# Patient Record
Sex: FEMALE | Race: BLACK OR AFRICAN AMERICAN | NOT HISPANIC OR LATINO | Employment: OTHER | URBAN - METROPOLITAN AREA
[De-identification: names, ages, dates, MRNs, and addresses within clinical notes are randomized per-mention and may not be internally consistent; named-entity substitution may affect disease eponyms.]

---

## 2017-02-15 ENCOUNTER — INFUSION (OUTPATIENT)
Dept: ONCOLOGY | Facility: HOSPITAL | Age: 72
End: 2017-02-15

## 2017-02-15 ENCOUNTER — OFFICE VISIT (OUTPATIENT)
Dept: ONCOLOGY | Facility: CLINIC | Age: 72
End: 2017-02-15

## 2017-02-15 VITALS
TEMPERATURE: 98.2 F | SYSTOLIC BLOOD PRESSURE: 175 MMHG | RESPIRATION RATE: 18 BRPM | DIASTOLIC BLOOD PRESSURE: 79 MMHG | HEIGHT: 60 IN | BODY MASS INDEX: 38.87 KG/M2 | WEIGHT: 198 LBS | HEART RATE: 60 BPM

## 2017-02-15 DIAGNOSIS — C50.911 INVASIVE DUCTAL CARCINOMA OF RIGHT BREAST (HCC): ICD-10-CM

## 2017-02-15 DIAGNOSIS — C50.912 INVASIVE DUCTAL CARCINOMA OF LEFT BREAST (HCC): Primary | ICD-10-CM

## 2017-02-15 DIAGNOSIS — M85.80 OSTEOPENIA DUE TO CANCER THERAPY: ICD-10-CM

## 2017-02-15 DIAGNOSIS — M85.80 OSTEOPENIA DUE TO CANCER THERAPY: Primary | ICD-10-CM

## 2017-02-15 DIAGNOSIS — C50.912 INVASIVE DUCTAL CARCINOMA OF LEFT BREAST (HCC): ICD-10-CM

## 2017-02-15 LAB
ALBUMIN SERPL-MCNC: 4.1 G/DL (ref 3.2–4.8)
ALBUMIN/GLOB SERPL: 1.1 G/DL (ref 1.5–2.5)
ALP SERPL-CCNC: 65 U/L (ref 25–100)
ALT SERPL W P-5'-P-CCNC: 64 U/L (ref 7–40)
ANION GAP SERPL CALCULATED.3IONS-SCNC: 3 MMOL/L (ref 3–11)
AST SERPL-CCNC: 68 U/L (ref 0–33)
BILIRUB SERPL-MCNC: 0.4 MG/DL (ref 0.3–1.2)
BUN BLD-MCNC: 12 MG/DL (ref 9–23)
BUN/CREAT SERPL: 15 (ref 7–25)
CALCIUM SPEC-SCNC: 9.7 MG/DL (ref 8.7–10.4)
CHLORIDE SERPL-SCNC: 102 MMOL/L (ref 99–109)
CO2 SERPL-SCNC: 33 MMOL/L (ref 20–31)
CREAT BLD-MCNC: 0.8 MG/DL (ref 0.6–1.3)
CREAT BLDA-MCNC: 1 MG/DL (ref 0.6–1.3)
ERYTHROCYTE [DISTWIDTH] IN BLOOD BY AUTOMATED COUNT: 14.5 % (ref 11.3–14.5)
GFR SERPL CREATININE-BSD FRML MDRD: 86 ML/MIN/1.73
GLOBULIN UR ELPH-MCNC: 3.8 GM/DL
GLUCOSE BLD-MCNC: 88 MG/DL (ref 70–100)
HCT VFR BLD AUTO: 46.3 % (ref 34.5–44)
HGB BLD-MCNC: 15.7 G/DL (ref 11.5–15.5)
LYMPHOCYTES # BLD AUTO: 1.4 10*3/MM3 (ref 0.6–4.8)
LYMPHOCYTES NFR BLD AUTO: 40.4 % (ref 24–44)
MAGNESIUM SERPL-MCNC: 1.7 MG/DL (ref 1.3–2.7)
MCH RBC QN AUTO: 32.8 PG (ref 27–31)
MCHC RBC AUTO-ENTMCNC: 33.9 G/DL (ref 32–36)
MCV RBC AUTO: 96.7 FL (ref 80–99)
MONOCYTES # BLD AUTO: 0.1 10*3/MM3 (ref 0–1)
MONOCYTES NFR BLD AUTO: 1.9 % (ref 0–12)
NEUTROPHILS # BLD AUTO: 2 10*3/MM3 (ref 1.5–8.3)
NEUTROPHILS NFR BLD AUTO: 57.7 % (ref 41–71)
PHOSPHATE SERPL-MCNC: 4 MG/DL (ref 2.4–5.1)
PLATELET # BLD AUTO: 211 10*3/MM3 (ref 150–450)
PMV BLD AUTO: 7.6 FL (ref 6–12)
POTASSIUM BLD-SCNC: 4.2 MMOL/L (ref 3.5–5.5)
PROT SERPL-MCNC: 7.9 G/DL (ref 5.7–8.2)
RBC # BLD AUTO: 4.79 10*6/MM3 (ref 3.89–5.14)
SODIUM BLD-SCNC: 138 MMOL/L (ref 132–146)
WBC NRBC COR # BLD: 3.5 10*3/MM3 (ref 3.5–10.8)

## 2017-02-15 PROCEDURE — 96374 THER/PROPH/DIAG INJ IV PUSH: CPT

## 2017-02-15 PROCEDURE — 25010000002 ZOLEDRONIC ACID PER 1 MG: Performed by: INTERNAL MEDICINE

## 2017-02-15 PROCEDURE — 99213 OFFICE O/P EST LOW 20 MIN: CPT | Performed by: INTERNAL MEDICINE

## 2017-02-15 PROCEDURE — 84100 ASSAY OF PHOSPHORUS: CPT | Performed by: INTERNAL MEDICINE

## 2017-02-15 PROCEDURE — 83735 ASSAY OF MAGNESIUM: CPT | Performed by: INTERNAL MEDICINE

## 2017-02-15 PROCEDURE — 80053 COMPREHEN METABOLIC PANEL: CPT | Performed by: INTERNAL MEDICINE

## 2017-02-15 RX ORDER — SODIUM CHLORIDE 9 MG/ML
250 INJECTION, SOLUTION INTRAVENOUS ONCE
Status: CANCELLED | OUTPATIENT
Start: 2017-02-15

## 2017-02-15 RX ORDER — COLCHICINE 0.6 MG/1
0.6 CAPSULE ORAL EVERY 12 HOURS SCHEDULED
COMMUNITY
Start: 2017-01-23 | End: 2020-07-23

## 2017-02-15 RX ORDER — METFORMIN HYDROCHLORIDE 500 MG/1
TABLET, EXTENDED RELEASE ORAL
COMMUNITY
Start: 2017-01-30 | End: 2018-06-13 | Stop reason: SDUPTHER

## 2017-02-15 RX ORDER — COLESEVELAM 180 1/1
1875 TABLET ORAL 3 TIMES DAILY
COMMUNITY
End: 2018-06-13

## 2017-02-15 RX ORDER — GABAPENTIN 300 MG/1
300 CAPSULE ORAL DAILY
COMMUNITY
Start: 2017-01-31 | End: 2020-07-23

## 2017-02-15 RX ADMIN — ZOLEDRONIC ACID 4 MG: 4 INJECTION, SOLUTION, CONCENTRATE INTRAVENOUS at 12:37

## 2017-02-15 NOTE — PROGRESS NOTES
PROBLEM LIST:  1. Left-sided pT1bN0 (IA), low-grade invasive ductal carcinoma diagnosed  05/01/2014. She presented with left-sided nipple discharge and an abnormality  on mammogram in 04/2014. Biopsy showed invasive ductal cancer low-grade,  estrogen receptor and progesterone receptor positive, HER-2 negative. She  underwent a left needle localization lumpectomy on 06/27/2014. Final tumor size  is 1.4 cm:   a) Oncotype recurrence score was 0 which is associated with a 3% risk of 10  year recurrence.  b) Adjuvant Arimidex was started after the completion of radiotherapy in  08/2014.  c) Arimidex was discontinued in 11/2014 due to severe hot flashes. Arimidex  resumed in 07/2015.  2. 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 Diagnosed on biopsy on 05/22/2014. She underwent a lumpectomy on  06/27/2014 with final tumor size being 2 mm. It was ER/DE positive with HER-2  not performed.   3. Hypothyroidism.   4. Diabetes.   5. Hypertension.   6. Hyperlipidemia.   7. Autoimmune hepatitis.  8. Osteopenia, on zometa.    Subjective     HISTORY OF PRESENT ILLNESS:   Radha John returns for follow-up.   Since she was last here she has been having trouble with low back pain radiating down her legs.  She had an xray which she reports shows arthritis.  The pain has been present for about a year.  Over that time it has not worsened.  She has tried physical therapy without much benefit.  Otherwise she continues to take anastrozole.  She still has significant hot flashes but has just decided to live with it.    Past Medical History, Past Surgical History, Social History, Family History have been reviewed and are without significant changes except as mentioned.    Review of Systems   A comprehensive 14 point review of systems was performed and was negative except as mentioned.    Medications:  The current medication list was reviewed in the EMR    ALLERGIES:    Allergies   Allergen Reactions  "  • Erythromycin    • Hydrocodone-Acetaminophen        Objective      Visit Vitals   • /79   • Pulse 60   • Temp 98.2 °F (36.8 °C)   • Resp 18   • Ht 60\" (152.4 cm)   • Wt 198 lb (89.8 kg)   • BMI 38.67 kg/m2        Performance Status: 0    General: well appearing female in no acute distress  Neuro: alert and oriented  HEENT: sclera anicteric, oropharynx clear  Lymphatics: no cervical, supraclavicular, or axillary adenopathy  Breast: Bilateral breast exam is performed.  No palpable masses.  Cardiovascular: regular rate and rhythm, no murmurs  Lungs: clear to auscultation bilaterally  Abdomen: soft, nontender, nondistended.  No palpable organomegaly  Extremeties: no lower extremity edema  Skin: no rashes, lesions, bruising, or petechiae  Psych: mood and affect appropriate      CBC and CMP are unremarkable.  She has stable elevation of transaminases with ALT 64 and AST 68.    mammogram on 12/22/2016is benign.  Follow-up in 1 year recommended.    Assessment/Plan   Radha John is a 70-year-old female with stage I ER positive breast cancer who returns for follow-up.she is doing well without evidence of recurrence of breast cancer.  She continues to have hot flashes which have been unresponsive to any medical management. She will have aGraham in December 2017.    She has osteopenia and is on Zometa.  She will have a repeat bone density scan in July 2018.    I will plan to see her back again in 6 months.                  Visit time was 15 minutes, greater than 50% spent in counseling      Sarah Prasad MD  [facility] Hematology and Oncology    2/15/2017          CC:          "

## 2017-05-15 ENCOUNTER — TRANSCRIBE ORDERS (OUTPATIENT)
Dept: MAMMOGRAPHY | Facility: HOSPITAL | Age: 72
End: 2017-05-15

## 2017-05-15 DIAGNOSIS — Z98.890 STATUS POST BREAST LUMPECTOMY: ICD-10-CM

## 2017-05-15 DIAGNOSIS — Z85.3 HISTORY OF LEFT BREAST CANCER: Primary | ICD-10-CM

## 2017-05-22 RX ORDER — ANASTROZOLE 1 MG/1
TABLET ORAL
Qty: 90 TABLET | Refills: 3 | Status: SHIPPED | OUTPATIENT
Start: 2017-05-22 | End: 2018-02-14 | Stop reason: SDUPTHER

## 2017-06-01 ENCOUNTER — HOSPITAL ENCOUNTER (OUTPATIENT)
Dept: MAMMOGRAPHY | Facility: HOSPITAL | Age: 72
Discharge: HOME | End: 2017-06-01
Attending: SURGERY | Admitting: SURGERY

## 2017-06-01 DIAGNOSIS — Z85.3 HISTORY OF LEFT BREAST CANCER: ICD-10-CM

## 2017-06-01 DIAGNOSIS — Z98.890 STATUS POST BREAST LUMPECTOMY: ICD-10-CM

## 2017-06-01 PROCEDURE — G0279 TOMOSYNTHESIS, MAMMO: HCPCS

## 2017-06-01 PROCEDURE — G0204 DX MAMMO INCL CAD BI: HCPCS

## 2017-06-01 PROCEDURE — G0204 DX MAMMO INCL CAD BI: HCPCS | Performed by: RADIOLOGY

## 2017-06-01 PROCEDURE — G0279 TOMOSYNTHESIS, MAMMO: HCPCS | Performed by: RADIOLOGY

## 2017-08-16 ENCOUNTER — OFFICE VISIT (OUTPATIENT)
Dept: ONCOLOGY | Facility: CLINIC | Age: 72
End: 2017-08-16

## 2017-08-16 ENCOUNTER — INFUSION (OUTPATIENT)
Dept: ONCOLOGY | Facility: HOSPITAL | Age: 72
End: 2017-08-16

## 2017-08-16 VITALS
TEMPERATURE: 97.5 F | WEIGHT: 197 LBS | SYSTOLIC BLOOD PRESSURE: 172 MMHG | RESPIRATION RATE: 18 BRPM | HEART RATE: 59 BPM | BODY MASS INDEX: 38.68 KG/M2 | DIASTOLIC BLOOD PRESSURE: 88 MMHG | HEIGHT: 60 IN

## 2017-08-16 DIAGNOSIS — C50.911 INVASIVE DUCTAL CARCINOMA OF RIGHT BREAST (HCC): ICD-10-CM

## 2017-08-16 DIAGNOSIS — M85.80 OSTEOPENIA DUE TO CANCER THERAPY: Primary | ICD-10-CM

## 2017-08-16 DIAGNOSIS — C50.912 INVASIVE DUCTAL CARCINOMA OF LEFT BREAST (HCC): Primary | ICD-10-CM

## 2017-08-16 DIAGNOSIS — M85.80 OSTEOPENIA DUE TO CANCER THERAPY: ICD-10-CM

## 2017-08-16 DIAGNOSIS — C50.912 INVASIVE DUCTAL CARCINOMA OF LEFT BREAST (HCC): ICD-10-CM

## 2017-08-16 LAB
ALBUMIN SERPL-MCNC: 3.9 G/DL (ref 3.2–4.8)
ALBUMIN/GLOB SERPL: 1.2 G/DL (ref 1.5–2.5)
ALP SERPL-CCNC: 58 U/L (ref 25–100)
ALT SERPL W P-5'-P-CCNC: 37 U/L (ref 7–40)
ANION GAP SERPL CALCULATED.3IONS-SCNC: 4 MMOL/L (ref 3–11)
AST SERPL-CCNC: 26 U/L (ref 0–33)
BILIRUB SERPL-MCNC: 0.3 MG/DL (ref 0.3–1.2)
BUN BLD-MCNC: 17 MG/DL (ref 9–23)
BUN/CREAT SERPL: 21.3 (ref 7–25)
CALCIUM SPEC-SCNC: 8.8 MG/DL (ref 8.7–10.4)
CHLORIDE SERPL-SCNC: 105 MMOL/L (ref 99–109)
CO2 SERPL-SCNC: 31 MMOL/L (ref 20–31)
CREAT BLD-MCNC: 0.8 MG/DL (ref 0.6–1.3)
CREAT BLDA-MCNC: 1 MG/DL (ref 0.6–1.3)
ERYTHROCYTE [DISTWIDTH] IN BLOOD BY AUTOMATED COUNT: 15.3 % (ref 11.3–14.5)
GFR SERPL CREATININE-BSD FRML MDRD: 86 ML/MIN/1.73
GLOBULIN UR ELPH-MCNC: 3.3 GM/DL
GLUCOSE BLD-MCNC: 98 MG/DL (ref 70–100)
HCT VFR BLD AUTO: 47.8 % (ref 34.5–44)
HGB BLD-MCNC: 15.4 G/DL (ref 11.5–15.5)
LYMPHOCYTES # BLD AUTO: 1.4 10*3/MM3 (ref 0.6–4.8)
LYMPHOCYTES NFR BLD AUTO: 28.3 % (ref 24–44)
MCH RBC QN AUTO: 32.6 PG (ref 27–31)
MCHC RBC AUTO-ENTMCNC: 32.1 G/DL (ref 32–36)
MCV RBC AUTO: 101.5 FL (ref 80–99)
MONOCYTES # BLD AUTO: 0.2 10*3/MM3 (ref 0–1)
MONOCYTES NFR BLD AUTO: 4.6 % (ref 0–12)
NEUTROPHILS # BLD AUTO: 3.4 10*3/MM3 (ref 1.5–8.3)
NEUTROPHILS NFR BLD AUTO: 67.1 % (ref 41–71)
PLATELET # BLD AUTO: 179 10*3/MM3 (ref 150–450)
PMV BLD AUTO: 6.9 FL (ref 6–12)
POTASSIUM BLD-SCNC: 3.8 MMOL/L (ref 3.5–5.5)
PROT SERPL-MCNC: 7.2 G/DL (ref 5.7–8.2)
RBC # BLD AUTO: 4.71 10*6/MM3 (ref 3.89–5.14)
SODIUM BLD-SCNC: 140 MMOL/L (ref 132–146)
WBC NRBC COR # BLD: 5.1 10*3/MM3 (ref 3.5–10.8)

## 2017-08-16 PROCEDURE — 85025 COMPLETE CBC W/AUTO DIFF WBC: CPT

## 2017-08-16 PROCEDURE — 80053 COMPREHEN METABOLIC PANEL: CPT

## 2017-08-16 PROCEDURE — 36415 COLL VENOUS BLD VENIPUNCTURE: CPT

## 2017-08-16 PROCEDURE — 99214 OFFICE O/P EST MOD 30 MIN: CPT | Performed by: INTERNAL MEDICINE

## 2017-08-16 PROCEDURE — 96375 TX/PRO/DX INJ NEW DRUG ADDON: CPT

## 2017-08-16 PROCEDURE — 82565 ASSAY OF CREATININE: CPT

## 2017-08-16 PROCEDURE — 96365 THER/PROPH/DIAG IV INF INIT: CPT

## 2017-08-16 PROCEDURE — 25010000002 ZOLEDRONIC ACID PER 1 MG: Performed by: INTERNAL MEDICINE

## 2017-08-16 RX ORDER — SODIUM CHLORIDE 9 MG/ML
250 INJECTION, SOLUTION INTRAVENOUS ONCE
Status: CANCELLED | OUTPATIENT
Start: 2017-08-16

## 2017-08-16 RX ORDER — SODIUM CHLORIDE 9 MG/ML
250 INJECTION, SOLUTION INTRAVENOUS ONCE
Status: COMPLETED | OUTPATIENT
Start: 2017-08-16 | End: 2017-08-16

## 2017-08-16 RX ORDER — BUDESONIDE 3 MG/1
3 CAPSULE, COATED PELLETS ORAL EVERY MORNING
COMMUNITY
Start: 2017-07-22 | End: 2018-12-17 | Stop reason: DRUGHIGH

## 2017-08-16 RX ADMIN — SODIUM CHLORIDE 250 ML: 9 INJECTION, SOLUTION INTRAVENOUS at 10:23

## 2017-08-16 RX ADMIN — ZOLEDRONIC ACID 4 MG: 4 INJECTION, SOLUTION, CONCENTRATE INTRAVENOUS at 10:50

## 2017-08-16 NOTE — PROGRESS NOTES
PROBLEM LIST:  1. Left-sided pT1bN0 (IA), low-grade invasive ductal carcinoma diagnosed  05/01/2014. She presented with left-sided nipple discharge and an abnormality  on mammogram in 04/2014. Biopsy showed invasive ductal cancer low-grade,  estrogen receptor and progesterone receptor positive, HER-2 negative. She  underwent a left needle localization lumpectomy on 06/27/2014. Final tumor size  is 1.4 cm:   a) Oncotype recurrence score was 0 which is associated with a 3% risk of 10  year recurrence.  b) Adjuvant Arimidex was started after the completion of radiotherapy in  08/2014.  c) Arimidex was discontinued in 11/2014 due to severe hot flashes. Arimidex  resumed in 07/2015.  2. 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 Diagnosed on biopsy on 05/22/2014. She underwent a lumpectomy on  06/27/2014 with final tumor size being 2 mm. It was ER/TX positive with HER-2  not performed.   3. Hypothyroidism.   4. Diabetes.   5. Hypertension.   6. Hyperlipidemia.   7. Autoimmune hepatitis.  8. Osteopenia, on zometa.    Subjective     HISTORY OF PRESENT ILLNESS:   Radha John returns for follow-up.   She says she has been doing pretty well.  She had a recent flare of her autoimmune hepatitis and had to go back on some medication for this.  She says it is improving.  She continues to have severe hot flashes.  She says the sweat just runs down her face.  She is currently taking gabapentin for her arthritis pain.  We previously tried Effexor without benefit.    Past Medical History, Past Surgical History, Social History, Family History have been reviewed and are without significant changes except as mentioned.    Review of Systems   A comprehensive 14 point review of systems was performed and was negative except as mentioned.    Medications:  The current medication list was reviewed in the EMR    ALLERGIES:    Allergies   Allergen Reactions   • Erythromycin    •  "Hydrocodone-Acetaminophen        Objective      /88 Comment: LUE  Pulse 59  Temp 97.5 °F (36.4 °C) (Temporal Artery )   Resp 18  Ht 60\" (152.4 cm)  Wt 197 lb (89.4 kg)  BMI 38.47 kg/m2     Performance Status: 0    General: well appearing female in no acute distress  Neuro: alert and oriented  HEENT: sclera anicteric, oropharynx clear  Lymphatics: no cervical, supraclavicular, or axillary adenopathy  Breast: Bilateral breast exam is performed.  No palpable masses.  Cardiovascular: regular rate and rhythm, no murmurs  Lungs: clear to auscultation bilaterally  Abdomen: soft, nontender, nondistended.  No palpable organomegaly  Extremeties: no lower extremity edema  Skin: no rashes, lesions, bruising, or petechiae  Psych: mood and affect appropriate        Assessment/Plan   Radha John is a 70-year-old female with stage I ER positive breast cancer who returns for follow-up.  She continues to do well without evidence of cancer recurrence.     She continues to have significant hot flashes.  We have tried Effexor and gabapentin in the past.  We tried stopping the aromatase inhibitor and she did not have relief with this either.  We will try clonidine 0.1 mg patch.  We can increase the dose if needed.  She will call and let us know how this is going.  She will have mammogram in December 2017.    She has osteopenia and is on Zometa which she will receive today.  She will have a repeat bone density scan in July 2018.    I will plan to see her back again in 6 months.                  Visit time was 25 minutes, greater than 50% spent in counseling      Sarah Prasad MD  [facility] Hematology and Oncology    8/16/2017          CC:          "

## 2018-01-24 ENCOUNTER — TRANSCRIBE ORDERS (OUTPATIENT)
Dept: MAMMOGRAPHY | Facility: HOSPITAL | Age: 73
End: 2018-01-24

## 2018-01-24 DIAGNOSIS — C50.912 DUCTAL CARCINOMA OF BOTH BREASTS (HCC): Primary | ICD-10-CM

## 2018-01-24 DIAGNOSIS — C50.911 DUCTAL CARCINOMA OF BOTH BREASTS (HCC): Primary | ICD-10-CM

## 2018-01-25 ENCOUNTER — TELEPHONE (OUTPATIENT)
Dept: ONCOLOGY | Facility: CLINIC | Age: 73
End: 2018-01-25

## 2018-01-25 NOTE — TELEPHONE ENCOUNTER
Returned call to patient. On Tuesday she started having tenderness on her left nipple. Yesterday, after taking her bra off she noticed her left breast was red and warm to touch. She cannot feel any lumps/bumps. Patient was due for mammogram in December 2017. She has a 6 month follow up apt scheduled with Dr. Prasad on 2-14-18. I told her to call the breast center and schedule a mammogram sometime before 2/14 and call me back if the pain/redness gets worse and we will get her in to see Dr. Prasad sooner than the 14th.

## 2018-01-25 NOTE — TELEPHONE ENCOUNTER
----- Message from Yina Hurtado sent at 1/25/2018  8:43 AM EST -----  Regarding: ELIAS-LEFT BREAST RED AND SORE  Contact: [phone]  Patient called and her left breast is red and sore. Please call.

## 2018-01-26 ENCOUNTER — TELEPHONE (OUTPATIENT)
Dept: ONCOLOGY | Facility: CLINIC | Age: 73
End: 2018-01-26

## 2018-01-26 DIAGNOSIS — C50.912 INVASIVE DUCTAL CARCINOMA OF LEFT BREAST (HCC): Primary | ICD-10-CM

## 2018-01-26 DIAGNOSIS — C50.911 INVASIVE DUCTAL CARCINOMA OF RIGHT BREAST (HCC): ICD-10-CM

## 2018-01-26 NOTE — TELEPHONE ENCOUNTER
1-25-18: Yesterday, patient called me back because when she tried to schedule her mammogram the radiologist told her she could not have one until June. I told her I would check with Dr. Prasad and call her back tomorrow.     1-26-18: Called patient, no answer, left VM. Told her since she is experiencing new symptoms, we can do a diagnostic left breast mammogram. I have placed this order in epic and scheduled her for 2-6-18. She is to arrive at 8:40 for 9:00 apt. [address]

## 2018-02-06 ENCOUNTER — HOSPITAL ENCOUNTER (OUTPATIENT)
Dept: MAMMOGRAPHY | Facility: HOSPITAL | Age: 73
Discharge: HOME | End: 2018-02-06
Attending: INTERNAL MEDICINE | Admitting: INTERNAL MEDICINE

## 2018-02-06 DIAGNOSIS — C50.912 INVASIVE DUCTAL CARCINOMA OF LEFT BREAST (HCC): ICD-10-CM

## 2018-02-06 DIAGNOSIS — C50.911 INVASIVE DUCTAL CARCINOMA OF RIGHT BREAST (HCC): ICD-10-CM

## 2018-02-06 PROCEDURE — 77065 DX MAMMO INCL CAD UNI: CPT

## 2018-02-06 PROCEDURE — G0279 TOMOSYNTHESIS, MAMMO: HCPCS

## 2018-02-06 PROCEDURE — 77065 DX MAMMO INCL CAD UNI: CPT | Performed by: RADIOLOGY

## 2018-02-06 PROCEDURE — G0279 TOMOSYNTHESIS, MAMMO: HCPCS | Performed by: RADIOLOGY

## 2018-02-14 ENCOUNTER — INFUSION (OUTPATIENT)
Dept: ONCOLOGY | Facility: HOSPITAL | Age: 73
End: 2018-02-14

## 2018-02-14 ENCOUNTER — OFFICE VISIT (OUTPATIENT)
Dept: ONCOLOGY | Facility: CLINIC | Age: 73
End: 2018-02-14

## 2018-02-14 VITALS
RESPIRATION RATE: 18 BRPM | SYSTOLIC BLOOD PRESSURE: 155 MMHG | DIASTOLIC BLOOD PRESSURE: 88 MMHG | BODY MASS INDEX: 40.05 KG/M2 | HEIGHT: 60 IN | WEIGHT: 204 LBS | HEART RATE: 61 BPM | TEMPERATURE: 97.1 F

## 2018-02-14 DIAGNOSIS — C50.911 INVASIVE DUCTAL CARCINOMA OF RIGHT BREAST (HCC): Primary | ICD-10-CM

## 2018-02-14 DIAGNOSIS — M85.80 OSTEOPENIA DUE TO CANCER THERAPY: ICD-10-CM

## 2018-02-14 DIAGNOSIS — M85.80 OSTEOPENIA DUE TO CANCER THERAPY: Primary | ICD-10-CM

## 2018-02-14 DIAGNOSIS — C50.912 INVASIVE DUCTAL CARCINOMA OF LEFT BREAST (HCC): ICD-10-CM

## 2018-02-14 DIAGNOSIS — C50.911 INVASIVE DUCTAL CARCINOMA OF RIGHT BREAST (HCC): ICD-10-CM

## 2018-02-14 LAB
CREAT BLDA-MCNC: 1 MG/DL (ref 0.6–1.3)
ERYTHROCYTE [DISTWIDTH] IN BLOOD BY AUTOMATED COUNT: 15.6 % (ref 11.3–14.5)
HCT VFR BLD AUTO: 47.4 % (ref 34.5–44)
HGB BLD-MCNC: 14.8 G/DL (ref 11.5–15.5)
LYMPHOCYTES # BLD AUTO: 1.5 10*3/MM3 (ref 0.6–4.8)
LYMPHOCYTES NFR BLD AUTO: 38.3 % (ref 24–44)
MCH RBC QN AUTO: 31.4 PG (ref 27–31)
MCHC RBC AUTO-ENTMCNC: 31.2 G/DL (ref 32–36)
MCV RBC AUTO: 100.7 FL (ref 80–99)
MONOCYTES # BLD AUTO: 0.3 10*3/MM3 (ref 0–1)
MONOCYTES NFR BLD AUTO: 7.1 % (ref 0–12)
NEUTROPHILS # BLD AUTO: 2.2 10*3/MM3 (ref 1.5–8.3)
NEUTROPHILS NFR BLD AUTO: 54.6 % (ref 41–71)
PLATELET # BLD AUTO: 227 10*3/MM3 (ref 150–450)
PMV BLD AUTO: 7.4 FL (ref 6–12)
RBC # BLD AUTO: 4.71 10*6/MM3 (ref 3.89–5.14)
WBC NRBC COR # BLD: 4 10*3/MM3 (ref 3.5–10.8)

## 2018-02-14 PROCEDURE — 85025 COMPLETE CBC W/AUTO DIFF WBC: CPT

## 2018-02-14 PROCEDURE — 96374 THER/PROPH/DIAG INJ IV PUSH: CPT

## 2018-02-14 PROCEDURE — 82565 ASSAY OF CREATININE: CPT

## 2018-02-14 PROCEDURE — 36415 COLL VENOUS BLD VENIPUNCTURE: CPT

## 2018-02-14 PROCEDURE — 99214 OFFICE O/P EST MOD 30 MIN: CPT | Performed by: INTERNAL MEDICINE

## 2018-02-14 PROCEDURE — 25010000002 ZOLEDRONIC ACID PER 1 MG: Performed by: INTERNAL MEDICINE

## 2018-02-14 RX ORDER — SODIUM CHLORIDE 9 MG/ML
250 INJECTION, SOLUTION INTRAVENOUS ONCE
Status: CANCELLED | OUTPATIENT
Start: 2018-02-14

## 2018-02-14 RX ORDER — ANASTROZOLE 1 MG/1
1 TABLET ORAL DAILY
Qty: 90 TABLET | Refills: 3 | Status: SHIPPED | OUTPATIENT
Start: 2018-02-14 | End: 2019-03-02 | Stop reason: SDUPTHER

## 2018-02-14 RX ADMIN — ZOLEDRONIC ACID 4 MG: 4 INJECTION, SOLUTION, CONCENTRATE INTRAVENOUS at 10:06

## 2018-02-14 NOTE — PROGRESS NOTES
PROBLEM LIST:  1. Left-sided pT1bN0 (IA), low-grade invasive ductal carcinoma diagnosed  05/01/2014. She presented with left-sided nipple discharge and an abnormality  on mammogram in 04/2014. Biopsy showed invasive ductal cancer low-grade,  estrogen receptor and progesterone receptor positive, HER-2 negative. She  underwent a left needle localization lumpectomy on 06/27/2014. Final tumor size  is 1.4 cm:   a) Oncotype recurrence score was 0 which is associated with a 3% risk of 10  year recurrence.  b) Adjuvant Arimidex was started after the completion of radiotherapy in  08/2014.  c) Arimidex was discontinued in 11/2014 due to severe hot flashes. Arimidex  resumed in 07/2015.  2. 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 Diagnosed on biopsy on 05/22/2014. She underwent a lumpectomy on  06/27/2014 with final tumor size being 2 mm. It was ER/[place] positive with HER-2  not performed.   3. Hypothyroidism.   4. Diabetes.   5. Hypertension.   6. Hyperlipidemia.   7. Autoimmune hepatitis.  8. Osteopenia, on zometa.    Subjective     HISTORY OF PRESENT ILLNESS:   Radha John returns for follow-up.  She recently had an episode of cellulitis of the breast.  This was quite worrisome for her.  She saw Dr. Henry who placed her on antibiotics which led to a fairly quick resolution of her symptoms.  She otherwise is doing well.  She continues to take anastrozole.  She does report recently been diagnosed with a skin cancer on the scalp.  She was told this could be related to her Imuran which she takes for autoimmune hepatitis.  She is thinking of reducing her Imuran dose.    Past Medical History, Past Surgical History, Social History, Family History have been reviewed and are without significant changes except as mentioned.    Review of Systems   A comprehensive 14 point review of systems was performed and was negative except as mentioned.    Medications:  The current medication list was  "reviewed in the EMR    ALLERGIES:    Allergies   Allergen Reactions   • Erythromycin    • Hydrocodone-Acetaminophen        Objective      /88 Comment: LUE  Pulse 61  Temp 97.1 °F (36.2 °C) (Temporal Artery )   Resp 18  Ht 152.4 cm (60\")  Wt 92.5 kg (204 lb)  BMI 39.84 kg/m2     Performance Status: 0    General: well appearing female in no acute distress  Neuro: alert and oriented  HEENT: sclera anicteric, oropharynx clear  Lymphatics: no cervical, supraclavicular, or axillary adenopathy  Cardiovascular: regular rate and rhythm, no murmurs  Lungs: clear to auscultation bilaterally  Abdomen: soft, nontender, nondistended.  No palpable organomegaly  Extremeties: no lower extremity edema  Skin: no rashes, lesions, bruising, or petechiae  Psych: mood and affect appropriate        Assessment/Plan   Radha John is a 72 y.o.  female with stage I ER positive breast cancer who returns for follow-up.  She is on anastrozole and is doing well with no evidence of disease recurrence.    She is due for a mammogram in June.  She had a recent mammogram done because of the redness and swelling of the breast which did not show any concerning findings.    She has osteopenia will receive her 4th dose of zometa today.  We will plan to have this be the last dose.    I will plan to see her back again in 6 months.  I will plan to order a repeat bone density scan when she returns.                  Visit time was 25 minutes, greater than 50% spent in counseling      Sarah Prasad MD  [facility] Hematology and Oncology    2/14/2018          CC:          "

## 2018-06-04 ENCOUNTER — APPOINTMENT (OUTPATIENT)
Dept: MAMMOGRAPHY | Facility: HOSPITAL | Age: 73
End: 2018-06-04
Attending: SURGERY

## 2018-06-13 ENCOUNTER — APPOINTMENT (OUTPATIENT)
Dept: LAB | Facility: HOSPITAL | Age: 73
End: 2018-06-13

## 2018-06-13 ENCOUNTER — OFFICE VISIT (OUTPATIENT)
Dept: ONCOLOGY | Facility: CLINIC | Age: 73
End: 2018-06-13

## 2018-06-13 VITALS
TEMPERATURE: 98 F | WEIGHT: 198 LBS | HEIGHT: 60 IN | BODY MASS INDEX: 38.87 KG/M2 | DIASTOLIC BLOOD PRESSURE: 84 MMHG | HEART RATE: 58 BPM | SYSTOLIC BLOOD PRESSURE: 172 MMHG | RESPIRATION RATE: 20 BRPM

## 2018-06-13 DIAGNOSIS — C50.912 INVASIVE DUCTAL CARCINOMA OF LEFT BREAST (HCC): Primary | ICD-10-CM

## 2018-06-13 DIAGNOSIS — D75.1 ERYTHROCYTOSIS: ICD-10-CM

## 2018-06-13 DIAGNOSIS — C50.911 INVASIVE DUCTAL CARCINOMA OF RIGHT BREAST (HCC): ICD-10-CM

## 2018-06-13 LAB
ALBUMIN SERPL-MCNC: 4.01 G/DL (ref 3.2–4.8)
ALBUMIN/GLOB SERPL: 1.2 G/DL (ref 1.5–2.5)
ALP SERPL-CCNC: 54 U/L (ref 25–100)
ALT SERPL W P-5'-P-CCNC: 62 U/L (ref 7–40)
ANION GAP SERPL CALCULATED.3IONS-SCNC: 5 MMOL/L (ref 3–11)
AST SERPL-CCNC: 37 U/L (ref 0–33)
BILIRUB SERPL-MCNC: 0.5 MG/DL (ref 0.3–1.2)
BUN BLD-MCNC: 16 MG/DL (ref 9–23)
BUN/CREAT SERPL: 17.8 (ref 7–25)
CALCIUM SPEC-SCNC: 9 MG/DL (ref 8.7–10.4)
CHLORIDE SERPL-SCNC: 102 MMOL/L (ref 99–109)
CO2 SERPL-SCNC: 34 MMOL/L (ref 20–31)
CREAT BLD-MCNC: 0.9 MG/DL (ref 0.6–1.3)
ERYTHROCYTE [DISTWIDTH] IN BLOOD BY AUTOMATED COUNT: 16.1 % (ref 11.3–14.5)
GFR SERPL CREATININE-BSD FRML MDRD: 75 ML/MIN/1.73
GLOBULIN UR ELPH-MCNC: 3.4 GM/DL
GLUCOSE BLD-MCNC: 80 MG/DL (ref 70–100)
HCT VFR BLD AUTO: 50.2 % (ref 34.5–44)
HGB BLD-MCNC: 16.2 G/DL (ref 11.5–15.5)
LDH SERPL-CCNC: 289 U/L (ref 120–246)
LYMPHOCYTES # BLD AUTO: 2 10*3/MM3 (ref 0.6–4.8)
LYMPHOCYTES NFR BLD AUTO: 31.6 % (ref 24–44)
MAGNESIUM SERPL-MCNC: 2.1 MG/DL (ref 1.3–2.7)
MCH RBC QN AUTO: 32.4 PG (ref 27–31)
MCHC RBC AUTO-ENTMCNC: 32.3 G/DL (ref 32–36)
MCV RBC AUTO: 100.2 FL (ref 80–99)
MONOCYTES # BLD AUTO: 0.2 10*3/MM3 (ref 0–1)
MONOCYTES NFR BLD AUTO: 3.8 % (ref 0–12)
NEUTROPHILS # BLD AUTO: 4.1 10*3/MM3 (ref 1.5–8.3)
NEUTROPHILS NFR BLD AUTO: 64.6 % (ref 41–71)
PHOSPHATE SERPL-MCNC: 4.2 MG/DL (ref 2.4–5.1)
PLATELET # BLD AUTO: 212 10*3/MM3 (ref 150–450)
PMV BLD AUTO: 6.8 FL (ref 6–12)
POTASSIUM BLD-SCNC: 4.6 MMOL/L (ref 3.5–5.5)
PROT SERPL-MCNC: 7.4 G/DL (ref 5.7–8.2)
RBC # BLD AUTO: 5.01 10*6/MM3 (ref 3.89–5.14)
RETICS/RBC NFR AUTO: 1.63 % (ref 0.5–1.5)
SODIUM BLD-SCNC: 141 MMOL/L (ref 132–146)
WBC NRBC COR # BLD: 6.3 10*3/MM3 (ref 3.5–10.8)

## 2018-06-13 PROCEDURE — 99214 OFFICE O/P EST MOD 30 MIN: CPT | Performed by: INTERNAL MEDICINE

## 2018-06-13 PROCEDURE — 85045 AUTOMATED RETICULOCYTE COUNT: CPT | Performed by: INTERNAL MEDICINE

## 2018-06-13 PROCEDURE — 36415 COLL VENOUS BLD VENIPUNCTURE: CPT

## 2018-06-13 PROCEDURE — 82668 ASSAY OF ERYTHROPOIETIN: CPT | Performed by: INTERNAL MEDICINE

## 2018-06-13 PROCEDURE — 85025 COMPLETE CBC W/AUTO DIFF WBC: CPT | Performed by: INTERNAL MEDICINE

## 2018-06-13 PROCEDURE — 83735 ASSAY OF MAGNESIUM: CPT

## 2018-06-13 PROCEDURE — 80053 COMPREHEN METABOLIC PANEL: CPT | Performed by: INTERNAL MEDICINE

## 2018-06-13 PROCEDURE — 84100 ASSAY OF PHOSPHORUS: CPT

## 2018-06-13 PROCEDURE — 83615 LACTATE (LD) (LDH) ENZYME: CPT | Performed by: INTERNAL MEDICINE

## 2018-06-13 RX ORDER — TRAMADOL HYDROCHLORIDE 50 MG/1
50 TABLET ORAL EVERY 8 HOURS PRN
COMMUNITY
End: 2020-07-23

## 2018-06-13 NOTE — PROGRESS NOTES
PROBLEM LIST:  1. Left-sided pT1bN0 (IA), low-grade invasive ductal carcinoma diagnosed  05/01/2014. She presented with left-sided nipple discharge and an abnormality  on mammogram in 04/2014. Biopsy showed invasive ductal cancer low-grade,  estrogen receptor and progesterone receptor positive, HER-2 negative. She  underwent a left needle localization lumpectomy on 06/27/2014. Final tumor size  is 1.4 cm:   a) Oncotype recurrence score was 0 which is associated with a 3% risk of 10  year recurrence.  b) Adjuvant Arimidex was started after the completion of radiotherapy in  08/2014.  c) Arimidex was discontinued in 11/2014 due to severe hot flashes. Arimidex  resumed in 07/2015.  2. 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 Diagnosed on biopsy on 05/22/2014. She underwent a lumpectomy on  06/27/2014 with final tumor size being 2 mm. It was ER/[place] positive with HER-2  not performed.   3. Hypothyroidism.   4. Diabetes.   5. Hypertension.   6. Hyperlipidemia.   7. Autoimmune hepatitis.  8. Osteopenia, received zometa x 4 doses, completed February 2018.  9. Erythrocytosis    Subjective     HISTORY OF PRESENT ILLNESS:   Radha John returns for follow-up.  She is here to discuss some recent labs which showed a hgb of 17.3.  She says she has been feeling well and has no new complaints or concerns.  She does mention that she has recently had a mild elevation of her liver enzymes and she is concerned about this.  Her GI doctor Dr. Hansen has recommended that she check them every 2 weeks.  She is currently taking Imuran and budesonide.    Past Medical History, Past Surgical History, Social History, Family History have been reviewed and are without significant changes except as mentioned.    Review of Systems   A comprehensive 14 point review of systems was performed and was negative except as mentioned.    Medications:  The current medication list was reviewed in the  "EMR    ALLERGIES:    Allergies   Allergen Reactions   • Erythromycin    • Hydrocodone-Acetaminophen        Objective      /84   Pulse 58   Temp 98 °F (36.7 °C)   Resp 20   Ht 152.4 cm (60\")   Wt 89.8 kg (198 lb)   BMI 38.67 kg/m²      Performance Status: 0    General: well appearing female in no acute distress  Neuro: alert and oriented  HEENT: sclera anicteric, oropharynx clear  Lymphatics: no cervical, supraclavicular, or axillary adenopathy  Cardiovascular: regular rate and rhythm, no murmurs  Lungs: clear to auscultation bilaterally  Abdomen: soft, nontender, nondistended.  No palpable organomegaly  Extremeties: no lower extremity edema  Skin: no rashes, lesions, bruising, or petechiae  Psych: mood and affect appropriate    Labs:    11/28/17- wbc 4.2, hgb 15.5, hct 46, mcv 98.9, plt 147    2/14/18 - wbc 4.0, hgb 14.8, hct 47.4, mcv 100.7, plt 227    5/9/18 - wbc 5.2, hgb 16.6, hct 48.8, mcv 98, plt 181    5/30/18 - wbc 5.7, hgb 17.3, hct 51.2, mcv 99, plt 191      Assessment/Plan   Radha John is a 72 y.o.  female with stage I ER positive breast cancer who returns for evaluation of a recently elevated hemoglobin and hematocrit.  In reviewing her labs in the past few years she has occasionally had a hematocrit at the upper limit of normal.  On May 30 her hemoglobin was 17.3, hematocrit 51.2%.  She is asymptomatic from this.    We discussed potential explanations for an elevated hemoglobin and hematocrit.  The most common situation is a secondary erythrocytosis, in which the body is responding to chronic hypoxia.  This can be seen with pulmonary disease or sleep apnea.  Rarely, certain malignancies can lead to increased erythropoeitin production, namely HCC or renal cell carcinoma.   Primary erythrocytosis can be seen with myeloproliferative disorders such as polycythemia vera.     We will check some labs today including a repeat CBC, LFTs, and erythropoietin level.  I will call her regarding the " results and whether further workup is required.    Repeat bone density scan due in fall 2018.                  Visit time was 25 minutes, greater than 50% spent in counseling      Sarah Prasad MD  [facility] Hematology and Oncology    6/13/2018          CC:

## 2018-06-14 LAB — ETHNIC BACKGROUND STATED: 37.1 MIU/ML (ref 2.6–18.5)

## 2018-06-15 ENCOUNTER — TELEPHONE (OUTPATIENT)
Dept: ONCOLOGY | Facility: CLINIC | Age: 73
End: 2018-06-15

## 2018-06-15 DIAGNOSIS — R79.89 ABNORMAL LIVER FUNCTION TEST: Primary | ICD-10-CM

## 2018-06-15 NOTE — TELEPHONE ENCOUNTER
Called patient re: labs.  epo level is elevated along with hgb/hct.  Given her history of hepatitis as well as recent LFT elevation, will do AFP and CT imaging of the liver to r/o a liver tumor, which can be associated with erythrocytosis.

## 2018-06-19 ENCOUNTER — HOSPITAL ENCOUNTER (OUTPATIENT)
Dept: CT IMAGING | Facility: HOSPITAL | Age: 73
Discharge: HOME | End: 2018-06-19
Attending: INTERNAL MEDICINE | Admitting: INTERNAL MEDICINE

## 2018-06-19 DIAGNOSIS — R79.89 ABNORMAL LIVER FUNCTION TEST: ICD-10-CM

## 2018-06-19 PROCEDURE — 25010000002 IOPAMIDOL 61 % SOLUTION: Performed by: INTERNAL MEDICINE

## 2018-06-19 PROCEDURE — 74160 CT ABDOMEN W/CONTRAST: CPT

## 2018-06-19 RX ADMIN — IOPAMIDOL 80 ML: 612 INJECTION, SOLUTION INTRAVENOUS at 08:35

## 2018-06-21 ENCOUNTER — TELEPHONE (OUTPATIENT)
Dept: ONCOLOGY | Facility: CLINIC | Age: 73
End: 2018-06-21

## 2018-06-21 NOTE — TELEPHONE ENCOUNTER
Called pt to let her know CT abd showed moderate steatosis but no other abnormality.  No liver or kidney tumor that could explain erythrocytosis and elevated epo level.  For now we can monitor her labs.  If hgb continues to increase over time, she may need eval for lung disease/sleep apnea.

## 2018-06-28 ENCOUNTER — HOSPITAL ENCOUNTER (OUTPATIENT)
Dept: MAMMOGRAPHY | Facility: HOSPITAL | Age: 73
Discharge: HOME | End: 2018-06-28
Attending: SURGERY | Admitting: SURGERY

## 2018-06-28 ENCOUNTER — TRANSCRIBE ORDERS (OUTPATIENT)
Dept: MAMMOGRAPHY | Facility: HOSPITAL | Age: 73
End: 2018-06-28

## 2018-06-28 ENCOUNTER — LAB REQUISITION (OUTPATIENT)
Dept: LAB | Facility: HOSPITAL | Age: 73
End: 2018-06-28

## 2018-06-28 DIAGNOSIS — C50.912 DUCTAL CARCINOMA OF BOTH BREASTS (HCC): ICD-10-CM

## 2018-06-28 DIAGNOSIS — R92.8 ABNORMAL MAMMOGRAM: Primary | ICD-10-CM

## 2018-06-28 DIAGNOSIS — C50.911 DUCTAL CARCINOMA OF BOTH BREASTS (HCC): ICD-10-CM

## 2018-06-28 DIAGNOSIS — Z00.00 ROUTINE GENERAL MEDICAL EXAMINATION AT A HEALTH CARE FACILITY: ICD-10-CM

## 2018-06-28 PROCEDURE — G0279 TOMOSYNTHESIS, MAMMO: HCPCS

## 2018-06-28 PROCEDURE — 77066 DX MAMMO INCL CAD BI: CPT | Performed by: RADIOLOGY

## 2018-06-28 PROCEDURE — 77066 DX MAMMO INCL CAD BI: CPT

## 2018-06-28 PROCEDURE — G0279 TOMOSYNTHESIS, MAMMO: HCPCS | Performed by: RADIOLOGY

## 2018-06-28 PROCEDURE — 36415 COLL VENOUS BLD VENIPUNCTURE: CPT | Performed by: INTERNAL MEDICINE

## 2018-08-29 ENCOUNTER — APPOINTMENT (OUTPATIENT)
Dept: LAB | Facility: HOSPITAL | Age: 73
End: 2018-08-29

## 2018-08-29 ENCOUNTER — OFFICE VISIT (OUTPATIENT)
Dept: ONCOLOGY | Facility: CLINIC | Age: 73
End: 2018-08-29

## 2018-08-29 VITALS
HEART RATE: 58 BPM | TEMPERATURE: 97.6 F | SYSTOLIC BLOOD PRESSURE: 164 MMHG | BODY MASS INDEX: 40.05 KG/M2 | WEIGHT: 204 LBS | HEIGHT: 60 IN | DIASTOLIC BLOOD PRESSURE: 79 MMHG | OXYGEN SATURATION: 94 % | RESPIRATION RATE: 20 BRPM

## 2018-08-29 DIAGNOSIS — C50.912 INVASIVE DUCTAL CARCINOMA OF LEFT BREAST (HCC): Primary | ICD-10-CM

## 2018-08-29 DIAGNOSIS — M85.9 DISORDER OF BONE DENSITY AND STRUCTURE, UNSPECIFIED: ICD-10-CM

## 2018-08-29 DIAGNOSIS — D75.1 ERYTHROCYTOSIS: ICD-10-CM

## 2018-08-29 PROCEDURE — 99214 OFFICE O/P EST MOD 30 MIN: CPT | Performed by: INTERNAL MEDICINE

## 2018-08-29 NOTE — PROGRESS NOTES
PROBLEM LIST:  1. Left-sided pT1bN0 (IA), low-grade invasive ductal carcinoma diagnosed  05/01/2014. She presented with left-sided nipple discharge and an abnormality  on mammogram in 04/2014. Biopsy showed invasive ductal cancer low-grade,  estrogen receptor and progesterone receptor positive, HER-2 negative. She  underwent a left needle localization lumpectomy on 06/27/2014. Final tumor size  is 1.4 cm:   a) Oncotype recurrence score was 0 which is associated with a 3% risk of 10  year recurrence.  b) Adjuvant Arimidex was started after the completion of radiotherapy in  08/2014.  c) Arimidex was discontinued in 11/2014 due to severe hot flashes. Arimidex  resumed in 07/2015.  2. 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 Diagnosed on biopsy on 05/22/2014. She underwent a lumpectomy on  06/27/2014 with final tumor size being 2 mm. It was ER/[place] positive with HER-2  not performed.   3. Hypothyroidism.   4. Diabetes.   5. Hypertension.   6. Hyperlipidemia.   7. Autoimmune hepatitis.  8. Osteopenia, received zometa x 4 doses, completed February 2018.  9. Erythrocytosis    Subjective     HISTORY OF PRESENT ILLNESS:   Radha John returns for follow-up.  She says she's been feeling pretty well.  Her liver doctor stopped her colchicine which she was taking for gout, and this actually lead to improvement in her liver enzymes.  She says she still has hot flashes that she is just living with it and that'll bother her as much anymore.  She has some joint aches and pains, also tolerable.      Past Medical History, Past Surgical History, Social History, Family History have been reviewed and are without significant changes except as mentioned.    Review of Systems   A comprehensive 14 point review of systems was performed and was negative except as mentioned.    Medications:  The current medication list was reviewed in the EMR    ALLERGIES:    Allergies   Allergen Reactions   •  "Erythromycin    • Hydrocodone-Acetaminophen        Objective      /79 Comment: LUE  Pulse 58   Temp 97.6 °F (36.4 °C) (Temporal Artery )   Resp 20   Ht 152.4 cm (60\")   Wt 92.5 kg (204 lb)   SpO2 94% Comment: RA  BMI 39.84 kg/m²      Performance Status: 0    General: well appearing female in no acute distress  Neuro: alert and oriented  HEENT: sclera anicteric, oropharynx clear  Lymphatics: no cervical, supraclavicular, or axillary adenopathy  Cardiovascular: regular rate and rhythm, no murmurs  Lungs: clear to auscultation bilaterally  Abdomen: soft, nontender, nondistended.  No palpable organomegaly  Extremeties: no lower extremity edema  Skin: no rashes, lesions, bruising, or petechiae  Psych: mood and affect appropriate    Mammogram in June was category 3, 6 month follow-up recommended.          Assessment/Plan   Radha John is a 72 y.o.  female with stage I ER positive breast cancer who returns for follow-up on anastrozole.  She's doing well without evidence of cancer recurrence.  We will plan to continue anastrozole for another year, at which point she will be at 5 years out from her treatment.  Given her very low Oncotype score, i'm not sure that additional treatment afterwards is warranted.    I will recheck a CBC to follow her erythrocytosis.    We will do a repeat bone density scan now.                  Visit time was 25 minutes, greater than 50% spent in counseling      Sarah Prasad MD  [facility] Hematology and Oncology    8/29/2018          CC:          "

## 2018-08-30 ENCOUNTER — TELEPHONE (OUTPATIENT)
Dept: ONCOLOGY | Facility: CLINIC | Age: 73
End: 2018-08-30

## 2018-08-30 ENCOUNTER — LAB (OUTPATIENT)
Dept: LAB | Facility: HOSPITAL | Age: 73
End: 2018-08-30

## 2018-08-30 DIAGNOSIS — D75.1 ERYTHROCYTOSIS: ICD-10-CM

## 2018-08-30 DIAGNOSIS — R79.89 ABNORMAL LIVER FUNCTION TEST: ICD-10-CM

## 2018-08-30 LAB
ERYTHROCYTE [DISTWIDTH] IN BLOOD BY AUTOMATED COUNT: 14.6 % (ref 11.3–14.5)
HCT VFR BLD AUTO: 46.5 % (ref 34.5–44)
HGB BLD-MCNC: 15.4 G/DL (ref 11.5–15.5)
LYMPHOCYTES # BLD AUTO: 1.8 10*3/MM3 (ref 0.6–4.8)
LYMPHOCYTES NFR BLD AUTO: 33.8 % (ref 24–44)
MCH RBC QN AUTO: 34.2 PG (ref 27–31)
MCHC RBC AUTO-ENTMCNC: 33.1 G/DL (ref 32–36)
MCV RBC AUTO: 103.3 FL (ref 80–99)
MONOCYTES # BLD AUTO: 0.3 10*3/MM3 (ref 0–1)
MONOCYTES NFR BLD AUTO: 5.9 % (ref 0–12)
NEUTROPHILS # BLD AUTO: 3.2 10*3/MM3 (ref 1.5–8.3)
NEUTROPHILS NFR BLD AUTO: 60.3 % (ref 41–71)
PLATELET # BLD AUTO: 212 10*3/MM3 (ref 150–450)
PMV BLD AUTO: 6.6 FL (ref 6–12)
RBC # BLD AUTO: 4.51 10*6/MM3 (ref 3.89–5.14)
WBC NRBC COR # BLD: 5.3 10*3/MM3 (ref 3.5–10.8)

## 2018-08-30 PROCEDURE — 85025 COMPLETE CBC W/AUTO DIFF WBC: CPT

## 2018-08-30 PROCEDURE — 36415 COLL VENOUS BLD VENIPUNCTURE: CPT

## 2018-08-30 PROCEDURE — 82105 ALPHA-FETOPROTEIN SERUM: CPT

## 2018-08-31 LAB — AFP-TM SERPL-MCNC: 2.6 NG/ML (ref 0–8.3)

## 2018-09-13 ENCOUNTER — HOSPITAL ENCOUNTER (OUTPATIENT)
Dept: BONE DENSITY | Facility: HOSPITAL | Age: 73
Discharge: HOME | End: 2018-09-13
Attending: INTERNAL MEDICINE | Admitting: INTERNAL MEDICINE

## 2018-09-13 DIAGNOSIS — C50.912 INVASIVE DUCTAL CARCINOMA OF LEFT BREAST (HCC): ICD-10-CM

## 2018-09-13 DIAGNOSIS — M85.9 DISORDER OF BONE DENSITY AND STRUCTURE, UNSPECIFIED: ICD-10-CM

## 2018-09-13 PROCEDURE — 77080 DXA BONE DENSITY AXIAL: CPT

## 2018-12-17 DIAGNOSIS — K75.4 AUTOIMMUNE HEPATITIS (HCC): Primary | ICD-10-CM

## 2018-12-17 RX ORDER — BUDESONIDE 3 MG/1
9 CAPSULE, COATED PELLETS ORAL EVERY MORNING
Qty: 90 CAPSULE | Refills: 3 | Status: SHIPPED | OUTPATIENT
Start: 2018-12-17 | End: 2019-05-15 | Stop reason: SDUPTHER

## 2018-12-31 ENCOUNTER — HOSPITAL ENCOUNTER (OUTPATIENT)
Dept: MAMMOGRAPHY | Facility: HOSPITAL | Age: 73
Discharge: HOME | End: 2018-12-31
Attending: SURGERY | Admitting: SURGERY

## 2018-12-31 ENCOUNTER — TRANSCRIBE ORDERS (OUTPATIENT)
Dept: MAMMOGRAPHY | Facility: HOSPITAL | Age: 73
End: 2018-12-31

## 2018-12-31 DIAGNOSIS — R92.8 ABNORMAL MAMMOGRAM: Primary | ICD-10-CM

## 2018-12-31 DIAGNOSIS — R92.8 ABNORMAL MAMMOGRAM: ICD-10-CM

## 2018-12-31 PROCEDURE — 77065 DX MAMMO INCL CAD UNI: CPT | Performed by: RADIOLOGY

## 2018-12-31 PROCEDURE — 77065 DX MAMMO INCL CAD UNI: CPT

## 2019-01-04 DIAGNOSIS — R94.5 NONSPECIFIC ABNORMAL RESULTS OF LIVER FUNCTION STUDY: Primary | ICD-10-CM

## 2019-01-04 DIAGNOSIS — K75.4 HEPATITIS, AUTOIMMUNE (HCC): ICD-10-CM

## 2019-01-08 RX ORDER — AZATHIOPRINE 50 MG/1
TABLET ORAL
Qty: 60 TABLET | Refills: 1 | Status: SHIPPED | OUTPATIENT
Start: 2019-01-08 | End: 2019-09-09 | Stop reason: SDUPTHER

## 2019-01-31 ENCOUNTER — OFFICE VISIT (OUTPATIENT)
Dept: GASTROENTEROLOGY | Facility: CLINIC | Age: 74
End: 2019-01-31

## 2019-01-31 VITALS
WEIGHT: 205.8 LBS | HEART RATE: 59 BPM | HEIGHT: 60 IN | SYSTOLIC BLOOD PRESSURE: 137 MMHG | DIASTOLIC BLOOD PRESSURE: 84 MMHG | BODY MASS INDEX: 40.4 KG/M2

## 2019-01-31 DIAGNOSIS — R74.8 ELEVATED LIVER ENZYMES: ICD-10-CM

## 2019-01-31 DIAGNOSIS — K75.4 AUTOIMMUNE HEPATITIS (HCC): Primary | ICD-10-CM

## 2019-01-31 PROCEDURE — 99214 OFFICE O/P EST MOD 30 MIN: CPT | Performed by: INTERNAL MEDICINE

## 2019-01-31 NOTE — PROGRESS NOTES
GASTROENTEROLOGY OFFICE NOTE  Radha John  [id number]  1945    CARE TEAM  Patient Care Team:  Wojciech Merino MD as PCP - General (Internal Medicine)  Linh Lucas PA as PCP - Claims Attributed    No ref. provider found     Chief Complaint   Patient presents with   • Follow-up     Idiopathic autoimmune hepatitis and recently elevated liver enzymes         HISTORY OF PRESENT ILLNESS:  Patient presents for follow-up of her idiopathic immune hepatitis.    When we last saw her liver enzymes were elevated.  It wasn't clear what the cause of this elevation was but we suspected was colchicine.  It was discontinued after we last saw her and she is taking it only on a very sparing, when necessary basis.    Her most recent serum liver enzymes are from 1/9/19.  AST was normal at 17 units per liter/ALT 17 units per liter Alpine phosphatase was normal 41 and total bilirubin was 0.4.    Her current regimen it is azathioprine 50 motor grams per day and Entocort 9 mg per day.    She is feeling well without dysphagia, odynophagia, early satiety, nausea, vomiting, melanotic stools, constipation or diarrhea.  She is in her usual state of health.          PAST MEDICAL HISTORY  Past Medical History:   Diagnosis Date   • Arthritis    • Diabetes mellitus (CMS/HCC)    • Disease of thyroid gland    • Gout    • Hepatitis    • Hx of radiation therapy 06/2014   • Hyperlipidemia    • Hypertension    • Malignant neoplasm of breast (CMS/HCC) 2014        PAST SURGICAL HISTORY  Past Surgical History:   Procedure Laterality Date   • BREAST BIOPSY Bilateral    • BREAST LUMPECTOMY Bilateral 06/27/2014   • HEMORRHOIDECTOMY     • HYSTERECTOMY     • PARATHYROIDECTOMY  07/19/2016    Dr. Izabela Hermosillo @ [facility]   • THYROID SURGERY          MEDICATIONS:    Current Outpatient Medications:   •  amLODIPine (NORVASC) 10 MG tablet, Take 10 mg by mouth daily., Disp: , Rfl:   •  anastrozole (ARIMIDEX) 1 MG tablet, Take 1 tablet by mouth Daily.,  Disp: 90 tablet, Rfl: 3  •  aspirin 81 MG EC tablet, Take 81 mg by mouth daily., Disp: , Rfl:   •  azaTHIOprine (IMURAN) 50 MG tablet, Take 2 by mouth daily, Disp: 60 tablet, Rfl: 1  •  Budesonide (ENTOCORT EC) 3 MG 24 hr capsule, Take 3 capsules by mouth Every Morning for 90 days., Disp: 90 capsule, Rfl: 3  •  Cholecalciferol (VITAMIN D-3 PO), Take 1 tablet by mouth daily., Disp: , Rfl:   •  gabapentin (NEURONTIN) 300 MG capsule, Take 300 mg by mouth Daily., Disp: , Rfl:   •  levothyroxine (SYNTHROID, LEVOTHROID) 88 MCG tablet, Take 88 mcg by mouth daily., Disp: , Rfl:   •  metFORMIN (GLUCOPHAGE) 500 MG tablet, Take 500 mg by mouth 2 (two) times a day., Disp: , Rfl:   •  metoprolol tartrate (LOPRESSOR) 50 MG tablet, Take 50 mg by mouth every night at bedtime., Disp: , Rfl:   •  MITIGARE 0.6 MG capsule capsule, Take 0.6 mg by mouth Every 12 (Twelve) Hours., Disp: , Rfl:   •  montelukast (SINGULAIR) 10 MG tablet, Take 10 mg by mouth daily., Disp: , Rfl:   •  olmesartan (BENICAR) 20 MG tablet, Take 20 mg by mouth daily., Disp: , Rfl:   •  traMADol (ULTRAM) 50 MG tablet, Take 50 mg by mouth 2 (Two) Times a Day., Disp: , Rfl:     ALLERGIES  Allergies   Allergen Reactions   • Erythromycin    • Hydrocodone-Acetaminophen Other (See Comments)     Lortab       FAMILY HISTORY:  Family History   Problem Relation Age of Onset   • Hypertension Father    • Hypertension Paternal Grandmother    • Hypertension Other    • Other Other         CAD   • Breast cancer Sister 50   • Endometrial cancer Neg Hx    • Ovarian cancer Neg Hx        SOCIAL HISTORY  Social History     Socioeconomic History   • Marital status:      Spouse name: Not on file   • Number of children: Not on file   • Years of education: Not on file   • Highest education level: Not on file   Tobacco Use   • Smoking status: Former Smoker   • Smokeless tobacco: Never Used   Substance and Sexual Activity   • Alcohol use: No   • Drug use: No   • Sexual activity: Defer  "    Socioeconomic History: Nonsmoker does not abuse alcohol.  F2        REVIEW OF SYSTEMS  Review of Systems   Constitutional: Negative for unexpected weight loss.   HENT: Negative for trouble swallowing.    Eyes: Negative.    Respiratory: Negative.    Gastrointestinal: Negative for abdominal distention, abdominal pain, anal bleeding, blood in stool, constipation, diarrhea, nausea, rectal pain, vomiting, GERD and indigestion.   Endocrine: Negative.    Genitourinary: Negative.    Musculoskeletal: Negative.    Skin: Negative.    Allergic/Immunologic: Negative.    Neurological: Negative.    Hematological: Negative.    Psychiatric/Behavioral: Negative.        PHYSICAL EXAM   /84 (BP Location: Right arm, Patient Position: Sitting, Cuff Size: Adult)   Pulse 59   Ht 152.4 cm (60\")   Wt 93.4 kg (205 lb 12.8 oz)   BMI 40.19 kg/m²   General: Alert and oriented x 3. In no apparent or acute distress.  and No stigmata of chronic liver disease  HEENT: Anicteric slcera. Normal oropharynx  Neck: Supple. Without lymphadenopathy  CV: Regular rate and rhythm, S1, S2  Lungs: Clear to ausculation. Without rales, robchi and wheezing  Abdomen:  Soft,non-distended without palpable masses or hepatosplenomeagaly, areas of rebound tenderness or guarding.   Extremeties: without clubbing, cyanosis or edema  Neurologic:  Alert and oriented x 3 without focal motor or sensory deficits  Rectal exam: deferred     Results for orders placed or performed in visit on 08/30/18   CBC Auto Differential   Result Value Ref Range    WBC 5.30 3.50 - 10.80 10*3/mm3    RBC 4.51 3.89 - 5.14 10*6/mm3    Hemoglobin 15.4 11.5 - 15.5 g/dL    Hematocrit 46.5 (H) 34.5 - 44.0 %    RDW 14.6 (H) 11.3 - 14.5 %    .3 (H) 80.0 - 99.0 fL    MCH 34.2 (H) 27.0 - 31.0 pg    MCHC 33.1 32.0 - 36.0 g/dL    MPV 6.6 6.0 - 12.0 fL    Platelets 212 150 - 450 10*3/mm3    Neutrophil % 60.3 41.0 - 71.0 %    Lymphocyte % 33.8 24.0 - 44.0 %    Monocyte % 5.9 0.0 - 12.0 %    " Neutrophils, Absolute 3.20 1.50 - 8.30 10*3/mm3    Lymphocytes, Absolute 1.80 0.60 - 4.80 10*3/mm3    Monocytes, Absolute 0.30 0.00 - 1.00 10*3/mm3   AFP Tumor Marker   Result Value Ref Range    AFP Tumor Marker 2.6 0.0 - 8.3 ng/mL        Results Review:  I reviewed the patient's new clinical results.      ASSESSMENT  1.-Idiopathic autoimmune hepatitis currently and serological/biochemical remission on Imuran 50 mg/budesonide 9 mg per day.  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  3.-History of breast cancer  4.-History of adenomatous colonic polyps.  Last colonoscopy February 2017.  Surveillance colonoscopy recommended 2020.      PLAN  1.-Continue current medical regimen.  I want to check her liver enzymes monthly.  In about a month I wanted to drop down from 9 mg of budesonide per day down to 6 mg per day and continue to monitor liver enzymes.  If after a month or 2 her liver enzymes remain normal all have her drop down to 3 mg per day and keep her at that level for maintenance purposes for the time being  2.-Follow-up appointment in 6 months  3.-Surveillance colonoscopy recommended 2020        I discussed the patients findings and my recommendations with patient    Poncho Hansen MD  1/31/2019   3:23 PM    Much of this note is an electronic transcription of spoken language to printed text. Electronic transcription of spoken language may permit erroneous, nonsensical word phrases to be inadvertently transcribed.  Although I have reviewed the note for these errors, some may still be present.

## 2019-02-22 ENCOUNTER — RESULTS ENCOUNTER (OUTPATIENT)
Dept: GASTROENTEROLOGY | Facility: CLINIC | Age: 74
End: 2019-02-22

## 2019-02-22 DIAGNOSIS — K75.4 AUTOIMMUNE HEPATITIS (HCC): ICD-10-CM

## 2019-03-04 RX ORDER — ANASTROZOLE 1 MG/1
TABLET ORAL
Qty: 30 TABLET | Refills: 5 | Status: SHIPPED | OUTPATIENT
Start: 2019-03-04 | End: 2019-07-26 | Stop reason: SDUPTHER

## 2019-03-08 ENCOUNTER — OFFICE VISIT (OUTPATIENT)
Dept: ONCOLOGY | Facility: CLINIC | Age: 74
End: 2019-03-08

## 2019-03-08 VITALS
OXYGEN SATURATION: 96 % | SYSTOLIC BLOOD PRESSURE: 183 MMHG | HEART RATE: 59 BPM | TEMPERATURE: 97.7 F | BODY MASS INDEX: 39.85 KG/M2 | RESPIRATION RATE: 20 BRPM | WEIGHT: 203 LBS | DIASTOLIC BLOOD PRESSURE: 98 MMHG | HEIGHT: 60 IN

## 2019-03-08 DIAGNOSIS — C50.912 INVASIVE DUCTAL CARCINOMA OF LEFT BREAST (HCC): Primary | ICD-10-CM

## 2019-03-08 PROCEDURE — 99213 OFFICE O/P EST LOW 20 MIN: CPT | Performed by: INTERNAL MEDICINE

## 2019-03-08 RX ORDER — LISINOPRIL 20 MG/1
TABLET ORAL
COMMUNITY
End: 2020-07-23

## 2019-03-08 NOTE — PROGRESS NOTES
PROBLEM LIST:  1. Left-sided pT1bN0 (IA), low-grade invasive ductal carcinoma diagnosed  2014. She presented with left-sided nipple discharge and an abnormality  on mammogram in 2014. Biopsy showed invasive ductal cancer low-grade,  estrogen receptor and progesterone receptor positive, HER-2 negative. She  underwent a left needle localization lumpectomy on 2014. Final tumor size  is 1.4 cm:   a) Oncotype recurrence score was 0 which is associated with a 3% risk of 10  year recurrence.  b) Adjuvant Arimidex was started after the completion of radiotherapy in  2014.  c) Arimidex was discontinued in 2014 due to severe hot flashes. Arimidex  resumed in 2015.  2. 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 Diagnosed on biopsy on 2014. She underwent a lumpectomy on  2014 with final tumor size being 2 mm. It was ER/ID positive with HER-2  not performed.   3. Hypothyroidism.   4. Diabetes.   5. Hypertension.   6. Hyperlipidemia.   7. Autoimmune hepatitis.  8. Osteopenia, received zometa x 4 doses, completed 2018.  9. Erythrocytosis    Subjective      CC: breast cancer    HISTORY OF PRESENT ILLNESS:   Radha John returns for follow-up.  She says she has been doing pretty well.  She has been having more trouble with arthritis pain and has been referred to a pain doctor.  She is anticipating getting some injections in her back next week.  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    Past Medical History, Past Surgical History, Social History, Family History have been reviewed and are without significant changes except as mentioned.    Review of Systems   A comprehensive 14 point review of systems was performed and was negative except as mentioned.    Medications:  The current medication list was reviewed in the EMR    ALLERGIES:    Allergies   Allergen  "Reactions   • Erythromycin    • Hydrocodone-Acetaminophen Other (See Comments)     Lortab       Objective      BP (!) 183/98 Comment: Left Wrist  Pulse 59   Temp 97.7 °F (36.5 °C) (Temporal)   Resp 20   Ht 152.4 cm (60\")   Wt 92.1 kg (203 lb)   SpO2 96% Comment: RA  BMI 39.65 kg/m²      Performance Status: 0    General: well appearing female in no acute distress  Neuro: alert and oriented  HEENT: sclera anicteric, oropharynx clear  Lymphatics: no cervical, supraclavicular, or axillary adenopathy  Breast: Bilateral breast exam performed.  Well-healed incisions bilaterally.  No palpable masses or lesions  Cardiovascular: regular rate and rhythm, no murmurs  Lungs: clear to auscultation bilaterally  Abdomen: soft, nontender, nondistended.  No palpable organomegaly  Extremeties: no lower extremity edema  Skin: no rashes, lesions, bruising, or petechiae  Psych: mood and affect appropriate    Mammogram December 2018 was category 3.  Six-month bilateral follow-up recommended          Assessment/Plan   Radha John is a 73 y.o.  female with stage I ER positive breast cancer who returns for follow-up on anastrozole.  She continues to do well with no evidence of cancer recurrence.  I will see her back in 6 months.  At that point she will be 5 years from the start of her adjuvant hormonal therapy.  We will plan to stop hormonal therapy at that time.    We discussed her repeat bone density scan showed improvement in her bone density.                  I spent 15 minutes with the patient. I spent > 50% percent of this time counseling and discussing prognosis, diagnostic testing, evaluation, current status and management.        Sarah Prasad MD  [facility]ematology and On[facility]    3/8/2019          CC:          "

## 2019-03-22 ENCOUNTER — RESULTS ENCOUNTER (OUTPATIENT)
Dept: GASTROENTEROLOGY | Facility: CLINIC | Age: 74
End: 2019-03-22

## 2019-03-22 DIAGNOSIS — K75.4 AUTOIMMUNE HEPATITIS (HCC): ICD-10-CM

## 2019-03-29 ENCOUNTER — RESULTS ENCOUNTER (OUTPATIENT)
Dept: GASTROENTEROLOGY | Facility: CLINIC | Age: 74
End: 2019-03-29

## 2019-03-29 DIAGNOSIS — R94.5 NONSPECIFIC ABNORMAL RESULTS OF LIVER FUNCTION STUDY: ICD-10-CM

## 2019-03-29 DIAGNOSIS — K75.4 HEPATITIS, AUTOIMMUNE (HCC): ICD-10-CM

## 2019-04-19 ENCOUNTER — RESULTS ENCOUNTER (OUTPATIENT)
Dept: GASTROENTEROLOGY | Facility: CLINIC | Age: 74
End: 2019-04-19

## 2019-04-19 DIAGNOSIS — K75.4 AUTOIMMUNE HEPATITIS (HCC): ICD-10-CM

## 2019-05-15 DIAGNOSIS — K75.4 AUTOIMMUNE HEPATITIS (HCC): ICD-10-CM

## 2019-05-16 RX ORDER — BUDESONIDE 3 MG/1
9 CAPSULE, COATED PELLETS ORAL EVERY MORNING
Qty: 90 CAPSULE | Refills: 3 | Status: SHIPPED | OUTPATIENT
Start: 2019-05-16 | End: 2019-11-18 | Stop reason: SDUPTHER

## 2019-05-17 ENCOUNTER — RESULTS ENCOUNTER (OUTPATIENT)
Dept: GASTROENTEROLOGY | Facility: CLINIC | Age: 74
End: 2019-05-17

## 2019-05-17 DIAGNOSIS — K75.4 AUTOIMMUNE HEPATITIS (HCC): ICD-10-CM

## 2019-06-14 ENCOUNTER — RESULTS ENCOUNTER (OUTPATIENT)
Dept: GASTROENTEROLOGY | Facility: CLINIC | Age: 74
End: 2019-06-14

## 2019-06-14 DIAGNOSIS — K75.4 AUTOIMMUNE HEPATITIS (HCC): ICD-10-CM

## 2019-06-21 ENCOUNTER — RESULTS ENCOUNTER (OUTPATIENT)
Dept: GASTROENTEROLOGY | Facility: CLINIC | Age: 74
End: 2019-06-21

## 2019-06-21 DIAGNOSIS — R94.5 NONSPECIFIC ABNORMAL RESULTS OF LIVER FUNCTION STUDY: ICD-10-CM

## 2019-06-21 DIAGNOSIS — K75.4 HEPATITIS, AUTOIMMUNE (HCC): ICD-10-CM

## 2019-07-01 ENCOUNTER — HOSPITAL ENCOUNTER (OUTPATIENT)
Dept: MAMMOGRAPHY | Facility: HOSPITAL | Age: 74
Discharge: HOME | End: 2019-07-01
Attending: SURGERY | Admitting: SURGERY

## 2019-07-01 DIAGNOSIS — R92.8 ABNORMAL MAMMOGRAM: ICD-10-CM

## 2019-07-01 PROCEDURE — 77066 DX MAMMO INCL CAD BI: CPT | Performed by: RADIOLOGY

## 2019-07-01 PROCEDURE — G0279 TOMOSYNTHESIS, MAMMO: HCPCS

## 2019-07-01 PROCEDURE — G0279 TOMOSYNTHESIS, MAMMO: HCPCS | Performed by: RADIOLOGY

## 2019-07-01 PROCEDURE — 77066 DX MAMMO INCL CAD BI: CPT

## 2019-07-12 ENCOUNTER — RESULTS ENCOUNTER (OUTPATIENT)
Dept: GASTROENTEROLOGY | Facility: CLINIC | Age: 74
End: 2019-07-12

## 2019-07-12 DIAGNOSIS — K75.4 AUTOIMMUNE HEPATITIS (HCC): ICD-10-CM

## 2019-07-26 RX ORDER — ANASTROZOLE 1 MG/1
1 TABLET ORAL DAILY
Qty: 30 TABLET | Refills: 1 | Status: SHIPPED | OUTPATIENT
Start: 2019-07-26 | End: 2019-09-19

## 2019-08-09 ENCOUNTER — RESULTS ENCOUNTER (OUTPATIENT)
Dept: GASTROENTEROLOGY | Facility: CLINIC | Age: 74
End: 2019-08-09

## 2019-08-09 DIAGNOSIS — K75.4 AUTOIMMUNE HEPATITIS (HCC): ICD-10-CM

## 2019-09-06 ENCOUNTER — RESULTS ENCOUNTER (OUTPATIENT)
Dept: GASTROENTEROLOGY | Facility: CLINIC | Age: 74
End: 2019-09-06

## 2019-09-06 DIAGNOSIS — K75.4 AUTOIMMUNE HEPATITIS (HCC): ICD-10-CM

## 2019-09-09 RX ORDER — AZATHIOPRINE 50 MG/1
TABLET ORAL
Qty: 60 TABLET | Refills: 1 | Status: SHIPPED | OUTPATIENT
Start: 2019-09-09 | End: 2019-11-08 | Stop reason: SDUPTHER

## 2019-09-13 ENCOUNTER — RESULTS ENCOUNTER (OUTPATIENT)
Dept: GASTROENTEROLOGY | Facility: CLINIC | Age: 74
End: 2019-09-13

## 2019-09-13 DIAGNOSIS — K75.4 HEPATITIS, AUTOIMMUNE (HCC): ICD-10-CM

## 2019-09-13 DIAGNOSIS — R94.5 NONSPECIFIC ABNORMAL RESULTS OF LIVER FUNCTION STUDY: ICD-10-CM

## 2019-09-19 ENCOUNTER — OFFICE VISIT (OUTPATIENT)
Dept: ONCOLOGY | Facility: CLINIC | Age: 74
End: 2019-09-19

## 2019-09-19 VITALS
WEIGHT: 200 LBS | HEART RATE: 59 BPM | TEMPERATURE: 97.4 F | RESPIRATION RATE: 16 BRPM | SYSTOLIC BLOOD PRESSURE: 146 MMHG | BODY MASS INDEX: 39.06 KG/M2 | DIASTOLIC BLOOD PRESSURE: 90 MMHG | OXYGEN SATURATION: 97 %

## 2019-09-19 DIAGNOSIS — C50.912 INVASIVE DUCTAL CARCINOMA OF LEFT BREAST (HCC): Primary | ICD-10-CM

## 2019-09-19 DIAGNOSIS — C50.911 INVASIVE DUCTAL CARCINOMA OF RIGHT BREAST (HCC): ICD-10-CM

## 2019-09-19 PROCEDURE — 99213 OFFICE O/P EST LOW 20 MIN: CPT | Performed by: INTERNAL MEDICINE

## 2019-09-19 NOTE — PROGRESS NOTES
PROBLEM LIST:  1. Left-sided pT1bN0 (IA), low-grade invasive ductal carcinoma diagnosed 05/01/2014. She presented with left-sided nipple discharge and an abnormalityon mammogram in 04/2014. Biopsy showed invasive ductal cancer low-grade,estrogen receptor and progesterone receptor positive, HER-2 negative. Sheunderwent a left needle localization lumpectomy on 06/27/2014. Final tumor size  is 1.4 cm  a) Oncotype recurrence score was 0 which is associated with a 3% risk of 10 year recurrence.  b) Adjuvant Arimidex was started after the completion of radiotherapy in 08/2014.  c) Arimidex was discontinued in 11/2014 due to severe hot flashes. Arimidex resumed in 07/2015.  2. 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 Diagnosed on biopsy on 05/22/2014. She underwent a lumpectomy on 06/27/2014 with final tumor size being 2 mm. It was ER/IL positive with HER-2 not performed.   3. Hypothyroidism.   4. Diabetes.   5. Hypertension.   6. Hyperlipidemia.   7. Autoimmune hepatitis.  8. Osteopenia, received zometa x 4 doses, completed February 2018.  9. Erythrocytosis    Subjective      CC: breast cancer    HISTORY OF PRESENT ILLNESS:   Radha John returns for follow-up.  She says she has been feeling well.  No new complaints or concerns.  She has continued to take anastrozole daily.  She does feel a little bit anxious about stopping it.    Past Medical History, Past Surgical History, Social History, Family History have been reviewed and are without significant changes except as mentioned.    Review of Systems   A comprehensive 14 point review of systems was performed and was negative except as mentioned.    Medications:  The current medication list was reviewed in the EMR    ALLERGIES:    Allergies   Allergen Reactions   • Erythromycin    • Hydrocodone-Acetaminophen Other (See Comments)     Lortab       Objective      /90   Pulse 59   Temp 97.4 °F (36.3 °C)   Resp 16   Wt  90.7 kg (200 lb)   SpO2 97%   BMI 39.06 kg/m²      Performance Status: 0    General: well appearing female in no acute distress  Neuro: alert and oriented  HEENT: sclera anicteric, oropharynx clear  Lymphatics: no cervical, supraclavicular, or axillary adenopathy  Cardiovascular: regular rate and rhythm, no murmurs  Lungs: clear to auscultation bilaterally  Abdomen: soft, nontender, nondistended.  No palpable organomegaly  Extremeties: no lower extremity edema  Skin: no rashes, lesions, bruising, or petechiae  Psych: mood and affect appropriate    Mammogram 7/1/2019: Category 2.  One year follow-up recommended.          Assessment/Plan   Radha John is a 74 y.o.  female with stage I ER positive breast cancer who returns for follow-up on anastrozole.  She continues to do well with no evidence of cancer recurrence.  She has completed 5 years of aromatase inhibitor therapy.  She has no evidence of her cancer recurring.  In her situation she had 2 low risk breast cancers and had a low risk Oncotype score.  I think there is minimal benefit from extended endocrine therapy in her situation.  She can stop anastrozole at this time.    In terms of follow-up she can continue follow-up with her primary care provider.  I would be happy to see her in the future as needed.  She should continue with regular mammogram screening and breast exam twice yearly.                    I spent 15 minutes with the patient. I spent > 50% percent of this time counseling and discussing prognosis, diagnostic testing, evaluation, current status and management.        Sarah Prasad MD  [facility] Hematology and Oncology    9/19/2019          CC:

## 2019-09-20 NOTE — PROGRESS NOTES
I have reviewed the notes, assessments, and/or procedures performed by , I concur with her/his documentation of Radha John.  Normal serum liver enzymes noted.  Continue current therapy

## 2019-10-23 ENCOUNTER — OFFICE VISIT (OUTPATIENT)
Dept: GASTROENTEROLOGY | Facility: CLINIC | Age: 74
End: 2019-10-23

## 2019-10-23 VITALS
HEART RATE: 62 BPM | HEIGHT: 60 IN | SYSTOLIC BLOOD PRESSURE: 134 MMHG | BODY MASS INDEX: 40.13 KG/M2 | WEIGHT: 204.4 LBS | DIASTOLIC BLOOD PRESSURE: 85 MMHG

## 2019-10-23 DIAGNOSIS — K75.4 AUTOIMMUNE HEPATITIS (HCC): Primary | ICD-10-CM

## 2019-10-23 DIAGNOSIS — E66.09 CLASS 2 OBESITY DUE TO EXCESS CALORIES WITH BODY MASS INDEX (BMI) OF 39.0 TO 39.9 IN ADULT, UNSPECIFIED WHETHER SERIOUS COMORBIDITY PRESENT: ICD-10-CM

## 2019-10-23 DIAGNOSIS — D12.6 ADENOMATOUS POLYP OF COLON, UNSPECIFIED PART OF COLON: ICD-10-CM

## 2019-10-23 DIAGNOSIS — Z79.899 HIGH RISK MEDICATION USE: ICD-10-CM

## 2019-10-23 PROCEDURE — 99214 OFFICE O/P EST MOD 30 MIN: CPT | Performed by: INTERNAL MEDICINE

## 2019-10-23 RX ORDER — FLUTICASONE PROPIONATE 50 MCG
1 SPRAY, SUSPENSION (ML) NASAL AS NEEDED
COMMUNITY

## 2019-10-23 RX ORDER — IRBESARTAN 75 MG/1
TABLET ORAL
COMMUNITY
End: 2020-07-23

## 2019-10-23 RX ORDER — LORATADINE 10 MG/1
10 TABLET ORAL DAILY
COMMUNITY

## 2019-10-23 RX ORDER — BUDESONIDE 3 MG/1
CAPSULE, COATED PELLETS ORAL
Refills: 3 | COMMUNITY
Start: 2019-10-03 | End: 2020-07-23

## 2019-10-23 RX ORDER — OLMESARTAN MEDOXOMIL 40 MG/1
40 TABLET ORAL DAILY
Refills: 5 | COMMUNITY
Start: 2019-08-14 | End: 2020-07-23

## 2019-10-23 NOTE — PROGRESS NOTES
GASTROENTEROLOGY OFFICE NOTE  Radha John  [id number]  1945    CARE TEAM  Patient Care Team:  Wojciech Merino MD as PCP - General (Internal Medicine)  Linh Lucas PA as PCP - Claims Attributed    No ref. provider found     Chief Complaint   Patient presents with   • Follow-up     Autoimmune Hepatitis         HISTORY OF PRESENT ILLNESS:  Patient is here to follow-up of idiopathic autoimmune hepatitis with most recent liver enzymes within normal limits.  She remains on Imuran 50 mg/day and budesonide 9 mg/day and she has a history of lobular grade 1-2 inflammatory changes in stage II fibrosis.  She also has a history of colchicine hepatic toxicity.    She has a history of adenomatous colon polyps with last colonoscopy in February 2017 for which surveillance is recommended 2020.    She is doing very well and has no particular GI complaints today denying problems with dysphagia, odynophagia, early satiety, unexplained weight loss, melanotic stools, constipation or diarrhea.    Most recent AST and ALT were both 17 units/L.    PAST MEDICAL HISTORY  Past Medical History:   Diagnosis Date   • Arthritis    • Diabetes mellitus (CMS/HCC)    • Disease of thyroid gland    • Gout    • Hepatitis    • Hx of radiation therapy 06/2014   • Hyperlipidemia    • Hypertension    • Malignant neoplasm of breast (CMS/HCC) 2014        PAST SURGICAL HISTORY  Past Surgical History:   Procedure Laterality Date   • BREAST BIOPSY Bilateral    • BREAST LUMPECTOMY Bilateral 06/27/2014   • HEMORRHOIDECTOMY     • HYSTERECTOMY     • PARATHYROIDECTOMY  07/19/2016    Dr. Izabela Hermosillo @ [facility]   • THYROID SURGERY          MEDICATIONS:    Current Outpatient Medications:   •  amLODIPine (NORVASC) 10 MG tablet, Take 10 mg by mouth daily., Disp: , Rfl:   •  aspirin 81 MG EC tablet, Take 81 mg by mouth daily., Disp: , Rfl:   •  azaTHIOprine (IMURAN) 50 MG tablet, TAKE 2 TABLETS BY MOUTH ONCE DAILY, Disp: 60 tablet, Rfl: 1  •   Budesonide (ENTOCORT EC) 3 MG 24 hr capsule, TAKE 3 CAPSULES BY MOUTH IN THE MORNING, Disp: , Rfl: 3  •  Cholecalciferol (VITAMIN D-3 PO), Take 1 tablet by mouth daily., Disp: , Rfl:   •  fluticasone (FLONASE) 50 MCG/ACT nasal spray, fluticasone propionate 50 mcg/actuation nasal spray,suspension  Spray 1 spray every day by intranasal route for 90 days., Disp: , Rfl:   •  gabapentin (NEURONTIN) 300 MG capsule, Take 300 mg by mouth Daily., Disp: , Rfl:   •  irbesartan (AVAPRO) 75 MG tablet, irbesartan 75 mg tablet, Disp: , Rfl:   •  levothyroxine (SYNTHROID, LEVOTHROID) 88 MCG tablet, Take 88 mcg by mouth daily., Disp: , Rfl:   •  lisinopril (PRINIVIL,ZESTRIL) 20 MG tablet, lisinopril 20 mg tablet  Take 1 tablet every day by oral route., Disp: , Rfl:   •  loratadine (CLARITIN) 10 MG tablet, loratadine 10 mg tablet  Take 1 tablet every day by oral route for 90 days., Disp: , Rfl:   •  metFORMIN (GLUCOPHAGE) 500 MG tablet, Take 500 mg by mouth 2 (two) times a day., Disp: , Rfl:   •  metoprolol tartrate (LOPRESSOR) 50 MG tablet, Take 50 mg by mouth every night at bedtime., Disp: , Rfl:   •  MITIGARE 0.6 MG capsule capsule, Take 0.6 mg by mouth Every 12 (Twelve) Hours., Disp: , Rfl:   •  montelukast (SINGULAIR) 10 MG tablet, Take 10 mg by mouth daily., Disp: , Rfl:   •  olmesartan (BENICAR) 20 MG tablet, Take 20 mg by mouth daily., Disp: , Rfl:   •  olmesartan (BENICAR) 40 MG tablet, Take 40 mg by mouth Daily., Disp: , Rfl: 5  •  traMADol (ULTRAM) 50 MG tablet, Take 50 mg by mouth Every 8 (Eight) Hours As Needed., Disp: , Rfl:     ALLERGIES  Allergies   Allergen Reactions   • Erythromycin    • Hydrocodone-Acetaminophen Other (See Comments)     Lortab       FAMILY HISTORY:  Family History   Problem Relation Age of Onset   • Hypertension Father    • Hypertension Paternal Grandmother    • Hypertension Other    • Other Other         CAD   • Breast cancer Sister 50   • Endometrial cancer Neg Hx    • Ovarian cancer Neg Hx    •  Colon cancer Neg Hx    • Colon polyps Neg Hx        SOCIAL HISTORY  Social History     Socioeconomic History   • Marital status:      Spouse name: Not on file   • Number of children: Not on file   • Years of education: Not on file   • Highest education level: Not on file   Tobacco Use   • Smoking status: Former Smoker   • Smokeless tobacco: Never Used   Substance and Sexual Activity   • Alcohol use: No   • Drug use: No   • Sexual activity: Yes     Socioeconomic History:  She is now working part-time at a  home and she is really enjoying it 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  She is .  Former smoker nondrinker.       REVIEW OF SYSTEMS  Review of Systems   Constitutional: Negative for activity change, appetite change, chills, diaphoresis, fatigue, fever, unexpected weight gain and unexpected weight loss.   HENT: Negative for congestion, dental problem, drooling, ear discharge, ear pain, facial swelling, hearing loss, mouth sores, nosebleeds, postnasal drip, rhinorrhea, sinus pressure, sneezing, sore throat, swollen glands, tinnitus, trouble swallowing and voice change.    Respiratory: Negative for apnea, cough, choking, chest tightness, shortness of breath, wheezing and stridor.    Cardiovascular: Negative for chest pain, palpitations and leg swelling.   Gastrointestinal: Negative for abdominal distention, abdominal pain, anal bleeding, blood in stool, constipation, diarrhea, nausea, rectal pain, vomiting, GERD and indigestion.   Endocrine: Negative for cold intolerance, heat intolerance, polydipsia, polyphagia and polyuria.   Musculoskeletal: Positive for arthralgias. Negative for back pain, gait problem, joint swelling, myalgias, neck pain, neck stiffness and bursitis.   Allergic/Immunologic: Negative for environmental allergies, food allergies and immunocompromised state.   Neurological: Negative for  "dizziness, tremors, seizures, syncope, facial asymmetry, speech difficulty, weakness, light-headedness, numbness, headache, memory problem and confusion.   Hematological: Negative for adenopathy. Does not bruise/bleed easily.   Psychiatric/Behavioral: Negative for agitation, behavioral problems, decreased concentration, dysphoric mood, hallucinations, self-injury, sleep disturbance, suicidal ideas, negative for hyperactivity, depressed mood and stress. The patient is not nervous/anxious.      I reviewed the above noted review of systems.    PHYSICAL EXAM   /85 (BP Location: Right arm, Patient Position: Sitting, Cuff Size: Adult)   Pulse 62   Ht 152.4 cm (60\")   Wt 92.7 kg (204 lb 6.4 oz)   BMI 39.92 kg/m²   General: Alert and oriented x 3. In no apparent or acute distress.  and No stigmata of chronic liver disease  HEENT: Anicteric slcera. Normal oropharynx  Neck: Supple. Without lymphadenopathy  CV: Regular rate and rhythm, S1, S2  Lungs: Clear to ausculation. Without rales, robchi and wheezing  Abdomen:  Soft,non-distended without palpable masses or hepatosplenomeagaly, areas of rebound tenderness or guarding.   Extremeties: without clubbing, cyanosis or edema  Neurologic:  Alert and oriented x 3 without focal motor or sensory deficits  Rectal exam: deferred          Results Review:  I reviewed the patient's new clinical results.      ASSESSMENT  1.-  Idiopathic autoimmune hepatitis with normal serum liver enzymes continue current regimen  2.-   History of adenomatous colonic polyps surveillance colonoscopy due February 2020  3.-  History of colchicine hepatotoxicity  4.-  History of breast cancer    PLAN  1.-  Monitor serum liver enzymes q. 3 months  2.-  Surveillance colonoscopy in February 2020  3.-  Continue current medical regimen of colchicine/budesonide.      I discussed the patients findings and my recommendations with patient    Poncho Hansen MD  10/23/2019   1:18 PM    Much of " this note is an electronic transcription of spoken language to printed text. Electronic transcription of spoken language may permit erroneous, nonsensical word phrases to be inadvertently transcribed.  Although I have reviewed the note for these errors, some may still be present.

## 2019-11-08 RX ORDER — AZATHIOPRINE 50 MG/1
TABLET ORAL
Qty: 60 TABLET | Refills: 1 | Status: SHIPPED | OUTPATIENT
Start: 2019-11-08 | End: 2020-01-07

## 2019-11-18 DIAGNOSIS — K75.4 AUTOIMMUNE HEPATITIS (HCC): ICD-10-CM

## 2019-11-18 RX ORDER — BUDESONIDE 3 MG/1
CAPSULE, COATED PELLETS ORAL
Qty: 90 CAPSULE | Refills: 0 | Status: SHIPPED | OUTPATIENT
Start: 2019-11-18 | End: 2020-01-07

## 2020-01-03 DIAGNOSIS — K75.4 AUTOIMMUNE HEPATITIS (HCC): ICD-10-CM

## 2020-01-07 RX ORDER — AZATHIOPRINE 50 MG/1
TABLET ORAL
Qty: 60 TABLET | Refills: 0 | Status: SHIPPED | OUTPATIENT
Start: 2020-01-07 | End: 2020-02-03

## 2020-01-07 RX ORDER — BUDESONIDE 3 MG/1
CAPSULE, COATED PELLETS ORAL
Qty: 90 CAPSULE | Refills: 0 | Status: SHIPPED | OUTPATIENT
Start: 2020-01-07 | End: 2020-02-19

## 2020-02-03 RX ORDER — AZATHIOPRINE 50 MG/1
TABLET ORAL
Qty: 60 TABLET | Refills: 2 | Status: SHIPPED | OUTPATIENT
Start: 2020-02-03 | End: 2020-04-27

## 2020-02-19 DIAGNOSIS — K75.4 AUTOIMMUNE HEPATITIS (HCC): ICD-10-CM

## 2020-02-19 RX ORDER — BUDESONIDE 3 MG/1
CAPSULE, COATED PELLETS ORAL
Qty: 90 CAPSULE | Refills: 2 | Status: SHIPPED | OUTPATIENT
Start: 2020-02-19 | End: 2020-07-06

## 2020-03-19 ENCOUNTER — TELEPHONE (OUTPATIENT)
Dept: GASTROENTEROLOGY | Facility: CLINIC | Age: 75
End: 2020-03-19

## 2020-03-19 DIAGNOSIS — K75.4 AUTOIMMUNE HEPATITIS (HCC): Primary | ICD-10-CM

## 2020-03-19 DIAGNOSIS — R74.8 ELEVATED LIVER ENZYMES: ICD-10-CM

## 2020-03-19 DIAGNOSIS — Z79.899 HIGH RISK MEDICATION USE: ICD-10-CM

## 2020-03-19 NOTE — TELEPHONE ENCOUNTER
----- Message from Poncho Hansen MD sent at 3/19/2020 12:07 PM EDT -----  Telma  I would have her PCP address this.  Would not appear to be related to anything going on with her autoimmune hepatitis or her medications that I know of.  ----- Message -----  From: Telma Roblero MA  Sent: 3/19/2020   8:51 AM EDT  To: Poncho Hansen MD    Called patient back to notify her we would send the new lab order for hepatic function and CBC every 3 months.     Patient noted that her skin has become lighter and is wanting to know if the medication could be causing this?     If no, Do you know what could be causing this or should she follow up with her PCP?

## 2020-04-28 RX ORDER — AZATHIOPRINE 50 MG/1
TABLET ORAL
Qty: 180 TABLET | Refills: 1 | Status: SHIPPED | OUTPATIENT
Start: 2020-04-28 | End: 2020-11-02

## 2020-05-28 ENCOUNTER — TRANSCRIBE ORDERS (OUTPATIENT)
Dept: ADMINISTRATIVE | Facility: HOSPITAL | Age: 75
End: 2020-05-28

## 2020-05-28 DIAGNOSIS — Z12.31 VISIT FOR SCREENING MAMMOGRAM: Primary | ICD-10-CM

## 2020-06-05 ENCOUNTER — RESULTS ENCOUNTER (OUTPATIENT)
Dept: GASTROENTEROLOGY | Facility: CLINIC | Age: 75
End: 2020-06-05

## 2020-06-05 DIAGNOSIS — K75.4 AUTOIMMUNE HEPATITIS (HCC): ICD-10-CM

## 2020-06-05 DIAGNOSIS — Z79.899 HIGH RISK MEDICATION USE: ICD-10-CM

## 2020-06-29 ENCOUNTER — APPOINTMENT (OUTPATIENT)
Dept: PREADMISSION TESTING | Facility: HOSPITAL | Age: 75
End: 2020-06-29

## 2020-06-29 LAB
REF LAB TEST METHOD: NORMAL
SARS-COV-2 RNA RESP QL NAA+PROBE: NOT DETECTED

## 2020-06-29 PROCEDURE — C9803 HOPD COVID-19 SPEC COLLECT: HCPCS

## 2020-06-29 PROCEDURE — U0004 COV-19 TEST NON-CDC HGH THRU: HCPCS

## 2020-06-29 PROCEDURE — U0002 COVID-19 LAB TEST NON-CDC: HCPCS

## 2020-07-02 ENCOUNTER — TELEPHONE (OUTPATIENT)
Dept: GASTROENTEROLOGY | Facility: CLINIC | Age: 75
End: 2020-07-02

## 2020-07-02 ENCOUNTER — LAB REQUISITION (OUTPATIENT)
Dept: LAB | Facility: HOSPITAL | Age: 75
End: 2020-07-02

## 2020-07-02 ENCOUNTER — OUTSIDE FACILITY SERVICE (OUTPATIENT)
Dept: GASTROENTEROLOGY | Facility: CLINIC | Age: 75
End: 2020-07-02

## 2020-07-02 DIAGNOSIS — Z86.010 PERSONAL HISTORY OF COLONIC POLYPS: ICD-10-CM

## 2020-07-02 DIAGNOSIS — K75.4 AUTOIMMUNE HEPATITIS (HCC): ICD-10-CM

## 2020-07-02 DIAGNOSIS — Z12.11 ENCOUNTER FOR SCREENING FOR MALIGNANT NEOPLASM OF COLON: ICD-10-CM

## 2020-07-02 PROCEDURE — 45385 COLONOSCOPY W/LESION REMOVAL: CPT | Performed by: INTERNAL MEDICINE

## 2020-07-02 PROCEDURE — 88305 TISSUE EXAM BY PATHOLOGIST: CPT | Performed by: INTERNAL MEDICINE

## 2020-07-02 NOTE — TELEPHONE ENCOUNTER
Called patient back because she had left a voicemail that Post colonoscopy she has noted some rectal bleeding and blood in her urine.     Called patient and confirmed she has noticed some dark blood when she wipes and blood in her urine. Negative for abdominal pain, fever, vomiting, coughing blood, and nausea.   The patient also noted that ever since she had a hysterectomy in the 70's sometimes when she has diarrhea or when she takes the colon prep she notices that that comes out in her urine as well and the blood in the stool may be why she has blood in her urine. Placed the  patient on hold .     Notified  of the above information who recommends the patient follow up with her gynecologist for further evaluation and the potential for a rectovaginal fistula but she will also follow up with  for further evaluation as well on 08/12/2020.  noted that during the colonoscopy at least 17 polyps were removed  and that is most likely why she is noticing some rectal bleeding but to continue to monitor the bleeding and  if she starts to notice a  large amount of bright red blood with clots that is when we would recommend going to the Emergency Department.     Notified the patient of the above information  and if she has any further questions with it being the weekend the patient can call the office and it will transfer her to the on call Gastroenterologist if necessary but again if she notices a larger amount of bright red blood with clots she should go to the Emergency Department . Patient confirmed she understood and had no additional questions at this time.

## 2020-07-06 RX ORDER — BUDESONIDE 3 MG/1
CAPSULE, COATED PELLETS ORAL
Qty: 270 CAPSULE | Refills: 0 | Status: SHIPPED | OUTPATIENT
Start: 2020-07-06 | End: 2020-11-04

## 2020-07-06 NOTE — TELEPHONE ENCOUNTER
Called patient to check on her.     Patient confirmed she is doing good and has not had any additional rectal bleeding after the one time.   Negative for abdominal pain, fever, nausea, and vomiting. Confirmed patient bowel movements have been normal as well post colonoscopy .

## 2020-07-07 LAB
CYTO UR: NORMAL
LAB AP CASE REPORT: NORMAL
LAB AP CLINICAL INFORMATION: NORMAL
LAB AP DIAGNOSIS COMMENT: NORMAL
PATH REPORT.FINAL DX SPEC: NORMAL
PATH REPORT.GROSS SPEC: NORMAL

## 2020-07-23 ENCOUNTER — OFFICE VISIT (OUTPATIENT)
Dept: OBSTETRICS AND GYNECOLOGY | Facility: CLINIC | Age: 75
End: 2020-07-23

## 2020-07-23 VITALS
WEIGHT: 185.4 LBS | SYSTOLIC BLOOD PRESSURE: 152 MMHG | TEMPERATURE: 97.1 F | BODY MASS INDEX: 36.4 KG/M2 | DIASTOLIC BLOOD PRESSURE: 84 MMHG | HEIGHT: 60 IN

## 2020-07-23 DIAGNOSIS — Z78.0 MENOPAUSE: Primary | ICD-10-CM

## 2020-07-23 DIAGNOSIS — Z85.3 PERSONAL HISTORY OF BREAST CANCER: ICD-10-CM

## 2020-07-23 DIAGNOSIS — Z01.419 ENCOUNTER FOR GYNECOLOGICAL EXAMINATION WITHOUT ABNORMAL FINDING: ICD-10-CM

## 2020-07-23 PROCEDURE — G0101 CA SCREEN;PELVIC/BREAST EXAM: HCPCS | Performed by: OBSTETRICS & GYNECOLOGY

## 2020-07-23 RX ORDER — METFORMIN HYDROCHLORIDE 500 MG/1
500 TABLET, EXTENDED RELEASE ORAL DAILY
COMMUNITY
Start: 2020-05-03

## 2020-07-23 RX ORDER — OLMESARTAN MEDOXOMIL 40 MG/1
40 TABLET ORAL DAILY
COMMUNITY

## 2020-07-23 RX ORDER — METOPROLOL TARTRATE 50 MG/1
50 TABLET, FILM COATED ORAL 2 TIMES DAILY
COMMUNITY

## 2020-07-23 NOTE — PROGRESS NOTES
Chief Complaint   Patient presents with   • Gynecologic Exam     New GYN, establish care. patient feels that she may have a rectovaginal fistula.       Radha John is a 74 y.o. year old  presenting to be seen for her annual exam.  This is her first gynecologic visit with me.  This patient has previously had a vaginal hysterectomy by Dr. Roblero.  She indicates that after that she began to notice that she had some passage of flatus from the vagina and occasional brown discharge from the vagina.  She was then seen by Dr. Mayur Li.  She does not use estrogen replacement therapy.  She denies menopausal symptoms.  She denies urinary symptoms.  Dr. Bryan Reilly has done bilateral lumpectomies for DCIS.  The patient's breast imaging has been benign.  She recently had colonoscopy by Dr. Hansen with findings of tubular adenomas.    SCREENING TESTS    Year 2012 2033   Age                         PAP                         HPV high risk                         Mammogram        benign                 ADRIAN score                         Breast MRI                         Lipids                         Vitamin D                         Colonoscopy         polyps                DEXA  Frax (hip/any)       normal                  Ovarian Screen                             She exercises regularly: no.  She wears her seat belt: yes.  She has concerns about domestic violence: no.  She has noticed changes in height: no    GYN screening history:  · Last mammogram: was done on approximately 2019 and the result was: Birads I (Normal).  · Last DEXA: was done on approximately 2018 and the results were: normal.    No Additional Complaints Reported    The following portions of the patient's history were reviewed and updated as appropriate:vital signs and   She  has a past medical history of Adenomatous polyp of  colon (10/23/2019), Arthritis, Diabetes mellitus (CMS/HCC), Disease of thyroid gland, Gout, Hepatitis, radiation therapy (06/2014), Hyperlipidemia, Hypertension, Malignant neoplasm of breast (CMS/HCC) (2014), and Menopause.  She does not have any pertinent problems on file.  She  has a past surgical history that includes Hemorrhoid surgery; Parathyroidectomy (07/19/2016); Breast biopsy (Bilateral); Thyroid surgery; Breast lumpectomy (Bilateral, 06/27/2014); and Vaginal hysterectomy.  Her family history includes Breast cancer (age of onset: 50) in her sister; Hypertension in her father, paternal grandmother, and another family member; Other in an other family member.  She  reports that she has quit smoking. She has never used smokeless tobacco. She reports that she does not drink alcohol or use drugs.  Current Outpatient Medications   Medication Sig Dispense Refill   • amLODIPine (NORVASC) 10 MG tablet Take 10 mg by mouth daily.     • aspirin 81 MG EC tablet Take 81 mg by mouth daily.     • azaTHIOprine (IMURAN) 50 MG tablet Take 2 tablets by mouth once daily 180 tablet 1   • Budesonide (ENTOCORT EC) 3 MG 24 hr capsule TAKE 3 CAPSULES BY MOUTH IN THE MORNING 270 capsule 0   • Cholecalciferol (VITAMIN D-3 PO) Take 1 tablet by mouth daily.     • fluticasone (FLONASE) 50 MCG/ACT nasal spray fluticasone propionate 50 mcg/actuation nasal spray,suspension   Spray 1 spray every day by intranasal route for 90 days.     • levothyroxine (SYNTHROID, LEVOTHROID) 88 MCG tablet Take 88 mcg by mouth daily.     • loratadine (CLARITIN) 10 MG tablet loratadine 10 mg tablet   Take 1 tablet every day by oral route for 90 days.     • metFORMIN ER (GLUCOPHAGE-XR) 500 MG 24 hr tablet TAKE 2 TABLETS BY MOUTH ONCE DAILY FOR DIABETES     • metoprolol tartrate (LOPRESSOR) 50 MG tablet metoprolol tartrate 50 mg tablet   Take 1 tablet by mouth twice daily     • montelukast (SINGULAIR) 10 MG tablet Take 10 mg by mouth daily.     • olmesartan  "(BENICAR) 40 MG tablet olmesartan 40 mg tablet   TAKE 1 TABLET BY MOUTH ONCE DAILY       No current facility-administered medications for this visit.      She is allergic to erythromycin and hydrocodone-acetaminophen..    Review of Systems  A review of systems was taken.  Constitutional: negative for fever, chills, activity change, appetite change, fatigue and unexpected weight change.  Respiratory: negative  Cardiovascular: negative  Gastrointestinal: positive for flatus per vagina and occasional brown discharge  Genitourinary:negative  Musculoskeletal:positive for back pain and stiff joints  Behavioral/Psych: negative       /84   Temp 97.1 °F (36.2 °C)   Ht 152.4 cm (60\")   Wt 84.1 kg (185 lb 6.4 oz)   Breastfeeding No   BMI 36.21 kg/m²     Physical Exam    General:  alert; cooperative; well developed; well nourished  obese - Body mass index is 36.21 kg/m².   Skin:  No suspicious lesions seen   Thyroid: normal to inspection and palpation   Lungs:  clear to auscultation bilaterally   Heart:  regular rate and rhythm, S1, S2 normal, no murmur, click, rub or gallop   Breasts:  Examined in supine position  Symmetric without masses or skin dimpling  Nipples normal without inversion, lesions or discharge  There are no palpable axillary nodes  scars bilaterally with thickening in the left UOQ scar.   Abdomen: soft, non-tender; no masses  no umbilical or inguinal hernias are present  no hepato-splenomegaly   Pelvis: Clinical staff was present for exam  External genitalia:  normal appearance of the external genitalia including Bartholin's and [place]'s glands.  Vaginal:  atrophic mucosal changes are present; discharge present -  brown;  Cervix:  absent.  Uterus:  absent.  Adnexa:  non palpable bilaterally.  Rectal:  anus visually normal appearing. recto-vaginal exam unremarkable and confirms findings;     Lab Review   No data reviewed    Imaging  Mammogram results and DEXA         ASSESSMENT  Problems Addressed " this Visit        Genitourinary    Menopause - Primary      Other Visit Diagnoses     Personal history of breast cancer        Encounter for gynecological examination without abnormal finding                  Substance History:   reports that she has quit smoking. She has never used smokeless tobacco.   reports that she does not drink alcohol.   reports that she does not use drugs.    Substance use counseling is not indicated based on patient history.      PLAN    · Medications prescribed this encounter:  No orders of the defined types were placed in this encounter.  · Monthly self breast assessment and annual breast imaging.  She will see Dr. Reynolds and I on an alternating 6-month basis for breast evaluation  · I have encouraged her to follow-up with gastroenterology regarding a possible recto-vaginal fistula  · Vaginal PCR for pathogens  · Calcium, 600 mg/ Vit. D, 400 IU daily; regular weight-bearing exercise  · Follow up: 12 month(s)  *Please note that portions of this documentation may have been completed with a voice recognition program.  Efforts were made to edit this dictation, but occasional words may have been mistranscribed.       This note was electronically signed.    ANTONIA Duffy MD  July 23, 2020  08:48

## 2020-07-27 DIAGNOSIS — B96.89 BACTERIAL VAGINITIS: Primary | ICD-10-CM

## 2020-07-27 DIAGNOSIS — N76.0 BACTERIAL VAGINITIS: Primary | ICD-10-CM

## 2020-07-27 RX ORDER — SULFAMETHOXAZOLE AND TRIMETHOPRIM 800; 160 MG/1; MG/1
1 TABLET ORAL 2 TIMES DAILY
Qty: 20 TABLET | Refills: 0 | Status: SHIPPED | OUTPATIENT
Start: 2020-07-27 | End: 2020-08-06

## 2020-07-28 ENCOUNTER — TELEPHONE (OUTPATIENT)
Dept: OBSTETRICS AND GYNECOLOGY | Facility: CLINIC | Age: 75
End: 2020-07-28

## 2020-07-28 NOTE — TELEPHONE ENCOUNTER
Patient's vaginal culture grew E Coli.  She has been given a prescription for Bactrim DS and has an appointment to see Dr. Hansen 8/12/2020.  She will take antibiotic as directed.

## 2020-07-30 DIAGNOSIS — K62.5 RECTAL BLEEDING: Primary | ICD-10-CM

## 2020-07-30 DIAGNOSIS — R19.8 ABNORMAL BOWEL MOVEMENT: ICD-10-CM

## 2020-08-10 ENCOUNTER — APPOINTMENT (OUTPATIENT)
Dept: GENERAL RADIOLOGY | Facility: HOSPITAL | Age: 75
End: 2020-08-10

## 2020-08-11 ENCOUNTER — HOSPITAL ENCOUNTER (OUTPATIENT)
Dept: GENERAL RADIOLOGY | Facility: HOSPITAL | Age: 75
Discharge: HOME | End: 2020-08-11
Admitting: INTERNAL MEDICINE

## 2020-08-11 DIAGNOSIS — R19.8 ABNORMAL BOWEL MOVEMENT: ICD-10-CM

## 2020-08-11 DIAGNOSIS — K62.5 RECTAL BLEEDING: ICD-10-CM

## 2020-08-11 PROCEDURE — 74270 X-RAY XM COLON 1CNTRST STD: CPT

## 2020-08-12 ENCOUNTER — OFFICE VISIT (OUTPATIENT)
Dept: GASTROENTEROLOGY | Facility: CLINIC | Age: 75
End: 2020-08-12

## 2020-08-12 VITALS
DIASTOLIC BLOOD PRESSURE: 86 MMHG | SYSTOLIC BLOOD PRESSURE: 174 MMHG | WEIGHT: 179.6 LBS | TEMPERATURE: 97.7 F | HEART RATE: 59 BPM | BODY MASS INDEX: 35.08 KG/M2

## 2020-08-12 DIAGNOSIS — D12.6 ADENOMATOUS POLYP OF COLON, UNSPECIFIED PART OF COLON: ICD-10-CM

## 2020-08-12 DIAGNOSIS — Z79.899 HIGH RISK MEDICATION USE: ICD-10-CM

## 2020-08-12 DIAGNOSIS — K75.4 AUTOIMMUNE HEPATITIS (HCC): Primary | ICD-10-CM

## 2020-08-12 PROCEDURE — 99214 OFFICE O/P EST MOD 30 MIN: CPT | Performed by: INTERNAL MEDICINE

## 2020-08-12 RX ORDER — ESCITALOPRAM OXALATE 5 MG/1
TABLET ORAL
COMMUNITY
End: 2020-12-01

## 2020-08-12 NOTE — PROGRESS NOTES
GASTROENTEROLOGY OFFICE NOTE  Radha John  [id number]  1945    CARE TEAM  Patient Care Team:  Wojciech Merino MD as PCP - General (Internal Medicine)  Armand Duffy MD as Consulting Physician (Gynecology)    No ref. 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    She states that she notices some vaginal area and occasionally a dark brown vaginal discharge.  A recent Gastrografin barium enema failed to reveal a fistulous communication.  She denies recurrent urinary tract infections or vaginal infections and for the most part otherwise is feeling well.  She occasionally notices some vaginal area discharge.    Her idiopathic autoimmune hepatitis remains in serological remission on Imuran 50 mg/day and budesonide.  Most recent liver enzymes are within normal limits.  Specifically, on July 6 AST was 11 units/L ALT was 12 units/L alkaline phosphatase was 42 units/L and total bilirubin was 0.5 mg/dL.    She status post colonoscopy June 2, 2020 at which time multiple adenomatous colonic polyps were found and removed.  She had diverticulosis of the colon.  No obvious fistulous communication or abnormalities are noted in the rectosigmoid.  Multiple polyps were removed for which no initial 1 year surveillance colonoscopy was recommended given the size and number of polyps.    She recently saw Dr. Rendon (sp?)  At the [facility] for evaluation of asymptomatic cholelithiasis incidentally noted on ultrasound.  Per Radha, conservative management was recommended.  Cholecystectomy was not suggested.      PAST MEDICAL HISTORY  Past Medical History:   Diagnosis Date   • Adenomatous polyp of colon 10/23/2019   • Arthritis    • Diabetes mellitus (CMS/HCC)    • Disease  of thyroid gland    • Gout    • Hepatitis    • Hx of radiation therapy 06/2014   • Hyperlipidemia    • Hypertension    • Malignant neoplasm of breast (CMS/HCC) 2014   • Menopause         PAST SURGICAL HISTORY  Past Surgical History:   Procedure Laterality Date   • BREAST BIOPSY Bilateral    • BREAST LUMPECTOMY Bilateral 06/27/2014   • HEMORRHOIDECTOMY     • PARATHYROIDECTOMY  07/19/2016    Dr. Izabela Hermosillo @ [facility]   • THYROID SURGERY     • VAGINAL HYSTERECTOMY          MEDICATIONS:    Current Outpatient Medications:   •  amLODIPine (NORVASC) 10 MG tablet, Take 10 mg by mouth daily., Disp: , Rfl:   •  aspirin 81 MG EC tablet, Take 81 mg by mouth daily., Disp: , Rfl:   •  azaTHIOprine (IMURAN) 50 MG tablet, Take 2 tablets by mouth once daily, Disp: 180 tablet, Rfl: 1  •  Budesonide (ENTOCORT EC) 3 MG 24 hr capsule, TAKE 3 CAPSULES BY MOUTH IN THE MORNING, Disp: 270 capsule, Rfl: 0  •  Cholecalciferol (VITAMIN D-3 PO), Take 1 tablet by mouth daily., Disp: , Rfl:   •  escitalopram (Lexapro) 5 MG tablet, Lexapro 5 mg tablet  Take 1 tablet every day by oral route., Disp: , Rfl:   •  fluticasone (FLONASE) 50 MCG/ACT nasal spray, fluticasone propionate 50 mcg/actuation nasal spray,suspension  Spray 1 spray every day by intranasal route for 90 days., Disp: , Rfl:   •  levothyroxine (SYNTHROID, LEVOTHROID) 88 MCG tablet, Take 88 mcg by mouth daily., Disp: , Rfl:   •  loratadine (CLARITIN) 10 MG tablet, loratadine 10 mg tablet  Take 1 tablet every day by oral route for 90 days., Disp: , Rfl:   •  metFORMIN ER (GLUCOPHAGE-XR) 500 MG 24 hr tablet, TAKE 2 TABLETS BY MOUTH ONCE DAILY FOR DIABETES, Disp: , Rfl:   •  metoprolol tartrate (LOPRESSOR) 50 MG tablet, metoprolol tartrate 50 mg tablet  Take 1 tablet by mouth twice daily, Disp: , Rfl:   •  montelukast (SINGULAIR) 10 MG tablet, Take 10 mg by mouth daily., Disp: , Rfl:   •  olmesartan (BENICAR) 40 MG tablet, olmesartan 40 mg tablet  TAKE 1 TABLET BY MOUTH ONCE DAILY,  Disp: , Rfl:     ALLERGIES  Allergies   Allergen Reactions   • Erythromycin Swelling     C/O lips swelling   • Hydrocodone-Acetaminophen Nausea Only     Lortab       FAMILY HISTORY:  Family History   Problem Relation Age of Onset   • Hypertension Father    • Hypertension Paternal Grandmother    • Hypertension Other    • Other Other         CAD   • Breast cancer Sister 50   • Endometrial cancer Neg Hx    • Ovarian cancer Neg Hx    • Colon cancer Neg Hx    • Colon polyps Neg Hx        SOCIAL HISTORY  Social History     Socioeconomic History   • Marital status:      Spouse name: Not on file   • Number of children: Not on file   • Years of education: Not on file   • Highest education level: Not on file   Tobacco Use   • Smoking status: Former Smoker   • Smokeless tobacco: Never Used   Substance and Sexual Activity   • Alcohol use: No   • Drug use: No   • Sexual activity: Yes     Partners: Male     Birth control/protection: Post-menopausal, Surgical     Socioeconomic History:  Works part-time at a  home.  .  Former smoker.  Nondrinker       REVIEW OF SYSTEMS  Review of Systems   Constitutional: Positive for appetite change and unexpected weight loss. Negative for activity change, chills, diaphoresis, fatigue, fever and unexpected weight gain.   HENT: Negative for trouble swallowing and voice change.    Gastrointestinal: Negative for abdominal distention, abdominal pain, anal bleeding, blood in stool, constipation, diarrhea, nausea, rectal pain, vomiting, GERD and indigestion.     I reviewed the above-noted review of systems    PHYSICAL EXAM   There were no vitals taken for this visit.  General: Alert and oriented x 3. In no apparent or acute distress.  and No stigmata of chronic liver disease  HEENT: Anicteric sclerae. Normal oropharynx  Neck: Supple. Without lymphadenopathy  CV: Regular rate and rhythm, S1, S2  Lungs: Clear to ausculation. Without rales, rhonchi and wheezing  Abdomen:   Soft,non-distended without palpable masses or hepatosplenomeagaly, areas of rebound tenderness or guarding.   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  In the absence of recurrent urinary tract infections or vaginal infections I would not pursue this further at this time.  She agrees with conservative management  2.-  Multiple adenomatous colon polyps.  Surveillance recommended in 1 year  3.-  Idiopathic autoimmune hepatitis and serological remission on combination therapy budesonide/azathioprine.  Continue current therapy unchanged  4.-  High risk medication use  5.-  History of breast cancer  6.-  History of lobular grade 1-2 inflammatory changes and stage II fibrosis on prior liver biopsy.  7.-  Asymptomatic cholelithiasis.  Conservative management cholecystectomy not suggested at this time    PLAN  1.-  Continue to monitor serum liver enzyme  2.-  Repeat colonoscopy in 1 year  3.-  Conservative management of suspected enterovaginal fistula in the absence of urinary tract infection/vaginal infections etc.  4.-  Follow-up appointment in 6 months  5.-  Conservative management of asymptomatic cholelithiasis        Poncho Hansen MD  8/12/2020   15:36

## 2020-08-20 ENCOUNTER — TELEPHONE (OUTPATIENT)
Dept: GASTROENTEROLOGY | Facility: CLINIC | Age: 75
End: 2020-08-20

## 2020-08-20 NOTE — TELEPHONE ENCOUNTER
Called patient because insurance is requiring a peer to peer prior to the approval of her EGD for weightloss and nausea.     Patient noted she has nausea for 1 week but her Gynecologist gave her antibiotics for bacterial vaginitis and her nausea is gone. Patient also noted she had lost her appetite but at this time she is able to eat with no problems. Confirmed patient is negative for symptoms such as nausea, vomiting, GERD, vomiting, and abdominal pain. Patient also noted she weighed 181.0lb this morning and was wondering if she even needed the EGD because she is feeling so good. Confirmed with the patient that she does not think she needs the EGD at this time. Notified the patient to call us or her PCP if she starts to lose weight or if she becomes symptomatic again. Patient confirmed she understood and will continue to monitor her weight for te next couple of weeks.     Notified Eliel of the above information.

## 2020-08-21 ENCOUNTER — HOSPITAL ENCOUNTER (OUTPATIENT)
Dept: MAMMOGRAPHY | Facility: HOSPITAL | Age: 75
Discharge: HOME | End: 2020-08-21
Admitting: PHYSICIAN ASSISTANT

## 2020-08-21 DIAGNOSIS — Z12.31 VISIT FOR SCREENING MAMMOGRAM: ICD-10-CM

## 2020-08-21 PROCEDURE — 77063 BREAST TOMOSYNTHESIS BI: CPT

## 2020-08-21 PROCEDURE — 77067 SCR MAMMO BI INCL CAD: CPT | Performed by: RADIOLOGY

## 2020-08-21 PROCEDURE — 77063 BREAST TOMOSYNTHESIS BI: CPT | Performed by: RADIOLOGY

## 2020-08-21 PROCEDURE — 77067 SCR MAMMO BI INCL CAD: CPT

## 2020-08-24 ENCOUNTER — TELEPHONE (OUTPATIENT)
Dept: OBSTETRICS AND GYNECOLOGY | Facility: CLINIC | Age: 75
End: 2020-08-24

## 2020-08-24 NOTE — TELEPHONE ENCOUNTER
Dr. Duffy's recommendation to observe at this time has been discussed with the patient.  She has been advised to call the office if she has vaginal discharge or odor.

## 2020-08-24 NOTE — TELEPHONE ENCOUNTER
Patient was seen in our office in July 2020 and a vaginal specimen was obtained at that time (PCR).  The results were positive for E.coli.  The patient was then referred to Dr. Hansen to rule out fistula.  The patient had a barium enema done (results in Epic) and these results did not reveal a fistula.  The patient calls today and wonders what she needs to do.  States that she is not having any vaginal discharge at this time, but wanted Dr. Duffy to be aware of results.

## 2020-08-28 ENCOUNTER — RESULTS ENCOUNTER (OUTPATIENT)
Dept: GASTROENTEROLOGY | Facility: CLINIC | Age: 75
End: 2020-08-28

## 2020-08-28 DIAGNOSIS — Z79.899 HIGH RISK MEDICATION USE: ICD-10-CM

## 2020-08-28 DIAGNOSIS — K75.4 AUTOIMMUNE HEPATITIS (HCC): ICD-10-CM

## 2020-09-18 ENCOUNTER — TELEPHONE (OUTPATIENT)
Dept: GASTROENTEROLOGY | Facility: CLINIC | Age: 75
End: 2020-09-18

## 2020-09-18 NOTE — TELEPHONE ENCOUNTER
Patient called with some vaginal discharge when she wipe that looks like stool per the patient.     Notified  of the above information. Per  notified the patient she should contact her Gynecologist first for further recommendations with it being vaginal discharge . Patient confirmed she understood and that it happens every so often. Again notified the patient to contact  for further recommendations  but to contact our office at anytime if she has additional questions or needs anything else from our office.

## 2020-09-21 ENCOUNTER — OFFICE VISIT (OUTPATIENT)
Dept: OBSTETRICS AND GYNECOLOGY | Facility: CLINIC | Age: 75
End: 2020-09-21

## 2020-09-21 VITALS
BODY MASS INDEX: 35.5 KG/M2 | DIASTOLIC BLOOD PRESSURE: 82 MMHG | HEIGHT: 60 IN | WEIGHT: 180.8 LBS | TEMPERATURE: 97.8 F | SYSTOLIC BLOOD PRESSURE: 130 MMHG

## 2020-09-21 DIAGNOSIS — N82.3 RECTOVAGINAL FISTULA: ICD-10-CM

## 2020-09-21 DIAGNOSIS — K57.30 DIVERTICULOSIS OF LARGE INTESTINE WITHOUT HEMORRHAGE: ICD-10-CM

## 2020-09-21 DIAGNOSIS — Z78.0 MENOPAUSE: Primary | ICD-10-CM

## 2020-09-21 PROCEDURE — 99214 OFFICE O/P EST MOD 30 MIN: CPT | Performed by: OBSTETRICS & GYNECOLOGY

## 2020-09-21 RX ORDER — DOXYCYCLINE HYCLATE 100 MG/1
100 CAPSULE ORAL 2 TIMES DAILY
Qty: 14 CAPSULE | Refills: 0 | Status: SHIPPED | OUTPATIENT
Start: 2020-09-21 | End: 2020-09-28

## 2020-09-21 NOTE — PROGRESS NOTES
Chief Complaint   Patient presents with   • Follow-up     patient states that she took a stool softener and a laxative last Thursday.  states that she passed stool per vagina after taking those medications.       Radha John is a 75 y.o. year old  presenting to be seen because of recurrent complaints of stool being passed per vagina.  This patient had a vaginal hysterectomy done elsewhere in the 1970s.  She has a history of pancolonic diverticulosis.  I have seen her previously with apparent stool in the vagina.  On 2020 she underwent a barium enema with no fistula seen.  Multiple diverticuli were seen.  She has had a colonoscopy with Dr. Mobley.  She has vaginal malodor.  She denies fever, chills, and sweats.  She does not have diffuse abdominal pain.  She denies nausea and vomiting.  When she becomes constipated she has taken Colace and used prune juice and then developed liquid stool with passage per vagina.  When she has solid stools she does not have leakage into the vagina.  She denies urinary symptoms.  She is treated for hypertension, hypothyroidism, and type 2 diabetes mellitus.  She does not use estrogen replacement therapy.    Social History     Substance and Sexual Activity   Sexual Activity Yes   • Partners: Male   • Birth control/protection: Post-menopausal, Surgical     SCREENING TESTS    Year 2012 2018 2019 [id number]   Age                         PAP                         HPV high risk                         Mammogram        benign benign                ADRIAN score                         Breast MRI                         Lipids                         Vitamin D                         Colonoscopy        Divertic                 DEXA  Frax (hip/any)                         Ovarian Screen                             No Additional Complaints Reported    The following portions of the  patient's history were reviewed and updated as appropriate:vital signs and   She  has a past medical history of Adenomatous polyp of colon (10/23/2019), Arthritis, Diabetes mellitus (CMS/HCC), Disease of thyroid gland, Gout, Hepatitis, radiation therapy (06/2014), Hyperlipidemia, Hypertension, Malignant neoplasm of breast (CMS/HCC) (2014), and Menopause.  She does not have any pertinent problems on file.  She  has a past surgical history that includes Hemorrhoid surgery; Parathyroidectomy (07/19/2016); Breast biopsy (Bilateral); Thyroid surgery; Breast lumpectomy (Bilateral, 06/27/2014); and Vaginal hysterectomy.  Her family history includes Breast cancer (age of onset: 50) in her sister; Hypertension in her father, paternal grandmother, and another family member; Other in an other family member.  She  reports that she has quit smoking. She has never used smokeless tobacco. She reports that she does not drink alcohol or use drugs.  Current Outpatient Medications   Medication Sig Dispense Refill   • amLODIPine (NORVASC) 10 MG tablet Take 10 mg by mouth daily.     • aspirin 81 MG EC tablet Take 81 mg by mouth daily.     • azaTHIOprine (IMURAN) 50 MG tablet Take 2 tablets by mouth once daily 180 tablet 1   • Budesonide (ENTOCORT EC) 3 MG 24 hr capsule TAKE 3 CAPSULES BY MOUTH IN THE MORNING 270 capsule 0   • Cholecalciferol (VITAMIN D-3 PO) Take 1 tablet by mouth daily.     • escitalopram (Lexapro) 5 MG tablet Lexapro 5 mg tablet   Take 1 tablet every day by oral route.     • fluticasone (FLONASE) 50 MCG/ACT nasal spray fluticasone propionate 50 mcg/actuation nasal spray,suspension   Spray 1 spray every day by intranasal route for 90 days.     • levothyroxine (SYNTHROID, LEVOTHROID) 88 MCG tablet Take 88 mcg by mouth daily.     • loratadine (CLARITIN) 10 MG tablet loratadine 10 mg tablet   Take 1 tablet every day by oral route for 90 days.     • metFORMIN ER (GLUCOPHAGE-XR) 500 MG 24 hr tablet TAKE 2 TABLETS BY MOUTH  "ONCE DAILY FOR DIABETES     • metoprolol tartrate (LOPRESSOR) 50 MG tablet metoprolol tartrate 50 mg tablet   Take 1 tablet by mouth twice daily     • montelukast (SINGULAIR) 10 MG tablet Take 10 mg by mouth daily.     • olmesartan (BENICAR) 40 MG tablet olmesartan 40 mg tablet   TAKE 1 TABLET BY MOUTH ONCE DAILY       No current facility-administered medications for this visit.      She is allergic to erythromycin and hydrocodone-acetaminophen..        Review of Systems  A review of systems was done.  Constitutional: negative for fever, chills, activity change, appetite change, fatigue and unexpected weight change.  Respiratory: negative  Cardiovascular: negative  Gastrointestinal: positive for constipation  Genitourinary:positive for stool per vagina  Musculoskeletal:positive for back pain  Behavioral/Psych: negative          /82   Temp 97.8 °F (36.6 °C)   Ht 152.4 cm (60\")   Wt 82 kg (180 lb 12.8 oz)   Breastfeeding No   BMI 35.31 kg/m²     Physical Exam    General:  alert; cooperative; well developed; well nourished  obese - Body mass index is 35.31 kg/m².   Skin:  No suspicious lesions seen   Thyroid: normal to inspection and palpation   Lungs:  clear to auscultation bilaterally   Heart:  regular rate and rhythm, S1, S2 normal, no murmur, click, rub or gallop   Breasts:  Not performed.   Abdomen: soft, non-tender; no masses  no umbilical or inguinal hernias are present  no hepato-splenomegaly   Pelvis: Clinical staff was present for exam  External genitalia:  normal appearance of the external genitalia including Bartholin's and [address]'s glands.  Vaginal:  There is stool in the posterior fornix and a defect at the left apex of the vaginal cuff  Cervix:  absent.  Uterus:  absent.  Adnexa:  non palpable bilaterally.  Rectal:  anus visually normal appearing. recto-vaginal exam unremarkable and confirms findings;     Lab Review   CBC results    Imaging   Barium enema    Medical counseling was given to " patient for the following topics: diagnostic results, instructions for management, risk factor reductions, prognosis, impressions and risks and benefits of treatment options . Total time of the encounter was 27 minute(s) and 17 minute(s)  was spent in face to face counseling.  I have reviewed the patient's clinical findings with her.  I have counseled the patient that the recurrent passage of stool per vagina is certainly consistent with a rectovaginal fistula.  I have advised the patient that this is likely due to a perforated diverticulum since she has multiple colonic diverticuli.  I have advised her that the recurrence of this condition warrants an evaluation by Colon-Rectal surgery.  I have advised the patient that I have discussed this with Dr. Hansen' and he is in agreement.  I have advised the patient that a decision would need to be made about whether she would have surgical intervention or not due to the severity of the fistula.  I have recommended that she see Dr. Sonam Olvera for further evaluation.  I have advised the patient that I will discuss this with Dr. Olvera.  I have also recommended that she take doxycycline, 100 mg twice a day for 7 days to treat the bacterial vaginosis caused by likely gram-negative organisms.  She voiced understanding of all these recommendations.          ASSESSMENT  Problems Addressed this Visit        Genitourinary    Menopause - Primary      Other Visit Diagnoses     Rectovaginal fistula        Diverticulosis of large intestine without hemorrhage               Substance History:    reports that she has quit smoking. She has never used smokeless tobacco.    reports no history of alcohol use.    reports no history of drug use.    Substance use counseling is not indicated based on patient history.      PLAN    · Medications prescribed this encounter:  No orders of the defined types were placed in this encounter.  · PCR specimen from vaginal discharge  · Doxycycline, 100  mg twice daily for 7 days  · Referral to Colon-Rectal surgery  · Follow up: 6 month(s)  · Calcium, 600 mg/ Vit. D, 400 IU daily, regular, weight-bearing exercise 4 days a week     *Please note that portions of this documentation may have been completed with a voice recognition program.  Efforts were made to edit this dictation, but occasional words may have been mistranscribed.     This note was electronically signed.    ANTONIA Duffy MD  September 21, 2020  11:46 EDT

## 2020-09-23 ENCOUNTER — TRANSCRIBE ORDERS (OUTPATIENT)
Dept: ADMINISTRATIVE | Facility: HOSPITAL | Age: 75
End: 2020-09-23

## 2020-09-23 DIAGNOSIS — N82.4 DIGESTIVE-GENITAL TRACT FISTULA, FEMALE: Primary | ICD-10-CM

## 2020-09-29 ENCOUNTER — HOSPITAL ENCOUNTER (OUTPATIENT)
Dept: CT IMAGING | Facility: HOSPITAL | Age: 75
Discharge: HOME | End: 2020-09-29
Admitting: COLON & RECTAL SURGERY

## 2020-09-29 DIAGNOSIS — N82.4 DIGESTIVE-GENITAL TRACT FISTULA, FEMALE: ICD-10-CM

## 2020-09-29 LAB — CREAT BLDA-MCNC: 0.9 MG/DL (ref 0.6–1.3)

## 2020-09-29 PROCEDURE — 74177 CT ABD & PELVIS W/CONTRAST: CPT

## 2020-09-29 PROCEDURE — 0 DIATRIZOATE MEGLUMINE & SODIUM PER 1 ML: Performed by: COLON & RECTAL SURGERY

## 2020-09-29 PROCEDURE — 25010000002 IOPAMIDOL 61 % SOLUTION: Performed by: COLON & RECTAL SURGERY

## 2020-09-29 PROCEDURE — 82565 ASSAY OF CREATININE: CPT

## 2020-09-29 RX ADMIN — IOPAMIDOL 95 ML: 612 INJECTION, SOLUTION INTRAVENOUS at 14:24

## 2020-09-29 RX ADMIN — DIATRIZOATE MEGLUMINE AND DIATRIZOATE SODIUM 15 ML: 660; 100 LIQUID ORAL; RECTAL at 14:24

## 2020-10-02 ENCOUNTER — DOCUMENTATION (OUTPATIENT)
Dept: GASTROENTEROLOGY | Facility: CLINIC | Age: 75
End: 2020-10-02

## 2020-10-02 NOTE — PROGRESS NOTES
Patient was seen for enterovaginal fistula by Dr. Sonam Salinas.  Proctosigmoidoscopy was performed vaginoscopy was performed CT of abdomen pelvis with IV and oral contrast was ordered with subsequent recommendations be deferred pending findings of CAT scan

## 2020-10-09 ENCOUNTER — TELEPHONE (OUTPATIENT)
Dept: GASTROENTEROLOGY | Facility: CLINIC | Age: 75
End: 2020-10-09

## 2020-10-09 NOTE — TELEPHONE ENCOUNTER
Patient called in regards to blood work drawn on 10/5/20, Can you please review and let me know what to tell the patient please?

## 2020-11-02 RX ORDER — AZATHIOPRINE 50 MG/1
TABLET ORAL
Qty: 180 TABLET | Refills: 0 | Status: ON HOLD | OUTPATIENT
Start: 2020-11-02 | End: 2020-12-10 | Stop reason: SDUPTHER

## 2020-11-03 DIAGNOSIS — K75.4 AUTOIMMUNE HEPATITIS (HCC): ICD-10-CM

## 2020-11-04 RX ORDER — BUDESONIDE 3 MG/1
CAPSULE, COATED PELLETS ORAL
Qty: 270 CAPSULE | Refills: 0 | Status: SHIPPED | OUTPATIENT
Start: 2020-11-04 | End: 2021-02-08

## 2020-11-20 ENCOUNTER — RESULTS ENCOUNTER (OUTPATIENT)
Dept: GASTROENTEROLOGY | Facility: CLINIC | Age: 75
End: 2020-11-20

## 2020-11-20 DIAGNOSIS — Z79.899 HIGH RISK MEDICATION USE: ICD-10-CM

## 2020-11-20 DIAGNOSIS — K75.4 AUTOIMMUNE HEPATITIS (HCC): ICD-10-CM

## 2020-12-01 ENCOUNTER — APPOINTMENT (OUTPATIENT)
Dept: PREADMISSION TESTING | Facility: HOSPITAL | Age: 75
End: 2020-12-01

## 2020-12-01 ENCOUNTER — HOSPITAL ENCOUNTER (OUTPATIENT)
Dept: GENERAL RADIOLOGY | Facility: HOSPITAL | Age: 75
Discharge: HOME | End: 2020-12-01

## 2020-12-01 VITALS — HEIGHT: 60 IN | WEIGHT: 178 LBS | BODY MASS INDEX: 34.95 KG/M2

## 2020-12-01 LAB
ABO GROUP BLD: NORMAL
ALBUMIN SERPL-MCNC: 3.9 G/DL (ref 3.5–5.2)
ALBUMIN/GLOB SERPL: 1.2 G/DL
ALP SERPL-CCNC: 55 U/L (ref 39–117)
ALT SERPL W P-5'-P-CCNC: 13 U/L (ref 1–33)
ANION GAP SERPL CALCULATED.3IONS-SCNC: 8 MMOL/L (ref 5–15)
AST SERPL-CCNC: 14 U/L (ref 1–32)
BILIRUB SERPL-MCNC: 0.4 MG/DL (ref 0–1.2)
BUN SERPL-MCNC: 19 MG/DL (ref 8–23)
BUN/CREAT SERPL: 17.1 (ref 7–25)
CALCIUM SPEC-SCNC: 9.2 MG/DL (ref 8.6–10.5)
CHLORIDE SERPL-SCNC: 103 MMOL/L (ref 98–107)
CO2 SERPL-SCNC: 30 MMOL/L (ref 22–29)
CREAT SERPL-MCNC: 1.11 MG/DL (ref 0.57–1)
DEPRECATED RDW RBC AUTO: 48.1 FL (ref 37–54)
ERYTHROCYTE [DISTWIDTH] IN BLOOD BY AUTOMATED COUNT: 12.9 % (ref 12.3–15.4)
GFR SERPL CREATININE-BSD FRML MDRD: 58 ML/MIN/1.73
GLOBULIN UR ELPH-MCNC: 3.2 GM/DL
GLUCOSE SERPL-MCNC: 99 MG/DL (ref 65–99)
HBA1C MFR BLD: 6.2 % (ref 4.8–5.6)
HCT VFR BLD AUTO: 50.8 % (ref 34–46.6)
HGB BLD-MCNC: 16.7 G/DL (ref 12–15.9)
MCH RBC QN AUTO: 33.3 PG (ref 26.6–33)
MCHC RBC AUTO-ENTMCNC: 32.9 G/DL (ref 31.5–35.7)
MCV RBC AUTO: 101.4 FL (ref 79–97)
PLATELET # BLD AUTO: 198 10*3/MM3 (ref 140–450)
PMV BLD AUTO: 9.3 FL (ref 6–12)
POTASSIUM SERPL-SCNC: 4.2 MMOL/L (ref 3.5–5.2)
PROT SERPL-MCNC: 7.1 G/DL (ref 6–8.5)
QT INTERVAL: 464 MS
QTC INTERVAL: 427 MS
RBC # BLD AUTO: 5.01 10*6/MM3 (ref 3.77–5.28)
RH BLD: POSITIVE
SODIUM SERPL-SCNC: 141 MMOL/L (ref 136–145)
WBC # BLD AUTO: 4.62 10*3/MM3 (ref 3.4–10.8)

## 2020-12-01 PROCEDURE — 71046 X-RAY EXAM CHEST 2 VIEWS: CPT

## 2020-12-01 PROCEDURE — 83036 HEMOGLOBIN GLYCOSYLATED A1C: CPT

## 2020-12-01 PROCEDURE — 36415 COLL VENOUS BLD VENIPUNCTURE: CPT

## 2020-12-01 PROCEDURE — 93010 ELECTROCARDIOGRAM REPORT: CPT | Performed by: INTERNAL MEDICINE

## 2020-12-01 PROCEDURE — 80053 COMPREHEN METABOLIC PANEL: CPT

## 2020-12-01 PROCEDURE — 93005 ELECTROCARDIOGRAM TRACING: CPT

## 2020-12-01 PROCEDURE — 86900 BLOOD TYPING SEROLOGIC ABO: CPT

## 2020-12-01 PROCEDURE — 85027 COMPLETE CBC AUTOMATED: CPT

## 2020-12-01 PROCEDURE — 86901 BLOOD TYPING SEROLOGIC RH(D): CPT

## 2020-12-01 NOTE — PAT
Patient to apply Chlorhexadine wipes  to surgical area (as instructed) the night before procedure and the AM of procedure. Wipes provided.    Patient instructed to drink 20 ounces (or until full) of Gatorade and it needs to be completed 1 hour before given arrival time for procedure (NO RED Gatorade)    Patient verbalized understanding.    Per Anesthesia Request, patient instructed not to take their ACE/ARB medications on the AM of surgery.    Too early to draw type and screen in PAT.  Please obtain specimen in pre-op on the day of surgery.    Called the stoma nurse and left a message.    Called the GI nurse navigator and left a message.

## 2020-12-02 DIAGNOSIS — Z79.899 HIGH RISK MEDICATION USE: ICD-10-CM

## 2020-12-02 DIAGNOSIS — K75.4 AUTOIMMUNE HEPATITIS (HCC): Primary | ICD-10-CM

## 2020-12-06 ENCOUNTER — APPOINTMENT (OUTPATIENT)
Dept: PREADMISSION TESTING | Facility: HOSPITAL | Age: 75
End: 2020-12-06

## 2020-12-06 LAB — SARS-COV-2 RNA RESP QL NAA+PROBE: NOT DETECTED

## 2020-12-06 PROCEDURE — U0004 COV-19 TEST NON-CDC HGH THRU: HCPCS

## 2020-12-06 PROCEDURE — C9803 HOPD COVID-19 SPEC COLLECT: HCPCS

## 2020-12-08 ENCOUNTER — HOSPITAL ENCOUNTER (INPATIENT)
Facility: HOSPITAL | Age: 75
LOS: 2 days | Discharge: HOME | End: 2020-12-10
Attending: COLON & RECTAL SURGERY | Admitting: COLON & RECTAL SURGERY

## 2020-12-08 ENCOUNTER — ANESTHESIA (OUTPATIENT)
Dept: PERIOP | Facility: HOSPITAL | Age: 75
End: 2020-12-08

## 2020-12-08 ENCOUNTER — ANESTHESIA EVENT (OUTPATIENT)
Dept: PERIOP | Facility: HOSPITAL | Age: 75
End: 2020-12-08

## 2020-12-08 DIAGNOSIS — N82.4 COLOVAGINAL FISTULA: ICD-10-CM

## 2020-12-08 PROBLEM — Z98.890 S/P CYSTOSCOPY: Status: ACTIVE | Noted: 2020-12-08

## 2020-12-08 PROBLEM — Z90.49 STATUS POST COLECTOMY: Status: ACTIVE | Noted: 2020-12-08

## 2020-12-08 PROBLEM — K57.90 DIVERTICULOSIS: Status: ACTIVE | Noted: 2020-12-08

## 2020-12-08 LAB
ABO GROUP BLD: NORMAL
BLD GP AB SCN SERPL QL: NEGATIVE
GLUCOSE BLDC GLUCOMTR-MCNC: 150 MG/DL (ref 70–130)
GLUCOSE BLDC GLUCOMTR-MCNC: 228 MG/DL (ref 70–130)
GLUCOSE BLDC GLUCOMTR-MCNC: 292 MG/DL (ref 70–130)
GLUCOSE BLDC GLUCOMTR-MCNC: 83 MG/DL (ref 70–130)
HCT VFR BLD AUTO: 45.6 % (ref 34–46.6)
HGB BLD-MCNC: 14.9 G/DL (ref 12–15.9)
RH BLD: POSITIVE
T&S EXPIRATION DATE: NORMAL

## 2020-12-08 PROCEDURE — 8E0W4CZ ROBOTIC ASSISTED PROCEDURE OF TRUNK REGION, PERCUTANEOUS ENDOSCOPIC APPROACH: ICD-10-PCS | Performed by: COLON & RECTAL SURGERY

## 2020-12-08 PROCEDURE — 44207 L COLECTOMY/COLOPROCTOSTOMY: CPT | Performed by: PHYSICIAN ASSISTANT

## 2020-12-08 PROCEDURE — 86900 BLOOD TYPING SEROLOGIC ABO: CPT | Performed by: OBSTETRICS & GYNECOLOGY

## 2020-12-08 PROCEDURE — 63710000001 INSULIN LISPRO (HUMAN) PER 5 UNITS: Performed by: COLON & RECTAL SURGERY

## 2020-12-08 PROCEDURE — 0TN78ZZ RELEASE LEFT URETER, VIA NATURAL OR ARTIFICIAL OPENING ENDOSCOPIC: ICD-10-PCS | Performed by: COLON & RECTAL SURGERY

## 2020-12-08 PROCEDURE — 86850 RBC ANTIBODY SCREEN: CPT | Performed by: OBSTETRICS & GYNECOLOGY

## 2020-12-08 PROCEDURE — C1769 GUIDE WIRE: HCPCS | Performed by: COLON & RECTAL SURGERY

## 2020-12-08 PROCEDURE — 52005 CYSTO W/URTRL CATHJ: CPT | Performed by: OBSTETRICS & GYNECOLOGY

## 2020-12-08 PROCEDURE — 25010000002 ONDANSETRON PER 1 MG: Performed by: NURSE ANESTHETIST, CERTIFIED REGISTERED

## 2020-12-08 PROCEDURE — 25010000002 MORPHINE PER 10 MG: Performed by: COLON & RECTAL SURGERY

## 2020-12-08 PROCEDURE — 25010000002 PROPOFOL 10 MG/ML EMULSION: Performed by: NURSE ANESTHETIST, CERTIFIED REGISTERED

## 2020-12-08 PROCEDURE — 25010000002 HEPARIN (PORCINE) PER 1000 UNITS: Performed by: OBSTETRICS & GYNECOLOGY

## 2020-12-08 PROCEDURE — 86901 BLOOD TYPING SEROLOGIC RH(D): CPT | Performed by: OBSTETRICS & GYNECOLOGY

## 2020-12-08 PROCEDURE — 85014 HEMATOCRIT: CPT | Performed by: COLON & RECTAL SURGERY

## 2020-12-08 PROCEDURE — C1758 CATHETER, URETERAL: HCPCS | Performed by: COLON & RECTAL SURGERY

## 2020-12-08 PROCEDURE — 85018 HEMOGLOBIN: CPT | Performed by: COLON & RECTAL SURGERY

## 2020-12-08 PROCEDURE — 0T788DZ DILATION OF BILATERAL URETERS WITH INTRALUMINAL DEVICE, VIA NATURAL OR ARTIFICIAL OPENING ENDOSCOPIC: ICD-10-PCS | Performed by: COLON & RECTAL SURGERY

## 2020-12-08 PROCEDURE — 25010000002 DEXAMETHASONE SODIUM PHOSPHATE 10 MG/ML SOLUTION: Performed by: ANESTHESIOLOGY

## 2020-12-08 PROCEDURE — 88307 TISSUE EXAM BY PATHOLOGIST: CPT | Performed by: COLON & RECTAL SURGERY

## 2020-12-08 PROCEDURE — 25010000002 ERTAPENEM 1 GM/100ML SOLUTION: Performed by: COLON & RECTAL SURGERY

## 2020-12-08 PROCEDURE — 0DTP4ZZ RESECTION OF RECTUM, PERCUTANEOUS ENDOSCOPIC APPROACH: ICD-10-PCS | Performed by: COLON & RECTAL SURGERY

## 2020-12-08 PROCEDURE — 0D1B4Z4 BYPASS ILEUM TO CUTANEOUS, PERCUTANEOUS ENDOSCOPIC APPROACH: ICD-10-PCS | Performed by: COLON & RECTAL SURGERY

## 2020-12-08 PROCEDURE — 82962 GLUCOSE BLOOD TEST: CPT

## 2020-12-08 PROCEDURE — 25010000002 NEOSTIGMINE 10 MG/10ML SOLUTION: Performed by: NURSE ANESTHETIST, CERTIFIED REGISTERED

## 2020-12-08 DEVICE — ABSORBABLE WOUND CLOSURE DEVICE
Type: IMPLANTABLE DEVICE | Site: ABDOMEN | Status: FUNCTIONAL
Brand: V-LOC 180

## 2020-12-08 DEVICE — CELLULAR STAPLER WITH TRI-STAPLE TECHNOLOGY
Type: IMPLANTABLE DEVICE | Site: ABDOMEN | Status: FUNCTIONAL
Brand: EEA

## 2020-12-08 RX ORDER — ROCURONIUM BROMIDE 10 MG/ML
INJECTION, SOLUTION INTRAVENOUS AS NEEDED
Status: DISCONTINUED | OUTPATIENT
Start: 2020-12-08 | End: 2020-12-08 | Stop reason: SURG

## 2020-12-08 RX ORDER — PREGABALIN 75 MG/1
75 CAPSULE ORAL ONCE
Status: COMPLETED | OUTPATIENT
Start: 2020-12-08 | End: 2020-12-08

## 2020-12-08 RX ORDER — CALCIUM POLYCARBOPHIL 625 MG
625 TABLET ORAL DAILY
COMMUNITY

## 2020-12-08 RX ORDER — DEXTROSE, SODIUM CHLORIDE, AND POTASSIUM CHLORIDE 5; .45; .15 G/100ML; G/100ML; G/100ML
50 INJECTION INTRAVENOUS CONTINUOUS
Status: DISCONTINUED | OUTPATIENT
Start: 2020-12-08 | End: 2020-12-10 | Stop reason: HOSPADM

## 2020-12-08 RX ORDER — SODIUM CHLORIDE, SODIUM LACTATE, POTASSIUM CHLORIDE, CALCIUM CHLORIDE 600; 310; 30; 20 MG/100ML; MG/100ML; MG/100ML; MG/100ML
9 INJECTION, SOLUTION INTRAVENOUS CONTINUOUS PRN
Status: DISCONTINUED | OUTPATIENT
Start: 2020-12-08 | End: 2020-12-08 | Stop reason: HOSPADM

## 2020-12-08 RX ORDER — PROMETHAZINE HYDROCHLORIDE 25 MG/1
25 TABLET ORAL ONCE AS NEEDED
Status: DISCONTINUED | OUTPATIENT
Start: 2020-12-08 | End: 2020-12-08 | Stop reason: HOSPADM

## 2020-12-08 RX ORDER — KETAMINE HCL IN NACL, ISO-OSM 100MG/10ML
SYRINGE (ML) INJECTION AS NEEDED
Status: DISCONTINUED | OUTPATIENT
Start: 2020-12-08 | End: 2020-12-08 | Stop reason: SURG

## 2020-12-08 RX ORDER — LABETALOL HYDROCHLORIDE 5 MG/ML
10 INJECTION, SOLUTION INTRAVENOUS EVERY 4 HOURS PRN
Status: DISCONTINUED | OUTPATIENT
Start: 2020-12-08 | End: 2020-12-10 | Stop reason: HOSPADM

## 2020-12-08 RX ORDER — ONDANSETRON 2 MG/ML
4 INJECTION INTRAMUSCULAR; INTRAVENOUS EVERY 6 HOURS PRN
Status: DISCONTINUED | OUTPATIENT
Start: 2020-12-08 | End: 2020-12-08 | Stop reason: SDUPTHER

## 2020-12-08 RX ORDER — FAMOTIDINE 20 MG/1
20 TABLET, FILM COATED ORAL 2 TIMES DAILY
Status: DISCONTINUED | OUTPATIENT
Start: 2020-12-08 | End: 2020-12-10 | Stop reason: HOSPADM

## 2020-12-08 RX ORDER — ONDANSETRON 2 MG/ML
INJECTION INTRAMUSCULAR; INTRAVENOUS AS NEEDED
Status: DISCONTINUED | OUTPATIENT
Start: 2020-12-08 | End: 2020-12-08 | Stop reason: SURG

## 2020-12-08 RX ORDER — NEOSTIGMINE METHYLSULFATE 1 MG/ML
INJECTION, SOLUTION INTRAVENOUS AS NEEDED
Status: DISCONTINUED | OUTPATIENT
Start: 2020-12-08 | End: 2020-12-08 | Stop reason: SURG

## 2020-12-08 RX ORDER — GLYCOPYRROLATE 0.2 MG/ML
INJECTION INTRAMUSCULAR; INTRAVENOUS AS NEEDED
Status: DISCONTINUED | OUTPATIENT
Start: 2020-12-08 | End: 2020-12-08 | Stop reason: SURG

## 2020-12-08 RX ORDER — LIDOCAINE HYDROCHLORIDE 10 MG/ML
0.5 INJECTION, SOLUTION EPIDURAL; INFILTRATION; INTRACAUDAL; PERINEURAL ONCE AS NEEDED
Status: COMPLETED | OUTPATIENT
Start: 2020-12-08 | End: 2020-12-08

## 2020-12-08 RX ORDER — MONTELUKAST SODIUM 10 MG/1
10 TABLET ORAL NIGHTLY
Status: DISCONTINUED | OUTPATIENT
Start: 2020-12-08 | End: 2020-12-10 | Stop reason: HOSPADM

## 2020-12-08 RX ORDER — SODIUM CHLORIDE 0.9 % (FLUSH) 0.9 %
3 SYRINGE (ML) INJECTION EVERY 12 HOURS SCHEDULED
Status: DISCONTINUED | OUTPATIENT
Start: 2020-12-08 | End: 2020-12-08 | Stop reason: HOSPADM

## 2020-12-08 RX ORDER — ESMOLOL HYDROCHLORIDE 10 MG/ML
INJECTION INTRAVENOUS AS NEEDED
Status: DISCONTINUED | OUTPATIENT
Start: 2020-12-08 | End: 2020-12-08 | Stop reason: SURG

## 2020-12-08 RX ORDER — DIAZEPAM 5 MG/1
5 TABLET ORAL EVERY 6 HOURS PRN
Status: DISCONTINUED | OUTPATIENT
Start: 2020-12-08 | End: 2020-12-10 | Stop reason: HOSPADM

## 2020-12-08 RX ORDER — OXYBUTYNIN CHLORIDE 5 MG/1
5 TABLET ORAL 3 TIMES DAILY
Status: DISCONTINUED | OUTPATIENT
Start: 2020-12-08 | End: 2020-12-10 | Stop reason: HOSPADM

## 2020-12-08 RX ORDER — ULTRASOUND COUPLING MEDIUM
GEL (GRAM) TOPICAL AS NEEDED
Status: DISCONTINUED | OUTPATIENT
Start: 2020-12-08 | End: 2020-12-08 | Stop reason: HOSPADM

## 2020-12-08 RX ORDER — MORPHINE SULFATE 2 MG/ML
2 INJECTION, SOLUTION INTRAMUSCULAR; INTRAVENOUS EVERY 4 HOURS PRN
Status: DISCONTINUED | OUTPATIENT
Start: 2020-12-08 | End: 2020-12-10 | Stop reason: HOSPADM

## 2020-12-08 RX ORDER — ONDANSETRON 2 MG/ML
4 INJECTION INTRAMUSCULAR; INTRAVENOUS ONCE AS NEEDED
Status: DISCONTINUED | OUTPATIENT
Start: 2020-12-08 | End: 2020-12-08 | Stop reason: HOSPADM

## 2020-12-08 RX ORDER — AMLODIPINE BESYLATE 10 MG/1
10 TABLET ORAL NIGHTLY
Status: DISCONTINUED | OUTPATIENT
Start: 2020-12-09 | End: 2020-12-08

## 2020-12-08 RX ORDER — MELOXICAM 15 MG/1
15 TABLET ORAL ONCE
Status: COMPLETED | OUTPATIENT
Start: 2020-12-08 | End: 2020-12-08

## 2020-12-08 RX ORDER — ACETAMINOPHEN 500 MG
1000 TABLET ORAL EVERY 6 HOURS
Status: DISCONTINUED | OUTPATIENT
Start: 2020-12-08 | End: 2020-12-09

## 2020-12-08 RX ORDER — FAMOTIDINE 20 MG/1
20 TABLET, FILM COATED ORAL
Status: COMPLETED | OUTPATIENT
Start: 2020-12-08 | End: 2020-12-08

## 2020-12-08 RX ORDER — LIDOCAINE HYDROCHLORIDE 10 MG/ML
INJECTION, SOLUTION EPIDURAL; INFILTRATION; INTRACAUDAL; PERINEURAL AS NEEDED
Status: DISCONTINUED | OUTPATIENT
Start: 2020-12-08 | End: 2020-12-08 | Stop reason: SURG

## 2020-12-08 RX ORDER — MIDAZOLAM HYDROCHLORIDE 1 MG/ML
1 INJECTION INTRAMUSCULAR; INTRAVENOUS
Status: DISCONTINUED | OUTPATIENT
Start: 2020-12-08 | End: 2020-12-08 | Stop reason: HOSPADM

## 2020-12-08 RX ORDER — DROPERIDOL 2.5 MG/ML
0.62 INJECTION, SOLUTION INTRAMUSCULAR; INTRAVENOUS AS NEEDED
Status: DISCONTINUED | OUTPATIENT
Start: 2020-12-08 | End: 2020-12-08 | Stop reason: HOSPADM

## 2020-12-08 RX ORDER — HYDROCODONE BITARTRATE AND ACETAMINOPHEN 5; 325 MG/1; MG/1
1 TABLET ORAL ONCE AS NEEDED
Status: DISCONTINUED | OUTPATIENT
Start: 2020-12-08 | End: 2020-12-08 | Stop reason: HOSPADM

## 2020-12-08 RX ORDER — DEXMEDETOMIDINE HYDROCHLORIDE 100 UG/ML
INJECTION, SOLUTION INTRAVENOUS AS NEEDED
Status: DISCONTINUED | OUTPATIENT
Start: 2020-12-08 | End: 2020-12-08 | Stop reason: SURG

## 2020-12-08 RX ORDER — CETIRIZINE HYDROCHLORIDE 10 MG/1
5 TABLET ORAL DAILY
Status: DISCONTINUED | OUTPATIENT
Start: 2020-12-09 | End: 2020-12-10 | Stop reason: HOSPADM

## 2020-12-08 RX ORDER — PROMETHAZINE HYDROCHLORIDE 25 MG/1
25 SUPPOSITORY RECTAL ONCE AS NEEDED
Status: DISCONTINUED | OUTPATIENT
Start: 2020-12-08 | End: 2020-12-08 | Stop reason: HOSPADM

## 2020-12-08 RX ORDER — SODIUM CHLORIDE 0.9 % (FLUSH) 0.9 %
3-10 SYRINGE (ML) INJECTION AS NEEDED
Status: DISCONTINUED | OUTPATIENT
Start: 2020-12-08 | End: 2020-12-08 | Stop reason: HOSPADM

## 2020-12-08 RX ORDER — AMLODIPINE BESYLATE 10 MG/1
10 TABLET ORAL DAILY
Status: DISCONTINUED | OUTPATIENT
Start: 2020-12-09 | End: 2020-12-10 | Stop reason: HOSPADM

## 2020-12-08 RX ORDER — MAGNESIUM HYDROXIDE 1200 MG/15ML
LIQUID ORAL AS NEEDED
Status: DISCONTINUED | OUTPATIENT
Start: 2020-12-08 | End: 2020-12-08 | Stop reason: HOSPADM

## 2020-12-08 RX ORDER — ALVIMOPAN 12 MG/1
12 CAPSULE ORAL ONCE
Status: COMPLETED | OUTPATIENT
Start: 2020-12-08 | End: 2020-12-08

## 2020-12-08 RX ORDER — HYDROMORPHONE HYDROCHLORIDE 1 MG/ML
0.5 INJECTION, SOLUTION INTRAMUSCULAR; INTRAVENOUS; SUBCUTANEOUS
Status: DISCONTINUED | OUTPATIENT
Start: 2020-12-08 | End: 2020-12-08 | Stop reason: HOSPADM

## 2020-12-08 RX ORDER — ONDANSETRON 4 MG/1
4 TABLET, FILM COATED ORAL EVERY 6 HOURS PRN
Status: DISCONTINUED | OUTPATIENT
Start: 2020-12-08 | End: 2020-12-10 | Stop reason: HOSPADM

## 2020-12-08 RX ORDER — ACETAMINOPHEN 500 MG
1000 TABLET ORAL ONCE
Status: COMPLETED | OUTPATIENT
Start: 2020-12-08 | End: 2020-12-08

## 2020-12-08 RX ORDER — INDOCYANINE GREEN AND WATER 25 MG
KIT INJECTION AS NEEDED
Status: DISCONTINUED | OUTPATIENT
Start: 2020-12-08 | End: 2020-12-08 | Stop reason: HOSPADM

## 2020-12-08 RX ORDER — NALOXONE HCL 0.4 MG/ML
0.4 VIAL (ML) INJECTION AS NEEDED
Status: DISCONTINUED | OUTPATIENT
Start: 2020-12-08 | End: 2020-12-08 | Stop reason: HOSPADM

## 2020-12-08 RX ORDER — DROPERIDOL 2.5 MG/ML
0.62 INJECTION, SOLUTION INTRAMUSCULAR; INTRAVENOUS ONCE AS NEEDED
Status: DISCONTINUED | OUTPATIENT
Start: 2020-12-08 | End: 2020-12-08 | Stop reason: HOSPADM

## 2020-12-08 RX ORDER — ONDANSETRON 2 MG/ML
4 INJECTION INTRAMUSCULAR; INTRAVENOUS EVERY 6 HOURS PRN
Status: DISCONTINUED | OUTPATIENT
Start: 2020-12-08 | End: 2020-12-10 | Stop reason: HOSPADM

## 2020-12-08 RX ORDER — AMLODIPINE BESYLATE 10 MG/1
10 TABLET ORAL DAILY
Status: DISCONTINUED | OUTPATIENT
Start: 2020-12-08 | End: 2020-12-08

## 2020-12-08 RX ORDER — SODIUM CHLORIDE 9 MG/ML
INJECTION, SOLUTION INTRAVENOUS AS NEEDED
Status: DISCONTINUED | OUTPATIENT
Start: 2020-12-08 | End: 2020-12-08 | Stop reason: HOSPADM

## 2020-12-08 RX ORDER — DEXAMETHASONE SODIUM PHOSPHATE 10 MG/ML
INJECTION, SOLUTION INTRAMUSCULAR; INTRAVENOUS
Status: COMPLETED | OUTPATIENT
Start: 2020-12-08 | End: 2020-12-08

## 2020-12-08 RX ORDER — METOPROLOL TARTRATE 50 MG/1
50 TABLET, FILM COATED ORAL 2 TIMES DAILY
Status: DISCONTINUED | OUTPATIENT
Start: 2020-12-08 | End: 2020-12-10 | Stop reason: HOSPADM

## 2020-12-08 RX ORDER — BUPIVACAINE HYDROCHLORIDE 2.5 MG/ML
INJECTION, SOLUTION EPIDURAL; INFILTRATION; INTRACAUDAL
Status: COMPLETED | OUTPATIENT
Start: 2020-12-08 | End: 2020-12-08

## 2020-12-08 RX ORDER — FENTANYL CITRATE 50 UG/ML
50 INJECTION, SOLUTION INTRAMUSCULAR; INTRAVENOUS
Status: DISCONTINUED | OUTPATIENT
Start: 2020-12-08 | End: 2020-12-08 | Stop reason: HOSPADM

## 2020-12-08 RX ORDER — LEVOTHYROXINE SODIUM 88 UG/1
88 TABLET ORAL
Status: DISCONTINUED | OUTPATIENT
Start: 2020-12-09 | End: 2020-12-10 | Stop reason: HOSPADM

## 2020-12-08 RX ORDER — SODIUM CHLORIDE 0.9 % (FLUSH) 0.9 %
10 SYRINGE (ML) INJECTION EVERY 12 HOURS SCHEDULED
Status: DISCONTINUED | OUTPATIENT
Start: 2020-12-08 | End: 2020-12-08 | Stop reason: HOSPADM

## 2020-12-08 RX ORDER — SODIUM CHLORIDE 0.9 % (FLUSH) 0.9 %
10 SYRINGE (ML) INJECTION AS NEEDED
Status: DISCONTINUED | OUTPATIENT
Start: 2020-12-08 | End: 2020-12-08 | Stop reason: HOSPADM

## 2020-12-08 RX ORDER — ALVIMOPAN 12 MG/1
12 CAPSULE ORAL 2 TIMES DAILY
Status: DISCONTINUED | OUTPATIENT
Start: 2020-12-09 | End: 2020-12-09

## 2020-12-08 RX ORDER — NALOXONE HCL 0.4 MG/ML
0.4 VIAL (ML) INJECTION
Status: DISCONTINUED | OUTPATIENT
Start: 2020-12-08 | End: 2020-12-10 | Stop reason: HOSPADM

## 2020-12-08 RX ORDER — IPRATROPIUM BROMIDE AND ALBUTEROL SULFATE 2.5; .5 MG/3ML; MG/3ML
3 SOLUTION RESPIRATORY (INHALATION) ONCE AS NEEDED
Status: DISCONTINUED | OUTPATIENT
Start: 2020-12-08 | End: 2020-12-08 | Stop reason: HOSPADM

## 2020-12-08 RX ORDER — SODIUM CHLORIDE, SODIUM LACTATE, POTASSIUM CHLORIDE, CALCIUM CHLORIDE 600; 310; 30; 20 MG/100ML; MG/100ML; MG/100ML; MG/100ML
INJECTION, SOLUTION INTRAVENOUS CONTINUOUS PRN
Status: DISCONTINUED | OUTPATIENT
Start: 2020-12-08 | End: 2020-12-08 | Stop reason: SURG

## 2020-12-08 RX ORDER — HEPARIN SODIUM 5000 [USP'U]/ML
5000 INJECTION, SOLUTION INTRAVENOUS; SUBCUTANEOUS ONCE
Status: COMPLETED | OUTPATIENT
Start: 2020-12-08 | End: 2020-12-08

## 2020-12-08 RX ORDER — PROPOFOL 10 MG/ML
VIAL (ML) INTRAVENOUS AS NEEDED
Status: DISCONTINUED | OUTPATIENT
Start: 2020-12-08 | End: 2020-12-08 | Stop reason: SURG

## 2020-12-08 RX ADMIN — NEOSTIGMINE 4 MG: 1 INJECTION INTRAVENOUS at 11:08

## 2020-12-08 RX ADMIN — ACETAMINOPHEN 1000 MG: 500 TABLET ORAL at 06:52

## 2020-12-08 RX ADMIN — AMLODIPINE BESYLATE 10 MG: 10 TABLET ORAL at 14:17

## 2020-12-08 RX ADMIN — EPHEDRINE SULFATE 5 MG: 50 INJECTION INTRAMUSCULAR; INTRAVENOUS; SUBCUTANEOUS at 10:21

## 2020-12-08 RX ADMIN — DEXAMETHASONE SODIUM PHOSPHATE 4 MG: 10 INJECTION INTRAMUSCULAR; INTRAVENOUS at 07:44

## 2020-12-08 RX ADMIN — LIDOCAINE HYDROCHLORIDE 0.2 ML: 10 INJECTION, SOLUTION EPIDURAL; INFILTRATION; INTRACAUDAL; PERINEURAL at 06:48

## 2020-12-08 RX ADMIN — PREGABALIN 75 MG: 75 CAPSULE ORAL at 06:52

## 2020-12-08 RX ADMIN — EPHEDRINE SULFATE 10 MG: 50 INJECTION INTRAMUSCULAR; INTRAVENOUS; SUBCUTANEOUS at 10:47

## 2020-12-08 RX ADMIN — LIDOCAINE HYDROCHLORIDE 50 MG: 10 INJECTION, SOLUTION EPIDURAL; INFILTRATION; INTRACAUDAL; PERINEURAL at 07:38

## 2020-12-08 RX ADMIN — ROCURONIUM BROMIDE 20 MG: 10 INJECTION INTRAVENOUS at 09:36

## 2020-12-08 RX ADMIN — DEXMEDETOMIDINE HYDROCHLORIDE 12 MCG: 100 INJECTION, SOLUTION INTRAVENOUS at 11:07

## 2020-12-08 RX ADMIN — MELOXICAM 15 MG: 15 TABLET ORAL at 06:52

## 2020-12-08 RX ADMIN — METOPROLOL TARTRATE 50 MG: 50 TABLET, FILM COATED ORAL at 20:47

## 2020-12-08 RX ADMIN — EPHEDRINE SULFATE 5 MG: 50 INJECTION INTRAMUSCULAR; INTRAVENOUS; SUBCUTANEOUS at 08:06

## 2020-12-08 RX ADMIN — HEPARIN SODIUM 5000 UNITS: 5000 INJECTION, SOLUTION INTRAVENOUS; SUBCUTANEOUS at 06:53

## 2020-12-08 RX ADMIN — Medication 30 MG: at 09:36

## 2020-12-08 RX ADMIN — SODIUM CHLORIDE, POTASSIUM CHLORIDE, SODIUM LACTATE AND CALCIUM CHLORIDE 9 ML/HR: 600; 310; 30; 20 INJECTION, SOLUTION INTRAVENOUS at 06:48

## 2020-12-08 RX ADMIN — ROCURONIUM BROMIDE 30 MG: 10 INJECTION INTRAVENOUS at 10:01

## 2020-12-08 RX ADMIN — SODIUM CHLORIDE, POTASSIUM CHLORIDE, SODIUM LACTATE AND CALCIUM CHLORIDE: 600; 310; 30; 20 INJECTION, SOLUTION INTRAVENOUS at 10:22

## 2020-12-08 RX ADMIN — ROCURONIUM BROMIDE 50 MG: 10 INJECTION INTRAVENOUS at 07:38

## 2020-12-08 RX ADMIN — INSULIN LISPRO 3 UNITS: 100 INJECTION, SOLUTION INTRAVENOUS; SUBCUTANEOUS at 16:57

## 2020-12-08 RX ADMIN — ACETAMINOPHEN 1000 MG: 500 TABLET ORAL at 20:46

## 2020-12-08 RX ADMIN — FAMOTIDINE 20 MG: 20 TABLET, FILM COATED ORAL at 06:52

## 2020-12-08 RX ADMIN — FAMOTIDINE 20 MG: 20 TABLET, FILM COATED ORAL at 20:46

## 2020-12-08 RX ADMIN — EPHEDRINE SULFATE 5 MG: 50 INJECTION INTRAMUSCULAR; INTRAVENOUS; SUBCUTANEOUS at 09:25

## 2020-12-08 RX ADMIN — ONDANSETRON 4 MG: 2 INJECTION INTRAMUSCULAR; INTRAVENOUS at 10:47

## 2020-12-08 RX ADMIN — INDOCYANINE GREEN AND WATER 12.5 MG: KIT at 09:53

## 2020-12-08 RX ADMIN — INDOCYANINE GREEN AND WATER 12.5 MG: KIT at 10:32

## 2020-12-08 RX ADMIN — POTASSIUM CHLORIDE, DEXTROSE MONOHYDRATE AND SODIUM CHLORIDE 100 ML/HR: 150; 5; 450 INJECTION, SOLUTION INTRAVENOUS at 14:17

## 2020-12-08 RX ADMIN — SODIUM CHLORIDE, POTASSIUM CHLORIDE, SODIUM LACTATE AND CALCIUM CHLORIDE: 600; 310; 30; 20 INJECTION, SOLUTION INTRAVENOUS at 07:30

## 2020-12-08 RX ADMIN — BUPIVACAINE HYDROCHLORIDE 60 ML: 2.5 INJECTION, SOLUTION EPIDURAL; INFILTRATION; INTRACAUDAL; PERINEURAL at 07:44

## 2020-12-08 RX ADMIN — EPHEDRINE SULFATE 10 MG: 50 INJECTION INTRAMUSCULAR; INTRAVENOUS; SUBCUTANEOUS at 08:01

## 2020-12-08 RX ADMIN — MORPHINE SULFATE 2 MG: 2 INJECTION, SOLUTION INTRAMUSCULAR; INTRAVENOUS at 17:03

## 2020-12-08 RX ADMIN — GLYCOPYRROLATE 0.6 MG: 0.2 INJECTION INTRAMUSCULAR; INTRAVENOUS at 11:08

## 2020-12-08 RX ADMIN — DEXMEDETOMIDINE HYDROCHLORIDE 12 MCG: 100 INJECTION, SOLUTION INTRAVENOUS at 11:10

## 2020-12-08 RX ADMIN — ERTAPENEM SODIUM 1 G: 1 INJECTION, POWDER, LYOPHILIZED, FOR SOLUTION INTRAMUSCULAR; INTRAVENOUS at 07:46

## 2020-12-08 RX ADMIN — PROPOFOL 100 MG: 10 INJECTION, EMULSION INTRAVENOUS at 07:38

## 2020-12-08 RX ADMIN — OXYBUTYNIN CHLORIDE 5 MG: 5 TABLET ORAL at 20:47

## 2020-12-08 RX ADMIN — ACETAMINOPHEN 1000 MG: 500 TABLET ORAL at 14:17

## 2020-12-08 RX ADMIN — OXYBUTYNIN CHLORIDE 5 MG: 5 TABLET ORAL at 14:17

## 2020-12-08 RX ADMIN — ALVIMOPAN 12 MG: 12 CAPSULE ORAL at 06:52

## 2020-12-08 RX ADMIN — ESMOLOL HYDROCHLORIDE 30 MG: 10 INJECTION, SOLUTION INTRAVENOUS at 07:38

## 2020-12-08 RX ADMIN — INSULIN LISPRO 4 UNITS: 100 INJECTION, SOLUTION INTRAVENOUS; SUBCUTANEOUS at 20:46

## 2020-12-08 RX ADMIN — EPHEDRINE SULFATE 5 MG: 50 INJECTION INTRAMUSCULAR; INTRAVENOUS; SUBCUTANEOUS at 08:25

## 2020-12-08 RX ADMIN — MONTELUKAST SODIUM 10 MG: 10 TABLET, COATED ORAL at 20:46

## 2020-12-08 NOTE — NURSING NOTE
Stoma marking completed:     Marked Mrs. John in her RUQ.   This was the only location that she could visualize.   Avoided waist band.   She was able to point to selected site.      In the event an ostomy is need, WOC will follow daily for stomal support and teaching.     Thanks

## 2020-12-08 NOTE — H&P
Patient Care Team:      Chief complaint stool and air in vagina     Subjective:  Patient is a 75 y.o.female who presents with stool and air through her vagina. Patient states that she believes her symptoms began years ago, but they have progressively worsened over the past year. She states she recently had a colonoscopy and the prep also leaked out her vagina. She denies any abdominal pain, melena, or hematochezia. Patient denies any other recent changes to her health.      Review of Systems:  General ROS: negative  Cardiovascular ROS: no chest pain or dyspnea on exertion  Respiratory ROS: no cough, shortness of breath, or wheezing      Allergies:   Allergies   Allergen Reactions   • Erythromycin Swelling     C/O lips swelling   • Hydrocodone-Acetaminophen Nausea Only     Lortab          Latex: negative  Contrast Dye: negative    Home Meds    Medications Prior to Admission   Medication Sig Dispense Refill Last Dose   • amLODIPine (NORVASC) 10 MG tablet Take 10 mg by mouth daily.   12/7/2020 at 0800   • aspirin 81 MG EC tablet Take 81 mg by mouth daily.   12/7/2020 at 2000   • azaTHIOprine (IMURAN) 50 MG tablet Take 2 tablets by mouth once daily (Patient taking differently: Take 50 mg by mouth 2 (two) times a day. TAKE 2 TABLETS BY MOUTH ONCE DAILY) 180 tablet 0 12/7/2020 at 0800   • Budesonide (ENTOCORT EC) 3 MG 24 hr capsule TAKE 3 CAPSULES BY MOUTH IN THE MORNING (Patient taking differently: Take 6 mg by mouth Every Morning.) 270 capsule 0 12/7/2020 at 0800   • Cholecalciferol (VITAMIN D-3 PO) Take 1 tablet by mouth daily.   12/7/2020 at 2000   • levothyroxine (SYNTHROID, LEVOTHROID) 88 MCG tablet Take 88 mcg by mouth daily.   12/7/2020 at 0800   • loratadine (CLARITIN) 10 MG tablet Take 10 mg by mouth Daily.   12/7/2020 at 0800   • metFORMIN ER (GLUCOPHAGE-XR) 500 MG 24 hr tablet Take 500 mg by mouth Daily.   12/7/2020 at 2000   • metoprolol tartrate (LOPRESSOR) 50 MG tablet Take 50 mg by mouth 2 (Two) Times a  Day.   12/7/2020 at 2000   • montelukast (SINGULAIR) 10 MG tablet Take 10 mg by mouth daily.   12/7/2020 at 2000   • olmesartan (BENICAR) 40 MG tablet Take 40 mg by mouth Daily.   12/7/2020 at 2000   • polycarbophil (calcium polycarbophil) 625 MG tablet tablet Take 625 mg by mouth Daily. Takes two daily   12/6/2020 at Unknown time   • fluticasone (FLONASE) 50 MCG/ACT nasal spray 1 spray into the nostril(s) as directed by provider As Needed.   More than a month at Unknown time     PMH:   Past Medical History:   Diagnosis Date   • Adenomatous polyp of colon 10/23/2019   • Arthritis    • Diabetes mellitus (CMS/HCC)    • Disease of thyroid gland    • Diverticulosis    • Gout    • Hepatitis    • History of kidney stones    • Hx of radiation therapy 06/2014   • Hyperlipidemia    • Hypertension    • Malignant neoplasm of breast (CMS/HCC) 2014   • Menopause      PSH:    Past Surgical History:   Procedure Laterality Date   • BREAST BIOPSY Bilateral    • BREAST LUMPECTOMY Bilateral 06/27/2014   • COLONOSCOPY     • HEMORRHOIDECTOMY     • PARATHYROIDECTOMY  07/19/2016    [facility] Izabela Hermosillo [facility]   • THYROID SURGERY     • VAGINAL HYSTERECTOMY       Immunization History: pneumo up to date   Flu up to date  Tetanus up to date  Social History:   Tobacco negative   Alcohol negative       Physical Exam:/81 (BP Location: Right arm, Patient Position: Lying)   Pulse 64   Temp 97.3 °F (36.3 °C) (Temporal)   Resp 18   SpO2 98%       General Appearance:    Alert, cooperative, no distress, appears stated age   Head:    Normocephalic, without obvious abnormality, atraumatic   Lungs:     Clear to auscultation bilaterally, respirations unlabored    Heart:   Regular rate and rhythm, S1 and S2 normal, no murmur, rub    or gallop    Abdomen:    Soft without tenderness   Breast Exam:    deferred   Genitalia:    deferred   Extremities:   Extremities normal, atraumatic, no cyanosis or edema   Skin:   Skin color, texture, turgor  normal, no rashes or lesions   Neurologic:   Grossly intact     Results Review:   LABS:  Lab Results   Component Value Date    WBC 4.62 12/01/2020    HGB 16.7 (H) 12/01/2020    HCT 50.8 (H) 12/01/2020    .4 (H) 12/01/2020     12/01/2020    NEUTROABS 3.20 08/30/2018    GLUCOSE 99 12/01/2020    BUN 19 12/01/2020    CREATININE 1.11 (H) 12/01/2020    EGFRIFAFRI 58 (L) 12/01/2020     12/01/2020    K 4.2 12/01/2020     12/01/2020    CO2 30.0 (H) 12/01/2020    MG 2.1 06/13/2018    PHOS 4.2 06/13/2018    CALCIUM 9.2 12/01/2020    ALBUMIN 3.90 12/01/2020    AST 14 12/01/2020    ALT 13 12/01/2020    BILITOT 0.4 12/01/2020       RADIOLOGY:  Imaging Results (Last 72 Hours)     ** No results found for the last 72 hours. **        Impression: colovaginal fistula    Plan: robotic low anterior resection, possible diverting loop ileostomy, cystoscopy with bilateral ureteral catheters  Isha Valladares PA-C 12/8/2020 06:55 EST

## 2020-12-08 NOTE — ANESTHESIA PREPROCEDURE EVALUATION
Anesthesia Evaluation     Patient summary reviewed and Nursing notes reviewed   no history of anesthetic complications:  NPO Solid Status: > 8 hours  NPO Liquid Status: > 2 hours           Airway   Mallampati: I  TM distance: >3 FB  Neck ROM: full  No difficulty expected  Dental - normal exam     Pulmonary    (+) decreased breath sounds,   Cardiovascular   Exercise tolerance: good (4-7 METS)    Rhythm: regular  Rate: normal    (+) hypertension well controlled 2 medications or greater, hyperlipidemia,       Neuro/Psych  GI/Hepatic/Renal/Endo    (+) obesity,   hepatitis, liver disease, diabetes mellitus type 2 well controlled, thyroid problem hypothyroidism    Musculoskeletal     Abdominal   (+) obese,     Abdomen: soft.   Substance History      OB/GYN          Other   arthritis,    history of cancer remission                    Anesthesia Plan    ASA 3     general with block     intravenous induction     Anesthetic plan, all risks, benefits, and alternatives have been provided, discussed and informed consent has been obtained with: patient.    Plan discussed with CRNA.

## 2020-12-08 NOTE — OP NOTE
Subjective     Date of Service:  12/08/20  Time of Service:  08:42 EST    Surgical Staff: Surgeon(s) and Role:  Panel 1:     * Sonam Olvera MD - Primary  Panel 2:     * Rosie Gottlieb MD - Primary   Additional Staff:  Diverticular disease, colovaginal fistula, need for clarification of pelvic anatomy   Pre-operative diagnosis(es):      Post-operative diagnosis(es):  Same   Procedure(s): Procedure(s):  ROBOTIC LOW ANTERIOR RESECTION, POSSIBLE DIVERTING LOOP ILEOSTOMY (May)  CYSTOSCOPY WITH BILATERAL URETERAL CATHETERS, ADMINISTRATION OF FLUORESCEIN DYE VIA BILATERAL URETERAL CATHETERS (Chery)       Antibiotics:  Invanz ordered on call to OR     Anesthesia: Type: General with Block  ASA:  III     Objective      Operative findings:  Attenuated bilateral ureteral orifices, no mucosal lesions which were significant   Specimens removed: None (for my portion)   Fluid Intake and Output: No intake/output data recorded.   Blood products used: No   Drains: NG/OG Tube Orogastric 16 Fr Center mouth (Active)      Implant Information: Nothing was implanted during the procedure   Complications:  No immediate   Intraoperative consult(s):    Condition: stable   Disposition: to PACU and then admit per Dr. Olvera       Indications:  Patient is a 75-year-old woman had diverticular disease with vaginal fistula and was undergoing surgical intervention.   requested identification of ureters in order to facilitate surgical approach.  Consent was signed and on chart.    Procedure: Patient was taken to the operating room and underwent general endotracheal anesthesia after patient site verification.  Feet were placed in Brooks stirrups.  Abdomen, perineum, and vagina prepped and draped in the usual sterile fashion.  Cystoscopy was performed with the above findings.  Bilateral 5 French catheters could not be placed.  Glidewire was called for.  Catheters were then placed without difficulty under direct visualization.  As these  were placed, fluorescein dye was injected via the ureteral catheters.  Raymond catheter was anchored and the ureteral catheters were attached to a sterile glove.  At the completion of the surgery, the intact temporary bilateral ureteral catheters were removed.    Rosie Gottlieb MD  12/08/20  08:38 EST

## 2020-12-08 NOTE — OP NOTE
[facility]  OPERATIVE REPORT      Radha John  12/8/2020    Pre-op Diagnosis:   Sigmoid diverticulosis  Colovaginal fistula  History of hysterectomy  Fecal incontinence      Post-op Diagnosis:    Sigmoid diverticulosis  Colovaginal fistula  History of hysterectomy  Fecal incontinence    Procedure(s):  ROBOTIC LOW ANTERIOR RESECTION  Takedown of colovaginal fistula  CYSTOSCOPY WITH BILATERAL URETERAL CATHETERS  Left ureterolysis    Surgeon(s):  Sonam Olvera MD Cottrill, Hope M., MD    Anesthesia: General with Block    Staff:   Circulator: Sunshine Tamayo RN; Diana Boland RN; Caroline John, ALIZE; Bethany Felix RN  Scrub Person: Marika Islas; Maryam Zaragoza  Nursing Assistant: Lewis Vaughn  Assistant: Liseth Ha PA    Assistant: Liseth Ha PA was responsible for performing the following activities: Retraction, Suction, Irrigation, Suturing, Closing and Held/Positioned Camera and their skilled assistance was necessary for the success of this case.     Estimated Blood Loss: 50mL    Urine Voided: * No values recorded between 12/8/2020  7:29 AM and 12/8/2020 11:22 AM *    Specimens:                Specimens     ID Source Type Tests Collected By Collected At Frozen?      A Large Intestine, Sigmoid Colon Tissue · TISSUE PATHOLOGY EXAM   Sonam Olvera MD 12/8/20 1046 No     Description: sigmoid colon, upper rectum, and anastamotic ring                Drains:   Urethral Catheter Silicone 16 Fr. (Active)   Daily Indications Selected surgeries ( tract, abdomen) 12/08/20 1140   Site Assessment Clean;Skin intact 12/08/20 1125   Collection Container Standard drainage bag 12/08/20 1140   Securement Method Securing device 12/08/20 1140       [REMOVED] NG/OG Tube Orogastric 16 Fr Center mouth (Removed)       Findings: Dense scar tissue on the left pelvic sidewall.  Colovaginal fistula at the top of the vaginal cuff.  Appropriate for age appearing ovaries.   Negative air leak test.    Complications: None.    Procedure in Detail:   Radha was brought to the operating room placed in the supine position.  After successful induction of general tracheal anesthesia, she was placed in the low lithotomy position in yellowfin stirrups.  The operative site was prepped and draped in the usual sterile fashion.  Dr. Gottlieb performed cystoscopy with bilateral ureteral catheters which will be dictated separately.     Veress needle technique was used to enter the abdomen at Vargas's point in the left upper quadrant.  Saline drop test was reassuring.  Initial pressures were low.  Optiview trocar was then used to gain entrance into the right midabdomen.     Four additional robotic trochars were placed, 8 mm, all under direct visualization.    She was placed in 25 degrees of Trendelenburg and 8 degrees right side tilt.  The small bowel was placed in the upper abdomen.  The sigmoid colon was retracted to expose the pelvis.        Pathology noted in the distal sigmoid colon with adhesions in the pelvis.  The peritoneal reflection was essentially socked in with adhesions.  Tedious dissection was performed to take the colon off of the bladder and the vaginal cuff.  There were obvious adhesions of the rectosigmoid to the top of the vaginal cuff in the left ovary was also involved in this chronic appearing inflammatory process.    The left ureter was identified and kept out of the plane of dissection at all times.     I repaired the vaginal cuff with 2 layers of running 3-0 Vicryl V lock suture including the peritoneum over the repair.    The line of Toldt was then incised and left ureterolysis was performed to identify the left ureteral catheter with the aid of firefly.      A healthy location on the descending colon was chosen for a point of transection. This was performed using the vessel sealer device.  The mesentery of the specimen was then transected using the vessel seal device.  This  was done using the vessel sealer.  Isocyanate green was used to confirm good fluorescence at the descending colon conduit.        3-0 VLock suture was then used to secure pursestring on the descending colon and placed the anvil and a 3-O V lock was also used on the rectum for a pursestring around stapler.  A 28 mm [employer] circular stapler with tristaple technology was used to perform the anastomosis.  The stapler was fired and removed from the rectum.  I placed 4 interrupted figure-of-eight Vicryl sutures circumferentially around the anastomosis.      The anastomosis was then placed under saline irrigation and proximally occluded.  Rigid proctoscopy was performed and the anastomosis was found to be hemostatic airtight and circumferentially intact.  It was located approximately 10 cm from the anal verge.    The anastomosis was inspected and found to be tension-free.    I then pexied a couple of long fatty epiploic appendages in between the colorectal anastomosis and the vaginal cuff to help prevent recurrence of her fistula.    Abdomen was inspected for other pathology. None noted.     Ureteral catheters were removed intact.    All counts were announced as correct at the end of the case. Patient tolerated procedure well, extubated in the operating room and transferred to recovery room in stable condition.     The abdomen was inspected for hemostasis which was achieved.    The abdomen was desufflated.  The ports were closed with Monocryl.  Dermabond pernio dressing was applied.    All counts were announced as correct at the end of the case.    She tolerated the procedure well was extubated in the operating room and transferred to recovery in stable condition.            Sonam Olvera MD     Date: 12/8/2020  Time: 11:45 EST

## 2020-12-08 NOTE — ANESTHESIA PROCEDURE NOTES
Peripheral Block      Patient reassessed immediately prior to procedure    Patient location during procedure: OR  Start time: 12/8/2020 7:39 AM  Stop time: 12/8/2020 7:45 AM  Reason for block: at surgeon's request and post-op pain management  Performed by  Anesthesiologist: Antonio Atkinson MD  CRNA: Jamie Nelson CRNA  Preanesthetic Checklist  Completed: patient identified, site marked, surgical consent, pre-op evaluation, timeout performed, IV checked, risks and benefits discussed and monitors and equipment checked  Prep:  Pt Position: supine  Sterile barriers:washed/disinfected hands, gloves, mask and cap  Prep: ChloraPrep  Patient monitoring: blood pressure monitoring, continuous pulse oximetry and EKG  Procedure  Sedation:yes  Performed under: general  Guidance:ultrasound guided  ULTRASOUND INTERPRETATION. Using ultrasound guidance a 20 G gauge needle was placed in close proximity to the nerve, at which point, under ultrasound guidance anesthetic was injected in the area of the nerve and spread of the anesthesia was seen on ultrasound in close proximity thereto.  There were no abnormalities seen on ultrasound; a digital image was taken; and the patient tolerated the procedure with no complications. Images:still images not obtained    Laterality:Bilateral  Block Type:TAP  Injection Technique:single-shot  Needle Type:echogenic  Needle Gauge:20 G  Resistance on Injection: none    Medications Used: bupivacaine PF (MARCAINE) 0.25 % injection, 60 mL  dexamethasone sodium phosphate injection, 4 mg  Med admintered at 12/8/2020 7:44 AM      Medications  Comment:30 mL .25% Bupivicaine and 2 mg Decadron administered per side

## 2020-12-08 NOTE — H&P
Admission HP     Patient Name: Radha John  MRN: [id number]  : 1945  DOS: 2020    Attending: Sonam Olvera MD    Primary Care Provider: Wojciech Merino MD      Patient Care Team:  Wojciech Merino MD as PCP - General (Internal Medicine)  Armand Duffy MD as Consulting Physician (Gynecology)    Chief complaint: Severe diverticulosis with colovaginal fistula and fecal incontinence.    Subjective   Patient is a pleasant 75 y.o. female presented for scheduled surgery by Dr. Sonam Olvera..     Patient has been seen by Dr. Olvera for sigmoid diverticulosis, colovaginal fistula, and fecal incontinence.  He opted to proceed with surgery.    Patient with history of autoimmune hepatitis for which she is under the care of Dr. Hansen.  She is on Imuran.    Today she underwent robotic low anterior resection with takedown of colovaginal fistula, by Dr. Olvera.  She also underwent cystoscopy with bilateral ureteral catheters placement by Dr. Gottlieb.  He tolerated surgery well and was admitted for the    Seen in her room postoperatively, doing fairly well, good pain control, no complains of nausea, vomiting, or shortness of breath, tolerating clear liquids.    Allergies:  Allergies   Allergen Reactions   • Erythromycin Swelling     C/O lips swelling   • Hydrocodone-Acetaminophen Nausea Only     Lortab       Meds:  Medications Prior to Admission   Medication Sig Dispense Refill Last Dose   • amLODIPine (NORVASC) 10 MG tablet Take 10 mg by mouth daily.   2020 at 0800   • aspirin 81 MG EC tablet Take 81 mg by mouth daily.   2020 at 2000   • azaTHIOprine (IMURAN) 50 MG tablet Take 2 tablets by mouth once daily (Patient taking differently: Take 50 mg by mouth 2 (two) times a day. TAKE 2 TABLETS BY MOUTH ONCE DAILY) 180 tablet 0 2020 at 0800   • Budesonide (ENTOCORT EC) 3 MG 24 hr capsule TAKE 3 CAPSULES BY MOUTH IN THE MORNING (Patient taking differently: Take 6 mg by mouth Every Morning.)  270 capsule 0 12/7/2020 at 0800   • Cholecalciferol (VITAMIN D-3 PO) Take 1 tablet by mouth daily.   12/7/2020 at 2000   • levothyroxine (SYNTHROID, LEVOTHROID) 88 MCG tablet Take 88 mcg by mouth daily.   12/7/2020 at 0800   • loratadine (CLARITIN) 10 MG tablet Take 10 mg by mouth Daily.   12/7/2020 at 0800   • metFORMIN ER (GLUCOPHAGE-XR) 500 MG 24 hr tablet Take 500 mg by mouth Daily.   12/7/2020 at 2000   • metoprolol tartrate (LOPRESSOR) 50 MG tablet Take 50 mg by mouth 2 (Two) Times a Day.   12/7/2020 at 2000   • montelukast (SINGULAIR) 10 MG tablet Take 10 mg by mouth daily.   12/7/2020 at 2000   • olmesartan (BENICAR) 40 MG tablet Take 40 mg by mouth Daily.   12/7/2020 at 2000   • polycarbophil (calcium polycarbophil) 625 MG tablet tablet Take 625 mg by mouth Daily. Takes two daily   12/6/2020 at Unknown time   • fluticasone (FLONASE) 50 MCG/ACT nasal spray 1 spray into the nostril(s) as directed by provider As Needed.   More than a month at Unknown time       Past Medical History:   Diagnosis Date   • Adenomatous polyp of colon 10/23/2019   • Arthritis    • Diabetes mellitus (CMS/HCC)    • Disease of thyroid gland    • Diverticulosis    • Gout    • Hepatitis    • History of kidney stones    • Hx of radiation therapy 06/2014   • Hyperlipidemia    • Hypertension    • Malignant neoplasm of breast (CMS/HCC) 2014   • Menopause      Past Surgical History:   Procedure Laterality Date   • BREAST BIOPSY Bilateral    • BREAST LUMPECTOMY Bilateral 06/27/2014   • COLONOSCOPY     • HEMORRHOIDECTOMY     • PARATHYROIDECTOMY  07/19/2016    Dr. Izabela Hemrosillo @ [facility]   • THYROID SURGERY     • VAGINAL HYSTERECTOMY       Family History   Problem Relation Age of Onset   • Hypertension Father    • Hypertension Paternal Grandmother    • Hypertension Other    • Other Other         CAD   • Breast cancer Sister 50   • Endometrial cancer Neg Hx    • Ovarian cancer Neg Hx    • Colon cancer Neg Hx    • Colon polyps Neg Hx       Social History     Tobacco Use   • Smoking status: Former Smoker   • Smokeless tobacco: Never Used   • Tobacco comment: quit 40 years ago   Substance Use Topics   • Alcohol use: No   • Drug use: No   Patient is .  Used to be an  for inpatient drug rehab facility.    Review of Systems  Pertinent items are noted in HPI, all other systems reviewed and negative    Vital Signs  /76   Pulse 84   Temp 98.5 °F (36.9 °C) (Temporal)   Resp 16   SpO2 95%     Physical Exam:    General Appearance:    Alert, cooperative, in no acute distress   Head:    Normocephalic, without obvious abnormality, atraumatic   Eyes:            Lids and lashes normal, conjunctivae and sclerae normal, no   icterus, no pallor, corneas clear   Ears:    Ears appear intact with no abnormalities noted   Throat:   No oral lesions, no thrush, oral mucosa moist   Neck:   No adenopathy, supple, trachea midline, no thyromegaly         Lungs:     Clear to auscultation,respirations regular, even and       unlabored. No wheezes or rales.    Heart:    Regular rhythm and normal rate, normal S1 and S2, no murmur    Abdomen:      Soft and benign with clean incisions.  Obese.   Genitalia:    Deferred  Raymond catheter with clear urine.   Extremities:   Moves all extremities well, no edema, no cyanosis, no              redness   Pulses:   Pulses palpable and equal bilaterally   Skin:   No bleeding, bruising or rash   Neurologic:   Cranial nerves 2 - 12 grossly intact, no gross deficit.     Results from last 7 days   Lab Units 12/08/20  1318   HEMOGLOBIN g/dL 14.9   HEMATOCRIT % 45.6           Invalid input(s): LABALBU, PROT  Lab Results   Component Value Date    HGBA1C 6.20 (H) 12/01/2020     Results for BHAVANI STEFANYMAURO MORALES (MRN [id number]) as of 12/8/2020 17:32   Ref. Range 12/1/2020 10:38   Glucose Latest Ref Range: 65 - 99 mg/dL 99   Sodium Latest Ref Range: 136 - 145 mmol/L 141   Potassium Latest Ref Range: 3.5 - 5.2 mmol/L 4.2   CO2  Latest Ref Range: 22.0 - 29.0 mmol/L 30.0 (H)   Chloride Latest Ref Range: 98 - 107 mmol/L 103   Anion Gap Latest Ref Range: 5.0 - 15.0 mmol/L 8.0   Creatinine Latest Ref Range: 0.57 - 1.00 mg/dL 1.11 (H)   BUN Latest Ref Range: 8 - 23 mg/dL 19   BUN/Creatinine Ratio Latest Ref Range: 7.0 - 25.0  17.1   Calcium Latest Ref Range: 8.6 - 10.5 mg/dL 9.2   eGFR African Am Latest Ref Range: >60 mL/min/1.73 58 (L)   Alkaline Phosphatase Latest Ref Range: 39 - 117 U/L 55   Total Protein Latest Ref Range: 6.0 - 8.5 g/dL 7.1   ALT (SGPT) Latest Ref Range: 1 - 33 U/L 13   AST (SGOT) Latest Ref Range: 1 - 32 U/L 14   Total Bilirubin Latest Ref Range: 0.0 - 1.2 mg/dL 0.4   Albumin Latest Ref Range: 3.50 - 5.20 g/dL 3.90   Globulin Latest Units: gm/dL 3.2   A/G Ratio Latest Units: g/dL 1.2     Assessment and Plan:       Status post colectomy, LAR with takedown of colovaginal fistula    Diverticulosis, severe with colovaginal fistula    S/P cystoscopy with ureteral catheter placement    Hypothyroidism    Hypertension    Hyperlipidemia    Disease of thyroid gland    Diabetes mellitus (CMS/[facility])      Plan  1. Ambulation, several times daily  2. Pain control-prns   3. IS-encourage  4. DVT proph- Mechanical, subcutaneous Lovenox  5. Bowel regimen, alvimopan  6. Resume home medications as appropriate  7. Monitor post-op labs  8. DC planning   9. Diet, Clears, advance diet as tolerated.  IVF initially, monitor volume status.    - Hypertension:  Resume home medications as appropriate, formulary substitution when indicated.  Holding parameters.  Prn medications for elevated blood pressure.    -DM type 2  Hgb A1C 6.2  Hold OHA as appropriate  FSBG AC/HS, ( q 6 when NPO), along with correction humalog.  Long acting insulin if needed.    -Hypothyroidism: Resume replacement.    Discussed with patient postop management plan, she expressed understanding and agreement.    Dragon disclaimer:  Part of this encounter note is an electronic  transcription/translation of spoken language to printed text. The electronic translation of spoken language may permit erroneous, or at times, nonsensical words or phrases to be inadvertently transcribed; Although I have reviewed the note for such errors, some may still exist.    Christy Lennon MD  12/08/20  17:32 EST

## 2020-12-09 LAB
ANION GAP SERPL CALCULATED.3IONS-SCNC: 9 MMOL/L (ref 5–15)
BASOPHILS # BLD AUTO: 0.04 10*3/MM3 (ref 0–0.2)
BASOPHILS NFR BLD AUTO: 0.3 % (ref 0–1.5)
BUN SERPL-MCNC: 10 MG/DL (ref 8–23)
BUN/CREAT SERPL: 9 (ref 7–25)
CALCIUM SPEC-SCNC: 8.7 MG/DL (ref 8.6–10.5)
CHLORIDE SERPL-SCNC: 105 MMOL/L (ref 98–107)
CO2 SERPL-SCNC: 25 MMOL/L (ref 22–29)
CREAT SERPL-MCNC: 1.11 MG/DL (ref 0.57–1)
CYTO UR: NORMAL
DEPRECATED RDW RBC AUTO: 47.8 FL (ref 37–54)
EOSINOPHIL # BLD AUTO: 0.04 10*3/MM3 (ref 0–0.4)
EOSINOPHIL NFR BLD AUTO: 0.3 % (ref 0.3–6.2)
ERYTHROCYTE [DISTWIDTH] IN BLOOD BY AUTOMATED COUNT: 12.8 % (ref 12.3–15.4)
GFR SERPL CREATININE-BSD FRML MDRD: 58 ML/MIN/1.73
GLUCOSE BLDC GLUCOMTR-MCNC: 108 MG/DL (ref 70–130)
GLUCOSE BLDC GLUCOMTR-MCNC: 111 MG/DL (ref 70–130)
GLUCOSE BLDC GLUCOMTR-MCNC: 89 MG/DL (ref 70–130)
GLUCOSE BLDC GLUCOMTR-MCNC: 96 MG/DL (ref 70–130)
GLUCOSE SERPL-MCNC: 115 MG/DL (ref 65–99)
HCT VFR BLD AUTO: 44 % (ref 34–46.6)
HGB BLD-MCNC: 14.6 G/DL (ref 12–15.9)
IMM GRANULOCYTES # BLD AUTO: 0.04 10*3/MM3 (ref 0–0.05)
IMM GRANULOCYTES NFR BLD AUTO: 0.3 % (ref 0–0.5)
LAB AP CASE REPORT: NORMAL
LAB AP CLINICAL INFORMATION: NORMAL
LYMPHOCYTES # BLD AUTO: 1.11 10*3/MM3 (ref 0.7–3.1)
LYMPHOCYTES NFR BLD AUTO: 8.4 % (ref 19.6–45.3)
MAGNESIUM SERPL-MCNC: 1.9 MG/DL (ref 1.6–2.4)
MCH RBC QN AUTO: 33.6 PG (ref 26.6–33)
MCHC RBC AUTO-ENTMCNC: 33.2 G/DL (ref 31.5–35.7)
MCV RBC AUTO: 101.4 FL (ref 79–97)
MONOCYTES # BLD AUTO: 0.85 10*3/MM3 (ref 0.1–0.9)
MONOCYTES NFR BLD AUTO: 6.5 % (ref 5–12)
NEUTROPHILS NFR BLD AUTO: 11.07 10*3/MM3 (ref 1.7–7)
NEUTROPHILS NFR BLD AUTO: 84.2 % (ref 42.7–76)
NRBC BLD AUTO-RTO: 0 /100 WBC (ref 0–0.2)
PATH REPORT.FINAL DX SPEC: NORMAL
PATH REPORT.GROSS SPEC: NORMAL
PLAT MORPH BLD: NORMAL
PLATELET # BLD AUTO: 206 10*3/MM3 (ref 140–450)
PMV BLD AUTO: 9.7 FL (ref 6–12)
POTASSIUM SERPL-SCNC: 4.7 MMOL/L (ref 3.5–5.2)
RBC # BLD AUTO: 4.34 10*6/MM3 (ref 3.77–5.28)
RBC MORPH BLD: NORMAL
SODIUM SERPL-SCNC: 139 MMOL/L (ref 136–145)
WBC # BLD AUTO: 13.15 10*3/MM3 (ref 3.4–10.8)
WBC MORPH BLD: NORMAL

## 2020-12-09 PROCEDURE — 97161 PT EVAL LOW COMPLEX 20 MIN: CPT | Performed by: PHYSICAL THERAPIST

## 2020-12-09 PROCEDURE — 82962 GLUCOSE BLOOD TEST: CPT

## 2020-12-09 PROCEDURE — 97165 OT EVAL LOW COMPLEX 30 MIN: CPT

## 2020-12-09 PROCEDURE — 85007 BL SMEAR W/DIFF WBC COUNT: CPT | Performed by: COLON & RECTAL SURGERY

## 2020-12-09 PROCEDURE — 80048 BASIC METABOLIC PNL TOTAL CA: CPT | Performed by: COLON & RECTAL SURGERY

## 2020-12-09 PROCEDURE — 83735 ASSAY OF MAGNESIUM: CPT | Performed by: COLON & RECTAL SURGERY

## 2020-12-09 PROCEDURE — 85025 COMPLETE CBC W/AUTO DIFF WBC: CPT | Performed by: COLON & RECTAL SURGERY

## 2020-12-09 PROCEDURE — 25010000002 ENOXAPARIN PER 10 MG: Performed by: COLON & RECTAL SURGERY

## 2020-12-09 PROCEDURE — 94799 UNLISTED PULMONARY SVC/PX: CPT

## 2020-12-09 RX ORDER — IBUPROFEN 400 MG/1
400 TABLET ORAL EVERY 6 HOURS PRN
Status: DISCONTINUED | OUTPATIENT
Start: 2020-12-09 | End: 2020-12-10 | Stop reason: HOSPADM

## 2020-12-09 RX ADMIN — METOPROLOL TARTRATE 50 MG: 50 TABLET, FILM COATED ORAL at 09:09

## 2020-12-09 RX ADMIN — ALVIMOPAN 12 MG: 12 CAPSULE ORAL at 09:09

## 2020-12-09 RX ADMIN — ENOXAPARIN SODIUM 30 MG: 30 INJECTION SUBCUTANEOUS at 09:09

## 2020-12-09 RX ADMIN — CETIRIZINE HYDROCHLORIDE 5 MG: 10 TABLET, FILM COATED ORAL at 09:09

## 2020-12-09 RX ADMIN — IBUPROFEN 400 MG: 400 TABLET ORAL at 17:33

## 2020-12-09 RX ADMIN — MONTELUKAST SODIUM 10 MG: 10 TABLET, COATED ORAL at 20:28

## 2020-12-09 RX ADMIN — FAMOTIDINE 20 MG: 20 TABLET, FILM COATED ORAL at 09:09

## 2020-12-09 RX ADMIN — IBUPROFEN 400 MG: 400 TABLET ORAL at 09:09

## 2020-12-09 RX ADMIN — AMLODIPINE BESYLATE 10 MG: 10 TABLET ORAL at 09:10

## 2020-12-09 RX ADMIN — POTASSIUM CHLORIDE, DEXTROSE MONOHYDRATE AND SODIUM CHLORIDE 100 ML/HR: 150; 5; 450 INJECTION, SOLUTION INTRAVENOUS at 03:21

## 2020-12-09 RX ADMIN — OXYBUTYNIN CHLORIDE 5 MG: 5 TABLET ORAL at 17:31

## 2020-12-09 RX ADMIN — LEVOTHYROXINE SODIUM 88 MCG: 88 TABLET ORAL at 06:17

## 2020-12-09 RX ADMIN — OXYBUTYNIN CHLORIDE 5 MG: 5 TABLET ORAL at 09:09

## 2020-12-09 RX ADMIN — OXYBUTYNIN CHLORIDE 5 MG: 5 TABLET ORAL at 20:28

## 2020-12-09 RX ADMIN — FAMOTIDINE 20 MG: 20 TABLET, FILM COATED ORAL at 20:28

## 2020-12-09 RX ADMIN — ACETAMINOPHEN 1000 MG: 500 TABLET ORAL at 03:27

## 2020-12-09 NOTE — THERAPY DISCHARGE NOTE
Patient Name: Radha John  : 1945    MRN: [id number]                              Today's Date: 2020       Admit Date: 2020    Visit Dx:     ICD-10-CM ICD-9-CM   1. 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   • Invasive ductal carcinoma of right breast (CMS/HCC)   • Hypothyroidism   • Diabetes (CMS/HCC)   • Hypertension   • Hyperlipidemia   • Autoimmune hepatitis (CMS/HCC)   • Erythrocytosis   • High risk medication use   • Adenomatous polyp of colon   • Malignant neoplasm of breast (CMS/HCC)   • Hepatitis   • Gout   • Disease of thyroid gland   • Diabetes mellitus (CMS/HCC)   • Arthritis   • Menopause   • Colovaginal fistula   • Diverticulosis, severe with colovaginal fistula   • Status post colectomy, LAR with takedown of colovaginal fistula   • S/P cystoscopy with ureteral catheter placement     Past Medical History:   Diagnosis Date   • Adenomatous polyp of colon 10/23/2019   • Arthritis    • Diabetes mellitus (CMS/HCC)    • Disease of thyroid gland    • Diverticulosis    • Gout    • Hepatitis    • History of kidney stones    • Hx of radiation therapy 2014   • Hyperlipidemia    • Hypertension    • Malignant neoplasm of breast (CMS/HCC)    • Menopause      Past Surgical History:   Procedure Laterality Date   • BREAST BIOPSY Bilateral    • BREAST LUMPECTOMY Bilateral 2014   • COLON RESECTION N/A 2020    Procedure: ROBOTIC LOW ANTERIOR RESECTION, TAKEDOWN OF COLOVAGINAL FISTULA, URETEROLYSIS LEFT;  Surgeon: Sonam Olvera MD;  Location:  [facility];  Service: [facility];  Laterality: N/A;   • COLONOSCOPY     • CYSTOSCOPY N/A 2020    Procedure: CYSTOSCOPY, TEMPORY BILATERAL URETERAL STENTS;  Surgeon: Rosie Gottlieb MD;  Location:  [facility] OR;  Service: Gynecology;  Laterality: N/A;   • HEMORRHOIDECTOMY     • PARATHYROIDECTOMY  2016    Dr. Izabela Hermosillo @ [facility]   •  THYROID SURGERY     • VAGINAL HYSTERECTOMY       General Information     Row Name 12/09/20 1104          Physical Therapy Time and Intention    Document Type  evaluation;discharge evaluation/summary  -LM     Mode of Treatment  individual therapy;physical therapy  -LM     Row Name 12/09/20 1104          General Information    Patient Profile Reviewed  yes  -LM     Prior Level of Function  independent:;all household mobility;gait;ADL's  -LM     Existing Precautions/Restrictions  other (see comments) Abd Incision  -LM     Barriers to Rehab  none identified  -LM     Row Name 12/09/20 1104          Living Environment    Lives With  spouse  -LM     Row Name 12/09/20 1104          Home Main Entrance    Number of Stairs, Main Entrance  five  -LM     Stair Railings, Main Entrance  railing on right side (ascending)  -LM     Row Name 12/09/20 1104          Stairs Within Home, Primary    Number of Stairs, Within Home, Primary  seven  -LM     Stair Railings, Within Home, Primary  railing on right side (ascending)  -LM     Row Name 12/09/20 1104          Cognition    Orientation Status (Cognition)  oriented x 4  -LM       User Key  (r) = Recorded By, (t) = Taken By, (c) = Cosigned By    Initials Name Provider Type    LM Macey Shelley, JOSE Physical Therapist        Mobility     Row Name 12/09/20 1104          Bed Mobility    Bed Mobility  sit-supine  -LM     Sit-Supine [place] (Bed Mobility)  independent  -LM     Comment (Bed Mobility)  HOB flat; No BR  -LM     Row Name 12/09/20 1104          Sit-Stand Transfer    Sit-Stand [place] (Transfers)  independent  -LM     Assistive Device (Sit-Stand Transfers)  -- No AD  -LM     Row Name 12/09/20 1104          Gait/Stairs (Locomotion)    [place] Level (Gait)  independent  -LM     Assistive Device (Gait)  -- No AD  -LM     Distance in Feet (Gait)  300 feet  -LM     [place] Level (Stairs)  stand by assist  -LM     Handrail Location (Stairs)  right side (ascending)  -LM      Number of Steps (Stairs)  5  -LM     Ascending Technique (Stairs)  step-over-step  -LM     Descending Technique (Stairs)  step-to-step  -LM     Comment (Gait/Stairs)  No unsteadiness or gait abnormalities noted.  Pt reports she has been ambulating in the halls and feels comfortable doing so independently.  Pt has no concerns re: mobility/safety.  -LM       User Key  (r) = Recorded By, (t) = Taken By, (c) = Cosigned By    Initials Name Provider Type    LM Macey Shelley PT Physical Therapist        Obj/Interventions     Row Name 12/09/20 1104          Range of Motion Comprehensive    General Range of Motion  bilateral lower extremity ROM WFL  -LM     Row Name 12/09/20 1104          Strength Comprehensive (MMT)    General Manual Muscle Testing (MMT) Assessment  no strength deficits identified BLEs  -LM     Row Name 12/09/20 1104          Balance    Balance Assessment  sitting static balance;standing static balance;standing dynamic balance  -LM     Static Sitting Balance  WFL;sitting in chair  -LM     Static Standing Balance  WFL;unsupported  -LM     Dynamic Standing Balance  WFL;unsupported  -LM     Row Name 12/09/20 1104          Sensory Assessment (Somatosensory)    Sensory Assessment (Somatosensory)  LE sensation intact  -LM       User Key  (r) = Recorded By, (t) = Taken By, (c) = Cosigned By    Initials Name Provider Type    Macey Mccabe, JOSE Physical Therapist        Goals/Plan    No documentation.       Clinical Impression     Row Name 12/09/20 1104          Pain    Additional Documentation  Pain Scale: Numbers Pre/Post-Treatment (Group)  -LM     [facility] Name 12/09/20 1104          Pain Scale: Numbers Pre/Post-Treatment    Pretreatment Pain Rating  5/10  -LM     Posttreatment Pain Rating  5/10  -LM     Pain Location - Orientation  incisional  -LM     Pain Location  abdomen  -LM     Pain Intervention(s)  Repositioned;Ambulation/increased activity  -LM     Row Name 12/09/20 1104          Plan of Care Review     Plan of Care Reviewed With  patient  -LM     Outcome Summary  PT evaluation completed.  Pt demonstrated independence with bed mobility, transfers, and 300' + 20' feet of gait without AD.  Pt also ambulated up/down 5 stairs using R HR with SBAx1.  Pt has no concerns re: mobility or safety upon d/c.  Pt verbalizes understanding to continue ambulating in the halls multiple times a day.  Recommend home with assist.  PT will sign off.  -LM     Row Name 12/09/20 1104          Therapy Assessment/Plan (PT)    Criteria for Skilled Interventions Met (PT)  no;no problems identified which require skilled intervention  -LM     Row Name 12/09/20 1104          Vital Signs    Pre Systolic BP Rehab  133  -LM     Pre Treatment Diastolic BP  83  -LM     Pretreatment Heart Rate (beats/min)  60  -LM     Posttreatment Heart Rate (beats/min)  64  -LM     Pre Patient Position  Sitting  -LM     Post Patient Position  Supine  -LM     Row Name 12/09/20 1104          Positioning and Restraints    Pre-Treatment Position  sitting in chair/recliner  -LM     Post Treatment Position  bed  -LM     In Bed  supine;call light within reach;encouraged to call for assist;with family/caregiver;notified nsg  -LM       User Key  (r) = Recorded By, (t) = Taken By, (c) = Cosigned By    Initials Name Provider Type    LM Macey Shelley, PT Physical Therapist        Outcome Measures     Row Name 12/09/20 1104          How much help from another person do you currently need...    Turning from your back to your side while in flat bed without using bedrails?  4  -LM     Moving from lying on back to sitting on the side of a flat bed without bedrails?  4  -LM     Moving to and from a bed to a chair (including a wheelchair)?  4  -LM     Standing up from a chair using your arms (e.g., wheelchair, bedside chair)?  4  -LM     Climbing 3-5 steps with a railing?  3  -LM     To walk in hospital room?  4  -LM     AM-PAC 6 Clicks Score (PT)  23  -LM     Row Name 12/09/20 1106           Functional Assessment    Outcome Measure Options  AM-PAC 6 Clicks Basic Mobility (PT)  -       User Key  (r) = Recorded By, (t) = Taken By, (c) = Cosigned By    Initials Name Provider Type    Macey Mccabe PT Physical Therapist        Physical Therapy Education                 Title: PT OT SLP Therapies (In Progress)     Topic: Physical Therapy (In Progress)     Point: Mobility training (Done)     Learning Progress Summary           Patient Acceptance, E, VU,DU by  at 12/9/2020 1141                   Point: Home exercise program (Not Started)     Learner Progress:  Not documented in this visit.          Point: Body mechanics (Not Started)     Learner Progress:  Not documented in this visit.          Point: Precautions (Done)     Learning Progress Summary           Patient Acceptance, E, VU,DU by  at 12/9/2020 1141                               User Key     Initials Effective Dates Name Provider Type [facility] 07/24/19 -  Macey Shelley PT Physical Therapist PT              PT Recommendation and Plan     Plan of Care Reviewed With: patient  Outcome Summary: PT evaluation completed.  Pt demonstrated independence with bed mobility, transfers, and 300' + 20' feet of gait without AD.  Pt also ambulated up/down 5 stairs using R HR with SBAx1.  Pt has no concerns re: mobility or safety upon d/c.  Pt verbalizes understanding to continue ambulating in the halls multiple times a day.  Recommend home with assist.  PT will sign off.     Time Calculation:   PT Charges     Row Name 12/09/20 1104             Time Calculation    Start Time  1104  -LM      PT Received On  12/09/20  -        User Key  (r) = Recorded By, (t) = Taken By, (c) = Cosigned By    Initials Name Provider Type     Macey Shelley PT Physical Therapist        Therapy Charges for Today     Code Description Service Date Service Provider Modifiers Qty    [id number]  PT EVAL LOW COMPLEXITY 4 12/9/2020 Macey Shelley PT GP 1           PT G-Codes  Outcome Measure Options: AM-PAC 6 Clicks Basic Mobility (PT)  AM-PAC 6 Clicks Score (PT): 23  AM-PAC 6 Clicks Score (OT): 20    PT Discharge Summary  Anticipated Discharge Disposition (PT): home with assist    Macey Joshi, PT  12/9/2020

## 2020-12-09 NOTE — PROGRESS NOTES
Discharge Planning Assessment  [facility]     Patient Name: Radha John  MRN: [id number]  Today's Date: 12/9/2020    Admit Date: 12/8/2020    Discharge Needs Assessment     Row Name 12/09/20 1332       Living Environment    Lives With  spouse    Name(s) of Who Lives With Patient  Stas (spouse)    Current Living Arrangements  home/apartment/condo    Primary Care Provided by  self    Provides Primary Care For  no one    Family Caregiver if Needed  spouse    Quality of Family Relationships  helpful;involved;supportive    Able to Return to Prior Arrangements  yes    Living Arrangement Comments  Spoke with pt and pt's spouse, in room, with permission regarding discharge plan. Pt resides in [facility] with spouse.       Resource/Environmental Concerns    Resource/Environmental Concerns  none       Transition Planning    Patient/Family Anticipates Transition to  home with family    Patient/Family Anticipated Services at Transition      Transportation Anticipated  car, drives self;family or friend will provide       Discharge Needs Assessment    Readmission Within the Last 30 Days  no previous admission in last 30 days    Equipment Currently Used at Home  glucometer    Concerns to be Addressed  discharge planning    Anticipated Changes Related to Illness  other (see comments) S/P surgery, recovery time    Equipment Needed After Discharge  glucometer    Discharge Coordination/Progress  Pt reports she has [employer] Medicare Replacement insurance with no recent changes in insurance. Pt has prescription coverage and uses [facility] on [facility]. Plan is home with spouse at discharge. Pt currently denies discharge needs. CM will cont to follow.        Discharge Plan     Row Name 12/09/20 1338       Plan    Plan  discharge plan    Plan Comments  Plan is home with spouse at discharge. Pt currently denies discharge needs. CM will cont to follow.    Final Discharge Disposition Code  01 - home or self-care         Continued Care and Services - Admitted Since 12/8/2020    Coordination has not been started for this encounter.       Expected Discharge Date and Time     Expected Discharge Date Expected Discharge Time    Dec 10, 2020         Demographic Summary     Row Name 12/09/20 1327       General Information    General Information Comments  Pt's PCP is Wojciech Merino       Contact Information    Permission Granted to Share Info With      Contact Information Obtained for      Contact Information Comments  Jaspreet John(spouse):[phone] or [phone]        Functional Status     Row Name 12/09/20 1331       Functional Status    Functional Status Comments  Pt is independent of ADLs        Psychosocial    No documentation.       Abuse/Neglect    No documentation.       Legal    No documentation.       Substance Abuse    No documentation.       Patient Forms    No documentation.           Avis Zarate RN

## 2020-12-09 NOTE — PROGRESS NOTES
IM progress note      Radha John  [id number]  1945     LOS: 1 day     Attending: Sonam Olvera MD    Primary Care Provider: Wojciech Merino MD      Chief Complaint/Reason for visit:  No chief complaint on file.      Subjective   Doing well, having a good day.  Good pain control.  Ambulated, voided after Raymond catheter removal.  Had 2 liquid bowel movements.  On soft diet.    Objective     Visit Vitals  /59 (BP Location: Left arm, Patient Position: Lying)   Pulse 66   Temp 98.2 °F (36.8 °C) (Oral)   Resp 18   SpO2 95%     Temp (24hrs), Av.3 °F (36.8 °C), Min:98.2 °F (36.8 °C), Max:98.7 °F (37.1 °C)      Intake/Output:    Intake/Output Summary (Last 24 hours) at 2020 1537  Last data filed at 2020 1000  Gross per 24 hour   Intake 1940 ml   Output 3275 ml   Net -1335 ml          Physical Exam:     General Appearance:    Alert, cooperative, in no acute distress   Head:    Normocephalic, without obvious abnormality, atraumatic    Lungs:     Normal effort, symmetric chest rise,  clear to  auscultation bilaterally                 Heart:    Regular rhythm and normal rate, normal S1 and S2   Abdomen:    Soft, obese, benign.  Clean incisions.   Extremities:   No clubbing, cyanosis or edema.  No deformities.    Pulses:   Pulses palpable and equal bilaterally   Skin:   No bleeding, bruising or rash          Results Review:     I reviewed the patient's new clinical results.   Results from last 7 days   Lab Units 20  0645 20  1318   WBC 10*3/mm3 13.15*  --    HEMOGLOBIN g/dL 14.6 14.9   HEMATOCRIT % 44.0 45.6   PLATELETS 10*3/mm3 206  --      Results from last 7 days   Lab Units 20  0645   SODIUM mmol/L 139   POTASSIUM mmol/L 4.7   CHLORIDE mmol/L 105   CO2 mmol/L 25.0   BUN mg/dL 10   CREATININE mg/dL 1.11*   CALCIUM mg/dL 8.7   GLUCOSE mg/dL 115*     I reviewed the patient's new imaging including images and reports.  Results for RADHA JOHN (MRN [id number]) as of  12/9/2020 17:55   Ref. Range 12/8/2020 20:06 12/9/2020 06:45 12/9/2020 07:41 12/9/2020 11:46   Glucose Latest Ref Range: 70 - 130 mg/dL 292 (H) 115 (H) 111 108     All medications reviewed.   alvimopan, 12 mg, Oral, BID  amLODIPine, 10 mg, Oral, Daily  cetirizine, 5 mg, Oral, Daily  enoxaparin, 30 mg, Subcutaneous, Daily  famotidine, 20 mg, Oral, BID  insulin lispro, 0-7 Units, Subcutaneous, 4x Daily AC & at Bedtime  levothyroxine, 88 mcg, Oral, Q AM  metoprolol tartrate, 50 mg, Oral, BID  montelukast, 10 mg, Oral, Nightly  oxybutynin, 5 mg, Oral, TID          Assessment/Plan       Status post colectomy, LAR with takedown of colovaginal fistula    Diverticulosis, severe with colovaginal fistula    S/P cystoscopy with ureteral catheter placement    Hypothyroidism    Hypertension    Hyperlipidemia    Disease of thyroid gland    Diabetes mellitus (CMS/[facility])      Plan  1. Ambulation, several times daily.  2. Pain control-prns   3. IS-encouraged  4. DVT proph-mechanicals and Lovenox.  5. Bowel regimen  6. Diet soft diet as tolerated.  7. Monitor post-op labs  8. DC planning.    - Hypertension:  Resume home medications as appropriate, formulary substitution when indicated.  Holding parameters.  Prn medications for elevated blood pressure.     -DM type 2  Hgb A1C 6.2  Hold OHA as appropriate  FSBG AC/HS, ( q 6 when NPO), along with correction humalog.  Long acting insulin if needed.     -Hypothyroidism: Resume replacement.      Dragon disclaimer:  Part of this encounter note is an electronic transcription/translation of spoken language to printed text. The electronic translation of spoken language may permit erroneous, or at times, nonsensical words or phrases to be inadvertently transcribed; Although I have reviewed the note for such errors, some may still exist.    Christy Lennon MD  12/09/20  15:37 EST

## 2020-12-09 NOTE — PROGRESS NOTES
Seen and examined.   VS, Is&Os, labs reviewed.     Denies nausea. No vomitting. Elisa clears.   Walking.   Passing gas.     Abdominal exam as expected.     Progress as expected.    D/c Raymond if Cr ok. Am labs pending, but robust clear UOP and I anticipate no significant issues.   Will stop tylenol given hx of autoimmune hepatitis. Pt uses ibuprofen prn at home. Will order. Hold imuran for now until discharge. Preop LFTs all normal.   ADAT  Decrease IVFs  Ambulate  IS  PO pain meds  Home tomorrow if all goes well

## 2020-12-09 NOTE — PLAN OF CARE
Goal Outcome Evaluation:  Plan of Care Reviewed With: patient        VSS.  Pt had adequate urine output into mckeon bag during shift.  Pt slept most of the night and did not do any ambulation.  Pt did chew gum and drink fluids.  Will continue to monitor.    Problem: Adult Inpatient Plan of Care  Goal: Plan of Care Review  Outcome: Ongoing, Progressing  Goal: Patient-Specific Goal (Individualized)  Outcome: Ongoing, Progressing  Goal: Absence of Hospital-Acquired Illness or Injury  Outcome: Ongoing, Progressing  Intervention: Identify and Manage Fall Risk  Recent Flowsheet Documentation  Taken 12/9/2020 0400 by Jacklyn Leon RN  Safety Promotion/Fall Prevention:   activity supervised   fall prevention program maintained   nonskid shoes/slippers when out of bed   safety round/check completed  Taken 12/9/2020 0200 by Jacklyn Leon RN  Safety Promotion/Fall Prevention:   activity supervised   fall prevention program maintained   nonskid shoes/slippers when out of bed   safety round/check completed  Taken 12/9/2020 0000 by Jacklyn Leon RN  Safety Promotion/Fall Prevention:   activity supervised   fall prevention program maintained   nonskid shoes/slippers when out of bed   safety round/check completed  Taken 12/8/2020 2200 by Jacklyn Leon RN  Safety Promotion/Fall Prevention:   activity supervised   fall prevention program maintained   nonskid shoes/slippers when out of bed   safety round/check completed  Taken 12/8/2020 2000 by Jacklyn Leon RN  Safety Promotion/Fall Prevention:   activity supervised   fall prevention program maintained   nonskid shoes/slippers when out of bed   safety round/check completed  Intervention: Prevent Skin Injury  Recent Flowsheet Documentation  Taken 12/9/2020 0400 by Jacklyn Leon RN  Body Position: position changed independently  Taken 12/9/2020 0200 by Jacklyn Leon RN  Body Position: position changed independently  Taken 12/9/2020 0000 by Jacklyn Leon RN  Body Position: position  changed independently  Taken 12/8/2020 2200 by Jacklyn Leon RN  Body Position: position changed independently  Taken 12/8/2020 2000 by Jacklyn Leon RN  Body Position: position changed independently  Intervention: Prevent and Manage VTE (venous thromboembolism) Risk  Recent Flowsheet Documentation  Taken 12/8/2020 2000 by Jacklyn Leon RN  VTE Prevention/Management:   bilateral   sequential compression devices on  Intervention: Prevent Infection  Recent Flowsheet Documentation  Taken 12/9/2020 0400 by Jacklyn Leon RN  Infection Prevention: rest/sleep promoted  Taken 12/9/2020 0200 by Jacklyn Leon RN  Infection Prevention: rest/sleep promoted  Taken 12/9/2020 0000 by Jacklyn Leon RN  Infection Prevention: rest/sleep promoted  Taken 12/8/2020 2200 by Jacklyn Leon RN  Infection Prevention: rest/sleep promoted  Taken 12/8/2020 2000 by Jacklyn Leon RN  Infection Prevention: single patient room provided  Goal: Optimal Comfort and Wellbeing  Outcome: Ongoing, Progressing  Intervention: Provide Person-Centered Care  Recent Flowsheet Documentation  Taken 12/8/2020 2000 by Jacklyn Leon RN  Trust Relationship/Rapport:   care explained   thoughts/feelings acknowledged   questions answered  Goal: Readiness for Transition of Care  Outcome: Ongoing, Progressing

## 2020-12-09 NOTE — THERAPY EVALUATION
Patient Name: Radha John  : 1945    MRN: [id number]                              Today's Date: 2020       Admit Date: 2020    Visit Dx:     ICD-10-CM ICD-9-CM   1. 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   • Invasive ductal carcinoma of right breast (CMS/HCC)   • Hypothyroidism   • Diabetes (CMS/HCC)   • Hypertension   • Hyperlipidemia   • Autoimmune hepatitis (CMS/HCC)   • Erythrocytosis   • High risk medication use   • Adenomatous polyp of colon   • Malignant neoplasm of breast (CMS/HCC)   • Hepatitis   • Gout   • Disease of thyroid gland   • Diabetes mellitus (CMS/HCC)   • Arthritis   • Menopause   • Colovaginal fistula   • Diverticulosis, severe with colovaginal fistula   • Status post colectomy, LAR with takedown of colovaginal fistula   • S/P cystoscopy with ureteral catheter placement     Past Medical History:   Diagnosis Date   • Adenomatous polyp of colon 10/23/2019   • Arthritis    • Diabetes mellitus (CMS/HCC)    • Disease of thyroid gland    • Diverticulosis    • Gout    • Hepatitis    • History of kidney stones    • Hx of radiation therapy 2014   • Hyperlipidemia    • Hypertension    • Malignant neoplasm of breast (CMS/HCC)    • Menopause      Past Surgical History:   Procedure Laterality Date   • BREAST BIOPSY Bilateral    • BREAST LUMPECTOMY Bilateral 2014   • COLONOSCOPY     • HEMORRHOIDECTOMY     • PARATHYROIDECTOMY  2016    Dr. Izabela Hermosillo @ [facility]   • THYROID SURGERY     • VAGINAL HYSTERECTOMY       General Information     Row Name 20 0841          OT Time and Intention    Document Type  evaluation  -KAJAL     Mode of Treatment  occupational therapy  -KAJAL     Row Name 20 0841          General Information    Patient Profile Reviewed  yes  -KAJAL     Prior Level of Function  independent:;ADL's;home management;all household mobility;community mobility   -KAJAL     Existing Precautions/Restrictions  fall  -KAJAL     Barriers to Rehab  none identified  -KAJAL     Row Name 12/09/20 0841          Living Environment    Lives With  spouse  -KAJAL     Row Name 12/09/20 0841          Home Main Entrance    Number of Stairs, Main Entrance  five  -KAJAL     Stair Railings, Main Entrance  railing on right side (ascending)  -KAJAL     Row Name 12/09/20 0841          Stairs Within Home, Primary    Stairs, Within Home, Primary  Pt has split-level home with 5 SASHA through garage to main floor, 7 steps to master bedroom.  -KAJAL     Number of Stairs, Within Home, Primary  seven  -KAJAL     Stair Railings, Within Home, Primary  railing on right side (ascending)  -KAJAL     Row Name 12/09/20 0841          Cognition    Orientation Status (Cognition)  oriented x 4  -KAJAL     Row Name 12/09/20 0841          Safety Issues, Functional Mobility    Safety Issues Affecting Function (Mobility)  insight into deficits/self-awareness;judgment;problem-solving  -KAJAL     Impairments Affecting Function (Mobility)  balance;endurance/activity tolerance;strength  -KAJAL     Comment, Safety Issues/Impairments (Mobility)  vc's for line management for mckeon and IV  -KAJAL       User Key  (r) = Recorded By, (t) = Taken By, (c) = Cosigned By    Initials Name Provider Type    KAJAL Darcie Fregoso OT Occupational Therapist        Mobility/ADL's     Row Name 12/09/20 0849          Bed Mobility    Bed Mobility  supine-sit  -KAJAL     Supine-Sit Saunders (Bed Mobility)  standby assist  -KAJAL     Assistive Device (Bed Mobility)  bed rails;head of bed elevated  -KAJAL     Comment (Bed Mobility)  Extra time and effort required, HOB elevated, using bed rail  -KAJAL     Row Name 12/09/20 0849          Transfers    Transfers  sit-stand transfer;toilet transfer  -KAJAL     Sit-Stand Saunders (Transfers)  contact guard  -KAJAL     Row Name 12/09/20 0849          Functional Mobility    Functional Mobility- Ind. Level  contact guard assist  -KAJAL     Functional Mobility-  Device  other (see comments) HH assist  -KAJAL     Functional Mobility-Distance (Feet)  10  -KAJAL     Functional Mobility- Safety Issues  balance decreased during turns;step length decreased  -KAJAL     Functional Mobility- Comment  Pt ambulated across room from sink side to recliner managing IV pole with HHA and assist for mckeon.  -     Row Name 12/09/20 0849          Activities of Daily Living    BADL Assessment/Intervention  lower body dressing;grooming;toileting  -     Row Name 12/09/20 0849          Lower Body Dressing Assessment/Training    [place] Level (Lower Body Dressing)  don;pants/bottoms;socks;supervision  -KAJAL     Position (Lower Body Dressing)  edge of bed sitting;unsupported sitting  -KAJAL     Comment (Lower Body Dressing)  Pt donned underwear and socks seated at EOB, assist to manage mckeon through leghole in underwear.  -     Row Name 12/09/20 0849          Grooming Assessment/Training    [place] Level (Grooming)  oral care regimen;wash face, hands;supervision  -KAJAL     Position (Grooming)  sink side;supported standing  -KAJAL     Comment (Grooming)  One hand on sink countertop for support.  -     Row Name 12/09/20 0849          Toileting Assessment/Training    [place] Level (Toileting)  adjust/manage clothing;perform perineal hygiene;moderate assist (50% patient effort);change pad/brief;set up  -KAJAL     Position (Toileting)  edge of bed sitting;supported standing  -KAJAL     Comment (Toileting)  Assist for hygiene with bowel accident; pt completed hygiene for periarea.  -       User Key  (r) = Recorded By, (t) = Taken By, (c) = Cosigned By    Initials Name Provider Type    Darcie Lagos OT Occupational Therapist        Obj/Interventions     Row Name 12/09/20 0858          Sensory Assessment (Somatosensory)    Sensory Assessment (Somatosensory)  UE sensation intact  -     Row Name 12/09/20 0828          Vision Assessment/Intervention    Visual Impairment/Limitations  WFL;corrective  lenses full-time  -     Row Name 12/09/20 0858          Range of Motion Comprehensive    General Range of Motion  bilateral upper extremity ROM WFL  -     Row Name 12/09/20 0858          Strength Comprehensive (MMT)    Comment, General Manual Muscle Testing (MMT) Assessment  BUE's grossly 4/5  -     Row Name 12/09/20 0858          Balance    Balance Assessment  sitting static balance;sitting dynamic balance;standing dynamic balance  -     Static Sitting Balance  WFL;unsupported;sitting, edge of bed  -KAJAL     Dynamic Sitting Balance  WFL;unsupported;sitting, edge of bed  -KAJAL     Dynamic Standing Balance  mild impairment;unsupported;standing  -KAJAL     Balance Interventions  standing;occupation based/functional task  -     Comment, Balance  Assist for pericare in standing.  -KAJAL       User Key  (r) = Recorded By, (t) = Taken By, (c) = Cosigned By    Initials Name Provider Type    Darcie Lagos OT Occupational Therapist        Goals/Plan     Row Name 12/09/20 0907          Transfer Goal 1 (OT)    Activity/Assistive Device (Transfer Goal 1, OT)  sit-to-stand/stand-to-sit;bed-to-chair/chair-to-bed;toilet  -KAJAL     [place] Level/Cues Needed (Transfer Goal 1, OT)  supervision required  -KAJAL     Time Frame (Transfer Goal 1, OT)  long term goal (LTG);by discharge  -KAJAL     Progress/Outcome (Transfer Goal 1, OT)  goal ongoing  -     Row Name 12/09/20 0907          Toileting Goal 1 (OT)    Activity/Device (Toileting Goal 1, OT)  adjust/manage clothing;perform perineal hygiene;commode;grab bar/safety frame  -KAJAL     [place] Level/Cues Needed (Toileting Goal 1, OT)  supervision required  -KAJAL     Time Frame (Toileting Goal 1, OT)  long term goal (LTG);by discharge  -KAJAL     Progress/Outcome (Toileting Goal 1, OT)  goal ongoing  -     Row Name 12/09/20 0907          Therapy Assessment/Plan (OT)    Planned Therapy Interventions (OT)  activity tolerance training;BADL retraining;functional balance  retraining;ROM/therapeutic exercise;strengthening exercise;transfer/mobility retraining  -       User Key  (r) = Recorded By, (t) = Taken By, (c) = Cosigned By    Initials Name Provider Type    Darcie Lagos, FINESSE Occupational Therapist        Clinical Impression     Row Name 12/09/20 0900          Pain Assessment    Additional Documentation  Pain Scale: Numbers Pre/Post-Treatment (Group)  -KAJAL     Row Name 12/09/20 0900          Pain Scale: Numbers Pre/Post-Treatment    Pre/Posttreatment Pain Comment  Pt c/o mild sore throat.  -KAJAL     Pain Intervention(s)  Ambulation/increased activity;Repositioned  -KAJAL     Row Name 12/09/20 0900          Pain Scale: FACES Pre/Post-Treatment    Pain: FACES Scale, Pretreatment  2-->hurts little bit  -KAJAL     Posttreatment Pain Rating  2-->hurts little bit  -KAJAL     Pain Location  throat  -KAJAL     Row Name 12/09/20 0900          Plan of Care Review    Plan of Care Reviewed With  patient  -KAJAL     Outcome Summary  OT evaluation completed. Pt presents with decreased activity tolerance and dynamic standing balance deficits, s/p colectomy. Pt completed bed mobility with SBA, extra time and effort, STS with CGA to stand at sink to perform grooming with rest break needed at midpoint. Pt donned undergarment seated EOB with SBA, ambulated across room with HH assist to sit in recliner. OT to follow to progress self-care. Recommend home with assist and HH OT at discharge to promote return to PLOF.  -KAJAL     Row Name 12/09/20 0900          Therapy Assessment/Plan (OT)    Patient/Family Therapy Goal Statement (OT)  To go home and take care of myself.  -     Rehab Potential (OT)  good, to achieve stated therapy goals  -     Criteria for Skilled Therapeutic Interventions Met (OT)  yes;meets criteria;skilled treatment is necessary  -     Therapy Frequency (OT)  daily  -KAJAL     Row Name 12/09/20 0900          Therapy Plan Review/Discharge Plan (OT)    Anticipated Discharge Disposition (OT)  home  with assist;home with home health  -KAJAL     Row Name 12/09/20 0900          Vital Signs    Pre Systolic BP Rehab  129  -KAJAL     Pre Treatment Diastolic BP  68  -KAJAL     Post Systolic BP Rehab  133  -KAJAL     Post Treatment Diastolic BP  83  -KAJAL     Pretreatment Heart Rate (beats/min)  74  -KAJAL     Posttreatment Heart Rate (beats/min)  67  -KAJAL     Pre SpO2 (%)  93  -KAJAL     O2 Delivery Pre Treatment  room air  -KAJAL     Intra SpO2 (%)  94  -KAJAL     O2 Delivery Intra Treatment  room air  -KAJAL     Post SpO2 (%)  93  -KAJAL     O2 Delivery Post Treatment  room air  -KAJAL     Pre Patient Position  Supine  -KAJAL     Intra Patient Position  Standing  -KAJAL     Post Patient Position  Sitting  -KAJAL     Row Name 12/09/20 0900          Positioning and Restraints    Pre-Treatment Position  in bed  -KAJAL     Post Treatment Position  chair  -KAJAL     In Chair  notified nsg;reclined;call light within reach;encouraged to call for assist;exit alarm on;waffle cushion;legs elevated;heels elevated  -KAJAL       User Key  (r) = Recorded By, (t) = Taken By, (c) = Cosigned By    Initials Name Provider Type    Darcie Lagos OT Occupational Therapist        Outcome Measures     Row Name 12/09/20 0909          How much help from another is currently needed...    Putting on and taking off regular lower body clothing?  3  -KAJAL     Bathing (including washing, rinsing, and drying)  3  -KAJAL     Toileting (which includes using toilet bed pan or urinal)  3  -KAJAL     Putting on and taking off regular upper body clothing  4  -KAJAL     Taking care of personal grooming (such as brushing teeth)  3  -KAJAL     Eating meals  4  -KAJAL     AM-PAC 6 Clicks Score (OT)  20  -KAJAL     Row Name 12/09/20 0909          Functional Assessment    Outcome Measure Options  AM-PAC 6 Clicks Daily Activity (OT)  -KAJAL       User Key  (r) = Recorded By, (t) = Taken By, (c) = Cosigned By    Initials Name Provider Type    Darcie Lagos OT Occupational Therapist        Occupational Therapy Education                  Title: PT OT SLP Therapies (In Progress)     Topic: Occupational Therapy (In Progress)     Point: ADL training (Done)     Description:   Instruct learner(s) on proper safety adaptation and remediation techniques during self care or transfers.   Instruct in proper use of assistive devices.              Learning Progress Summary           Patient Acceptance, E, VU by KAJAL at 12/9/2020 0910    Comment: Role of OT; fall prevention                   Point: Home exercise program (Not Started)     Description:   Instruct learner(s) on appropriate technique for monitoring, assisting and/or progressing therapeutic exercises/activities.              Learner Progress:  Not documented in this visit.          Point: Precautions (Done)     Description:   Instruct learner(s) on prescribed precautions during self-care and functional transfers.              Learning Progress Summary           Patient Acceptance, E, VU by KAJAL at 12/9/2020 0910    Comment: Role of OT; fall prevention                   Point: Body mechanics (Done)     Description:   Instruct learner(s) on proper positioning and spine alignment during self-care, functional mobility activities and/or exercises.              Learning Progress Summary           Patient Acceptance, E, VU by KAJAL at 12/9/2020 0910    Comment: Role of OT; fall prevention                               User Key     Initials Effective Dates Name Provider Type Discipline    KAJAL 02/14/20 -  Darcie Fregoso OT Occupational Therapist OT              OT Recommendation and Plan  Planned Therapy Interventions (OT): activity tolerance training, BADL retraining, functional balance retraining, ROM/therapeutic exercise, strengthening exercise, transfer/mobility retraining  Therapy Frequency (OT): daily  Plan of Care Review  Plan of Care Reviewed With: patient  Outcome Summary: OT evaluation completed. Pt presents with decreased activity tolerance and dynamic standing balance deficits, s/p colectomy. Pt  completed bed mobility with SBA, extra time and effort, STS with CGA to stand at sink to perform grooming with rest break needed at midpoint. Pt donned undergarment seated EOB with SBA, ambulated across room with HH assist to sit in recliner. OT to follow to progress self-care. Recommend home with assist and HH OT at discharge to promote return to PLOF.     Time Calculation:   Time Calculation- OT     Row Name 12/09/20 0810             Time Calculation- OT    OT Start Time  0810  -KAJAL      OT Received On  12/09/20  -KAJAL      OT Goal Re-Cert Due Date  12/19/20  -KAJAL        User Key  (r) = Recorded By, (t) = Taken By, (c) = Cosigned By    Initials Name Provider Type    Darcie Lagos OT Occupational Therapist        Therapy Charges for Today     Code Description Service Date Service Provider Modifiers Qty    86331404347 HC OT EVAL LOW COMPLEXITY 4 12/9/2020 Darcie Fregoso OT GO 1               Darcie Fregoso OT  12/9/2020

## 2020-12-09 NOTE — ANESTHESIA POSTPROCEDURE EVALUATION
Patient: Radha John    Procedure Summary     Date: 12/08/20 Room / [facility]:  [facility]  [facility]    Anesthesia Start: 0730 Anesthesia Stop: 1125    Procedures:       ROBOTIC LOW ANTERIOR RESECTION, POSSIBLE DIVERTING LOOP ILEOSTOMY, CYSTOSCOPY WITH BILATERAL URETERAL CATHETERS (N/A Abdomen)      CYSTOSCOPY, TEMPORY BILATERAL URETERAL STENTS (N/A Urethra) Diagnosis:     Surgeon: Sonam Olvera MD; Rosie Gottlieb MD Provider: Antonio Atkinson MD    Anesthesia Type: general with block ASA Status: 3          Anesthesia Type: general with block    Vitals  Vitals Value Taken Time   /76 12/08/20 1225   Temp 98.5 °F (36.9 °C) 12/08/20 1230   Pulse 74 12/08/20 1230   Resp 16 12/08/20 1215   SpO2 95 % 12/08/20 1230           Post Anesthesia Care and Evaluation    Patient location during evaluation: PACU  Patient participation: complete - patient participated  Level of consciousness: awake and alert  Pain management: adequate  Airway patency: patent  Anesthetic complications: No anesthetic complications  PONV Status: none  Cardiovascular status: hemodynamically stable and acceptable  Respiratory status: nonlabored ventilation, acceptable and nasal cannula  Hydration status: acceptable

## 2020-12-09 NOTE — PLAN OF CARE
Goal Outcome Evaluation:  Plan of Care Reviewed With: patient     Outcome Summary: OT evaluation completed. Pt presents with decreased activity tolerance and dynamic standing balance deficits, s/p colectomy. Pt completed bed mobility with SBA, extra time and effort, STS with CGA to stand at sink to perform grooming with rest break needed at midpoint. Pt donned undergarment seated EOB with SBA, ambulated across room with HH assist to sit in recliner. OT to follow to progress self-care. Recommend home with assist and HH OT at discharge to promote return to PLOF.

## 2020-12-09 NOTE — PLAN OF CARE
Goal Outcome Evaluation:  Plan of Care Reviewed With: patient     Outcome Summary: PT evaluation completed.  Pt demonstrated independence with bed mobility, transfers, and 300' + 20' feet of gait without AD.  Pt also ambulated up/down 5 stairs using R HR with SBAx1.  Pt has no concerns re: mobility or safety upon d/c.  Pt verbalizes understanding to continue ambulating in the halls multiple times a day.  Recommend home with assist.  PT will sign off.

## 2020-12-10 VITALS
DIASTOLIC BLOOD PRESSURE: 75 MMHG | OXYGEN SATURATION: 99 % | HEART RATE: 73 BPM | SYSTOLIC BLOOD PRESSURE: 113 MMHG | TEMPERATURE: 98.3 F | RESPIRATION RATE: 18 BRPM

## 2020-12-10 LAB
GLUCOSE BLDC GLUCOMTR-MCNC: 83 MG/DL (ref 70–130)
GLUCOSE BLDC GLUCOMTR-MCNC: 98 MG/DL (ref 70–130)

## 2020-12-10 PROCEDURE — 82962 GLUCOSE BLOOD TEST: CPT

## 2020-12-10 PROCEDURE — 25010000002 ENOXAPARIN PER 10 MG: Performed by: COLON & RECTAL SURGERY

## 2020-12-10 RX ORDER — AZATHIOPRINE 50 MG/1
50 TABLET ORAL 2 TIMES DAILY
Start: 2020-12-12 | End: 2021-01-29

## 2020-12-10 RX ORDER — IBUPROFEN 400 MG/1
400 TABLET ORAL EVERY 6 HOURS PRN
Qty: 50 TABLET | Refills: 0 | Status: SHIPPED | OUTPATIENT
Start: 2020-12-10

## 2020-12-10 RX ADMIN — LEVOTHYROXINE SODIUM 88 MCG: 88 TABLET ORAL at 06:03

## 2020-12-10 RX ADMIN — AMLODIPINE BESYLATE 10 MG: 10 TABLET ORAL at 09:00

## 2020-12-10 RX ADMIN — OXYBUTYNIN CHLORIDE 5 MG: 5 TABLET ORAL at 09:00

## 2020-12-10 RX ADMIN — ENOXAPARIN SODIUM 30 MG: 30 INJECTION SUBCUTANEOUS at 08:59

## 2020-12-10 RX ADMIN — IBUPROFEN 400 MG: 400 TABLET ORAL at 08:59

## 2020-12-10 RX ADMIN — IBUPROFEN 400 MG: 400 TABLET ORAL at 01:03

## 2020-12-10 RX ADMIN — METOPROLOL TARTRATE 50 MG: 50 TABLET, FILM COATED ORAL at 09:00

## 2020-12-10 RX ADMIN — FAMOTIDINE 20 MG: 20 TABLET, FILM COATED ORAL at 08:59

## 2020-12-10 RX ADMIN — CETIRIZINE HYDROCHLORIDE 5 MG: 10 TABLET, FILM COATED ORAL at 08:59

## 2020-12-10 NOTE — PROGRESS NOTES
Case Management Discharge Note      Final Note: Plan is home with spouse, denies discharge needs.         Selected Continued Care - Admitted Since 12/8/2020     Destination    No services have been selected for the patient.              Durable Medical Equipment    No services have been selected for the patient.              Dialysis/Infusion    No services have been selected for the patient.              Home Medical Care    No services have been selected for the patient.              Therapy    No services have been selected for the patient.              Community Resources    No services have been selected for the patient.                       Final Discharge Disposition Code: 01 - home or self-care

## 2020-12-10 NOTE — DISCHARGE SUMMARY
Patient Name: Radha Jonh  MRN: [id number]  : 1945  DOS: 12/10/2020    Attending: Sonam Olvera MD    Primary Care Provider: Wojciech Merino MD    Date of Admission:.2020  5:57 AM    Date of Discharge:  12/10/2020    Discharge Diagnosis:     Status post colectomy, LAR with takedown of colovaginal fistula    Diverticulosis, severe with colovaginal fistula    S/P cystoscopy with ureteral catheter placement    Hypothyroidism    Hypertension    Hyperlipidemia    Disease of thyroid gland    Diabetes mellitus (CMS/HCC)      Hospital Course  At admit  Patient is a pleasant 75 y.o. female presented for scheduled surgery by Dr. Sonam Olvera..     Patient has been seen by Dr. Olvera for sigmoid diverticulosis, colovaginal fistula, and fecal incontinence.  He opted to proceed with surgery.     Patient with history of autoimmune hepatitis for which she is under the care of Dr. Hansen.  She is on Imuran.     Today she underwent robotic low anterior resection with takedown of colovaginal fistula, by Dr. Olvera.  She also underwent cystoscopy with bilateral ureteral catheters placement by Dr. Gottlieb.  He tolerated surgery well and was admitted for the     Seen in her room postoperatively, doing fairly well, good pain control, no complains of nausea, vomiting, or shortness of breath, tolerating clear liquids.       After admit    Patient was provided pain medication as needed for pain control.  She received DVT prophylaxis with subcutaneous Lovenox as well as mechanicals      Patient was started on clear liquid diet, this was advanced when she showed evidence of bowel function return.      Tolerated diet without difficulty.    Patient used an IS for atelectasis prophylaxis.    Home medications were resumed as appropriate, and labs were monitored and remained fairly stable.      With the progress she has made, pt is ready for DC home today.      Discussed with patient regarding plan and she shows  understanding and agreement.       Procedures Performed  Procedure(s):  ROBOTIC LOW ANTERIOR RESECTION, TAKEDOWN OF COLOVAGINAL FISTULA, URETEROLYSIS LEFT  CYSTOSCOPY, TEMPORY BILATERAL URETERAL STENTS       Pertinent Test Results:    I reviewed the patient's new clinical results.   Results from last 7 days   Lab Units 20  0645 20  1318   WBC 10*3/mm3 13.15*  --    HEMOGLOBIN g/dL 14.6 14.9   HEMATOCRIT % 44.0 45.6   PLATELETS 10*3/mm3 206  --      Results from last 7 days   Lab Units 20  0645   SODIUM mmol/L 139   POTASSIUM mmol/L 4.7   CHLORIDE mmol/L 105   CO2 mmol/L 25.0   BUN mg/dL 10   CREATININE mg/dL 1.11*   CALCIUM mg/dL 8.7   GLUCOSE mg/dL 115*     I reviewed the patient's new imaging including images and reports.          Physical therapy  Outcome Summary: PT evaluation completed.  Pt demonstrated independence with bed mobility, transfers, and 300' + 20' feet of gait without AD.  Pt also ambulated up/down 5 stairs using R HR with SBAx1.  Pt has no concerns re: mobility or safety upon d/c.  Pt verbalizes understanding to continue ambulating in the halls multiple times a day.  Recommend home with assist.  PT will sign off.  Discharge Assessment:       Visit Vitals  /75 (BP Location: Left arm, Patient Position: Lying)   Pulse 73   Temp 98.3 °F (36.8 °C) (Oral)   Resp 18   SpO2 96%     Temp (24hrs), Av.3 °F (36.8 °C), Min:98.1 °F (36.7 °C), Max:98.6 °F (37 °C)      General Appearance:    Alert, cooperative, in no acute distress   Lungs:     Clear to auscultation,respirations regular, even and                   unlabored    Heart:    Regular rhythm and normal rate, normal S1 and S2    Abdomen:   Soft and benign with clean incisions   Extremities:   Moves all extremities well, no edema, no cyanosis, no              redness   Pulses:   Pulses palpable and equal bilaterally   Skin:   No bleeding, bruising or rash            Discharge Disposition: Home          Discharge Medications       New Medications      Instructions Start Date   ibuprofen 400 MG tablet  Commonly known as: ADVIL,MOTRIN   400 mg, Oral, Every 6 Hours PRN         Changes to Medications      Instructions Start Date   azaTHIOprine 50 MG tablet  Commonly known as: IMURAN  What changed:   · how much to take  · when to take this  · additional instructions  · These instructions start on December 12, 2020. If you are unsure what to do until then, ask your doctor or other care provider.   50 mg, Oral, 2 times daily, TAKE 2 TABLETS BY MOUTH ONCE DAILY   Start Date: December 12, 2020     Budesonide 3 MG 24 hr capsule  Commonly known as: ENTOCORT EC  What changed: See the new instructions.   TAKE 3 CAPSULES BY MOUTH IN THE MORNING         Continue These Medications      Instructions Start Date   amLODIPine 10 MG tablet  Commonly known as: NORVASC   10 mg, Oral, Daily      aspirin 81 MG EC tablet   81 mg, Oral, Daily      fluticasone 50 MCG/ACT nasal spray  Commonly known as: FLONASE   1 spray, Nasal, As Needed      levothyroxine 88 MCG tablet  Commonly known as: SYNTHROID, LEVOTHROID   88 mcg, Oral, Daily      loratadine 10 MG tablet  Commonly known as: CLARITIN   10 mg, Oral, Daily      metFORMIN  MG 24 hr tablet  Commonly known as: GLUCOPHAGE-XR   500 mg, Oral, Daily      metoprolol tartrate 50 MG tablet  Commonly known as: LOPRESSOR   50 mg, Oral, 2 Times Daily      montelukast 10 MG tablet  Commonly known as: SINGULAIR   10 mg, Oral, Daily      olmesartan 40 MG tablet  Commonly known as: BENICAR   40 mg, Oral, Daily      polycarbophil 625 MG tablet tablet   625 mg, Oral, Daily, Takes two daily       VITAMIN D-3 PO   1 tablet, Oral, Daily             Discharge Diet: Regular    Activity at Discharge: Ambulate.  Restrictions per Dr. Olvera.    Follow-up Appointments  Sonam Olvera MD per her orders    I discussed with patient, her , and with Dr. Olvera.    Discharge took over 30 min    Dragon disclaimer:  Part of this encounter note  is an electronic transcription/translation of spoken language to printed text. The electronic translation of spoken language may permit erroneous, or at times, nonsensical words or phrases to be inadvertently transcribed; Although I have reviewed the note for such errors, some may still exist.       Christy Lennon MD  12/10/20  11:49 EST

## 2020-12-10 NOTE — PLAN OF CARE
Goal Outcome Evaluation:  Plan of Care Reviewed With: patient        Problem: Adult Inpatient Plan of Care  Goal: Plan of Care Review  Outcome: Ongoing, Progressing  Goal: Patient-Specific Goal (Individualized)  Outcome: Ongoing, Progressing  Goal: Absence of Hospital-Acquired Illness or Injury  Outcome: Ongoing, Progressing  Intervention: Identify and Manage Fall Risk  Recent Flowsheet Documentation  Taken 12/10/2020 0400 by Jacklyn Leon RN  Safety Promotion/Fall Prevention:   activity supervised   fall prevention program maintained   nonskid shoes/slippers when out of bed   safety round/check completed  Taken 12/10/2020 0200 by Jacklyn Leon RN  Safety Promotion/Fall Prevention:   activity supervised   fall prevention program maintained   nonskid shoes/slippers when out of bed   safety round/check completed  Taken 12/10/2020 0000 by Jacklyn Leon RN  Safety Promotion/Fall Prevention:   activity supervised   fall prevention program maintained   nonskid shoes/slippers when out of bed   safety round/check completed  Taken 12/9/2020 2200 by Jacklyn Leon RN  Safety Promotion/Fall Prevention:   activity supervised   fall prevention program maintained   nonskid shoes/slippers when out of bed   safety round/check completed  Taken 12/9/2020 2000 by Jacklyn Leon RN  Safety Promotion/Fall Prevention:   activity supervised   fall prevention program maintained   nonskid shoes/slippers when out of bed   safety round/check completed  Intervention: Prevent Skin Injury  Recent Flowsheet Documentation  Taken 12/10/2020 0400 by Jacklyn Leon RN  Body Position: position changed independently  Taken 12/10/2020 0200 by Jacklyn Leon RN  Body Position: position changed independently  Taken 12/10/2020 0000 by Jacklyn Leon RN  Body Position: position changed independently  Taken 12/9/2020 2200 by Jacklyn Leon RN  Body Position: position changed independently  Taken 12/9/2020 2000 by Jacklyn Leon RN  Body Position: position changed  independently  Intervention: Prevent and Manage VTE (venous thromboembolism) Risk  Recent Flowsheet Documentation  Taken 12/9/2020 2000 by Jacklyn Leon RN  VTE Prevention/Management: (lovenox)   bilateral   sequential compression devices on   bleeding risk factor(s) identified  Intervention: Prevent Infection  Recent Flowsheet Documentation  Taken 12/10/2020 0400 by Jacklyn Leon RN  Infection Prevention: rest/sleep promoted  Taken 12/10/2020 0200 by Jacklyn Leon RN  Infection Prevention: rest/sleep promoted  Taken 12/10/2020 0000 by Jacklyn Leon RN  Infection Prevention: rest/sleep promoted  Taken 12/9/2020 2200 by Jacklyn Leon RN  Infection Prevention: rest/sleep promoted  Taken 12/9/2020 2000 by Jacklyn Leon RN  Infection Prevention: rest/sleep promoted  Goal: Optimal Comfort and Wellbeing  Outcome: Ongoing, Progressing  Intervention: Provide Person-Centered Care  Recent Flowsheet Documentation  Taken 12/9/2020 2000 by Jacklyn Leon RN  Trust Relationship/Rapport:   care explained   thoughts/feelings acknowledged  Goal: Readiness for Transition of Care  Outcome: Ongoing, Progressing   VSS.  Pt did well on night shift.  No c/o's pain.  She ambulated to the bedside commode multiple times and did very well.  Pt may go home today.  Will continue to monitor.

## 2020-12-11 ENCOUNTER — READMISSION MANAGEMENT (OUTPATIENT)
Dept: CALL CENTER | Facility: HOSPITAL | Age: 75
End: 2020-12-11

## 2020-12-11 NOTE — OUTREACH NOTE
Prep Survey      Responses   [facility] patient discharged from?  [place]   Is LACE score < 7 ?  No   Eligibility  Readm Mgmt   Discharge diagnosis  colovaginal fistula [Status post colectomy, LAR with takedown of colovaginal fistula]   Does the patient have one of the following disease processes/diagnoses(primary or secondary)?  General Surgery   Does the patient have Home health ordered?  No   Is there a DME ordered?  No   Comments regarding appointments  Per AVS   Medication alerts for this patient  continue aspirin   Prep survey completed?  Yes          Madeleine Ledesma RN

## 2020-12-16 ENCOUNTER — READMISSION MANAGEMENT (OUTPATIENT)
Dept: CALL CENTER | Facility: HOSPITAL | Age: 75
End: 2020-12-16

## 2020-12-16 NOTE — OUTREACH NOTE
General Surgery Week 1 Survey      Responses   [facility] patient discharged from?  [place]   Does the patient have one of the following disease processes/diagnoses(primary or secondary)?  General Surgery   Week 1 attempt successful?  Yes   Call start time  1251   Call end time  1253   Discharge diagnosis  colovaginal fistula   Meds reviewed with patient/caregiver?  Yes   Is the patient having any side effects they believe may be caused by any medication additions or changes?  No   Does the patient have all medications related to this admission filled (includes all antibiotics, pain medications, etc.)  Yes   Is the patient taking all medications as directed (includes completed medication regime)?  Yes   Does the patient have a follow up appointment scheduled with their surgeon?  No   What is preventing the patient from scheduling follow up appointments?  Waiting on return call   Nursing Interventions  Advised patient to make appointment   Has the patient kept scheduled appointments due by today?  N/A   Psychosocial issues?  No   Did the patient receive a copy of their discharge instructions?  Yes   Nursing interventions  Reviewed instructions with patient   What is the patient's perception of their health status since discharge?  Improving   Nursing interventions  Nurse provided patient education   Is the patient /caregiver able to teach back basic post-op care?  Continue use of incentive spirometry at least 1 week post discharge, Practice 'cough and deep breath', Drive as instructed by MD in discharge instructions, Take showers only when approved by MD-sponge bathe until then, No tub bath, swimming, or hot tub until instructed by MD, Keep incision areas clean,dry and protected, Do not remove steri-strips, Lifting as instructed by MD in discharge instructions   Is the patient/caregiver able to teach back signs and symptoms of incisional infection?  Fever   Is the patient/caregiver able to teach back the  hierarchy of who to call/visit for symptoms/problems? PCP, Specialist, Home health nurse, Urgent Care, ED, 911  Yes   Week 1 call completed?  Yes   Wrap up additional comments  States she is doing well, no questions or concerns at this time.          Padmini Downs RN

## 2020-12-23 ENCOUNTER — READMISSION MANAGEMENT (OUTPATIENT)
Dept: CALL CENTER | Facility: HOSPITAL | Age: 75
End: 2020-12-23

## 2020-12-23 NOTE — OUTREACH NOTE
General Surgery Week 2 Survey      Responses   [facility] patient discharged from?  [place]   Does the patient have one of the following disease processes/diagnoses(primary or secondary)?  General Surgery   Week 2 attempt successful?  Yes   Call start time  1430   Unsuccessful attempts  Attempt 1   Call end time  1443   Discharge diagnosis  colovaginal fistula   Meds reviewed with patient/caregiver?  Yes   Is the patient having any side effects they believe may be caused by any medication additions or changes?  No   Does the patient have all medications related to this admission filled (includes all antibiotics, pain medications, etc.)  Yes   Is the patient taking all medications as directed (includes completed medication regime)?  Yes   Does the patient have a follow up appointment scheduled with their surgeon?  Yes   Has the patient kept scheduled appointments due by today?  Yes   Psychosocial issues?  No   Comments  states is free of pain, steri strips removed in office   Did the patient receive a copy of their discharge instructions?  Yes   Nursing interventions  Reviewed instructions with patient   What is the patient's perception of their health status since discharge?  Improving   Nursing interventions  Nurse provided patient education   Is the patient /caregiver able to teach back basic post-op care?  Drive as instructed by MD in discharge instructions, Take showers only when approved by MD-sponge bathe until then, No tub bath, swimming, or hot tub until instructed by MD, Keep incision areas clean,dry and protected, Do not remove steri-strips, Lifting as instructed by MD in discharge instructions   Is the patient/caregiver able to teach back signs and symptoms of incisional infection?  Increased redness, swelling or pain at the incisonal site, Incisional warmth, Fever, Increased drainage or bleeding, Pus or odor from incision   Is the patient/caregiver able to teach back steps to recovery at home?   Set small, achievable goals for return to baseline health, Rest and rebuild strength, gradually increase activity, Eat a well-balance diet   If the patient is a current smoker, are they able to teach back resources for cessation?  Not a smoker   Is the patient/caregiver able to teach back the hierarchy of who to call/visit for symptoms/problems? PCP, Specialist, Home health nurse, Urgent Care, ED, 911  Yes   Additional teach back comments  states having normal urination and BM's, appetite good, free of infection, pt states is so pleased with surgical outcome, states has been watching closely for hematuria and blood in stool   Week 2 call completed?  Yes          Loraine Anderson, RN

## 2020-12-23 NOTE — OUTREACH NOTE
General Surgery Week 2 Survey      Responses   [facility] patient discharged from?  [place]   Does the patient have one of the following disease processes/diagnoses(primary or secondary)?  General Surgery   Week 2 attempt successful?  No   Unsuccessful attempts  Attempt 1          Loraine Anderson RN

## 2020-12-30 ENCOUNTER — READMISSION MANAGEMENT (OUTPATIENT)
Dept: CALL CENTER | Facility: HOSPITAL | Age: 75
End: 2020-12-30

## 2020-12-30 NOTE — OUTREACH NOTE
General Surgery Week 3 Survey      Responses   [facility] patient discharged from?  [place]   Does the patient have one of the following disease processes/diagnoses(primary or secondary)?  General Surgery   Week 3 attempt successful?  Yes   Call start time  1200   Call end time  1203   Discharge diagnosis  colovaginal fistula   Meds reviewed with patient/caregiver?  Yes   Is the patient taking all medications as directed (includes completed medication regime)?  Yes   Has the patient kept scheduled appointments due by today?  Yes   What is the patient's perception of their health status since discharge?  Improving   Week 3 call completed?  Yes   Revoked  No further contact(revokes)-requires comment   Is the patient interested in additional calls from an ambulatory ?  NOTE:  applies to high risk patients requiring additional follow-up.  No   Graduated/Revoked comments  Goals met released by surgeon. No further calls necessary.          Polyl Phelps RN

## 2021-01-29 RX ORDER — AZATHIOPRINE 50 MG/1
TABLET ORAL
Qty: 180 TABLET | Refills: 1 | Status: SHIPPED | OUTPATIENT
Start: 2021-01-29 | End: 2021-07-20

## 2021-02-06 DIAGNOSIS — K75.4 AUTOIMMUNE HEPATITIS (HCC): ICD-10-CM

## 2021-02-08 RX ORDER — BUDESONIDE 3 MG/1
9 CAPSULE, COATED PELLETS ORAL EVERY MORNING
Qty: 90 CAPSULE | Refills: 0 | Status: SHIPPED | OUTPATIENT
Start: 2021-02-08 | End: 2021-04-13

## 2021-02-14 ENCOUNTER — APPOINTMENT (OUTPATIENT)
Dept: PREADMISSION TESTING | Facility: HOSPITAL | Age: 76
End: 2021-02-14

## 2021-02-14 LAB — SARS-COV-2 RNA RESP QL NAA+PROBE: NOT DETECTED

## 2021-02-14 PROCEDURE — C9803 HOPD COVID-19 SPEC COLLECT: HCPCS

## 2021-02-14 PROCEDURE — U0004 COV-19 TEST NON-CDC HGH THRU: HCPCS

## 2021-02-18 ENCOUNTER — IMMUNIZATION (OUTPATIENT)
Dept: VACCINE CLINIC | Facility: HOSPITAL | Age: 76
End: 2021-02-18

## 2021-02-18 PROCEDURE — 91301 HC SARSCO02 VAC 100MCG/0.5ML IM: CPT | Performed by: INTERNAL MEDICINE

## 2021-02-18 PROCEDURE — 0011A: CPT | Performed by: INTERNAL MEDICINE

## 2021-03-18 ENCOUNTER — IMMUNIZATION (OUTPATIENT)
Dept: VACCINE CLINIC | Facility: HOSPITAL | Age: 76
End: 2021-03-18

## 2021-03-18 PROCEDURE — 91301 HC SARSCO02 VAC 100MCG/0.5ML IM: CPT | Performed by: INTERNAL MEDICINE

## 2021-03-18 PROCEDURE — 0012A: CPT | Performed by: INTERNAL MEDICINE

## 2021-03-21 ENCOUNTER — APPOINTMENT (OUTPATIENT)
Dept: PREADMISSION TESTING | Facility: HOSPITAL | Age: 76
End: 2021-03-21

## 2021-03-24 LAB
ALBUMIN SERPL-MCNC: 3.7 G/DL (ref 3.7–4.7)
ALP SERPL-CCNC: 67 IU/L (ref 39–117)
ALT SERPL-CCNC: 10 IU/L (ref 0–32)
AST SERPL-CCNC: 10 IU/L (ref 0–40)
BASOPHILS # BLD AUTO: 0.1 X10E3/UL (ref 0–0.2)
BASOPHILS NFR BLD AUTO: 1 %
BILIRUB DIRECT SERPL-MCNC: 0.12 MG/DL (ref 0–0.4)
BILIRUB SERPL-MCNC: 0.4 MG/DL (ref 0–1.2)
EOSINOPHIL # BLD AUTO: 0.1 X10E3/UL (ref 0–0.4)
EOSINOPHIL NFR BLD AUTO: 1 %
ERYTHROCYTE [DISTWIDTH] IN BLOOD BY AUTOMATED COUNT: 14.3 % (ref 11.7–15.4)
HCT VFR BLD AUTO: 49.1 % (ref 34–46.6)
HGB BLD-MCNC: 16.4 G/DL (ref 11.1–15.9)
IMM GRANULOCYTES # BLD AUTO: 0 X10E3/UL (ref 0–0.1)
IMM GRANULOCYTES NFR BLD AUTO: 0 %
LYMPHOCYTES # BLD AUTO: 2.1 X10E3/UL (ref 0.7–3.1)
LYMPHOCYTES NFR BLD AUTO: 33 %
MCH RBC QN AUTO: 32.7 PG (ref 26.6–33)
MCHC RBC AUTO-ENTMCNC: 33.4 G/DL (ref 31.5–35.7)
MCV RBC AUTO: 98 FL (ref 79–97)
MONOCYTES # BLD AUTO: 0.6 X10E3/UL (ref 0.1–0.9)
MONOCYTES NFR BLD AUTO: 8 %
NEUTROPHILS # BLD AUTO: 3.7 X10E3/UL (ref 1.4–7)
NEUTROPHILS NFR BLD AUTO: 57 %
PLATELET # BLD AUTO: 233 X10E3/UL (ref 150–450)
PROT SERPL-MCNC: 6.8 G/DL (ref 6–8.5)
RBC # BLD AUTO: 5.01 X10E6/UL (ref 3.77–5.28)
WBC # BLD AUTO: 6.5 X10E3/UL (ref 3.4–10.8)

## 2021-03-25 NOTE — PROGRESS NOTES
Patient with idiopathic autoimmune hepatitis.  Standard monitoring labs indicate remission of her idiopathic autoimmune hepatitis with normalization of her liver enzymes AST and ALT are down to 10 units/L from 37/62 units/L 2 years ago and actually down from 3 months ago when they were 14 and 13 respectively.

## 2021-03-26 ENCOUNTER — OFFICE VISIT (OUTPATIENT)
Dept: GASTROENTEROLOGY | Facility: CLINIC | Age: 76
End: 2021-03-26

## 2021-03-26 DIAGNOSIS — K62.5 RECTAL BLEEDING: ICD-10-CM

## 2021-03-26 DIAGNOSIS — K75.4 AUTOIMMUNE HEPATITIS (HCC): Primary | ICD-10-CM

## 2021-03-26 DIAGNOSIS — Z79.899 HIGH RISK MEDICATION USE: ICD-10-CM

## 2021-03-26 DIAGNOSIS — R74.8 ELEVATED LIVER ENZYMES: ICD-10-CM

## 2021-03-26 DIAGNOSIS — D12.6 ADENOMATOUS POLYP OF COLON, UNSPECIFIED PART OF COLON: ICD-10-CM

## 2021-03-26 PROCEDURE — 99214 OFFICE O/P EST MOD 30 MIN: CPT | Performed by: INTERNAL MEDICINE

## 2021-03-26 RX ORDER — LEVOTHYROXINE SODIUM 0.07 MG/1
TABLET ORAL
COMMUNITY
Start: 2021-01-29

## 2021-03-26 NOTE — PROGRESS NOTES
EGD GASTROENTEROLOGY OFFICE NOTE  Radha John  [id number]  1945        Chief Complaint   Patient presents with   • Follow-up   • Autoimmune Hepatitis        HISTORY OF PRESENT ILLNESS:  Patient presents for follow-up of idiopathic autoimmune hepatitis on Imuran 100 mg a day budesonide 9 mg daily with most recent liver enzymes in March 22 indicating her autoimmune hepatitis is in remission.  AST and ALT are 10 units/L each and total bilirubin 0.4 mg/dL with a alkaline phosphatase of 67 units/L.  She is feeling well without any excessive fatigue and certainly no evidence of jaundice, dark urine or acholic stools.    She is status post robotic low anterior resection with colovaginal fistula repair with colovaginal fistula that had resulted from previous episode of diverticulitis presumably.    She recovered extremely well from the surgery and states that she sailed through without any real problems.  He is currently taking fiber supplements and with this she is having normal well formed bowel movements without any straining or blood in her stool.    Overall from a GI standpoint she is doing well without dysphagia, odynophagia, early satiety, nausea, vomiting, melena or bright red blood per rectum.    She has completed COVID-19 vaccination.    She did have a question regarding her skin coloration.  She was wondering whether any of the meds that she is on might cause whitening of her skin as she feels that she has had a diffuse generalized lightening of her baseline skin color.    PAST MEDICAL HISTORY  Past Medical History:   Diagnosis Date   • Adenomatous polyp of colon 10/23/2019   • Arthritis    • Diabetes mellitus (CMS/HCC)    • Disease of thyroid gland    • Diverticulosis    • Gout    • Hepatitis    • History of kidney stones    • Hx of radiation therapy 06/2014   • Hyperlipidemia    • Hypertension    • Malignant neoplasm of breast (CMS/HCC) 2014   • Menopause         PAST SURGICAL HISTORY  Past Surgical  History:   Procedure Laterality Date   • BREAST BIOPSY Bilateral    • BREAST LUMPECTOMY Bilateral 06/27/2014   • CATARACT EXTRACTION Right    • COLON RESECTION N/A 12/8/2020    Procedure: ROBOTIC LOW ANTERIOR RESECTION, TAKEDOWN OF COLOVAGINAL FISTULA, URETEROLYSIS LEFT;  Surgeon: Sonam Olvera MD;  Location:  [facility];  Service: DaVinci;  Laterality: N/A;   • COLONOSCOPY     • CYSTOSCOPY N/A 12/8/2020    Procedure: CYSTOSCOPY, TEMPORY BILATERAL URETERAL STENTS;  Surgeon: Rosie Gottlieb MD;  Location:  [facility];  Service: Gynecology;  Laterality: N/A;   • HEMORRHOIDECTOMY     • PARATHYROIDECTOMY  07/19/2016    Dr[facility] Izabela Hermosillo [facility]   • THYROID SURGERY     • VAGINAL HYSTERECTOMY          MEDICATIONS:    Current Outpatient Medications:   •  amLODIPine (NORVASC) 10 MG tablet, Take 10 mg by mouth daily., Disp: , Rfl:   •  aspirin 81 MG EC tablet, Take 81 mg by mouth daily., Disp: , Rfl:   •  azaTHIOprine (IMURAN) 50 MG tablet, Take 2 tablets by mouth once daily, Disp: 180 tablet, Rfl: 1  •  Budesonide (ENTOCORT EC) 3 MG 24 hr capsule, Take 3 capsules by mouth Every Morning. Patient needs a appointment prior to next refill., Disp: 90 capsule, Rfl: 0  •  Cholecalciferol (VITAMIN D-3 PO), Take 1 tablet by mouth daily., Disp: , Rfl:   •  fluticasone (FLONASE) 50 MCG/ACT nasal spray, 1 spray into the nostril(s) as directed by provider As Needed., Disp: , Rfl:   •  ibuprofen (ADVIL,MOTRIN) 400 MG tablet, Take 1 tablet by mouth Every 6 (Six) Hours As Needed for Mild Pain , Moderate Pain , Fever or Headache., Disp: 50 tablet, Rfl: 0  •  levothyroxine (SYNTHROID, LEVOTHROID) 75 MCG tablet, , Disp: , Rfl:   •  levothyroxine (SYNTHROID, LEVOTHROID) 88 MCG tablet, Take 88 mcg by mouth daily., Disp: , Rfl:   •  loratadine (CLARITIN) 10 MG tablet, Take 10 mg by mouth Daily., Disp: , Rfl:   •  metFORMIN ER (GLUCOPHAGE-XR) 500 MG 24 hr tablet, Take 500 mg by mouth Daily., Disp: , Rfl:   •  metoprolol tartrate  (LOPRESSOR) 50 MG tablet, Take 50 mg by mouth 2 (Two) Times a Day., Disp: , Rfl:   •  montelukast (SINGULAIR) 10 MG tablet, Take 10 mg by mouth daily., Disp: , Rfl:   •  olmesartan (BENICAR) 40 MG tablet, Take 40 mg by mouth Daily., Disp: , Rfl:   •  polycarbophil (calcium polycarbophil) 625 MG tablet tablet, Take 625 mg by mouth Daily. Takes two daily, Disp: , Rfl:     ALLERGIES  Allergies   Allergen Reactions   • Erythromycin Swelling     C/O lips swelling   • Hydrocodone-Acetaminophen Nausea Only     Lortab       FAMILY HISTORY:  Family History   Problem Relation Age of Onset   • Hypertension Father    • Hypertension Paternal Grandmother    • Hypertension Other    • Other Other         CAD   • Breast cancer Sister 50   • Endometrial cancer Neg Hx    • Ovarian cancer Neg Hx    • Colon cancer Neg Hx    • Colon polyps Neg Hx        SOCIAL HISTORY  Social History     Socioeconomic History   • Marital status:      Spouse name: Not on file   • Number of children: Not on file   • Years of education: Not on file   • Highest education level: Not on file   Tobacco Use   • Smoking status: Former Smoker   • Smokeless tobacco: Never Used   • Tobacco comment: quit 40 years ago   Vaping Use   • Vaping Use: Never used   Substance and Sexual Activity   • Alcohol use: No   • Drug use: No   • Sexual activity: Yes     Partners: Male     Birth control/protection: Post-menopausal, Surgical     Socioeconomic History:  She is  and lives at home with her .  He is due for bypass surgery in the very near future.  She is a former smoker for over 40 years and is a nondrinker.  She is very anxious to see the great-grandchild that she is not met yet because of the pandemic.       REVIEW OF SYSTEMS  Review of Systems   Constitutional: Negative for activity change, appetite change, chills, diaphoresis, fatigue, fever, unexpected weight gain and unexpected weight loss.   HENT: Negative for trouble swallowing and voice change.     Gastrointestinal: Negative for abdominal distention, abdominal pain, anal bleeding, blood in stool, constipation, diarrhea, nausea, rectal pain, vomiting, GERD and indigestion.     I reviewed the above-noted review of systems.    PHYSICAL EXAM   There were no vitals taken for this visit.  General: Alert and oriented x 3. In no apparent or acute distress.  and No stigmata of chronic liver disease  HEENT: Anicteric sclerae. Normal oropharynx  Neck: Supple. Without lymphadenopathy  CV: Regular rate and rhythm, S1, S2  Lungs: Clear to ausculation. Without rales, rhonchi and wheezing  Abdomen:  Soft,non-distended without palpable masses or hepatosplenomeagaly, areas of rebound tenderness or guarding.   Extremeties: without clubbing, cyanosis or edema  Neurologic:  Alert and oriented x 3 without focal motor or sensory deficits  Rectal exam: deferred     Results for orders placed or performed in visit on 03/22/21   Hepatic Function Panel   Result Value Ref Range    Total Protein 6.8 6.0 - 8.5 g/dL    Albumin 3.7 3.7 - 4.7 g/dL    Total Bilirubin 0.4 0.0 - 1.2 mg/dL    Bilirubin, Direct 0.12 0.00 - 0.40 mg/dL    Alkaline Phosphatase 67 39 - 117 IU/L    AST (SGOT) 10 0 - 40 IU/L    ALT (SGPT) 10 0 - 32 IU/L   CBC & Differential   Result Value Ref Range    WBC 6.5 3.4 - 10.8 x10E3/uL    RBC 5.01 3.77 - 5.28 x10E6/uL    Hemoglobin 16.4 (H) 11.1 - 15.9 g/dL    Hematocrit 49.1 (H) 34.0 - 46.6 %    MCV 98 (H) 79 - 97 fL    MCH 32.7 26.6 - 33.0 pg    MCHC 33.4 31.5 - 35.7 g/dL    RDW 14.3 11.7 - 15.4 %    Platelets 233 150 - 450 x10E3/uL    Neutrophil Rel % 57 Not Estab. %    Lymphocyte Rel % 33 Not Estab. %    Monocyte Rel % 8 Not Estab. %    Eosinophil Rel % 1 Not Estab. %    Basophil Rel % 1 Not Estab. %    Neutrophils Absolute 3.7 1.4 - 7.0 x10E3/uL    Lymphocytes Absolute 2.1 0.7 - 3.1 x10E3/uL    Monocytes Absolute 0.6 0.1 - 0.9 x10E3/uL    Eosinophils Absolute 0.1 0.0 - 0.4 x10E3/uL    Basophils Absolute 0.1 0.0 - 0.2  x10E3/uL    Immature Granulocyte Rel % 0 Not Estab. %    Immature Grans Absolute 0.0 0.0 - 0.1 x10E3/uL        Results Review:  I reviewed the patient's new clinical results.      ASSESSMENT  1.-Idiopathic autoimmune hepatitis in remission on Imuran 100 mg daily and budesonide 9 mg/day.  Continue.  Continue to monitor liver enzymes and CBC every 3 to 4 months  2.-Mild erythrocytosis.  Hemoglobin 16.4.  Monitor  3.-Status post repair of colovaginal fistula  4.-History of breast cancer  5.-History of stage II fibrosis on liver biopsy years ago  6.-Asymptomatic cholelithiasis.  7.-High risk medication use without indication of untoward effects    PLAN  1.-Continue budesonide 9 mg/day/Imuran 100 mg/day  2.-Lab recheck every 3 to 4 months  3.-Follow-up appointment in 6 to 12 months        Poncho Hansen MD  3/26/2021   16:38 EDT

## 2021-04-10 DIAGNOSIS — K75.4 AUTOIMMUNE HEPATITIS (HCC): ICD-10-CM

## 2021-04-13 RX ORDER — BUDESONIDE 3 MG/1
9 CAPSULE, COATED PELLETS ORAL EVERY MORNING
Qty: 90 CAPSULE | Refills: 5 | Status: SHIPPED | OUTPATIENT
Start: 2021-04-13 | End: 2021-09-28 | Stop reason: SDUPTHER

## 2021-04-14 ENCOUNTER — TELEPHONE (OUTPATIENT)
Dept: GASTROENTEROLOGY | Facility: CLINIC | Age: 76
End: 2021-04-14

## 2021-04-14 NOTE — TELEPHONE ENCOUNTER
MS. BECKHAM CALLED IN RE;TO HER MEDICINE STATES HAS LEFT YOU A FEW MESSAGE WITH ON CALL BACK. I TRANSFERRED HER TO YOUR VOICE MAIL. SHE ALSO ASK THAT I GIVE YOU A MESSAGE TO CALL HER [phone]

## 2021-04-21 ENCOUNTER — TELEPHONE (OUTPATIENT)
Dept: GASTROENTEROLOGY | Facility: CLINIC | Age: 76
End: 2021-04-21

## 2021-05-24 RX ORDER — SOD SULF/POT CHLORIDE/MAG SULF 1.479 G
1 TABLET ORAL ONCE
Qty: 24 TABLET | Refills: 0 | Status: SHIPPED | OUTPATIENT
Start: 2021-05-24 | End: 2021-05-24

## 2021-06-18 ENCOUNTER — LAB (OUTPATIENT)
Dept: LAB | Facility: HOSPITAL | Age: 76
End: 2021-06-18

## 2021-06-18 PROCEDURE — 80076 HEPATIC FUNCTION PANEL: CPT | Performed by: INTERNAL MEDICINE

## 2021-06-18 PROCEDURE — 85025 COMPLETE CBC W/AUTO DIFF WBC: CPT | Performed by: INTERNAL MEDICINE

## 2021-06-30 ENCOUNTER — TRANSCRIBE ORDERS (OUTPATIENT)
Dept: MAMMOGRAPHY | Facility: HOSPITAL | Age: 76
End: 2021-06-30

## 2021-06-30 DIAGNOSIS — Z12.31 VISIT FOR SCREENING MAMMOGRAM: Primary | ICD-10-CM

## 2021-07-13 ENCOUNTER — OUTSIDE FACILITY SERVICE (OUTPATIENT)
Dept: GASTROENTEROLOGY | Facility: CLINIC | Age: 76
End: 2021-07-13

## 2021-07-13 PROCEDURE — 45385 COLONOSCOPY W/LESION REMOVAL: CPT | Performed by: INTERNAL MEDICINE

## 2021-07-13 PROCEDURE — 88305 TISSUE EXAM BY PATHOLOGIST: CPT | Performed by: INTERNAL MEDICINE

## 2021-07-14 ENCOUNTER — LAB REQUISITION (OUTPATIENT)
Dept: LAB | Facility: HOSPITAL | Age: 76
End: 2021-07-14

## 2021-07-14 DIAGNOSIS — Z12.11 ENCOUNTER FOR SCREENING FOR MALIGNANT NEOPLASM OF COLON: ICD-10-CM

## 2021-07-15 LAB
CYTO UR: NORMAL
LAB AP CASE REPORT: NORMAL
LAB AP CLINICAL INFORMATION: NORMAL
PATH REPORT.FINAL DX SPEC: NORMAL
PATH REPORT.GROSS SPEC: NORMAL

## 2021-07-20 RX ORDER — AZATHIOPRINE 50 MG/1
TABLET ORAL
Qty: 180 TABLET | Refills: 1 | Status: SHIPPED | OUTPATIENT
Start: 2021-07-20 | End: 2022-01-25

## 2021-07-22 ENCOUNTER — TRANSCRIBE ORDERS (OUTPATIENT)
Dept: MAMMOGRAPHY | Facility: HOSPITAL | Age: 76
End: 2021-07-22

## 2021-07-22 DIAGNOSIS — R92.8 ABNORMAL MAMMOGRAM: ICD-10-CM

## 2021-07-22 DIAGNOSIS — N63.15 UNSPECIFIED LUMP IN THE RIGHT BREAST, OVERLAPPING QUADRANTS: Primary | ICD-10-CM

## 2021-07-23 ENCOUNTER — HOSPITAL ENCOUNTER (OUTPATIENT)
Dept: MAMMOGRAPHY | Facility: HOSPITAL | Age: 76
Discharge: HOME | End: 2021-07-23

## 2021-07-23 ENCOUNTER — HOSPITAL ENCOUNTER (OUTPATIENT)
Dept: ULTRASOUND IMAGING | Facility: HOSPITAL | Age: 76
Discharge: HOME | End: 2021-07-23

## 2021-07-23 DIAGNOSIS — N63.15 UNSPECIFIED LUMP IN THE RIGHT BREAST, OVERLAPPING QUADRANTS: ICD-10-CM

## 2021-07-23 PROCEDURE — 76642 ULTRASOUND BREAST LIMITED: CPT | Performed by: RADIOLOGY

## 2021-07-23 PROCEDURE — 77066 DX MAMMO INCL CAD BI: CPT | Performed by: RADIOLOGY

## 2021-07-23 PROCEDURE — 77066 DX MAMMO INCL CAD BI: CPT

## 2021-07-23 PROCEDURE — 76642 ULTRASOUND BREAST LIMITED: CPT

## 2021-07-23 PROCEDURE — G0279 TOMOSYNTHESIS, MAMMO: HCPCS | Performed by: RADIOLOGY

## 2021-07-23 PROCEDURE — G0279 TOMOSYNTHESIS, MAMMO: HCPCS

## 2021-07-26 ENCOUNTER — TELEPHONE (OUTPATIENT)
Dept: MAMMOGRAPHY | Facility: HOSPITAL | Age: 76
End: 2021-07-26

## 2021-07-26 ENCOUNTER — OFFICE VISIT (OUTPATIENT)
Dept: OBSTETRICS AND GYNECOLOGY | Facility: CLINIC | Age: 76
End: 2021-07-26

## 2021-07-26 VITALS
WEIGHT: 169 LBS | DIASTOLIC BLOOD PRESSURE: 88 MMHG | BODY MASS INDEX: 33.18 KG/M2 | SYSTOLIC BLOOD PRESSURE: 148 MMHG | HEIGHT: 60 IN

## 2021-07-26 DIAGNOSIS — Z01.419 ENCOUNTER FOR GYNECOLOGICAL EXAMINATION WITHOUT ABNORMAL FINDING: Primary | ICD-10-CM

## 2021-07-26 DIAGNOSIS — Z85.3 PERSONAL HISTORY OF BREAST CANCER: ICD-10-CM

## 2021-07-26 PROCEDURE — G0101 CA SCREEN;PELVIC/BREAST EXAM: HCPCS | Performed by: OBSTETRICS & GYNECOLOGY

## 2021-07-26 RX ORDER — ALBUTEROL SULFATE 90 UG/1
AEROSOL, METERED RESPIRATORY (INHALATION)
COMMUNITY
Start: 2021-06-23

## 2021-07-26 NOTE — PROGRESS NOTES
Chief Complaint   Patient presents with   • Gynecologic Exam       Radha John is a 75 y.o. year old  presenting to be seen for her annual exam.  Patient has previously had a vaginal hysterectomy.  She is menopausal and does not use estrogen replacement therapy.  She denies menopausal symptoms.  She has previously had bilateral lumpectomies of the breast for in situ carcinoma.  Recent breast imaging revealed an abnormality of the lower left breast.  She is scheduled for a biopsy.  She denies bowel or urinary symptoms.  She is treated for type 2 diabetes mellitus and hypertension.  She has had Covid-19 immunization.    SCREENING TESTS    Year 2012   Age                         PAP                         HPV high risk                         Mammogram          IV               ADRIAN score                         Breast MRI                         Lipids                         Vitamin D                         Colonoscopy                         DEXA  Frax (hip/any)       normal                  Ovarian Screen                             She exercises regularly: no.  She wears her seat belt: yes.  She has concerns about domestic violence: no.  She has noticed changes in height: no    GYN screening history:  · Last mammogram: was done on approximately 2021 and the result was: Birads IV (Suspicous abnormality).  · Last DEXA: was done on approximately 2018 and the results were: normal.    No Additional Complaints Reported    The following portions of the patient's history were reviewed and updated as appropriate:vital signs and   She  has a past medical history of Adenomatous polyp of colon (10/23/2019), Arthritis, Diabetes mellitus (CMS/HCC), Disease of thyroid gland, Diverticulosis, Gout, Hepatitis, History of kidney stones, radiation therapy (2014), Hyperlipidemia, Hypertension,  Malignant neoplasm of breast (CMS/HCC) (2014), and Menopause.  She does not have any pertinent problems on file.  She  has a past surgical history that includes Hemorrhoid surgery; Parathyroidectomy (07/19/2016); Breast biopsy (Bilateral); Thyroid surgery; Breast lumpectomy (Bilateral, 06/27/2014); Vaginal hysterectomy; Colonoscopy; Colectomy (N/A, 12/8/2020); Cystoscopy (N/A, 12/8/2020); and Cataract extraction (Right).  Her family history includes Breast cancer (age of onset: 50) in her sister; Hypertension in her father, paternal grandmother, and another family member; Other in an other family member.  She  reports that she has quit smoking. She has never used smokeless tobacco. She reports that she does not drink alcohol and does not use drugs.  Current Outpatient Medications   Medication Sig Dispense Refill   • albuterol sulfate  (90 Base) MCG/ACT inhaler INHALE 2 PUFFS BY MOUTH EVERY 4 TO 6 HOURS     • amLODIPine (NORVASC) 10 MG tablet Take 10 mg by mouth daily.     • aspirin 81 MG EC tablet Take 81 mg by mouth daily.     • azaTHIOprine (IMURAN) 50 MG tablet Take 2 tablets by mouth once daily 180 tablet 1   • Budesonide (ENTOCORT EC) 3 MG 24 hr capsule Take 3 capsules by mouth Every Morning. 90 capsule 5   • Cholecalciferol (VITAMIN D-3 PO) Take 1 tablet by mouth daily.     • fluticasone (FLONASE) 50 MCG/ACT nasal spray 1 spray into the nostril(s) as directed by provider As Needed.     • glucose blood test strip True Metrix Glucose Test Strip   Take 1 strip every day by miscell. route.     • ibuprofen (ADVIL,MOTRIN) 400 MG tablet Take 1 tablet by mouth Every 6 (Six) Hours As Needed for Mild Pain , Moderate Pain , Fever or Headache. 50 tablet 0   • levothyroxine (SYNTHROID, LEVOTHROID) 75 MCG tablet      • loratadine (CLARITIN) 10 MG tablet Take 10 mg by mouth Daily.     • metFORMIN ER (GLUCOPHAGE-XR) 500 MG 24 hr tablet Take 500 mg by mouth Daily.     • metoprolol tartrate (LOPRESSOR) 50 MG tablet Take  "50 mg by mouth 2 (Two) Times a Day.     • montelukast (SINGULAIR) 10 MG tablet Take 10 mg by mouth daily.     • olmesartan (BENICAR) 40 MG tablet Take 40 mg by mouth Daily.     • polycarbophil (calcium polycarbophil) 625 MG tablet tablet Take 625 mg by mouth Daily. Takes two daily       No current facility-administered medications for this visit.     She is allergic to erythromycin, hydrocodone-acetaminophen, and amoxicillin..    Review of Systems  A review of systems was taken.  Constitutional: negative for fever, chills, activity change, appetite change, fatigue and unexpected weight change.  Respiratory: negative  Cardiovascular: negative  Gastrointestinal: negative  Genitourinary:negative  Musculoskeletal:positive for back pain and stiff joints  Behavioral/Psych: negative     Counseling/Anticipatory Guidance Discussed: nutrition, physical activity, healthy weight, injury prevention, immunizations, screenings and self-breast exam      /88   Ht 152.4 cm (60\")   Wt 76.7 kg (169 lb)   Breastfeeding No   BMI 33.01 kg/m²     BMI reviewed     MEDICALLY INDICATED   Physical Exam    Neuro: alert and oriented to person, place and time    General:  alert; cooperative; well developed; well nourished  obese - Body mass index is 33.01 kg/m².   Skin:  No suspicious lesions seen   Thyroid: normal to inspection and palpation   Lungs:  breathing is unlabored  clear to auscultation bilaterally   Heart:  regular rate and rhythm, S1, S2 normal, no murmur, click, rub or gallop  normal apical impulse   Breasts:  Examined in supine position  Symmetric without masses or skin dimpling  Nipples normal without inversion, lesions or discharge  There are no palpable axillary nodes  Pendulous   Abdomen: soft, non-tender; no masses  no umbilical or inguinal hernias are present  no hepato-splenomegaly   Pelvis: Clinical staff was present for exam  External genitalia:  normal appearance of the external genitalia including Bartholin's " and Nada's glands.  Vaginal:  normal pink mucosa without prolapse or lesions.  Cervix:  absent.  Uterus:  absent.  Adnexa:  non palpable bilaterally.  Rectal:  anus visually normal appearing. recto-vaginal exam unremarkable and confirms findings;     Lab Review   No data reviewed    Imaging  Mammogram results and DEXA              ASSESSMENT  Problems Addressed this Visit     None      Visit Diagnoses     Encounter for gynecological examination without abnormal finding    -  Primary    Personal history of breast cancer          Diagnoses       Codes Comments    Encounter for gynecological examination without abnormal finding    -  Primary ICD-10-CM: Z01.419  ICD-9-CM: V72.31     Personal history of breast cancer     ICD-10-CM: Z85.3  ICD-9-CM: V10.3               Substance History:   reports that she has quit smoking. She has never used smokeless tobacco.   reports no history of alcohol use.   reports no history of drug use.    Substance use counseling is not indicated based on patient history.      PLAN    · Medications prescribed this encounter:  No orders of the defined types were placed in this encounter.  · Monthly self breast assessment breast follow-up for biopsy  · Calcium, 600 mg/ Vit. D, 400 IU daily; regular weight-bearing exercise  · Follow up: 12 month(s)  *Please note that portions of this documentation may have been completed with a voice recognition program.  Efforts were made to edit this dictation, but occasional words may have been mistranscribed.       This note was electronically signed.    ANTONIA Duffy MD  July 26, 2021  09:32 EDT

## 2021-08-16 ENCOUNTER — HOSPITAL ENCOUNTER (OUTPATIENT)
Dept: MAMMOGRAPHY | Facility: HOSPITAL | Age: 76
Discharge: HOME | End: 2021-08-16

## 2021-08-16 DIAGNOSIS — R92.8 ABNORMAL MAMMOGRAM: ICD-10-CM

## 2021-08-16 DIAGNOSIS — N63.15 UNSPECIFIED LUMP IN THE RIGHT BREAST, OVERLAPPING QUADRANTS: ICD-10-CM

## 2021-08-16 PROCEDURE — 19081 BX BREAST 1ST LESION STRTCTC: CPT | Performed by: RADIOLOGY

## 2021-08-16 PROCEDURE — 88305 TISSUE EXAM BY PATHOLOGIST: CPT | Performed by: SURGERY

## 2021-08-16 PROCEDURE — 77065 DX MAMMO INCL CAD UNI: CPT | Performed by: RADIOLOGY

## 2021-08-16 PROCEDURE — 88360 TUMOR IMMUNOHISTOCHEM/MANUAL: CPT | Performed by: SURGERY

## 2021-08-16 PROCEDURE — 25010000003 LIDOCAINE 1 % SOLUTION: Performed by: SURGERY

## 2021-08-16 PROCEDURE — A4648 IMPLANTABLE TISSUE MARKER: HCPCS

## 2021-08-16 RX ORDER — LIDOCAINE HYDROCHLORIDE 10 MG/ML
5 INJECTION, SOLUTION INFILTRATION; PERINEURAL ONCE
Status: COMPLETED | OUTPATIENT
Start: 2021-08-16 | End: 2021-08-16

## 2021-08-16 RX ORDER — LIDOCAINE HYDROCHLORIDE AND EPINEPHRINE 10; 10 MG/ML; UG/ML
10 INJECTION, SOLUTION INFILTRATION; PERINEURAL ONCE
Status: COMPLETED | OUTPATIENT
Start: 2021-08-16 | End: 2021-08-16

## 2021-08-16 RX ADMIN — LIDOCAINE HYDROCHLORIDE,EPINEPHRINE BITARTRATE 2 ML: 10; .01 INJECTION, SOLUTION INFILTRATION; PERINEURAL at 11:04

## 2021-08-16 RX ADMIN — LIDOCAINE HYDROCHLORIDE 1.5 ML: 10 INJECTION, SOLUTION INFILTRATION; PERINEURAL at 11:03

## 2021-08-16 NOTE — PROGRESS NOTES
Alert and orientated. Denies discomfort., No active bleeding., Steri-strips intact, gauze dressing applied. Ice packs given. Verbalizes and demonstrates understanding of post-care instructions, written copy given.

## 2021-08-18 LAB
CYTO UR: NORMAL
LAB AP CASE REPORT: NORMAL
LAB AP CLINICAL INFORMATION: NORMAL
LAB AP DIAGNOSIS COMMENT: NORMAL
LAB AP SPECIAL STAINS: NORMAL
PATH REPORT.FINAL DX SPEC: NORMAL
PATH REPORT.GROSS SPEC: NORMAL

## 2021-08-20 ENCOUNTER — TELEPHONE (OUTPATIENT)
Dept: MAMMOGRAPHY | Facility: HOSPITAL | Age: 76
End: 2021-08-20

## 2021-08-20 ENCOUNTER — NURSE NAVIGATOR (OUTPATIENT)
Dept: OTHER | Facility: HOSPITAL | Age: 76
End: 2021-08-20

## 2021-08-20 NOTE — TELEPHONE ENCOUNTER
Patient was notified of results on 8/19/21 by Dr. MIKE Reynolds.    Attempted to contact patient to notify of surgical consult appointment.  Left message for patient to return my call.

## 2021-08-20 NOTE — TELEPHONE ENCOUNTER
Patient returned call and stated she spoke with Dr. MIKE Reynolds yesterday and was notified of results and recommendation.      Pt notified of surgical consult appointment with Dr. MIKE Reynolds on 8/24/21 @ [id number] . Pt given office contact & location information. Told to bring photo ID, list of prescription & OTC medications, insurance information, must wear a mask; she may be accompanied by one person who must also wear a mask. Reviewed what would be discussed at surgical consult visit, including detailed explanation of pathology report & imaging reports; treatment options & pros/cons, availability of nurse navigator. Patient encouraged to call back or contact ERIC Ortiz RN, OCN, Breast Navigator, with any questions or concerns. Pt information sent to ERIC Ortiz RN, OCN, Breast Navigator for evaluation. Breast cancer information packet offered and accepted. Patient verbalized understanding.

## 2021-08-23 ENCOUNTER — NURSE NAVIGATOR (OUTPATIENT)
Dept: OTHER | Facility: HOSPITAL | Age: 76
End: 2021-08-23

## 2021-08-23 NOTE — PROGRESS NOTES
Spoke with patient about genetic testing and counseling. She is interested and will discuss it further with surgeon at her appointment. She will see MD on 8/24/2021.

## 2021-08-24 ENCOUNTER — TRANSCRIBE ORDERS (OUTPATIENT)
Dept: MAMMOGRAPHY | Facility: HOSPITAL | Age: 76
End: 2021-08-24

## 2021-08-24 ENCOUNTER — NURSE NAVIGATOR (OUTPATIENT)
Dept: ONCOLOGY | Facility: CLINIC | Age: 76
End: 2021-08-24

## 2021-08-24 DIAGNOSIS — C50.212 MALIGNANT NEOPLASM OF UPPER-INNER QUADRANT OF LEFT FEMALE BREAST, UNSPECIFIED ESTROGEN RECEPTOR STATUS (HCC): Primary | ICD-10-CM

## 2021-08-24 DIAGNOSIS — C50.912 MALIGNANT NEOPLASM OF LEFT BREAST IN FEMALE, ESTROGEN RECEPTOR POSITIVE, UNSPECIFIED SITE OF BREAST (HCC): Primary | ICD-10-CM

## 2021-08-24 DIAGNOSIS — Z17.0 MALIGNANT NEOPLASM OF LEFT BREAST IN FEMALE, ESTROGEN RECEPTOR POSITIVE, UNSPECIFIED SITE OF BREAST (HCC): Primary | ICD-10-CM

## 2021-08-24 NOTE — PROGRESS NOTES
I saw patient with Dr Reynolds and patients . The patient has history of bilateral breast cancer and has not had genetic testing - she asked for referral but this will not be for her surgical decision- Dr Reynolds reviewed the pathology report with the patient - she has decided on left lumpectomy and hormone blockade- Dr Reynolds will get this scheduled - Referral given to genetics. Educational and Supportive materials were reviewed and given.

## 2021-08-30 ENCOUNTER — PRE-ADMISSION TESTING (OUTPATIENT)
Dept: PREADMISSION TESTING | Facility: HOSPITAL | Age: 76
End: 2021-08-30

## 2021-08-30 LAB
ALBUMIN SERPL-MCNC: 3.5 G/DL (ref 3.5–5.2)
ALBUMIN/GLOB SERPL: 1.2 G/DL
ALP SERPL-CCNC: 234 U/L (ref 39–117)
ALT SERPL W P-5'-P-CCNC: 10 U/L (ref 1–33)
ANION GAP SERPL CALCULATED.3IONS-SCNC: 14 MMOL/L (ref 5–15)
AST SERPL-CCNC: 15 U/L (ref 1–32)
BILIRUB SERPL-MCNC: 0.3 MG/DL (ref 0–1.2)
BUN SERPL-MCNC: 16 MG/DL (ref 8–23)
BUN/CREAT SERPL: 19 (ref 7–25)
CALCIUM SPEC-SCNC: 9.1 MG/DL (ref 8.6–10.5)
CHLORIDE SERPL-SCNC: 105 MMOL/L (ref 98–107)
CO2 SERPL-SCNC: 25 MMOL/L (ref 22–29)
CREAT SERPL-MCNC: 0.84 MG/DL (ref 0.57–1)
DEPRECATED RDW RBC AUTO: 49 FL (ref 37–54)
ERYTHROCYTE [DISTWIDTH] IN BLOOD BY AUTOMATED COUNT: 13.4 % (ref 12.3–15.4)
GFR SERPL CREATININE-BSD FRML MDRD: 80 ML/MIN/1.73
GLOBULIN UR ELPH-MCNC: 3 GM/DL
GLUCOSE SERPL-MCNC: 123 MG/DL (ref 65–99)
HCT VFR BLD AUTO: 41.1 % (ref 34–46.6)
HGB BLD-MCNC: 13.6 G/DL (ref 12–15.9)
MCH RBC QN AUTO: 32.9 PG (ref 26.6–33)
MCHC RBC AUTO-ENTMCNC: 33.1 G/DL (ref 31.5–35.7)
MCV RBC AUTO: 99.3 FL (ref 79–97)
PLATELET # BLD AUTO: 284 10*3/MM3 (ref 140–450)
PMV BLD AUTO: 8.7 FL (ref 6–12)
POTASSIUM SERPL-SCNC: 4.2 MMOL/L (ref 3.5–5.2)
PROT SERPL-MCNC: 6.5 G/DL (ref 6–8.5)
QT INTERVAL: 442 MS
QTC INTERVAL: 459 MS
RBC # BLD AUTO: 4.14 10*6/MM3 (ref 3.77–5.28)
SARS-COV-2 RNA PNL SPEC NAA+PROBE: NOT DETECTED
SODIUM SERPL-SCNC: 144 MMOL/L (ref 136–145)
WBC # BLD AUTO: 7.08 10*3/MM3 (ref 3.4–10.8)

## 2021-08-30 PROCEDURE — 85027 COMPLETE CBC AUTOMATED: CPT

## 2021-08-30 PROCEDURE — 93010 ELECTROCARDIOGRAM REPORT: CPT | Performed by: INTERNAL MEDICINE

## 2021-08-30 PROCEDURE — 80053 COMPREHEN METABOLIC PANEL: CPT

## 2021-08-30 PROCEDURE — U0004 COV-19 TEST NON-CDC HGH THRU: HCPCS

## 2021-08-30 PROCEDURE — C9803 HOPD COVID-19 SPEC COLLECT: HCPCS

## 2021-08-30 PROCEDURE — 93005 ELECTROCARDIOGRAM TRACING: CPT

## 2021-08-30 PROCEDURE — 36415 COLL VENOUS BLD VENIPUNCTURE: CPT

## 2021-09-01 ENCOUNTER — HOSPITAL ENCOUNTER (OUTPATIENT)
Dept: MAMMOGRAPHY | Facility: HOSPITAL | Age: 76
Discharge: HOME | End: 2021-09-01

## 2021-09-01 ENCOUNTER — LAB REQUISITION (OUTPATIENT)
Dept: LAB | Facility: HOSPITAL | Age: 76
End: 2021-09-01

## 2021-09-01 DIAGNOSIS — C50.212 MALIGNANT NEOPLASM OF UPPER-INNER QUADRANT OF LEFT FEMALE BREAST, UNSPECIFIED ESTROGEN RECEPTOR STATUS (HCC): ICD-10-CM

## 2021-09-01 DIAGNOSIS — C50.912 MALIGNANT NEOPLASM OF UNSPECIFIED SITE OF LEFT FEMALE BREAST (HCC): ICD-10-CM

## 2021-09-01 PROCEDURE — 77065 DX MAMMO INCL CAD UNI: CPT | Performed by: RADIOLOGY

## 2021-09-01 PROCEDURE — 76098 X-RAY EXAM SURGICAL SPECIMEN: CPT

## 2021-09-01 PROCEDURE — 25010000003 LIDOCAINE 1 % SOLUTION: Performed by: RADIOLOGY

## 2021-09-01 PROCEDURE — C1819 TISSUE LOCALIZATION-EXCISION: HCPCS

## 2021-09-01 PROCEDURE — 88341 IMHCHEM/IMCYTCHM EA ADD ANTB: CPT | Performed by: SURGERY

## 2021-09-01 PROCEDURE — 19283 PERQ DEV BREAST 1ST STRTCTC: CPT | Performed by: RADIOLOGY

## 2021-09-01 PROCEDURE — 76098 X-RAY EXAM SURGICAL SPECIMEN: CPT | Performed by: RADIOLOGY

## 2021-09-01 PROCEDURE — 88342 IMHCHEM/IMCYTCHM 1ST ANTB: CPT | Performed by: SURGERY

## 2021-09-01 PROCEDURE — 88307 TISSUE EXAM BY PATHOLOGIST: CPT | Performed by: SURGERY

## 2021-09-01 RX ORDER — LIDOCAINE HYDROCHLORIDE 10 MG/ML
5 INJECTION, SOLUTION INFILTRATION; PERINEURAL ONCE
Status: COMPLETED | OUTPATIENT
Start: 2021-09-01 | End: 2021-09-01

## 2021-09-01 RX ADMIN — LIDOCAINE HYDROCHLORIDE 3 ML: 10 INJECTION, SOLUTION INFILTRATION; PERINEURAL at 08:37

## 2021-09-08 ENCOUNTER — NURSE NAVIGATOR (OUTPATIENT)
Dept: OTHER | Facility: HOSPITAL | Age: 76
End: 2021-09-08

## 2021-09-08 DIAGNOSIS — R74.8 ELEVATED LIVER ENZYMES: ICD-10-CM

## 2021-09-08 DIAGNOSIS — Z79.899 HIGH RISK MEDICATION USE: ICD-10-CM

## 2021-09-08 DIAGNOSIS — K75.4 AUTOIMMUNE HEPATITIS (HCC): Primary | ICD-10-CM

## 2021-09-28 DIAGNOSIS — K75.4 AUTOIMMUNE HEPATITIS (HCC): ICD-10-CM

## 2021-09-28 RX ORDER — BUDESONIDE 3 MG/1
9 CAPSULE, COATED PELLETS ORAL EVERY MORNING
Qty: 90 CAPSULE | Refills: 5 | Status: SHIPPED | OUTPATIENT
Start: 2021-09-28 | End: 2022-04-08 | Stop reason: SDUPTHER

## 2021-09-28 NOTE — PROGRESS NOTES
Subjective     PROBLEM LIST:  1. Left-sided pT1bN0 (IA), low-grade invasive ductal carcinoma diagnosed 05/01/2014. She presented with left-sided nipple discharge and an abnormality on mammogram in 04/2014. Biopsy showed invasive ductal cancer low-grade,estrogen receptor and progesterone receptor positive, HER-2 negative. Sheunderwent a left needle localization lumpectomy on 06/27/2014. Final tumor size  is 1.4 cm  a) Oncotype recurrence score was 0 which is associated with a 3% risk of 10 year recurrence.  b) Adjuvant Arimidex was started after the completion of radiotherapy in 08/2014.  c) Arimidex was discontinued in 11/2014 due to severe hot flashes. Arimidex resumed in 07/2015.  Completed 5 years of treatment.  D) recurrent left breast IDC.  Left breast lumpectomy on 9/1/21 showed 5 mm tumor, completely removed by biopsy needle, grade 2.  ER+ WI+ Her2 negative (pT1bNx)  2. 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 Diagnosed on biopsy on 05/22/2014. She underwent a lumpectomy on 06/27/2014 with final tumor size being 2 mm. It was ER/WI positive with HER-2 not performed.   3. Hypothyroidism.   4. Diabetes.   5. Hypertension.   6. Hyperlipidemia.   7. Autoimmune hepatitis.  8. Osteopenia, received zometa x 4 doses, completed February 2018.  9. Erythrocytosis    CHIEF COMPLAINT: breast cancer      HISTORY OF PRESENT ILLNESS:  The patient is a 76 y.o. female, referred for recurrent IDC in the left breast.    It has been about a year since she stopped taking anastrozole.  She says that she has thought about it and she is willing to go back on this.  She does not want to have any further surgery or radiation treatment.    REVIEW OF SYSTEMS:  A 14 point review of systems was performed and is negative except as noted above.    Past Medical History:   Diagnosis Date   • Adenomatous polyp of colon 10/23/2019   • Arthritis    • Diabetes mellitus (CMS/HCC)    • Disease of thyroid gland   "  • Diverticulosis    • Gout    • Hepatitis    • History of kidney stones    • Hx of radiation therapy 06/2014   • Hyperlipidemia    • Hypertension    • Malignant neoplasm of breast (CMS/HCC) 2014   • Menopause            Objective     /80   Pulse 55   Temp 97.5 °F (36.4 °C)   Ht 152.4 cm (60\")   Wt 74.2 kg (163 lb 8 oz)   SpO2 97%   BMI 31.93 kg/m²   Performance Status:0              General: well appearing female in no acute distress  Neuro: alert and oriented  HEENT: sclerae anicteric, oropharynx clear  Lymphatics: no cervical, supraclavicular, or axillary adenopathy  Cardiovascular: regular rate and rhythm, no murmurs  Lungs: clear to auscultation bilaterally  Abdomen: soft, nontender, nondistended.  No palpable organomegaly  Extremities: no lower extremity edema  Skin: no rashes, lesions, bruising, or petechiae  Psych: mood and affect appropriate    Lab Results   Component Value Date    WBC 7.08 08/30/2021    HGB 13.6 08/30/2021    HCT 41.1 08/30/2021    MCV 99.3 (H) 08/30/2021     08/30/2021     Lab Results   Component Value Date    GLUCOSE 123 (H) 08/30/2021    BUN 16 08/30/2021    CREATININE 0.84 08/30/2021    EGFRIFAFRI 80 08/30/2021    BCR 19.0 08/30/2021    K 4.2 08/30/2021    CO2 25.0 08/30/2021    CALCIUM 9.1 08/30/2021    PROTENTOTREF 6.8 03/22/2021    ALBUMIN 3.50 08/30/2021    AST 15 08/30/2021    ALT 10 08/30/2021             Assessment/Plan     Radha John is a 76 y.o. female with a history of bilateral stage I ER+ Her2 negative breast cancer, now with a recurrent 5 mm ER+ IDC in the left breast, s/p repeat lumpectomy.    We discussed resuming aromatase inhibitor therapy for another 5 years.  She tolerated anastrozole reasonably well previously and we will restart this medication.    Osteopenia: she previously received zometa x 4 with improvement in bone density to normal range.  Last dexa was 9/2018.  I will order repeat bone density scan.    Follow-up in 1 year.       A " total greater than 45 mins minutes was spent in face to face patient time, examination, counseling, charting, reviewing test results, and reviewing outside records.    Sarah Prasad MD    9/29/2021

## 2021-09-28 NOTE — TELEPHONE ENCOUNTER
Rx Refill Note  Requested Prescriptions      No prescriptions requested or ordered in this encounter      Last office visit with prescribing clinician: 3/26/2021      Next office visit with prescribing clinician: Visit date not found            Eric Roblero MA  09/28/21, 09:38 EDT

## 2021-09-29 ENCOUNTER — CONSULT (OUTPATIENT)
Dept: ONCOLOGY | Facility: CLINIC | Age: 76
End: 2021-09-29

## 2021-09-29 ENCOUNTER — CLINICAL SUPPORT (OUTPATIENT)
Dept: GENETICS | Facility: HOSPITAL | Age: 76
End: 2021-09-29

## 2021-09-29 VITALS
HEART RATE: 55 BPM | SYSTOLIC BLOOD PRESSURE: 158 MMHG | HEIGHT: 60 IN | TEMPERATURE: 97.5 F | DIASTOLIC BLOOD PRESSURE: 80 MMHG | OXYGEN SATURATION: 97 % | BODY MASS INDEX: 32.1 KG/M2 | WEIGHT: 163.5 LBS

## 2021-09-29 DIAGNOSIS — Z85.3 PERSONAL HISTORY OF BREAST CANCER: Primary | ICD-10-CM

## 2021-09-29 DIAGNOSIS — Z80.41 FH: OVARIAN CANCER: ICD-10-CM

## 2021-09-29 DIAGNOSIS — C50.912 INVASIVE DUCTAL CARCINOMA OF LEFT BREAST (HCC): ICD-10-CM

## 2021-09-29 DIAGNOSIS — Z80.3 FAMILY HISTORY OF BREAST CANCER: ICD-10-CM

## 2021-09-29 DIAGNOSIS — M81.0 AGE-RELATED OSTEOPOROSIS WITHOUT CURRENT PATHOLOGICAL FRACTURE: ICD-10-CM

## 2021-09-29 DIAGNOSIS — C50.911 INVASIVE DUCTAL CARCINOMA OF RIGHT BREAST (HCC): Primary | ICD-10-CM

## 2021-09-29 PROCEDURE — 99215 OFFICE O/P EST HI 40 MIN: CPT | Performed by: INTERNAL MEDICINE

## 2021-09-29 PROCEDURE — 96040: CPT | Performed by: GENETIC COUNSELOR, MS

## 2021-09-29 RX ORDER — TRIPROLIDINE/PSEUDOEPHEDRINE 2.5MG-60MG
TABLET ORAL
COMMUNITY
Start: 2021-09-20

## 2021-09-29 RX ORDER — ANASTROZOLE 1 MG/1
1 TABLET ORAL DAILY
Qty: 30 TABLET | Refills: 11 | Status: SHIPPED | OUTPATIENT
Start: 2021-09-29 | End: 2022-08-12 | Stop reason: SDUPTHER

## 2021-10-01 NOTE — PROGRESS NOTES
Radha John is a 76-year old female who was seen for genetic counseling due to a personal and family history of breast cancer.  Ms. John was diagnosed with breast cancer at age 69, and underwent a lumpectomy.  She was recently diagnosed with an ER/FL+ breast cancer at age 76, followed by lumpectomy.  She is postmenopausal, has had a hysterectomy, but retains her ovaries.  She reports that she has had several colon polyps in the past and has undergone screening colonoscopy every three years.  Ms. John has opted to take time to consider the information discussed today before proceeding with genetic testing.  Testing remains available to her and could be coordinated at any point in the future.      PERTINENT FAMILY HISTORY: (See pedigree)   Mat. Half-sister: Breast cancer  Niece:   Ovarian cancer, 30s    We do not have medical records regarding Ms. John’s family members’ diagnoses.     RISK ASSESSMENT:  Ms. John’s personal history of breast cancer raises the question of a hereditary cancer syndrome.   NCCN guidelines recommend offering testing to individuals with a personal history of two breast cancers at any age with one close relative diagnosed with breast cancer, therefore, Ms. John meets BRCA1/2 testing criteria.  We also discussed the availability of multigene panels which can evaluate for the BRCA genes and a number of other breast cancer susceptibility genes simultaneously.   We discussed that if she were to pursue testing and it was negative, breast cancer risk for other women in the family should be estimated based on family history risk modeling.  This risk assessment is based on the family history information provided at the time of the appointment.      GENETIC COUNSELING (30 minutes): We reviewed the family history information in detail.  Cases of breast cancer follow three general patterns: sporadic, familial, and hereditary.  While most (75-80%) breast cancer is sporadic, some cases appear to occur  in family clusters.  These cases are said to be familial and account for 10-20% of breast cancer cases.  Familial cases may be due to a combination of shared genes and environmental factors among family members.  In even fewer cases (5-10%), the risk to develop cancer is inherited, and the genes responsible for the cancer are known.      Family histories typical of hereditary cancer syndromes usually include multiple first- and second-degree relatives diagnosed with cancer types that define a syndrome.  These cases tend to be diagnosed at younger-than-expected ages and can be bilateral or multifocal.  The cancer in these families follows an autosomal dominant inheritance pattern, which indicates the likely presence of a mutation in a cancer susceptibility gene.  Children and siblings of an individual known to carry this mutation have a 50% chance of inheriting that mutation, thereby inheriting the increased risk to develop cancer.  These mutations can be passed down from the maternal or the paternal lineage.    Hereditary breast cancer accounts for 5-10% of all cases of breast cancer.  A significant proportion (up to 50%) of hereditary breast cancer can be attributed to mutations in the BRCA1 and BRCA2 genes.  Mutations in these genes confer an increased risk for breast cancer, ovarian cancer, male breast cancer, prostate cancer and pancreatic cancer.  Women with a BRCA1 or BRCA2 mutation who have already been diagnosed with breast cancer have a 40-60% lifetime risk of a second breast cancer.  In addition to BRCA1/2, there are a number of moderate risk breast cancer genes, and we discussed the availability of multigene panels that can evaluate multiple genes simultaneously.  We discussed that the degree of risk varies based on the gene identified to have a mutation, and that management guidelines also vary based on gene.       Genetic Testing:  The risks, benefits and limitations of genetic testing and implications  for clinical management following testing were reviewed.  DNA test results can influence decisions regarding screening, prevention and surgical management.  Genetic testing can have significant psychological implications for both individuals and families.  We also discussed insurance coverage for genetic testing.  In most cases, health insurance plans follow NCCN guidelines in determining coverage for genetic testing, and Ms. John would meet those criteria.      We discussed panel testing, which would involve testing for BRCA1/2 as well as multiple additional cancer susceptibility genes. The benefits and limitations of genetic testing were discussed. The implications of a positive or negative test result were discussed. We discussed the possibility that, in some cases, genetic test results may be ambiguous due to the identification of a genetic variant of uncertain significance (VUS).  VUSs are frequently reported through multigene panel testing, given the number of genes being evaluated and the presence of genetic variation in the population.  The majority of VUSs (>90%) are ultimately reclassified as negative.  Given her personal and family history, a negative test result does not eliminate all breast cancer risk to her relatives, although the risk would not be as high as it would with positive genetic testing.      Cancer Prevention:  At this time, Ms. John’s female relatives may be considered to have a somewhat increased lifetime risk for breast cancer based on family history.  Relatives could have a personalized risk assessment performed using a family history based risk model, such as the Tyrer-Cuzick (EMI) model, to quantify their breast cancer risk.    Surveillance for individuals with a high lifetime risk of breast cancer (>20% vs the general population risk of 12%), based on NCCN guidelines, would consist of semi-annual clinical breast exams and monthly self-breast exams starting by age 18 and annual  mammography starting 10 years younger than the earliest diagnosis in the family, or by age 40.  According to an American Cancer Society expert panel and NCCN guidelines, annual breast MRI should be offered to women whose lifetime risk of breast cancer is 20-25 percent or more.  If Ms. John were to pursue genetic testing and have an identified mutation, relatives could opt to have genetic testing to determine if they did or did not inherit the mutation.    PLAN:  Ms. John plans to take time to consider the information discussed today before proceeding with genetic testing.  She was encouraged to contact us with any questions or concerns.  If she were to decide she wanted to pursue testing in the future, it could be coordinated at any time.  She would contact our office at [phone] to coordinate.  Genetic counseling remains available to Ms. John in the future.       Bing Craig MS, [facility], [facility]       Licensed Certified Genetic Counselor          Cc: Radha Reynolds MD

## 2021-10-06 ENCOUNTER — TELEPHONE (OUTPATIENT)
Dept: ONCOLOGY | Facility: CLINIC | Age: 76
End: 2021-10-06

## 2021-10-06 NOTE — TELEPHONE ENCOUNTER
Caller: ROLANDO    Relationship:     Best call back number: [phone] REF [phone]    What is the best time to reach you: ASAP    Who are you requesting to speak with (clinical staff, provider,  specific staff member): VIIVANE    Do you know the name of the person who called:     What was the call regarding: CALLER HAS QUESTIONS ABOUT BIOPSY ON 08/16    Do you require a callback: YES

## 2021-10-07 NOTE — TELEPHONE ENCOUNTER
Attempted to call the number listed but it was a medicare voicemail and never a person.  Unable to complete the call.

## 2021-10-13 ENCOUNTER — TELEPHONE (OUTPATIENT)
Dept: ONCOLOGY | Facility: CLINIC | Age: 76
End: 2021-10-13

## 2021-10-13 NOTE — TELEPHONE ENCOUNTER
Call from estelita sun, who has done a bone scan on patient for complaints of joint pain, findings concerning for metastatic disease - involving axial skeleton, bilateral femurs.    We will get report and schedule patient for an appt in near future to discuss.

## 2021-10-13 NOTE — PROGRESS NOTES
PROBLEM LIST:  1. Left-sided pT1bN0 (IA), low-grade invasive ductal carcinoma diagnosed 05/01/2014. She presented with left-sided nipple discharge and an abnormality on mammogram in 04/2014. Biopsy showed invasive ductal cancer low-grade,estrogen receptor and progesterone receptor positive, HER-2 negative. Sheunderwent a left needle localization lumpectomy on 06/27/2014. Final tumor size  is 1.4 cm  a) Oncotype recurrence score was 0 which is associated with a 3% risk of 10 year recurrence.  b) Adjuvant Arimidex was started after the completion of radiotherapy in 08/2014.  c) Arimidex was discontinued in 11/2014 due to severe hot flashes. Arimidex resumed in 07/2015.  Completed 5 years of treatment.  D) recurrent left breast IDC.  Left breast lumpectomy on 9/1/21 showed 5 mm tumor, completely removed by biopsy needle, intermediate grade.  ER+ MN+ Her2 negative (pT1bNx).  Bone scan showed widespread metastatic bone disease.  2. 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 Diagnosed on biopsy on 05/22/2014. She underwent a lumpectomy on 06/27/2014 with final tumor size being 2 mm. It was ER/MN positive with HER-2 not performed.   3. Hypothyroidism.   4. Diabetes.   5. Hypertension.   6. Hyperlipidemia.   7. Autoimmune hepatitis.  8. Osteopenia, received zometa x 4 doses, completed February 2018.  9. Erythrocytosis    Subjective     CHIEF COMPLAINT: breast cancer    HISTORY OF PRESENT ILLNESS:   Radha John returns for follow-up.   She had a bone scan done for back and hip pain by her rheumatologist, and this showed evidence of metastatic disease.  She presents today to discuss this as well as treatment.    She says her pain is not severe.  Currently she does not have any and she can walk without difficulty.  She has noticed especially after longer periods of activity that she is aching in her low back, hips, and legs.  She is taking a fair amount of ibuprofen.      Objective   "    /79   Pulse 60   Temp 97.8 °F (36.6 °C) (Temporal)   Resp 18   Ht 152.4 cm (60\")   Wt 73 kg (161 lb)   SpO2 96%   BMI 31.44 kg/m²      Performance Status:                General: well appearing female in no acute distress  Neuro: alert and oriented  HEENT: sclera anicteric, oropharynx clear  Extremeties: no lower extremity edema  Skin: no rashes, lesions, bruising, or petechiae  Psych: mood and affect appropriate        RECENT LABS:  Lab Results   Component Value Date    WBC 7.08 08/30/2021    HGB 13.6 08/30/2021    HCT 41.1 08/30/2021    MCV 99.3 (H) 08/30/2021     08/30/2021       Lab Results   Component Value Date    GLUCOSE 123 (H) 08/30/2021    BUN 16 08/30/2021    CREATININE 0.84 08/30/2021    EGFRIFAFRI 80 08/30/2021    BCR 19.0 08/30/2021    K 4.2 08/30/2021    CO2 25.0 08/30/2021    CALCIUM 9.1 08/30/2021    PROTENTOTREF 6.8 03/22/2021    ALBUMIN 3.50 08/30/2021    AST 15 08/30/2021    ALT 10 08/30/2021                 Assessment/Plan   Radha John is a 76 y.o. female with a history of bilateral stage I ER+ Her2 negative breast cancer, with a more recent recurrent 5 mm ER+ IDC in the left breast, s/p repeat lumpectomy.  Bone scan shows evidence of metastatic disease involving the bone.    We discussed adding ibrance to her treatment, as well as zometa every three months.  Side effects of anastrozole and ibrance were discussed including  hot flashes, vaginal dryness, joint pain, decrease in bone density, mood or sleep disturbance, nausea, and neutropenia.    I also recommend a cT c/a/p to complete her staging.    We will plan to start zometa next week and ibrance as soon as it is available.    Tentatively plan to repeat imaging in about 3 months to assess response.    Cancer related pain - hopefully will improve with treatment.  Currently manageable with OTC medications.    F/u with me in 3 weeks.    Total time of patient care on day of service including time prior to, face to " face with patient, and following visit spent in reviewing records, lab results, imaging studies, discussion with patient, and documentation/charting was > 40 minutes.                          Sarah Prasad MD  [facility]    10/14/2021          CC:

## 2021-10-13 NOTE — TELEPHONE ENCOUNTER
Caller: Radha John    Relationship: Self    Best call back number: [phone]    What is the best time to reach you: ANYTIME    Who are you requesting to speak with (clinical staff, provider,  specific staff member): NURSE    What was the call regarding: PATIENT CALLED BACK (SHE HAS SPOKEN TO NURSE EARLIER) AS SHE IS FEELING VERY NERVOUS ABOUT WHY SHE IS BEING SCHEDULED TO SEE US.  SHE WOULD LIKE TO KNOW IF SHE CAN BE SEEN ANY TIME TODAY INSTEAD OF TOMORROW AT 8:00 AM.  IT WOULD TAKE PATIENT ABOUT 15 - 20 MINUTES TO ARRIVE.      Do you require a callback: PLEASE CALL TO ADVISE.

## 2021-10-13 NOTE — TELEPHONE ENCOUNTER
I called patient and asked her to come in tomorrow morning to see Dr. Prasad and discuss the bone scan that CYDNEY Ferguson ordered at [facility].  Patient stated she would be here.

## 2021-10-13 NOTE — TELEPHONE ENCOUNTER
Called and spoke to chloe briefly re: her bone scan.  Will see her in the morning to discuss treatment optoins.

## 2021-10-13 NOTE — TELEPHONE ENCOUNTER
I called patient and told her that our scxhedule was very fill today but Dr Prasad would try to call her on the phone.

## 2021-10-14 ENCOUNTER — OFFICE VISIT (OUTPATIENT)
Dept: ONCOLOGY | Facility: CLINIC | Age: 76
End: 2021-10-14

## 2021-10-14 VITALS
TEMPERATURE: 97.8 F | RESPIRATION RATE: 18 BRPM | DIASTOLIC BLOOD PRESSURE: 79 MMHG | HEIGHT: 60 IN | HEART RATE: 60 BPM | SYSTOLIC BLOOD PRESSURE: 163 MMHG | BODY MASS INDEX: 31.61 KG/M2 | OXYGEN SATURATION: 96 % | WEIGHT: 161 LBS

## 2021-10-14 DIAGNOSIS — C50.911 INVASIVE DUCTAL CARCINOMA OF RIGHT BREAST (HCC): ICD-10-CM

## 2021-10-14 DIAGNOSIS — C79.51 BONE METASTASES: Primary | ICD-10-CM

## 2021-10-14 PROCEDURE — 99215 OFFICE O/P EST HI 40 MIN: CPT | Performed by: INTERNAL MEDICINE

## 2021-10-14 RX ORDER — METHOCARBAMOL 500 MG/1
TABLET, FILM COATED ORAL
COMMUNITY
Start: 2021-06-23

## 2021-10-15 ENCOUNTER — SPECIALTY PHARMACY (OUTPATIENT)
Dept: ONCOLOGY | Facility: HOSPITAL | Age: 76
End: 2021-10-15

## 2021-10-15 DIAGNOSIS — C79.51 BONE METASTASES: Primary | ICD-10-CM

## 2021-10-15 DIAGNOSIS — C50.911 INVASIVE DUCTAL CARCINOMA OF RIGHT BREAST (HCC): ICD-10-CM

## 2021-10-15 NOTE — PROGRESS NOTES
Drug: Palbociclib  Strength: 125 mg tablet  Directions: Take 1 tablet by mouth daily on days 1-21, then 7 days off  Quantity: #21   Refills: 3  Released to: [facility]    Completed independent double check on medication order/Rx.  Indication and dosing are appropriate and there are no relevant DDI or allergies.

## 2021-10-15 NOTE — PROGRESS NOTES
Specialty Pharmacy Assessment    Palbociclib (Ibrance)  Dose: 125 mg  Frequency: Once a day for 21 days, followed by 7 days off  Indication: Breast cancer  Goals of Therapy: Palliative  New Start: Yes  Prescription submitted to: Other  Other pharmacy: [facility]  Additional notes: Oral chemotherapy clinic received referral from Dr. Prasad regarding the initiation of palbociclib + anastrozole + Zometa for metastatic breast cancer. Reviewed patient chart. Patient is s/p lumpectomy and previous hormone blockage treatment.  Patient is starting first-line treatment for ER/[place] +, HER2- metastatic breast cancer.  No allergies or DDI of concern. Will confirm patient is taking calcium + vit D during education session.  Reviewed patient's labs from 8/30/21.  Patient will need baseline labs. Released script for palbociclib 125mg PO daily Days 1-21 then 7 days off, #21 with 3 RF to [facility] to begin processing. Pt scheduled for oral chemotherapy education and financial navigation appt on 10/22/21.  Will obtain consent and CCA at that time.

## 2021-10-20 ENCOUNTER — TELEPHONE (OUTPATIENT)
Dept: ONCOLOGY | Facility: CLINIC | Age: 76
End: 2021-10-20

## 2021-10-20 ENCOUNTER — HOSPITAL ENCOUNTER (OUTPATIENT)
Dept: CT IMAGING | Facility: HOSPITAL | Age: 76
Discharge: HOME | End: 2021-10-20
Admitting: INTERNAL MEDICINE

## 2021-10-20 DIAGNOSIS — C50.911 INVASIVE DUCTAL CARCINOMA OF RIGHT BREAST (HCC): ICD-10-CM

## 2021-10-20 DIAGNOSIS — C79.51 BONE METASTASES: ICD-10-CM

## 2021-10-20 PROCEDURE — 71260 CT THORAX DX C+: CPT

## 2021-10-20 PROCEDURE — 74177 CT ABD & PELVIS W/CONTRAST: CPT

## 2021-10-20 PROCEDURE — 82565 ASSAY OF CREATININE: CPT

## 2021-10-20 PROCEDURE — 25010000002 IOPAMIDOL 61 % SOLUTION: Performed by: INTERNAL MEDICINE

## 2021-10-20 RX ADMIN — IOPAMIDOL 94 ML: 612 INJECTION, SOLUTION INTRAVENOUS at 09:58

## 2021-10-20 NOTE — TELEPHONE ENCOUNTER
Caller: Radha John    Relationship to patient: Self    Best call back number: [phone]    Chief complaint: REQUESTING AN EARLIER TIME FOR HER 10/22/2021 APPTS.    Type of visit: CHEMO EDUCATION/INFUSION    Requested date: SAME DAY 10/22    When is the original appointment: 10/22    Additional notes: PATIENT STATES THE EARLIER THE BETTER!

## 2021-10-21 ENCOUNTER — DOCUMENTATION (OUTPATIENT)
Dept: NUTRITION | Facility: HOSPITAL | Age: 76
End: 2021-10-21

## 2021-10-21 NOTE — TELEPHONE ENCOUNTER
Caller: Radha John    Relationship: Self    What was the call regarding: CALLING TO SEE IF EARLIER AVAILABILITY ON ALL THREE SCHED APPTS [facility], 10/22      I CALLED AND SPOKE WITH HILDA IN SCHED AND SHE ADV NO EARLIER AVAILABILITY      I ADV THIS TO PT AND SHE V/U.

## 2021-10-21 NOTE — PROGRESS NOTES
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 Diagnosed on biopsy on 2014. She underwent a lumpectomy on 2014 with final tumor size being 2 mm. It was ER/IA positive with HER-2 not performed    Recurrent left breast IDC - left breast lumpectomy on 21 showed 5 mm tumor, completely removed by biopsy needle, intermediate grade.  ER+ IA+ Her2 negative (pT1bNx).  Bone scan showed widespread metastatic bone disease    Surgery:   Lumpectomies on 2014    Chemotherapy:  Adjuvant Arimidex was started after the completion of radiotherapy in 2014 - discontinued in 2014 due to severe hot flashes. - resumed in 2015 - completed 5 years of treatment    Osteopenia, received zometa x 4 doses, completed 2018    Current treatment plan to include Ibrance, with Zometa every 3 months - Zometa to start 10/22/21 with Ibrance as soon as it is available      Patient Active Problem List   Diagnosis   • 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   • Invasive ductal carcinoma of right breast (HCC)   • Hypothyroidism   • Diabetes (HCC)   • Hypertension   • Hyperlipidemia   • Autoimmune hepatitis (HCC)   • Erythrocytosis   • High risk medication use   • Adenomatous polyp of colon   • Malignant neoplasm of breast (HCC)   • Hepatitis   • Gout   • Disease of thyroid gland   • Diabetes mellitus (HCC)   • Arthritis   • Menopause   • Colovaginal fistula   • Diverticulosis, severe with colovaginal fistula   • Status post colectomy, LAR with takedown of colovaginal fistula   • S/P cystoscopy with ureteral catheter placement   • Bone metastases  "(Formerly [facility])       Food / Nutrition Related History       Hydration Status     Goal:  80+ ounces    Enteral Feeding     NA  Anthropometric Measurements     Height:    Ht Readings from Last 1 Encounters:   10/14/21 152.4 cm (60\")       Weight:    Wt Readings from Last 1 Encounters:   10/14/21 73 kg (161 lb)       BMI: 31.44 / Obese    Weight Change:  18 lbs past year / 10% weight loss     Review of Lab Data (Time Frame - 1 month / 2 month)       Medication Review   Reviewed, noting Glucophage, Vitamin D3       Nutrition Focused Physical Findings       Nutrition Impact Symptoms      Fatigue      Physical Activity    Able to be up with normal activities    Current Nutritional Intake     Oral diet:  Regular  Malnutrition Risk Assessment     Recent weight loss over the past 6 months:  Yes    How much weight loss:  2 = 14-23 lbs    Eating poorly because of a decreased appetite:  1 = Yes    Malnutrition Screening Score:     MST = 2 more Patient at risk for malnutrition     Nutrition Diagnosis     Problem    Etiology    Signs / Symptoms      Nutrition Intervention   Consultation with patient during Cycle #1 Zometa.  Patient states that she has a good appetite and is eating well.  States that greater than a year ago, she lost quite a bit of weight which was related to thyroid medication and once adjusted, weight has remained stable.  She follows a cardiac diet, as her  is a cardiac patient and watches his diet closely.    Provided patient education for high calcium foods that would compliment the Zometa treatment plan.  Patient states that she takes Vitamin D, but was advised this morning taking a combination calcium / Vitamin D would be preferred.     Patient was provided with a content list of high calcium foods to emphasize in her diet.    Goal       Monitoring / Evaluation     Will follow as indicated.      "

## 2021-10-22 ENCOUNTER — OFFICE VISIT (OUTPATIENT)
Dept: ONCOLOGY | Facility: CLINIC | Age: 76
End: 2021-10-22

## 2021-10-22 ENCOUNTER — HOSPITAL ENCOUNTER (OUTPATIENT)
Dept: ONCOLOGY | Facility: HOSPITAL | Age: 76
Discharge: HOME | End: 2021-10-22

## 2021-10-22 ENCOUNTER — TELEPHONE (OUTPATIENT)
Dept: ONCOLOGY | Facility: CLINIC | Age: 76
End: 2021-10-22

## 2021-10-22 ENCOUNTER — HOSPITAL ENCOUNTER (OUTPATIENT)
Dept: ONCOLOGY | Facility: HOSPITAL | Age: 76
Setting detail: INFUSION SERIES
Discharge: HOME | End: 2021-10-22

## 2021-10-22 ENCOUNTER — SPECIALTY PHARMACY (OUTPATIENT)
Dept: ONCOLOGY | Facility: HOSPITAL | Age: 76
End: 2021-10-22

## 2021-10-22 ENCOUNTER — CLINICAL SUPPORT (OUTPATIENT)
Dept: GENETICS | Facility: HOSPITAL | Age: 76
End: 2021-10-22

## 2021-10-22 ENCOUNTER — LAB (OUTPATIENT)
Dept: LAB | Facility: HOSPITAL | Age: 76
End: 2021-10-22

## 2021-10-22 VITALS
WEIGHT: 165 LBS | DIASTOLIC BLOOD PRESSURE: 75 MMHG | HEART RATE: 59 BPM | BODY MASS INDEX: 32.39 KG/M2 | SYSTOLIC BLOOD PRESSURE: 154 MMHG | OXYGEN SATURATION: 92 % | RESPIRATION RATE: 16 BRPM | HEIGHT: 60 IN | TEMPERATURE: 97.1 F

## 2021-10-22 DIAGNOSIS — C79.51 BONE METASTASES: Primary | ICD-10-CM

## 2021-10-22 DIAGNOSIS — Z13.79 GENETIC TESTING: Primary | ICD-10-CM

## 2021-10-22 DIAGNOSIS — C50.911 INVASIVE DUCTAL CARCINOMA OF RIGHT BREAST (HCC): ICD-10-CM

## 2021-10-22 DIAGNOSIS — C50.912 INVASIVE DUCTAL CARCINOMA OF LEFT BREAST (HCC): ICD-10-CM

## 2021-10-22 DIAGNOSIS — Z17.0 MALIGNANT NEOPLASM OF BREAST IN FEMALE, ESTROGEN RECEPTOR POSITIVE, UNSPECIFIED LATERALITY, UNSPECIFIED SITE OF BREAST (HCC): Primary | ICD-10-CM

## 2021-10-22 DIAGNOSIS — C50.919 MALIGNANT NEOPLASM OF BREAST IN FEMALE, ESTROGEN RECEPTOR POSITIVE, UNSPECIFIED LATERALITY, UNSPECIFIED SITE OF BREAST (HCC): Primary | ICD-10-CM

## 2021-10-22 LAB
CREAT BLDA-MCNC: 0.9 MG/DL (ref 0.6–1.3)
ERYTHROCYTE [DISTWIDTH] IN BLOOD BY AUTOMATED COUNT: 16.9 % (ref 12.3–15.4)
HCT VFR BLD AUTO: 42.3 % (ref 34–46.6)
HGB BLD-MCNC: 14.4 G/DL (ref 12–15.9)
LYMPHOCYTES # BLD AUTO: 1 10*3/MM3 (ref 0.7–3.1)
LYMPHOCYTES NFR BLD AUTO: 14.3 % (ref 19.6–45.3)
MCH RBC QN AUTO: 33.2 PG (ref 26.6–33)
MCHC RBC AUTO-ENTMCNC: 34.1 G/DL (ref 31.5–35.7)
MCV RBC AUTO: 97.5 FL (ref 79–97)
MONOCYTES # BLD AUTO: 0.1 10*3/MM3 (ref 0.1–0.9)
MONOCYTES NFR BLD AUTO: 1.6 % (ref 5–12)
NEUTROPHILS NFR BLD AUTO: 5.9 10*3/MM3 (ref 1.7–7)
NEUTROPHILS NFR BLD AUTO: 84.1 % (ref 42.7–76)
PLATELET # BLD AUTO: 218 10*3/MM3 (ref 140–450)
PMV BLD AUTO: 6.4 FL (ref 6–12)
RBC # BLD AUTO: 4.34 10*6/MM3 (ref 3.77–5.28)
WBC # BLD AUTO: 7 10*3/MM3 (ref 3.4–10.8)

## 2021-10-22 PROCEDURE — 25010000002 ZOLEDRONIC ACID PER 1 MG: Performed by: NURSE PRACTITIONER

## 2021-10-22 PROCEDURE — 96365 THER/PROPH/DIAG IV INF INIT: CPT

## 2021-10-22 PROCEDURE — 99214 OFFICE O/P EST MOD 30 MIN: CPT | Performed by: NURSE PRACTITIONER

## 2021-10-22 PROCEDURE — 82565 ASSAY OF CREATININE: CPT

## 2021-10-22 PROCEDURE — 85025 COMPLETE CBC W/AUTO DIFF WBC: CPT | Performed by: NURSE PRACTITIONER

## 2021-10-22 PROCEDURE — 96374 THER/PROPH/DIAG INJ IV PUSH: CPT

## 2021-10-22 RX ORDER — SODIUM CHLORIDE 9 MG/ML
250 INJECTION, SOLUTION INTRAVENOUS ONCE
Status: DISCONTINUED | OUTPATIENT
Start: 2021-10-22 | End: 2021-10-23 | Stop reason: HOSPADM

## 2021-10-22 RX ORDER — SODIUM CHLORIDE 9 MG/ML
250 INJECTION, SOLUTION INTRAVENOUS ONCE
Status: CANCELLED | OUTPATIENT
Start: 2021-10-22

## 2021-10-22 RX ORDER — ONDANSETRON HYDROCHLORIDE 8 MG/1
8 TABLET, FILM COATED ORAL 3 TIMES DAILY PRN
Qty: 30 TABLET | Refills: 5 | Status: SHIPPED | OUTPATIENT
Start: 2021-10-22 | End: 2021-10-25 | Stop reason: SDUPTHER

## 2021-10-22 RX ADMIN — ZOLEDRONIC ACID 4 MG: 4 INJECTION, SOLUTION, CONCENTRATE INTRAVENOUS at 15:27

## 2021-10-22 NOTE — PROGRESS NOTES
CHEMOTHERAPY PREPARATION    Radha John  [id number]  1945    Subjective   Chief Complaint: Treatment Preparation and Needs Assessment    History of present illness:  Radha John is a 76 y.o. year old female who is here today for chemotherapy preparation and needs assessment. The patient has been diagnosed with metastatic breast cancer and is scheduled to begin oral and IV treatment with Ibrance and Zometa.     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     Past Medical History, Past Surgical History, Social History, Family History have been reviewed and are without significant changes except as mentioned.      Review of Systems   Constitutional: Negative.    HENT: Negative.    Eyes: Negative.    Respiratory: Negative.    Cardiovascular: Negative.    Gastrointestinal: Negative.    Endocrine: Negative.    Genitourinary: Negative.    Musculoskeletal:        Bilateral leg pain worse at bedtime   Skin: Negative.    Allergic/Immunologic: Negative.    Neurological: Negative.    Hematological: Negative.    Psychiatric/Behavioral: Positive for sleep disturbance.       Objective   Physical Exam  Vital Signs: /75   Pulse 59   Temp 97.1  "°F (36.2 °C)   Resp 16   Ht 152.4 cm (60\")   Wt 74.8 kg (165 lb)   SpO2 92%   BMI 32.22 kg/m²    General Appearance:  alert, cooperative, no apparent distress and appears stated age   Neurologic/Psychiatric: A&O x 3, gait steady, appropriate affect   HEENT:  Normocephalic, without obvious abnormality, mucous membranes moist   Lungs:   Clear to auscultation bilaterally; respirations regular, even, and unlabored bilaterally   Heart:  Regular rate and rhythm, no murmurs appreciated   Extremities: Normal, atraumatic; no clubbing, cyanosis, or edema    Skin: No rashes, lesions, or abnormal coloration noted     ECOG Performance Status: 1 - Symptomatic but completely ambulatory            NEEDS ASSESSMENTS    Genetics  The patient's new diagnosis and family history have been reviewed for genetic counseling needs. A genetic referral is not recommended.     Psychosocial  The patient has completed a PHQ-9 Depression Screening and the Distress Thermometer (DT) today.   PHQ-9 results show 1-4 (Minimal Depression). The patient scored their distress today as 0 on a scale of 0-10 with 0 being no distress and 10 being extreme distress.   Problems marked as being an issue for her within the last week include physical problems.   Results were reviewed along with psychosocial resources offered by our cancer center. Our oncology social worker will be flagged for a DT score of 4 or above, and a same day call will be made for a score of 9 or 10. A mental health referral is not recommended at this time. The patient is not accepting of a referral to [facility].   Copies of patient's questionnaires will be scanned into EMR for details and further reference.    Barriers to care  A barriers form was also completed by the patient today. We discussed services offered by our facility to help her have adequate access to care. The patient was given the name and card for our Oncology Social Worker, Moon Ramirez. Based upon barriers " "assessment today, the patient will not require a follow-up call from the  to further discuss needs.   A copy of the barriers form will also be scanned into EMR for details and further reference.     VAD Assessment  The patient and I discussed planned intervenous chemotherapy as well as other IV treatments that are often needed throughout the course of treatment. These may include, but are not limited to blood transfusions, antibiotics, and IV hydration. The patient's vasculature does appear to be adequate for multiple peripheral IVs throughout their treatment course. Discussed risks and benefits of VADs. The patient would not like to pursue Port-A-Cath insertion prior to initiation of treatment.       Advance Care Planning   The patient and I discussed advanced care planning, \"Conversations that Matter\".   This service was offered, free of charge, for development of advance directives with a certified ACP facilitator.  The patient does not have an up-to-date advanced directive. This document is not on file with our office. The patient is not interested in an appointment with one of our facilitators to create or update their advanced directives.         Palliative Care  The patient and I discussed palliative care services. Palliative care is not the same as Hospice care. This is specialized medical care for people living with serious illness with the goal of improving quality of life for the patient and their family. [facility] has partnered with [facility] to offer our patients outpatient palliative care early along with their treatment to assist in coordination of care, symptom management, pain management, and medical decision making.  Oncology criteria for palliative care referral is not met at this time. The patient is not interested in a palliative care consultation.     Additional Referral needs  Will send message to Yareli about request for financial assistance with Ibrance copay if " available.     IV CHEMOTHERAPY EDUCATION    Booklets Given: Chemotherapy and You [x]  Eating Hints [x]    Sexuality/Fertility Books []      Chemotherapy/Biotherapy Education Sheets: (list all that apply)  nausea management, acid reflux management, diarrhea management, Cancer resourse contacts information, skin and mouth care and vaccination information                                                                                                                                                                 Chemotherapy Regimen:   Treatment Plans     Name Type Plan Dates Plan Provider         Active    OP SUPPORTIVE Zoledronic Acid Q12W ONCOLOGY SUPPORTIVE CARE 1  10/21/2021 - Present Sarah Prasad MD     OP BREAST Letrozole / Palbociclib ONCOLOGY TREATMENT  10/20/2021 - Present Sarah Prasad MD                                                                                                       ORAL CHEMOTHERAPY TEACHING    Oral Chemotherapy Regimen: Ibrance and Anastrozole     Date of Medication Start: 10/22/2021      DETAILED CHEMOTHERAPY TEACHING COMPLETED BY PHARMACY. CHEMOTHERAPY CONSENT COMPLETED BY PHARMACY. SEE PHARMACY EDUCATION FOR DOCUMENTATION.     Chemotherapy education comprehension reviewed. Questions answered and additional information discussed on topics including:  Anemia, Neutropenia, Nutrition and appetite changes, Diarrhea, Nausea & vomiting and Pain        Assessment and Plan:    Diagnoses and all orders for this visit:    1. Bone metastases (HCC) (Primary)  -     Provider communication  -     ondansetron (ZOFRAN) 8 MG tablet; Take 1 tablet by mouth 3 (Three) Times a Day As Needed for Nausea or Vomiting.  Dispense: 30 tablet; Refill: 5  -     CBC and Differential; Future  -     Comprehensive metabolic panel; Future  -     Lab Appointment Request; Future  -     Clinic Appointment Request; Future  -     CBC and Differential; Future  -     Comprehensive metabolic panel; Future  -     Lab  Appointment Request; Future  -     CBC and Differential; Future  -     Cancer Antigen 15-3; Future  -     Cancer Antigen 27.29; Future  -     Comprehensive metabolic panel; Future  -     Magnesium; Future  -     Phosphorus; Future  -     Lab Appointment Request; Future  -     Clinic Appointment Request; Future  -     Infusion Appointment Request 3; Future    2. 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  Dispense: 30 tablet; Refill: 5  -     CBC and Differential; Future  -     Comprehensive metabolic panel; Future  -     Lab Appointment Request; Future  -     Clinic Appointment Request; Future  -     CBC and Differential; Future  -     Comprehensive metabolic panel; Future  -     Lab Appointment Request; Future  -     CBC and Differential; Future  -     Cancer Antigen 15-3; Future  -     Cancer Antigen 27.29; Future  -     Comprehensive metabolic panel; Future  -     Magnesium; Future  -     Phosphorus; Future  -     Lab Appointment Request; Future  -     Clinic Appointment Request; Future  -     Infusion Appointment Request 3; Future    3. 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 Needs assessment was completed including genetics, psychosocial needs, barriers to care, VAD evaluation, advanced care planning, and palliative care services. Referrals have been ordered as appropriate based upon our evaluation and patient desires.     IV  and oral chemotherapy teaching was also completed today as documented above. Adequate time was given to answer all questions to her satisfaction. Patient is aware of her care team members and contact information if they have questions or problems throughout the treatment course. Needs assessments and education has been completed. The patient is adequately prepared to begin treatment as scheduled.     I spent 35 minutes caring for Radha on this date of service. This time includes time spent by me in the following activities: preparing for the visit, obtaining and/or reviewing a separately obtained history, performing a medically appropriate examination and/or evaluation, counseling and educating the patient/family/caregiver, ordering medications, tests, or procedures and documenting information in the medical record    Rhoda Llamas, APRN  10/22/2021

## 2021-10-22 NOTE — PROGRESS NOTES
Specialty Pharmacy Assessment    Palbociclib (Ibrance)  Dose: 125 mg  Frequency: Once a day for 21 days, followed by 7 days off  Indication: Breast cancer  New Start: Yes  Consent obtained: Yes  CCA obtained: Yes  Counseling: Take capsules at the same time each day, Swallow capsules whole. Do not crush, cut, open or dissolve capsules., It should not be handled by people other than the patient or caregivers, and especially not by females who are or may become pregnant or are breastfeeding, Store medication at room temperature in a tightly closed container out of the reach of children, If you miss a dose, do not take an extra dose or two doses at the same time. Simply take your next dose at the regularly scheduled time., Avoid grapefruit juice  Most common side effects: Nausea or vomiting, Fatigue, Upper respiratory infection or cough, Changes in blood counts  Serious side effects: Decreased white blood cells and increased risk for infection, Decreased platelet count and increased risk of bleeding, Decreased red blood cells or hemoglobin, Changes in liver function, Other  Other serious side effects: Counseled to take tablets with or without food  Additional notes: Medication: Anastrazole  Dose: 2.5 mg by mouth once daily   Counseling:   - Take with or without food at the same time each day.  - Store at room temperature. Avoid humidity. Keep out of reach of children.   - If you miss a dose, do not take 2 doses at the same time   - It should not be handled by people other than the patient or caregivers. If others handle the medication, they should wear gloves.   - Swallow whole  - Side effects discussed: hot flashes, arthralgia, nausea  - Discussed ability to shed trace amounts of medication in urine and stool. Discussed necessary pre cautions to avoid exposing others.      Zoledronic Acid every 84 days  - Discussed indication of the medication   - Discussed common side effects including edema, nausea, arthralgia, and  GI upset  - Discussed importance of taking a calcium and vitamin D supplement     Provided patient with drug information handout for Ibrance from the     Patient plans to start Ibrance this evening, 10/22/21.    Discussed aforementioned material. Reminded patient of the pharmacist's contact information and instructions to call should additional questions arise. All questions and concerns addressed. Completed a medication reconciliation. Provided appropriate educational materials and a personalized calendar of tentative treatment cycles.     Thank you,  Rad Coelho  PharmD Candidate 2022  10/22/21 1:21 PM

## 2021-10-22 NOTE — PROGRESS NOTES
Patient returned for blood draw for genetic testing previously discussed on 9/29/21.  Sample was sent to [facility] and results are expected in 2-3 weeks.  Patient will be called to discuss results once available.

## 2021-10-22 NOTE — TELEPHONE ENCOUNTER
Per Dr. Prasad, Please let her know her ct scan shows the known disease in the bone, but no other disease in her organs.  Attempted to call patient.  Left voicemail with details and to call back with questions.

## 2021-10-25 ENCOUNTER — TELEPHONE (OUTPATIENT)
Dept: ONCOLOGY | Facility: CLINIC | Age: 76
End: 2021-10-25

## 2021-10-25 DIAGNOSIS — G47.00 INSOMNIA, UNSPECIFIED TYPE: ICD-10-CM

## 2021-10-25 DIAGNOSIS — C79.51 BONE METASTASES: ICD-10-CM

## 2021-10-25 DIAGNOSIS — R11.2 CINV (CHEMOTHERAPY-INDUCED NAUSEA AND VOMITING): Primary | ICD-10-CM

## 2021-10-25 DIAGNOSIS — T45.1X5A CINV (CHEMOTHERAPY-INDUCED NAUSEA AND VOMITING): Primary | ICD-10-CM

## 2021-10-25 DIAGNOSIS — C50.911 INVASIVE DUCTAL CARCINOMA OF RIGHT BREAST (HCC): ICD-10-CM

## 2021-10-25 LAB — CREAT BLDA-MCNC: 0.9 MG/DL (ref 0.6–1.3)

## 2021-10-25 RX ORDER — ZOLPIDEM TARTRATE 5 MG/1
5 TABLET ORAL NIGHTLY PRN
Qty: 30 TABLET | Refills: 5 | Status: SHIPPED | OUTPATIENT
Start: 2021-10-25 | End: 2021-11-11 | Stop reason: ALTCHOICE

## 2021-10-25 RX ORDER — ONDANSETRON HYDROCHLORIDE 8 MG/1
8 TABLET, FILM COATED ORAL 3 TIMES DAILY PRN
Qty: 30 TABLET | Refills: 5 | Status: SHIPPED | OUTPATIENT
Start: 2021-10-25 | End: 2023-03-21

## 2021-10-25 NOTE — TELEPHONE ENCOUNTER
Caller: STEFANY     Relationship to patient: SELF     Best call back number: [phone]    PATIENT WOULD  LIKE TO KNOW   CAN SHE PUT HER IBRANCE IN A PILL BOX OR DOES IT HAVE TO STAY IN ITS PACKAGING?   SHE WANTS TO KNOW ABOUT HUGGING AND  KISSING?   SHE WOULD LIKE TO GET SOMETHING TO HELP HER SLEEP. THE BENADRYL ISN'T WORKING.  WHAT CAN SHE TAKE FOR NAUSEA?   PLEASE CALL AND ADVISE   THANK YOU

## 2021-10-25 NOTE — TELEPHONE ENCOUNTER
I talked with Arlin Arzate pharmD and she said that patient could put ibrance in a pill box but by itself, not with other meds.  Also, there is no reason not to hug or kiss.  I talked with CYDNEY Hopper and the zofran went to patient's mail order pharmacy and not to [facility] so we will send in zofran to [facility].  Patient still not sleeping despite trying benadryl. She was not interested in trying Melatonin so Ruth Ann said we could send a low dose ambien in for her.  I talked with patient and she verbalized understanding of the above information.

## 2021-11-01 ENCOUNTER — SPECIALTY PHARMACY (OUTPATIENT)
Dept: ONCOLOGY | Facility: HOSPITAL | Age: 76
End: 2021-11-01

## 2021-11-02 ENCOUNTER — TELEPHONE (OUTPATIENT)
Dept: ONCOLOGY | Facility: CLINIC | Age: 76
End: 2021-11-02

## 2021-11-02 ENCOUNTER — SPECIALTY PHARMACY (OUTPATIENT)
Dept: ONCOLOGY | Facility: HOSPITAL | Age: 76
End: 2021-11-02

## 2021-11-02 NOTE — TELEPHONE ENCOUNTER
Caller: Radha John    Relationship to patient: Self    Best call back number: [phone]    Chief complaint: PT NEEDS TO RESCHEDULE    Type of visit: FOLLOW UP    Requested date: SAME DAY, 10:30 AM    If rescheduling, when is the original appointment: 11/04    Additional notes:

## 2021-11-04 ENCOUNTER — LAB (OUTPATIENT)
Dept: LAB | Facility: HOSPITAL | Age: 76
End: 2021-11-04

## 2021-11-04 ENCOUNTER — TELEPHONE (OUTPATIENT)
Dept: ONCOLOGY | Facility: CLINIC | Age: 76
End: 2021-11-04

## 2021-11-04 ENCOUNTER — SPECIALTY PHARMACY (OUTPATIENT)
Dept: ONCOLOGY | Facility: HOSPITAL | Age: 76
End: 2021-11-04

## 2021-11-04 ENCOUNTER — OFFICE VISIT (OUTPATIENT)
Dept: ONCOLOGY | Facility: CLINIC | Age: 76
End: 2021-11-04

## 2021-11-04 VITALS
WEIGHT: 163 LBS | TEMPERATURE: 96.6 F | DIASTOLIC BLOOD PRESSURE: 72 MMHG | BODY MASS INDEX: 32 KG/M2 | OXYGEN SATURATION: 98 % | RESPIRATION RATE: 16 BRPM | HEART RATE: 58 BPM | HEIGHT: 60 IN | SYSTOLIC BLOOD PRESSURE: 141 MMHG

## 2021-11-04 DIAGNOSIS — C79.51 BONE METASTASES: ICD-10-CM

## 2021-11-04 DIAGNOSIS — C50.911 INVASIVE DUCTAL CARCINOMA OF RIGHT BREAST (HCC): ICD-10-CM

## 2021-11-04 DIAGNOSIS — C79.51 BONE METASTASES: Primary | ICD-10-CM

## 2021-11-04 LAB
ALBUMIN SERPL-MCNC: 3.8 G/DL (ref 3.5–5.2)
ALBUMIN/GLOB SERPL: 1.1 G/DL
ALP SERPL-CCNC: 297 U/L (ref 39–117)
ALT SERPL W P-5'-P-CCNC: 16 U/L (ref 1–33)
ANION GAP SERPL CALCULATED.3IONS-SCNC: 11 MMOL/L (ref 5–15)
AST SERPL-CCNC: 15 U/L (ref 1–32)
BILIRUB SERPL-MCNC: 0.4 MG/DL (ref 0–1.2)
BUN SERPL-MCNC: 17 MG/DL (ref 8–23)
BUN/CREAT SERPL: 16 (ref 7–25)
CALCIUM SPEC-SCNC: 8.6 MG/DL (ref 8.6–10.5)
CHLORIDE SERPL-SCNC: 104 MMOL/L (ref 98–107)
CO2 SERPL-SCNC: 26 MMOL/L (ref 22–29)
CREAT SERPL-MCNC: 1.06 MG/DL (ref 0.57–1)
ERYTHROCYTE [DISTWIDTH] IN BLOOD BY AUTOMATED COUNT: 17.5 % (ref 12.3–15.4)
GFR SERPL CREATININE-BSD FRML MDRD: 61 ML/MIN/1.73
GLOBULIN UR ELPH-MCNC: 3.5 GM/DL
GLUCOSE SERPL-MCNC: 96 MG/DL (ref 65–99)
HCT VFR BLD AUTO: 43.8 % (ref 34–46.6)
HGB BLD-MCNC: 14.2 G/DL (ref 12–15.9)
LYMPHOCYTES # BLD AUTO: 1 10*3/MM3 (ref 0.7–3.1)
LYMPHOCYTES NFR BLD AUTO: 33.2 % (ref 19.6–45.3)
MCH RBC QN AUTO: 32.2 PG (ref 26.6–33)
MCHC RBC AUTO-ENTMCNC: 32.3 G/DL (ref 31.5–35.7)
MCV RBC AUTO: 99.8 FL (ref 79–97)
MONOCYTES # BLD AUTO: 0.1 10*3/MM3 (ref 0.1–0.9)
MONOCYTES NFR BLD AUTO: 2.6 % (ref 5–12)
NEUTROPHILS NFR BLD AUTO: 2 10*3/MM3 (ref 1.7–7)
NEUTROPHILS NFR BLD AUTO: 64.2 % (ref 42.7–76)
PLATELET # BLD AUTO: 236 10*3/MM3 (ref 140–450)
PMV BLD AUTO: 5.7 FL (ref 6–12)
POTASSIUM SERPL-SCNC: 5.4 MMOL/L (ref 3.5–5.2)
PROT SERPL-MCNC: 7.3 G/DL (ref 6–8.5)
RBC # BLD AUTO: 4.39 10*6/MM3 (ref 3.77–5.28)
SODIUM SERPL-SCNC: 141 MMOL/L (ref 136–145)
WBC # BLD AUTO: 3.1 10*3/MM3 (ref 3.4–10.8)

## 2021-11-04 PROCEDURE — 36415 COLL VENOUS BLD VENIPUNCTURE: CPT

## 2021-11-04 PROCEDURE — 99214 OFFICE O/P EST MOD 30 MIN: CPT | Performed by: INTERNAL MEDICINE

## 2021-11-04 PROCEDURE — 85025 COMPLETE CBC W/AUTO DIFF WBC: CPT

## 2021-11-04 PROCEDURE — 80053 COMPREHEN METABOLIC PANEL: CPT

## 2021-11-04 NOTE — TELEPHONE ENCOUNTER
Caller: Radha John    Relationship: Self    Best call back number: [phone]    What is the best time to reach you: ANYTIME    Who are you requesting to speak with (clinical staff, provider,  specific staff member): CLINICAL      Do you require a callback: PATIENT CALLED WANTED TO KNOW IF SHE CAN GET ASSISTANCE WITH HER RX, SHE HAS CALLED [employer] AND THEY ADVISED TO CONTACT DR OFFICE AND SEE IF SHE CAN GET A COUPON CARD FOR HER MEDICATIONS

## 2021-11-04 NOTE — PROGRESS NOTES
PROBLEM LIST:  1. Left-sided pT1bN0 (IA), low-grade invasive ductal carcinoma diagnosed 05/01/2014. She presented with left-sided nipple discharge and an abnormality on mammogram in 04/2014. Biopsy showed invasive ductal cancer low-grade,estrogen receptor and progesterone receptor positive, HER-2 negative. Sheunderwent a left needle localization lumpectomy on 06/27/2014. Final tumor size  is 1.4 cm  a) Oncotype recurrence score was 0 which is associated with a 3% risk of 10 year recurrence.  b) Adjuvant Arimidex was started after the completion of radiotherapy in 08/2014.  c) Arimidex was discontinued in 11/2014 due to severe hot flashes. Arimidex resumed in 07/2015.  Completed 5 years of treatment.  D) recurrent left breast IDC.  Left breast lumpectomy on 9/1/21 showed 5 mm tumor, completely removed by biopsy needle, intermediate grade.  ER+ WA+ Her2 negative (pT1bNx).  Bone scan showed widespread metastatic bone disease.  CT chest abdomen and pelvis on 10/20/2021 showed extensive sclerotic changes in the sternum and thoracic spine, but no involvement of the organs.  2. 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 Diagnosed on biopsy on 05/22/2014. She underwent a lumpectomy on 06/27/2014 with final tumor size being 2 mm. It was ER/WA positive with HER-2 not performed.   3. Hypothyroidism.   4. Diabetes.   5. Hypertension.   6. Hyperlipidemia.   7. Autoimmune hepatitis.  8. Osteopenia, received zometa x 4 doses, completed February 2018.  9. Erythrocytosis    Subjective     CHIEF COMPLAINT: breast cancer    HISTORY OF PRESENT ILLNESS:   Radha John returns for follow-up.   She has been taking Ibrance and letrozole for about a month.  She says that she is actually had resolution of her leg pain since her last visit.  She did receive Zometa infusion.  She has not had any side effects with treatment.    Objective      /72   Pulse 58   Temp 96.6 °F (35.9 °C) (Temporal)   " Resp 16   Ht 152.4 cm (60\")   Wt 73.9 kg (163 lb)   SpO2 98%   BMI 31.83 kg/m²      Performance Status:0                General: well appearing female in no acute distress  Neuro: alert and oriented  HEENT: sclera anicteric, oropharynx clear  Cardiovascular: Regular rate and rhythm  Lungs: Clear to auscultation bilaterally  Extremeties: no lower extremity edema  Skin: no rashes, lesions, bruising, or petechiae  Psych: mood and affect appropriate        RECENT LABS:  Lab Results   Component Value Date    WBC 7.00 10/22/2021    HGB 14.4 10/22/2021    HCT 42.3 10/22/2021    MCV 97.5 (H) 10/22/2021     10/22/2021       Lab Results   Component Value Date    GLUCOSE 123 (H) 08/30/2021    BUN 16 08/30/2021    CREATININE 0.90 10/22/2021    EGFRIFAFRI 80 08/30/2021    BCR 19.0 08/30/2021    K 4.2 08/30/2021    CO2 25.0 08/30/2021    CALCIUM 9.1 08/30/2021    PROTENTOTREF 6.8 03/22/2021    ALBUMIN 3.50 08/30/2021    AST 15 08/30/2021    ALT 10 08/30/2021                 Assessment/Plan   Radha John is a 76 y.o. female with a history of bilateral stage I ER+ Her2 negative breast cancer, with a more recent recurrent 5 mm ER+ IDC in the left breast, s/p repeat lumpectomy, and imaging showing widespread metastatic bone involvement.    She is doing well on Ibrance and letrozole and has had significant improvement in her bone pain.  We will check her CBC today.    Bone metastases: Continue Zometa every 3 months.    Plan to repeat imaging in about 3 months to assess response-this will be in January.    Cancer related pain -significantly improved.    Follow-up in 1 month.  At that visit she can be scheduled for scans in January.                            Sarah Prasad MD  [facility]    11/4/2021          CC:          "

## 2021-11-04 NOTE — TELEPHONE ENCOUNTER
I called patient back and told her that Osmar had reviewed her labs and she is okay to start her next cycle of ibrance.  Also, I told patient that I would send a msg to pharmacy about the coupon or copay card for her medicine.  Patient verbalized understanding.

## 2021-11-09 ENCOUNTER — TELEPHONE (OUTPATIENT)
Dept: ONCOLOGY | Facility: CLINIC | Age: 76
End: 2021-11-09

## 2021-11-09 NOTE — TELEPHONE ENCOUNTER
Caller: Radha John    Relationship: Self    Best call back number: [phone]    What medications are you currently taking:   Current Outpatient Medications on File Prior to Visit   Medication Sig Dispense Refill   • albuterol sulfate  (90 Base) MCG/ACT inhaler INHALE 2 PUFFS BY MOUTH EVERY 4 TO 6 HOURS     • amLODIPine (NORVASC) 10 MG tablet Take 10 mg by mouth daily.     • anastrozole (ARIMIDEX) 1 MG tablet Take 1 tablet by mouth Daily. 30 tablet 11   • aspirin 81 MG EC tablet Take 81 mg by mouth daily.     • azaTHIOprine (IMURAN) 50 MG tablet Take 2 tablets by mouth once daily 180 tablet 1   • Budesonide (ENTOCORT EC) 3 MG 24 hr capsule Take 3 capsules by mouth Every Morning. 90 capsule 5   • Cholecalciferol (VITAMIN D-3 PO) Take 1 tablet by mouth daily.     • Durezol 0.05 % ophthalmic emulsion INSTILL 1 DROP INTO EACH EYE 4 TIMES DAILY     • fluticasone (FLONASE) 50 MCG/ACT nasal spray 1 spray into the nostril(s) as directed by provider As Needed.     • glucose blood test strip True Metrix Glucose Test Strip   Take 1 strip every day by miscell. route.     • ibuprofen (ADVIL,MOTRIN) 400 MG tablet Take 1 tablet by mouth Every 6 (Six) Hours As Needed for Mild Pain , Moderate Pain , Fever or Headache. 50 tablet 0   • levothyroxine (SYNTHROID, LEVOTHROID) 75 MCG tablet      • loratadine (CLARITIN) 10 MG tablet Take 10 mg by mouth Daily.     • metFORMIN ER (GLUCOPHAGE-XR) 500 MG 24 hr tablet Take 500 mg by mouth Daily.     • methocarbamol (ROBAXIN) 500 MG tablet TAKE 2 TABLETS BY MOUTH THREE TIMES DAILY AS NEEDED FOR 7 DAYS     • metoprolol tartrate (LOPRESSOR) 50 MG tablet Take 50 mg by mouth 2 (Two) Times a Day.     • montelukast (SINGULAIR) 10 MG tablet Take 10 mg by mouth daily.     • olmesartan (BENICAR) 40 MG tablet Take 40 mg by mouth Daily.     • ondansetron (ZOFRAN) 8 MG tablet Take 1 tablet by mouth 3 (Three) Times a Day As Needed for Nausea or Vomiting. 30 tablet 5   • Palbociclib 125 MG  tablet Take 1 tablet by mouth Daily. Take with food on days 1-21, then off for 7 days. 21 tablet 3   • polycarbophil (calcium polycarbophil) 625 MG tablet tablet Take 625 mg by mouth Daily. Takes two daily     • zolpidem (AMBIEN) 5 MG tablet Take 1 tablet by mouth At Night As Needed for Sleep. 30 tablet 5     No current facility-administered medications on file prior to visit.          When did you start taking these medications:     Which medication are you concerned about: SYLVIE    Who prescribed you this medication: ELIAS    What are your concerns: PT SEEKING FINANCIAL ASSISTANCE. DIRECTED BY THE ASSISTANCE PROGRAMS THAT SHE WOULD NEED TO GO THROUGH HER PROVIDER. PT LAST DOSE IS Friday.    How long have you had these concerns:

## 2021-11-11 ENCOUNTER — TELEPHONE (OUTPATIENT)
Dept: ONCOLOGY | Facility: CLINIC | Age: 76
End: 2021-11-11

## 2021-11-11 DIAGNOSIS — C50.911 INVASIVE DUCTAL CARCINOMA OF RIGHT BREAST (HCC): ICD-10-CM

## 2021-11-11 DIAGNOSIS — G47.00 INSOMNIA, UNSPECIFIED TYPE: ICD-10-CM

## 2021-11-11 DIAGNOSIS — C79.51 BONE METASTASES: Primary | ICD-10-CM

## 2021-11-11 RX ORDER — ZOLPIDEM TARTRATE 10 MG/1
10 TABLET ORAL NIGHTLY PRN
Qty: 30 TABLET | Refills: 1 | Status: SHIPPED | OUTPATIENT
Start: 2021-11-11

## 2021-11-11 NOTE — TELEPHONE ENCOUNTER
I talked with Dr Prasad and she said the patient can try zolpidem 10mg nightly.  New script sent to patient pharmacy, I called patient back but got her voicemail.  I left her a detailed msg.

## 2021-11-11 NOTE — TELEPHONE ENCOUNTER
Provider: HOA LAMAS    Caller: STEFANY    Relationship to Patient: SELF    Phone Number: [phone]    Reason for Call: PT CALLED TO INQUIRE IF SHE CAN INCREASE DOSAGE OF SLEEPING PILL ZOLPRIDEM 5MG FROM 1 PILL TO 2. PT IS HAVING TROUBLE SLEEPING. PLEASE CALL PT BACK TO ADVISE

## 2021-11-23 ENCOUNTER — DOCUMENTATION (OUTPATIENT)
Dept: GENETICS | Facility: HOSPITAL | Age: 76
End: 2021-11-23

## 2021-11-29 ENCOUNTER — TELEPHONE (OUTPATIENT)
Dept: ONCOLOGY | Facility: CLINIC | Age: 76
End: 2021-11-29

## 2021-11-29 NOTE — TELEPHONE ENCOUNTER
Caller: Radha John    Relationship: Self    Best call back number: [phone]    What is the best time to reach you: ASAP    Who are you requesting to speak with (clinical staff, provider,  specific staff member):     Do you know the name of the person who called:     What was the call regarding: RESCHEDULING 12/03    Do you require a callback:YES

## 2021-12-03 ENCOUNTER — OFFICE VISIT (OUTPATIENT)
Dept: ONCOLOGY | Facility: CLINIC | Age: 76
End: 2021-12-03

## 2021-12-03 ENCOUNTER — LAB (OUTPATIENT)
Dept: LAB | Facility: HOSPITAL | Age: 76
End: 2021-12-03

## 2021-12-03 VITALS
OXYGEN SATURATION: 98 % | RESPIRATION RATE: 16 BRPM | DIASTOLIC BLOOD PRESSURE: 81 MMHG | SYSTOLIC BLOOD PRESSURE: 164 MMHG | BODY MASS INDEX: 32.31 KG/M2 | TEMPERATURE: 98.4 F | HEIGHT: 60 IN | WEIGHT: 164.6 LBS | HEART RATE: 65 BPM

## 2021-12-03 DIAGNOSIS — C79.51 BONE METASTASES: ICD-10-CM

## 2021-12-03 DIAGNOSIS — C50.912 INVASIVE DUCTAL CARCINOMA OF LEFT BREAST (HCC): ICD-10-CM

## 2021-12-03 DIAGNOSIS — C79.51 BONE METASTASES: Primary | ICD-10-CM

## 2021-12-03 DIAGNOSIS — C50.911 INVASIVE DUCTAL CARCINOMA OF RIGHT BREAST (HCC): ICD-10-CM

## 2021-12-03 LAB
ALBUMIN SERPL-MCNC: 3.8 G/DL (ref 3.5–5.2)
ALBUMIN/GLOB SERPL: 1.4 G/DL
ALP SERPL-CCNC: 117 U/L (ref 39–117)
ALT SERPL W P-5'-P-CCNC: 12 U/L (ref 1–33)
ANION GAP SERPL CALCULATED.3IONS-SCNC: 9 MMOL/L (ref 5–15)
AST SERPL-CCNC: 13 U/L (ref 1–32)
BILIRUB SERPL-MCNC: 0.5 MG/DL (ref 0–1.2)
BUN SERPL-MCNC: 21 MG/DL (ref 8–23)
BUN/CREAT SERPL: 19.6 (ref 7–25)
CALCIUM SPEC-SCNC: 8.8 MG/DL (ref 8.6–10.5)
CHLORIDE SERPL-SCNC: 105 MMOL/L (ref 98–107)
CO2 SERPL-SCNC: 28 MMOL/L (ref 22–29)
CREAT SERPL-MCNC: 1.07 MG/DL (ref 0.57–1)
ERYTHROCYTE [DISTWIDTH] IN BLOOD BY AUTOMATED COUNT: 23.8 % (ref 12.3–15.4)
GFR SERPL CREATININE-BSD FRML MDRD: 60 ML/MIN/1.73
GLOBULIN UR ELPH-MCNC: 2.7 GM/DL
GLUCOSE SERPL-MCNC: 100 MG/DL (ref 65–99)
HCT VFR BLD AUTO: 41.2 % (ref 34–46.6)
HGB BLD-MCNC: 13.2 G/DL (ref 12–15.9)
LYMPHOCYTES # BLD AUTO: 0.9 10*3/MM3 (ref 0.7–3.1)
LYMPHOCYTES NFR BLD AUTO: 29.1 % (ref 19.6–45.3)
MCH RBC QN AUTO: 33 PG (ref 26.6–33)
MCHC RBC AUTO-ENTMCNC: 32.2 G/DL (ref 31.5–35.7)
MCV RBC AUTO: 102.6 FL (ref 79–97)
MONOCYTES # BLD AUTO: 0.1 10*3/MM3 (ref 0.1–0.9)
MONOCYTES NFR BLD AUTO: 3.5 % (ref 5–12)
NEUTROPHILS NFR BLD AUTO: 2.2 10*3/MM3 (ref 1.7–7)
NEUTROPHILS NFR BLD AUTO: 67.4 % (ref 42.7–76)
PLATELET # BLD AUTO: 282 10*3/MM3 (ref 140–450)
PMV BLD AUTO: 5.7 FL (ref 6–12)
POTASSIUM SERPL-SCNC: 5.1 MMOL/L (ref 3.5–5.2)
PROT SERPL-MCNC: 6.5 G/DL (ref 6–8.5)
RBC # BLD AUTO: 4.01 10*6/MM3 (ref 3.77–5.28)
SODIUM SERPL-SCNC: 142 MMOL/L (ref 136–145)
WBC NRBC COR # BLD: 3.2 10*3/MM3 (ref 3.4–10.8)

## 2021-12-03 PROCEDURE — 80053 COMPREHEN METABOLIC PANEL: CPT

## 2021-12-03 PROCEDURE — 82248 BILIRUBIN DIRECT: CPT | Performed by: INTERNAL MEDICINE

## 2021-12-03 PROCEDURE — 85025 COMPLETE CBC W/AUTO DIFF WBC: CPT

## 2021-12-03 PROCEDURE — 99215 OFFICE O/P EST HI 40 MIN: CPT | Performed by: NURSE PRACTITIONER

## 2021-12-03 RX ORDER — SODIUM CHLORIDE 9 MG/ML
250 INJECTION, SOLUTION INTRAVENOUS ONCE
Status: CANCELLED | OUTPATIENT
Start: 2022-01-05

## 2021-12-03 NOTE — PROGRESS NOTES
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  No changes to therapy indicated

## 2021-12-03 NOTE — PROGRESS NOTES
PROBLEM LIST:  1. Left-sided pT1bN0 ([place]), low-grade invasive ductal carcinoma diagnosed 05/01/2014. She presented with left-sided nipple discharge and an abnormality on mammogram in 04/2014. Biopsy showed invasive ductal cancer low-grade,estrogen receptor and progesterone receptor positive, HER-2 negative. Sheunderwent a left needle localization lumpectomy on 06/27/2014. Final tumor size  is 1.4 cm  a) Oncotype recurrence score was 0 which is associated with a 3% risk of 10 year recurrence.  b) Adjuvant Arimidex was started after the completion of radiotherapy in 08/2014.  c) Arimidex was discontinued in 11/2014 due to severe hot flashes. Arimidex resumed in 07/2015.  Completed 5 years of treatment.  D) recurrent left breast IDC.  Left breast lumpectomy on 9/1/21 showed 5 mm tumor, completely removed by biopsy needle, intermediate grade.  ER+ NH+ Her2 negative (pT1bNx).  Bone scan showed widespread metastatic bone disease.  CT chest abdomen and pelvis on 10/20/2021 showed extensive sclerotic changes in the sternum and thoracic spine, but no involvement of the organs.  E) started Ibrance and letrozole 10/22/2021.   2. 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 Diagnosed on biopsy on 05/22/2014. She underwent a lumpectomy on 06/27/2014 with final tumor size being 2 mm. It was ER/NH positive with HER-2 not performed.   3. Hypothyroidism.   4. Diabetes.   5. Hypertension.   6. Hyperlipidemia.   7. Autoimmune hepatitis.  8. Osteopenia, received zometa x 4 doses, completed February 2018.  9. Erythrocytosis    Subjective     CHIEF COMPLAINT: breast cancer    HISTORY OF PRESENT ILLNESS:   Radha John returns for follow-up.  She is on her second cycle of Ibrance and letrozole.  She starts week 3 of Ibrance tomorrow.  She is tolerating the medications well with no new complaints.  She has no leg pain.  She is maintaining her usual daily activities.  She does have difficulty with  "insomnia.  She is taking Ambien 10 mg nightly as needed insomnia which helps some.  She complains of feeling her heartbeat sometimes at nighttime.  She denies any chest pain, palpitations or irregular heart rhythm.  No shortness of air or cough.        Objective      /81   Pulse 65   Temp 98.4 °F (36.9 °C) (Temporal)   Resp 16   Ht 152.4 cm (60\")   Wt 74.7 kg (164 lb 9.6 oz)   SpO2 98%   BMI 32.15 kg/m²    Vitals:    12/03/21 1256   PainSc: 0-No pain             Performance Status:0  ECOG score: 0             General: well appearing female in no acute distress  Neuro: alert and oriented  HEENT: sclera anicteric, oropharynx clear  Cardiovascular: Regular rate and rhythm  Lungs: Clear to auscultation bilaterally  Extremeties: no lower extremity edema  Skin: no rashes, lesions, bruising, or petechiae  Psych: mood and affect appropriate        RECENT LABS:  Lab Results   Component Value Date    WBC 3.20 (L) 12/03/2021    HGB 13.2 12/03/2021    HCT 41.2 12/03/2021    .6 (H) 12/03/2021     12/03/2021       Lab Results   Component Value Date    GLUCOSE 100 (H) 12/03/2021    BUN 21 12/03/2021    CREATININE 1.07 (H) 12/03/2021    EGFRIFAFRI 60 (L) 12/03/2021    BCR 19.6 12/03/2021    K 5.1 12/03/2021    CO2 28.0 12/03/2021    CALCIUM 8.8 12/03/2021    PROTENTOTREF 6.8 03/22/2021    ALBUMIN 3.80 12/03/2021    AST 13 12/03/2021    ALT 12 12/03/2021                 Assessment/Plan   Radha John is a 76 y.o. female with a history of bilateral stage I ER+ Her2 negative breast cancer, with a more recent recurrent 5 mm ER+ IDC in the left breast, s/p repeat lumpectomy, and imaging showing widespread metastatic bone involvement.    She is on cycle 2 of Ibrance and letrozole and is tolerating it well.  She will start week 3 of letrozole tomorrow.  Her CBC and CMP were reviewed today her labs look good.  We will plan on repeat imaging of bone scan, CT of chest, abdomen and pelvis prior to return in " January.    Bone metastases: Continue Zometa every 3 months. Next dose due 1/05/2022    Cancer related pain: Significantly improved.  She denies any pain currently.    Follow-up in 5 weeks with scans and labs prior to return.              I spent 48 minutes caring for Radha on this date of service. This time includes time spent by me in the following activities: preparing for the visit, reviewing tests, obtaining and/or reviewing a separately obtained history, performing a medically appropriate examination and/or evaluation, counseling and educating the patient/family/caregiver, ordering medications, tests, or procedures and documenting information in the medical record      Rhoda Llamas APRN  [facility]    12/3/2021          CC:

## 2021-12-06 ENCOUNTER — SPECIALTY PHARMACY (OUTPATIENT)
Dept: ONCOLOGY | Facility: HOSPITAL | Age: 76
End: 2021-12-06

## 2021-12-27 ENCOUNTER — HOSPITAL ENCOUNTER (OUTPATIENT)
Dept: BONE DENSITY | Facility: HOSPITAL | Age: 76
Discharge: HOME | End: 2021-12-27
Admitting: INTERNAL MEDICINE

## 2021-12-27 DIAGNOSIS — C50.911 INVASIVE DUCTAL CARCINOMA OF RIGHT BREAST (HCC): ICD-10-CM

## 2021-12-27 DIAGNOSIS — M81.0 AGE-RELATED OSTEOPOROSIS WITHOUT CURRENT PATHOLOGICAL FRACTURE: ICD-10-CM

## 2021-12-27 PROCEDURE — 77080 DXA BONE DENSITY AXIAL: CPT

## 2022-01-03 ENCOUNTER — HOSPITAL ENCOUNTER (OUTPATIENT)
Dept: NUCLEAR MEDICINE | Facility: HOSPITAL | Age: 77
Discharge: HOME | End: 2022-01-03

## 2022-01-03 ENCOUNTER — LAB (OUTPATIENT)
Dept: LAB | Facility: HOSPITAL | Age: 77
End: 2022-01-03

## 2022-01-03 ENCOUNTER — HOSPITAL ENCOUNTER (OUTPATIENT)
Dept: CT IMAGING | Facility: HOSPITAL | Age: 77
Discharge: HOME | End: 2022-01-03

## 2022-01-03 ENCOUNTER — TELEPHONE (OUTPATIENT)
Dept: ONCOLOGY | Facility: CLINIC | Age: 77
End: 2022-01-03

## 2022-01-03 DIAGNOSIS — C50.912 INVASIVE DUCTAL CARCINOMA OF LEFT BREAST: ICD-10-CM

## 2022-01-03 DIAGNOSIS — C50.911 INVASIVE DUCTAL CARCINOMA OF RIGHT BREAST: ICD-10-CM

## 2022-01-03 DIAGNOSIS — C79.51 BONE METASTASES: ICD-10-CM

## 2022-01-03 LAB
ALBUMIN SERPL-MCNC: 4.3 G/DL (ref 3.5–5.2)
ALBUMIN/GLOB SERPL: 1.5 G/DL
ALP SERPL-CCNC: 59 U/L (ref 39–117)
ALT SERPL W P-5'-P-CCNC: 12 U/L (ref 1–33)
ANION GAP SERPL CALCULATED.3IONS-SCNC: 9.3 MMOL/L (ref 5–15)
AST SERPL-CCNC: 9 U/L (ref 1–32)
BILIRUB SERPL-MCNC: 0.5 MG/DL (ref 0–1.2)
BUN SERPL-MCNC: 16 MG/DL (ref 8–23)
BUN/CREAT SERPL: 18.2 (ref 7–25)
CALCIUM SPEC-SCNC: 8.5 MG/DL (ref 8.6–10.5)
CANCER AG15-3 SERPL-ACNC: 269.6 U/ML
CHLORIDE SERPL-SCNC: 101 MMOL/L (ref 98–107)
CO2 SERPL-SCNC: 31.7 MMOL/L (ref 22–29)
CREAT SERPL-MCNC: 0.88 MG/DL (ref 0.57–1)
DEPRECATED RDW RBC AUTO: 83.6 FL (ref 37–54)
EOSINOPHIL # BLD MANUAL: 0.06 10*3/MM3 (ref 0–0.4)
EOSINOPHIL NFR BLD MANUAL: 2 % (ref 0.3–6.2)
ERYTHROCYTE [DISTWIDTH] IN BLOOD BY AUTOMATED COUNT: 23.5 % (ref 12.3–15.4)
GFR SERPL CREATININE-BSD FRML MDRD: 76 ML/MIN/1.73
GLOBULIN UR ELPH-MCNC: 2.8 GM/DL
GLUCOSE SERPL-MCNC: 85 MG/DL (ref 65–99)
HCT VFR BLD AUTO: 39.2 % (ref 34–46.6)
HGB BLD-MCNC: 13.7 G/DL (ref 12–15.9)
LYMPHOCYTES # BLD MANUAL: 0.63 10*3/MM3 (ref 0.7–3.1)
LYMPHOCYTES NFR BLD MANUAL: 6 % (ref 5–12)
MAGNESIUM SERPL-MCNC: 1.9 MG/DL (ref 1.6–2.4)
MCH RBC QN AUTO: 35.7 PG (ref 26.6–33)
MCHC RBC AUTO-ENTMCNC: 34.9 G/DL (ref 31.5–35.7)
MCV RBC AUTO: 102.1 FL (ref 79–97)
MONOCYTES # BLD: 0.19 10*3/MM3 (ref 0.1–0.9)
NEUTROPHILS # BLD AUTO: 2.28 10*3/MM3 (ref 1.7–7)
NEUTROPHILS NFR BLD MANUAL: 72 % (ref 42.7–76)
PHOSPHATE SERPL-MCNC: 3.3 MG/DL (ref 2.5–4.5)
PLAT MORPH BLD: NORMAL
PLATELET # BLD AUTO: 201 10*3/MM3 (ref 140–450)
PMV BLD AUTO: 10.1 FL (ref 6–12)
POTASSIUM SERPL-SCNC: 3.7 MMOL/L (ref 3.5–5.2)
PROT SERPL-MCNC: 7.1 G/DL (ref 6–8.5)
RBC # BLD AUTO: 3.84 10*6/MM3 (ref 3.77–5.28)
RBC MORPH BLD: NORMAL
SODIUM SERPL-SCNC: 142 MMOL/L (ref 136–145)
VARIANT LYMPHS NFR BLD MANUAL: 20 % (ref 19.6–45.3)
WBC MORPH BLD: NORMAL
WBC NRBC COR # BLD: 3.16 10*3/MM3 (ref 3.4–10.8)

## 2022-01-03 PROCEDURE — A9503 TC99M MEDRONATE: HCPCS | Performed by: NURSE PRACTITIONER

## 2022-01-03 PROCEDURE — 25010000002 IOPAMIDOL 61 % SOLUTION: Performed by: NURSE PRACTITIONER

## 2022-01-03 PROCEDURE — 85025 COMPLETE CBC W/AUTO DIFF WBC: CPT

## 2022-01-03 PROCEDURE — 80053 COMPREHEN METABOLIC PANEL: CPT

## 2022-01-03 PROCEDURE — 85007 BL SMEAR W/DIFF WBC COUNT: CPT

## 2022-01-03 PROCEDURE — 86300 IMMUNOASSAY TUMOR CA 15-3: CPT

## 2022-01-03 PROCEDURE — 0 TECHNETIUM MEDRONATE KIT: Performed by: NURSE PRACTITIONER

## 2022-01-03 PROCEDURE — 71260 CT THORAX DX C+: CPT

## 2022-01-03 PROCEDURE — 74177 CT ABD & PELVIS W/CONTRAST: CPT

## 2022-01-03 PROCEDURE — 83735 ASSAY OF MAGNESIUM: CPT

## 2022-01-03 PROCEDURE — 36415 COLL VENOUS BLD VENIPUNCTURE: CPT

## 2022-01-03 PROCEDURE — 78306 BONE IMAGING WHOLE BODY: CPT

## 2022-01-03 PROCEDURE — 84100 ASSAY OF PHOSPHORUS: CPT

## 2022-01-03 RX ORDER — TC 99M MEDRONATE 20 MG/10ML
26 INJECTION, POWDER, LYOPHILIZED, FOR SOLUTION INTRAVENOUS
Status: COMPLETED | OUTPATIENT
Start: 2022-01-03 | End: 2022-01-03

## 2022-01-03 RX ADMIN — IOPAMIDOL 100 ML: 612 INJECTION, SOLUTION INTRAVENOUS at 10:19

## 2022-01-03 RX ADMIN — Medication 26 MILLICURIE: at 09:35

## 2022-01-03 NOTE — TELEPHONE ENCOUNTER
Caller: Radha John    Relationship to patient: Self    Best call back number: [phone] MAY CALL BACK ANYTIME AND LEAVE VM IF NEEDED.    Type of visit: 01/05/2022 FOLLOW UP AND INFUSION     Requested date: 01/05/2022     If rescheduling, when is the original appointment: 01/05/2022     Additional notes:PATIENT HAS AN FOLLOW UP APPT AND INFUSION ON 01/05/2022 AND WOULD LIKE TO CHANGE THE START TIME TO EARLIEST AS POSSIBLE.  IF APPLICABLE PLEASE CHANGE PATIENTS START TIME FOR APPTS ON 01/05/2022 TO THE EARLIEST POSSIBLE.  PATIENT PREFERS TO BE SEEN EARLIEST AS POSSIBLE.  ONCE COMPLETED PLEASE CONTACT PATIENT NOTIFYING OF CHANGE.         [facility]

## 2022-01-04 LAB — CANCER AG27-29 SERPL-ACNC: 263.6 U/ML (ref 0–38.6)

## 2022-01-05 ENCOUNTER — HOSPITAL ENCOUNTER (OUTPATIENT)
Dept: ONCOLOGY | Facility: HOSPITAL | Age: 77
Setting detail: INFUSION SERIES
Discharge: HOME | End: 2022-01-05

## 2022-01-05 ENCOUNTER — OFFICE VISIT (OUTPATIENT)
Dept: ONCOLOGY | Facility: CLINIC | Age: 77
End: 2022-01-05

## 2022-01-05 VITALS
HEART RATE: 72 BPM | BODY MASS INDEX: 32.79 KG/M2 | RESPIRATION RATE: 18 BRPM | OXYGEN SATURATION: 95 % | HEIGHT: 60 IN | TEMPERATURE: 97.3 F | DIASTOLIC BLOOD PRESSURE: 82 MMHG | SYSTOLIC BLOOD PRESSURE: 157 MMHG | WEIGHT: 167 LBS

## 2022-01-05 DIAGNOSIS — C79.51 BONE METASTASES: Primary | ICD-10-CM

## 2022-01-05 DIAGNOSIS — C50.912 INVASIVE DUCTAL CARCINOMA OF LEFT BREAST: ICD-10-CM

## 2022-01-05 DIAGNOSIS — C50.911 INVASIVE DUCTAL CARCINOMA OF RIGHT BREAST: ICD-10-CM

## 2022-01-05 PROCEDURE — 96374 THER/PROPH/DIAG INJ IV PUSH: CPT

## 2022-01-05 PROCEDURE — 96375 TX/PRO/DX INJ NEW DRUG ADDON: CPT

## 2022-01-05 PROCEDURE — 25010000002 ZOLEDRONIC ACID PER 1 MG: Performed by: NURSE PRACTITIONER

## 2022-01-05 PROCEDURE — 99214 OFFICE O/P EST MOD 30 MIN: CPT | Performed by: INTERNAL MEDICINE

## 2022-01-05 RX ORDER — PREDNISOLONE ACETATE 10 MG/ML
SUSPENSION/ DROPS OPHTHALMIC
COMMUNITY
Start: 2021-12-27

## 2022-01-05 RX ORDER — SODIUM CHLORIDE 9 MG/ML
250 INJECTION, SOLUTION INTRAVENOUS ONCE
Status: CANCELLED | OUTPATIENT
Start: 2022-03-30

## 2022-01-05 RX ADMIN — ZOLEDRONIC ACID 4 MG: 4 INJECTION, SOLUTION, CONCENTRATE INTRAVENOUS at 12:37

## 2022-01-05 NOTE — PROGRESS NOTES
PROBLEM LIST:  1. Left-sided pT1bN0 ([place]), low-grade invasive ductal carcinoma diagnosed 05/01/2014. She presented with left-sided nipple discharge and an abnormality on mammogram in 04/2014. Biopsy showed invasive ductal cancer low-grade,estrogen receptor and progesterone receptor positive, HER-2 negative. Sheunderwent a left needle localization lumpectomy on 06/27/2014. Final tumor size  is 1.4 cm  a) Oncotype recurrence score was 0 which is associated with a 3% risk of 10 year recurrence.  b) Adjuvant Arimidex was started after the completion of radiotherapy in 08/2014.  c) Arimidex was discontinued in 11/2014 due to severe hot flashes. Arimidex resumed in 07/2015.  Completed 5 years of treatment.  D) recurrent left breast IDC.  Left breast lumpectomy on 9/1/21 showed 5 mm tumor, completely removed by biopsy needle, intermediate grade.  ER+ TN+ Her2 negative (pT1bNx).  Bone scan showed widespread metastatic bone disease.  CT chest abdomen and pelvis on 10/20/2021 showed extensive sclerotic changes in the sternum and thoracic spine, but no involvement of the organs.  E) started Ibrance and letrozole 10/22/2021.   2. 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 Diagnosed on biopsy on 05/22/2014. She underwent a lumpectomy on 06/27/2014 with final tumor size being 2 mm. It was ER/TN positive with HER-2 not performed.   3. Hypothyroidism.   4. Diabetes.   5. Hypertension.   6. Hyperlipidemia.   7. Autoimmune hepatitis.  8. Osteopenia, received zometa x 4 doses, completed February 2018.  9. Erythrocytosis    Subjective     CHIEF COMPLAINT: breast cancer    HISTORY OF PRESENT ILLNESS:   Radha John returns for follow-up.  She says she is doing well.  She is tolerating her medicine without difficulty.  She occasionally has some numbness on the back of her legs, but otherwise no significant bone pain.        Objective      /82   Pulse 72   Temp 97.3 °F (36.3 °C)   Resp  "18   Ht 152.4 cm (60\")   Wt 75.8 kg (167 lb)   SpO2 95%   BMI 32.61 kg/m²    Vitals:    01/05/22 1119   PainSc: 0-No pain             Performance Status:  ECOG score: 1             General: well appearing female in no acute distress  Neuro: alert and oriented  HEENT: sclera anicteric, oropharynx clear  Cardiovascular: Regular rate and rhythm  Lungs: Clear to auscultation bilaterally  Extremeties: no lower extremity edema  Skin: no rashes, lesions, bruising, or petechiae  Psych: mood and affect appropriate        RECENT LABS:  Lab Results   Component Value Date    WBC 3.16 (L) 01/03/2022    HGB 13.7 01/03/2022    HCT 39.2 01/03/2022    .1 (H) 01/03/2022     01/03/2022       Lab Results   Component Value Date    GLUCOSE 85 01/03/2022    BUN 16 01/03/2022    CREATININE 0.88 01/03/2022    EGFRIFAFRI 76 01/03/2022    BCR 18.2 01/03/2022    K 3.7 01/03/2022    CO2 31.7 (H) 01/03/2022    CALCIUM 8.5 (L) 01/03/2022    PROTENTOTREF 6.8 03/22/2021    ALBUMIN 4.30 01/03/2022    AST 9 01/03/2022    ALT 12 01/03/2022         CT Chest With Contrast Diagnostic, CT Abdomen Pelvis With Contrast  Narrative: EXAMINATION: CT CHEST W CONTRAST DIAGNOSTIC-, CT ABDOMEN PELVIS W  CONTRAST- 01/03/2022     INDICATION: Breast cancer with mets; C79.51-Secondary malignant neoplasm  of bone; C50.911-Malignant neoplasm of unspecified site of right female  breast      TECHNIQUE: CT chest, abdomen and pelvis with intravenous contrast  administration     The radiation dose reduction device was turned on for each scan per the  ALARA (As Low as Reasonably Achievable) protocol.     COMPARISON: CT 10/20/2021     FINDINGS:      CHEST: Thyroid surgically absent status post thyroidectomy. No bulky  median sternotomy adenopathy. Central airways are patent. Esophagus in  normal course and caliber. Patent nonaneurysmal thoracic aorta with  patent great vessel origins. Central pulmonary arteries are grossly  patent. Cardiac size within normal " limits and without pericardial  effusion. Extended lung windows with mild chronic changes including  healed granulomatous involvement with calcified granuloma left lung apex  however no noncalcified nodule or mass. No consolidation or pleural  effusion. Degenerative changes of the thoracic spine with multifocal  sclerotic areas throughout the thoracic spine, sternum and ribs of  diffuse osseous metastasis without pathologic fracture. Chest wall  findings reveals calcifications in the bilateral breast soft tissues as  seen on prior without bulky adenopathy or soft tissue mass separate. No  interval change.     ABDOMEN AND PELVIS: Liver demonstrates diffuse low-attenuation of mild  hepatic steatosis. Calcifications however no focal underlying mass  lesion or development of lesion. Gallbladder unremarkable. No biliary  dilatation. Pancreas and spleen unremarkable. Adrenals without distinct  nodule. Kidneys demonstrate simple appearing bilateral renal cortical  cysts. No bulky retroperitoneal adenopathy. Patent nonaneurysmal  abdominal aorta. Portal veins and IVC patent. GI tract evaluation  without focal thickening or disproportionate dilatation of bowel.  Appendix visualized and unremarkable. No free fluid or intra-abdominal  free air. Pelvic viscera demonstrates calcification left hemipelvis  likely calcified atrophic fibroid uterus. No bulky pelvic adenopathy or  free fluid. Degenerative changes throughout the lumbar spine and pelvis  with diffuse sclerotic metastasis however no evidence for pathologic  fracture.     Impression: Stable appearance of the chest, abdomen and pelvis with  diffuse osseous metastasis however no evidence for pathologic fracture.  Calcifications of the bilateral breasts without evidence for interval  change or soft tissue metastatic progression otherwise noted. No acute  pathology.     D:  01/03/2022  E:  01/03/2022        This report was finalized on 1/3/2022 2:12 PM by Dr. Sourav Crowder.      NM Bone Scan Whole Body  Narrative: EXAMINATION: NM BONE SCAN, WHOLE BODY-01/03/2022:     INDICATION: Breast cancer with mets; C79.51-Secondary malignant neoplasm  of bone; C50.911-Malignant neoplasm of unspecified site of right female  breast, history of breast cancer.     TECHNIQUE: 26 mCi of Technetium 99 MDP was injected intravenously. Whole  body imaging was obtained 4 hours later in the anterior and posterior  projection. There is spot imaging obtained of the skull in the lateral  projection, ribs in the oblique projection and pelvis in the oblique  projection.     COMPARISON: NONE.     FINDINGS: Abnormal uptake of tracer seen throughout the calvarium.  Abnormal uptake of tracer seen within the sternum, ribs, spine, and  proximal femurs as well as within the pelvis. The findings represent  diffuse widespread bony metastatic disease.     Impression: Widespread bony metastatic disease involving the skull,  sternum, ribs, spine, pelvis, and femurs.      D:  01/03/2022  E:  01/03/2022          I personally reviewed the imaging studies.  Bone scan appears similar to outside prior scan without new sites of disease.        Assessment/Plan   Radha John is a 76 y.o. female with a history of bilateral stage I ER+ Her2 negative breast cancer, with a more recent recurrent 5 mm ER+ IDC in the left breast, s/p repeat lumpectomy, and imaging showing widespread metastatic bone involvement.    Recent imaging studies show no evidence of progression.  Plan to repeat imaging in 3-6 months; sooner if she has a change in symptoms.    Bone metastases: Continue Zometa every 3 months. Next dose due today.    Cancer related pain: Significantly improved.      Follow-up in 6 weeks.                Sarah Prasad MD  [facility]    1/5/2022          CC:

## 2022-01-06 ENCOUNTER — SPECIALTY PHARMACY (OUTPATIENT)
Dept: ONCOLOGY | Facility: HOSPITAL | Age: 77
End: 2022-01-06

## 2022-01-12 DIAGNOSIS — C50.911 INVASIVE DUCTAL CARCINOMA OF RIGHT BREAST: ICD-10-CM

## 2022-01-12 DIAGNOSIS — C79.51 BONE METASTASES: ICD-10-CM

## 2022-01-14 ENCOUNTER — APPOINTMENT (OUTPATIENT)
Dept: ONCOLOGY | Facility: HOSPITAL | Age: 77
End: 2022-01-14

## 2022-01-25 RX ORDER — AZATHIOPRINE 50 MG/1
TABLET ORAL
Qty: 180 TABLET | Refills: 0 | Status: SHIPPED | OUTPATIENT
Start: 2022-01-25 | End: 2022-04-20

## 2022-01-25 NOTE — TELEPHONE ENCOUNTER
Rx Refill Note  Requested Prescriptions     Pending Prescriptions Disp Refills   • azaTHIOprine (IMURAN) 50 MG tablet [Pharmacy Med Name: azaTHIOprine 50 MG Oral Tablet] 180 tablet 0     Sig: Take 2 tablets by mouth once daily      Last office visit with prescribing clinician: 3/26/2021      Next office visit with prescribing clinician: Visit date not found            Telma Villarreal MA  01/25/22, 11:23 EST

## 2022-01-28 ENCOUNTER — TRANSCRIBE ORDERS (OUTPATIENT)
Dept: ADMINISTRATIVE | Facility: HOSPITAL | Age: 77
End: 2022-01-28

## 2022-01-28 DIAGNOSIS — R92.8 ABNORMAL MAMMOGRAM: Primary | ICD-10-CM

## 2022-02-15 ENCOUNTER — LAB (OUTPATIENT)
Dept: LAB | Facility: HOSPITAL | Age: 77
End: 2022-02-15

## 2022-02-15 ENCOUNTER — OFFICE VISIT (OUTPATIENT)
Dept: ONCOLOGY | Facility: CLINIC | Age: 77
End: 2022-02-15

## 2022-02-15 VITALS
DIASTOLIC BLOOD PRESSURE: 81 MMHG | SYSTOLIC BLOOD PRESSURE: 150 MMHG | TEMPERATURE: 97.7 F | HEART RATE: 55 BPM | BODY MASS INDEX: 33.1 KG/M2 | HEIGHT: 60 IN | OXYGEN SATURATION: 98 % | RESPIRATION RATE: 16 BRPM | WEIGHT: 168.6 LBS

## 2022-02-15 DIAGNOSIS — C50.911 INVASIVE DUCTAL CARCINOMA OF RIGHT BREAST: ICD-10-CM

## 2022-02-15 DIAGNOSIS — C79.51 BONE METASTASES: Primary | ICD-10-CM

## 2022-02-15 DIAGNOSIS — C79.51 BONE METASTASES: ICD-10-CM

## 2022-02-15 LAB
ALBUMIN SERPL-MCNC: 3.7 G/DL (ref 3.5–5.2)
ALBUMIN/GLOB SERPL: 1.4 G/DL
ALP SERPL-CCNC: 41 U/L (ref 39–117)
ALT SERPL W P-5'-P-CCNC: 11 U/L (ref 1–33)
ANION GAP SERPL CALCULATED.3IONS-SCNC: 8 MMOL/L (ref 5–15)
AST SERPL-CCNC: 11 U/L (ref 1–32)
BILIRUB SERPL-MCNC: 0.4 MG/DL (ref 0–1.2)
BUN SERPL-MCNC: 24 MG/DL (ref 8–23)
BUN/CREAT SERPL: 19.2 (ref 7–25)
CALCIUM SPEC-SCNC: 8.8 MG/DL (ref 8.6–10.5)
CHLORIDE SERPL-SCNC: 105 MMOL/L (ref 98–107)
CO2 SERPL-SCNC: 29 MMOL/L (ref 22–29)
CREAT SERPL-MCNC: 1.25 MG/DL (ref 0.57–1)
ERYTHROCYTE [DISTWIDTH] IN BLOOD BY AUTOMATED COUNT: 22.8 % (ref 12.3–15.4)
GFR SERPL CREATININE-BSD FRML MDRD: 51 ML/MIN/1.73
GLOBULIN UR ELPH-MCNC: 2.6 GM/DL
GLUCOSE SERPL-MCNC: 98 MG/DL (ref 65–99)
HCT VFR BLD AUTO: 37.3 % (ref 34–46.6)
HGB BLD-MCNC: 12.2 G/DL (ref 12–15.9)
LYMPHOCYTES # BLD AUTO: 1.2 10*3/MM3 (ref 0.7–3.1)
LYMPHOCYTES NFR BLD AUTO: 39.5 % (ref 19.6–45.3)
MCH RBC QN AUTO: 37.8 PG (ref 26.6–33)
MCHC RBC AUTO-ENTMCNC: 32.7 G/DL (ref 31.5–35.7)
MCV RBC AUTO: 115.5 FL (ref 79–97)
MONOCYTES # BLD AUTO: 0.1 10*3/MM3 (ref 0.1–0.9)
MONOCYTES NFR BLD AUTO: 3.7 % (ref 5–12)
NEUTROPHILS NFR BLD AUTO: 1.7 10*3/MM3 (ref 1.7–7)
NEUTROPHILS NFR BLD AUTO: 56.8 % (ref 42.7–76)
PLATELET # BLD AUTO: 239 10*3/MM3 (ref 140–450)
PMV BLD AUTO: 5.6 FL (ref 6–12)
POTASSIUM SERPL-SCNC: 4.2 MMOL/L (ref 3.5–5.2)
PROT SERPL-MCNC: 6.3 G/DL (ref 6–8.5)
RBC # BLD AUTO: 3.23 10*6/MM3 (ref 3.77–5.28)
SODIUM SERPL-SCNC: 142 MMOL/L (ref 136–145)
WBC NRBC COR # BLD: 3 10*3/MM3 (ref 3.4–10.8)

## 2022-02-15 PROCEDURE — 36415 COLL VENOUS BLD VENIPUNCTURE: CPT

## 2022-02-15 PROCEDURE — 85025 COMPLETE CBC W/AUTO DIFF WBC: CPT

## 2022-02-15 PROCEDURE — 80053 COMPREHEN METABOLIC PANEL: CPT

## 2022-02-15 PROCEDURE — 99214 OFFICE O/P EST MOD 30 MIN: CPT | Performed by: NURSE PRACTITIONER

## 2022-02-15 NOTE — PROGRESS NOTES
PROBLEM LIST:  1. Left-sided pT1bN0 (IA), low-grade invasive ductal carcinoma diagnosed 05/01/2014. She presented with left-sided nipple discharge and an abnormality on mammogram in 04/2014. Biopsy showed invasive ductal cancer low-grade,estrogen receptor and progesterone receptor positive, HER-2 negative. Sheunderwent a left needle localization lumpectomy on 06/27/2014. Final tumor size  is 1.4 cm  a) Oncotype recurrence score was 0 which is associated with a 3% risk of 10 year recurrence.  b) Adjuvant Arimidex was started after the completion of radiotherapy in 08/2014.  c) Arimidex was discontinued in 11/2014 due to severe hot flashes. Arimidex resumed in 07/2015.  Completed 5 years of treatment.  D) recurrent left breast IDC.  Left breast lumpectomy on 9/1/21 showed 5 mm tumor, completely removed by biopsy needle, intermediate grade.  ER+ NV+ Her2 negative (pT1bNx).  Bone scan showed widespread metastatic bone disease.  CT chest abdomen and pelvis on 10/20/2021 showed extensive sclerotic changes in the sternum and thoracic spine, but no involvement of the organs.  E) started Ibrance and letrozole 10/22/2021.   2. 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 Diagnosed on biopsy on 05/22/2014. She underwent a lumpectomy on 06/27/2014 with final tumor size being 2 mm. It was ER/NV positive with HER-2 not performed.   3. Hypothyroidism.   4. Diabetes.   5. Hypertension.   6. Hyperlipidemia.   7. Autoimmune hepatitis.  8. Osteopenia, received zometa x 4 doses, completed February 2018.  9. Erythrocytosis    Subjective     CHIEF COMPLAINT: breast cancer    HISTORY OF PRESENT ILLNESS:   Radha John returns for follow-up.  She continues on Ibrance and letrozole and is tolerated well.  Her off week of Ibrance was last week.  She no longer has numbness or pain in the back of her legs.  No bone pains at all.  No cough or shortness of air.  She did use an inhaler a few times because  "her granddaughter wore a perfume that made her short of air.  Her granddaughter has moved out and she is no longer needed the inhaler.  She is maintaining her usual daily activities.        Objective      /81   Pulse 55   Temp 97.7 °F (36.5 °C) (Temporal)   Resp 16   Ht 152.4 cm (60\")   Wt 76.5 kg (168 lb 9.6 oz)   SpO2 98%   BMI 32.93 kg/m²    Vitals:    02/15/22 0818   PainSc: 0-No pain               ECOG score: 0             General: well appearing female in no acute distress  Neuro: alert and oriented  HEENT: sclera anicteric, oropharynx clear  Cardiovascular: Regular rate and rhythm  Lungs: Clear to auscultation bilaterally  Extremeties: no lower extremity edema  Skin: no rashes, lesions, bruising, or petechiae  Psych: mood and affect appropriate        RECENT LABS:  Lab Results   Component Value Date    WBC 3.00 (L) 02/15/2022    HGB 12.2 02/15/2022    HCT 37.3 02/15/2022    .5 (H) 02/15/2022     02/15/2022       Lab Results   Component Value Date    GLUCOSE 85 01/03/2022    BUN 16 01/03/2022    CREATININE 0.88 01/03/2022    EGFRIFAFRI 76 01/03/2022    BCR 18.2 01/03/2022    K 3.7 01/03/2022    CO2 31.7 (H) 01/03/2022    CALCIUM 8.5 (L) 01/03/2022    PROTENTOTREF 6.8 03/22/2021    ALBUMIN 4.30 01/03/2022    AST 9 01/03/2022    ALT 12 01/03/2022         NM Bone Scan Whole Body  Narrative: EXAMINATION: NM BONE SCAN, WHOLE BODY-01/03/2022:     INDICATION: Breast cancer with mets; C79.51-Secondary malignant neoplasm  of bone; C50.911-Malignant neoplasm of unspecified site of right female  breast, history of breast cancer.     TECHNIQUE: 26 mCi of Technetium 99 MDP was injected intravenously. Whole  body imaging was obtained 4 hours later in the anterior and posterior  projection. There is spot imaging obtained of the skull in the lateral  projection, ribs in the oblique projection and pelvis in the oblique  projection.     COMPARISON: NONE.     FINDINGS: Abnormal uptake of tracer seen " throughout the calvarium.  Abnormal uptake of tracer seen within the sternum, ribs, spine, and  proximal femurs as well as within the pelvis. The findings represent  diffuse widespread bony metastatic disease.     Impression: Widespread bony metastatic disease involving the skull,  sternum, ribs, spine, pelvis, and femurs.      D:  01/03/2022  E:  01/03/2022     This report was finalized on 1/5/2022 5:07 PM by Dr. Roya Siegel MD.               Assessment/Plan   Radha John is a 76 y.o. female with a history of bilateral stage I ER+ Her2 negative breast cancer, with a more recent recurrent 5 mm ER+ IDC in the left breast, s/p repeat lumpectomy, and imaging showing widespread metastatic bone involvement.    Continue Ibrance and letrozole.  She is tolerating it well. CBC from today reviewed.  Her hemoglobin is 12.2 which is a little lower than last check but still within normal range.  Her white count was low at 3.00 but ANC is normal at 1.7.  We will plan to repeat imaging early July.  We can certainly do imaging sooner if she has a change in symptoms.  .    Bone metastasis: Continue Zometa every 3 months.  Next dose due 3/30/2022.    Cancer related pain: No current pain.    Follow-up in 6 weeks.            I spent 38 minutes caring for Radha on this date of service. This time includes time spent by me in the following activities: preparing for the visit, reviewing tests, obtaining and/or reviewing a separately obtained history, performing a medically appropriate examination and/or evaluation, counseling and educating the patient/family/caregiver and documenting information in the medical record      Rhoda Llamas APRN  [facility]    2/15/2022          CC:

## 2022-02-16 ENCOUNTER — SPECIALTY PHARMACY (OUTPATIENT)
Dept: ONCOLOGY | Facility: HOSPITAL | Age: 77
End: 2022-02-16

## 2022-02-17 ENCOUNTER — TELEPHONE (OUTPATIENT)
Dept: GASTROENTEROLOGY | Facility: CLINIC | Age: 77
End: 2022-02-17

## 2022-02-17 NOTE — TELEPHONE ENCOUNTER
Patient had labs drawn with Dr Prasad.  She wanted you to take a look at them and make sure everything looked ok.

## 2022-02-21 ENCOUNTER — TELEPHONE (OUTPATIENT)
Dept: ONCOLOGY | Facility: CLINIC | Age: 77
End: 2022-02-21

## 2022-02-21 NOTE — TELEPHONE ENCOUNTER
I called patient to let her know that I faxed the labs to Dr Moralez office.  She verbalized understanding.

## 2022-02-21 NOTE — TELEPHONE ENCOUNTER
Caller: Radha John    Relationship: Self    Best call back number: [phone]    What is the best time to reach you: ANYTIME    Who are you requesting to speak with (clinical staff, provider,  specific staff member): DR GRIFFIN OR NURSE    What was the call regarding: PT HAS AN APPT WITH HER PCP DR HUMERA WOODRUFF NEXT Monday. SHE WOULD LIKE HER LAB RESULTS FROM 2/15 FAXED OVER TO THEM SO SHE DOESN'T HAVE TO HAVE LABS DRAWN AGAIN. PLEASE FAX TO DR HUMERA WOODRUFF [phone].    Do you require a callback: YES, PLS CALL PT TO LET HER KNOW IF LAB RESULTS CAN BE SENT.

## 2022-03-04 ENCOUNTER — HOSPITAL ENCOUNTER (OUTPATIENT)
Dept: MAMMOGRAPHY | Facility: HOSPITAL | Age: 77
Discharge: HOME | End: 2022-03-04
Admitting: SURGERY

## 2022-03-04 ENCOUNTER — TRANSCRIBE ORDERS (OUTPATIENT)
Dept: MAMMOGRAPHY | Facility: HOSPITAL | Age: 77
End: 2022-03-04

## 2022-03-04 DIAGNOSIS — R92.8 ABNORMAL MAMMOGRAM: ICD-10-CM

## 2022-03-04 DIAGNOSIS — R92.8 ABNORMAL MAMMOGRAM: Primary | ICD-10-CM

## 2022-03-04 PROCEDURE — G0279 TOMOSYNTHESIS, MAMMO: HCPCS

## 2022-03-04 PROCEDURE — 77065 DX MAMMO INCL CAD UNI: CPT

## 2022-03-04 PROCEDURE — 77065 DX MAMMO INCL CAD UNI: CPT | Performed by: RADIOLOGY

## 2022-03-04 PROCEDURE — G0279 TOMOSYNTHESIS, MAMMO: HCPCS | Performed by: RADIOLOGY

## 2022-03-30 ENCOUNTER — TELEPHONE (OUTPATIENT)
Dept: ONCOLOGY | Facility: CLINIC | Age: 77
End: 2022-03-30

## 2022-03-30 ENCOUNTER — LAB (OUTPATIENT)
Dept: LAB | Facility: HOSPITAL | Age: 77
End: 2022-03-30

## 2022-03-30 ENCOUNTER — OFFICE VISIT (OUTPATIENT)
Dept: ONCOLOGY | Facility: CLINIC | Age: 77
End: 2022-03-30

## 2022-03-30 ENCOUNTER — HOSPITAL ENCOUNTER (OUTPATIENT)
Dept: ONCOLOGY | Facility: HOSPITAL | Age: 77
Setting detail: INFUSION SERIES
Discharge: HOME | End: 2022-03-30

## 2022-03-30 ENCOUNTER — HOSPITAL ENCOUNTER (OUTPATIENT)
Dept: CARDIOLOGY | Facility: HOSPITAL | Age: 77
Discharge: HOME | End: 2022-03-30

## 2022-03-30 VITALS — HEIGHT: 60 IN | BODY MASS INDEX: 33.59 KG/M2 | WEIGHT: 171.08 LBS

## 2022-03-30 VITALS
DIASTOLIC BLOOD PRESSURE: 84 MMHG | RESPIRATION RATE: 16 BRPM | WEIGHT: 171 LBS | BODY MASS INDEX: 33.57 KG/M2 | OXYGEN SATURATION: 95 % | SYSTOLIC BLOOD PRESSURE: 154 MMHG | TEMPERATURE: 96.2 F | HEIGHT: 60 IN | HEART RATE: 58 BPM

## 2022-03-30 DIAGNOSIS — C79.51 BONE METASTASES: Primary | ICD-10-CM

## 2022-03-30 DIAGNOSIS — C50.911 INVASIVE DUCTAL CARCINOMA OF RIGHT BREAST: ICD-10-CM

## 2022-03-30 DIAGNOSIS — C79.51 BONE METASTASES: ICD-10-CM

## 2022-03-30 DIAGNOSIS — Z17.0 MALIGNANT NEOPLASM OF LEFT BREAST IN FEMALE, ESTROGEN RECEPTOR POSITIVE, UNSPECIFIED SITE OF BREAST: ICD-10-CM

## 2022-03-30 DIAGNOSIS — C50.912 MALIGNANT NEOPLASM OF LEFT BREAST IN FEMALE, ESTROGEN RECEPTOR POSITIVE, UNSPECIFIED SITE OF BREAST: ICD-10-CM

## 2022-03-30 DIAGNOSIS — C50.912 INVASIVE DUCTAL CARCINOMA OF LEFT BREAST: ICD-10-CM

## 2022-03-30 DIAGNOSIS — R92.8 ABNORMAL MAMMOGRAM: ICD-10-CM

## 2022-03-30 DIAGNOSIS — R22.42 LOCALIZED SWELLING OF LEFT LOWER LEG: ICD-10-CM

## 2022-03-30 LAB
ALBUMIN SERPL-MCNC: 4 G/DL (ref 3.5–5.2)
ALBUMIN/GLOB SERPL: 1.6 G/DL
ALP SERPL-CCNC: 37 U/L (ref 39–117)
ALT SERPL W P-5'-P-CCNC: 11 U/L (ref 1–33)
ANION GAP SERPL CALCULATED.3IONS-SCNC: 7 MMOL/L (ref 5–15)
AST SERPL-CCNC: 13 U/L (ref 1–32)
BH CV LOWER VASCULAR LEFT COMMON FEMORAL AUGMENT: NORMAL
BH CV LOWER VASCULAR LEFT COMMON FEMORAL COMPETENT: NORMAL
BH CV LOWER VASCULAR LEFT COMMON FEMORAL COMPRESS: NORMAL
BH CV LOWER VASCULAR LEFT COMMON FEMORAL PHASIC: NORMAL
BH CV LOWER VASCULAR LEFT COMMON FEMORAL SPONT: NORMAL
BH CV LOWER VASCULAR LEFT DISTAL FEMORAL AUGMENT: NORMAL
BH CV LOWER VASCULAR LEFT DISTAL FEMORAL COMPETENT: NORMAL
BH CV LOWER VASCULAR LEFT DISTAL FEMORAL COMPRESS: NORMAL
BH CV LOWER VASCULAR LEFT DISTAL FEMORAL PHASIC: NORMAL
BH CV LOWER VASCULAR LEFT DISTAL FEMORAL SPONT: NORMAL
BH CV LOWER VASCULAR LEFT GASTRONEMIUS COMPRESS: NORMAL
BH CV LOWER VASCULAR LEFT GREATER SAPH AK COMPRESS: NORMAL
BH CV LOWER VASCULAR LEFT GREATER SAPH BK COMPRESS: NORMAL
BH CV LOWER VASCULAR LEFT LESSER SAPH COMPRESS: NORMAL
BH CV LOWER VASCULAR LEFT MID FEMORAL AUGMENT: NORMAL
BH CV LOWER VASCULAR LEFT MID FEMORAL COMPETENT: NORMAL
BH CV LOWER VASCULAR LEFT MID FEMORAL COMPRESS: NORMAL
BH CV LOWER VASCULAR LEFT MID FEMORAL PHASIC: NORMAL
BH CV LOWER VASCULAR LEFT MID FEMORAL SPONT: NORMAL
BH CV LOWER VASCULAR LEFT PERONEAL COMPRESS: NORMAL
BH CV LOWER VASCULAR LEFT POPLITEAL AUGMENT: NORMAL
BH CV LOWER VASCULAR LEFT POPLITEAL COMPETENT: NORMAL
BH CV LOWER VASCULAR LEFT POPLITEAL COMPRESS: NORMAL
BH CV LOWER VASCULAR LEFT POPLITEAL PHASIC: NORMAL
BH CV LOWER VASCULAR LEFT POPLITEAL SPONT: NORMAL
BH CV LOWER VASCULAR LEFT POSTERIOR TIBIAL COMPRESS: NORMAL
BH CV LOWER VASCULAR LEFT PROFUNDA FEMORAL AUGMENT: NORMAL
BH CV LOWER VASCULAR LEFT PROFUNDA FEMORAL COMPETENT: NORMAL
BH CV LOWER VASCULAR LEFT PROFUNDA FEMORAL COMPRESS: NORMAL
BH CV LOWER VASCULAR LEFT PROFUNDA FEMORAL PHASIC: NORMAL
BH CV LOWER VASCULAR LEFT PROFUNDA FEMORAL SPONT: NORMAL
BH CV LOWER VASCULAR LEFT PROXIMAL FEMORAL AUGMENT: NORMAL
BH CV LOWER VASCULAR LEFT PROXIMAL FEMORAL COMPETENT: NORMAL
BH CV LOWER VASCULAR LEFT PROXIMAL FEMORAL COMPRESS: NORMAL
BH CV LOWER VASCULAR LEFT PROXIMAL FEMORAL PHASIC: NORMAL
BH CV LOWER VASCULAR LEFT PROXIMAL FEMORAL SPONT: NORMAL
BH CV LOWER VASCULAR LEFT SAPHENOFEMORAL JUNCTION AUGMENT: NORMAL
BH CV LOWER VASCULAR LEFT SAPHENOFEMORAL JUNCTION COMPETENT: NORMAL
BH CV LOWER VASCULAR LEFT SAPHENOFEMORAL JUNCTION COMPRESS: NORMAL
BH CV LOWER VASCULAR LEFT SAPHENOFEMORAL JUNCTION PHASIC: NORMAL
BH CV LOWER VASCULAR LEFT SAPHENOFEMORAL JUNCTION SPONT: NORMAL
BH CV LOWER VASCULAR RIGHT COMMON FEMORAL AUGMENT: NORMAL
BH CV LOWER VASCULAR RIGHT COMMON FEMORAL COMPETENT: NORMAL
BH CV LOWER VASCULAR RIGHT COMMON FEMORAL COMPRESS: NORMAL
BH CV LOWER VASCULAR RIGHT COMMON FEMORAL PHASIC: NORMAL
BH CV LOWER VASCULAR RIGHT COMMON FEMORAL SPONT: NORMAL
BILIRUB SERPL-MCNC: 0.5 MG/DL (ref 0–1.2)
BUN SERPL-MCNC: 20 MG/DL (ref 8–23)
BUN/CREAT SERPL: 16.1 (ref 7–25)
CALCIUM SPEC-SCNC: 8.4 MG/DL (ref 8.6–10.5)
CANCER AG15-3 SERPL-ACNC: 180.5 U/ML
CHLORIDE SERPL-SCNC: 105 MMOL/L (ref 98–107)
CO2 SERPL-SCNC: 29 MMOL/L (ref 22–29)
CREAT SERPL-MCNC: 1.24 MG/DL (ref 0.57–1)
EGFRCR SERPLBLD CKD-EPI 2021: 45.2 ML/MIN/1.73
ERYTHROCYTE [DISTWIDTH] IN BLOOD BY AUTOMATED COUNT: 18.8 % (ref 12.3–15.4)
GLOBULIN UR ELPH-MCNC: 2.5 GM/DL
GLUCOSE SERPL-MCNC: 104 MG/DL (ref 65–99)
HCT VFR BLD AUTO: 35.3 % (ref 34–46.6)
HGB BLD-MCNC: 11.8 G/DL (ref 12–15.9)
LYMPHOCYTES # BLD AUTO: 1 10*3/MM3 (ref 0.7–3.1)
LYMPHOCYTES NFR BLD AUTO: 38.8 % (ref 19.6–45.3)
MAGNESIUM SERPL-MCNC: 1.9 MG/DL (ref 1.6–2.4)
MAXIMAL PREDICTED HEART RATE: 144 BPM
MCH RBC QN AUTO: 40.4 PG (ref 26.6–33)
MCHC RBC AUTO-ENTMCNC: 33.3 G/DL (ref 31.5–35.7)
MCV RBC AUTO: 121.5 FL (ref 79–97)
MONOCYTES # BLD AUTO: 0.1 10*3/MM3 (ref 0.1–0.9)
MONOCYTES NFR BLD AUTO: 3.7 % (ref 5–12)
NEUTROPHILS NFR BLD AUTO: 1.4 10*3/MM3 (ref 1.7–7)
NEUTROPHILS NFR BLD AUTO: 57.5 % (ref 42.7–76)
PHOSPHATE SERPL-MCNC: 3.2 MG/DL (ref 2.5–4.5)
PLATELET # BLD AUTO: 178 10*3/MM3 (ref 140–450)
PMV BLD AUTO: 6.6 FL (ref 6–12)
POTASSIUM SERPL-SCNC: 3.8 MMOL/L (ref 3.5–5.2)
PROT SERPL-MCNC: 6.5 G/DL (ref 6–8.5)
RBC # BLD AUTO: 2.91 10*6/MM3 (ref 3.77–5.28)
SODIUM SERPL-SCNC: 141 MMOL/L (ref 136–145)
STRESS TARGET HR: 122 BPM
WBC NRBC COR # BLD: 2.5 10*3/MM3 (ref 3.4–10.8)

## 2022-03-30 PROCEDURE — 36415 COLL VENOUS BLD VENIPUNCTURE: CPT

## 2022-03-30 PROCEDURE — 80053 COMPREHEN METABOLIC PANEL: CPT

## 2022-03-30 PROCEDURE — 85025 COMPLETE CBC W/AUTO DIFF WBC: CPT

## 2022-03-30 PROCEDURE — 99215 OFFICE O/P EST HI 40 MIN: CPT | Performed by: NURSE PRACTITIONER

## 2022-03-30 PROCEDURE — 93971 EXTREMITY STUDY: CPT | Performed by: INTERNAL MEDICINE

## 2022-03-30 PROCEDURE — 96374 THER/PROPH/DIAG INJ IV PUSH: CPT

## 2022-03-30 PROCEDURE — 93971 EXTREMITY STUDY: CPT

## 2022-03-30 PROCEDURE — 84100 ASSAY OF PHOSPHORUS: CPT

## 2022-03-30 PROCEDURE — 86300 IMMUNOASSAY TUMOR CA 15-3: CPT

## 2022-03-30 PROCEDURE — 25010000002 ZOLEDRONIC ACID PER 1 MG: Performed by: INTERNAL MEDICINE

## 2022-03-30 PROCEDURE — 83735 ASSAY OF MAGNESIUM: CPT

## 2022-03-30 RX ORDER — NYSTATIN 100000 U/G
CREAM TOPICAL
COMMUNITY

## 2022-03-30 RX ORDER — SODIUM CHLORIDE 9 MG/ML
250 INJECTION, SOLUTION INTRAVENOUS ONCE
Status: DISCONTINUED | OUTPATIENT
Start: 2022-03-30 | End: 2022-03-31 | Stop reason: HOSPADM

## 2022-03-30 RX ORDER — NEOMYCIN SULFATE 500 MG/1
TABLET ORAL
COMMUNITY

## 2022-03-30 RX ORDER — DOXYCYCLINE HYCLATE 100 MG/1
CAPSULE ORAL
COMMUNITY
End: 2022-05-12

## 2022-03-30 RX ORDER — KETOROLAC TROMETHAMINE 5 MG/ML
SOLUTION OPHTHALMIC
COMMUNITY
Start: 2022-03-29

## 2022-03-30 RX ORDER — METRONIDAZOLE 500 MG/1
TABLET ORAL
COMMUNITY

## 2022-03-30 RX ORDER — SOD SULF/POT CHLORIDE/MAG SULF 1.479 G
TABLET ORAL
COMMUNITY

## 2022-03-30 RX ORDER — LEVOTHYROXINE SODIUM 88 UG/1
TABLET ORAL
COMMUNITY

## 2022-03-30 RX ORDER — SODIUM CHLORIDE 9 MG/ML
250 INJECTION, SOLUTION INTRAVENOUS ONCE
Status: CANCELLED | OUTPATIENT
Start: 2022-06-30

## 2022-03-30 RX ORDER — ANASTROZOLE 1 MG/1
TABLET ORAL
COMMUNITY
End: 2022-05-12

## 2022-03-30 RX ADMIN — ZOLEDRONIC ACID 3 MG: 4 INJECTION, SOLUTION, CONCENTRATE INTRAVENOUS at 11:44

## 2022-03-30 NOTE — PROGRESS NOTES
PROBLEM LIST:  1. Left-sided pT1bN0 (IA), low-grade invasive ductal carcinoma diagnosed 05/01/2014. She presented with left-sided nipple discharge and an abnormality on mammogram in 04/2014. Biopsy showed invasive ductal cancer low-grade,estrogen receptor and progesterone receptor positive, HER-2 negative. Sheunderwent a left needle localization lumpectomy on 06/27/2014. Final tumor size  is 1.4 cm  a) Oncotype recurrence score was 0 which is associated with a 3% risk of 10 year recurrence.  b) Adjuvant Arimidex was started after the completion of radiotherapy in 08/2014.  c) Arimidex was discontinued in 11/2014 due to severe hot flashes. Arimidex resumed in 07/2015.  Completed 5 years of treatment.  D) recurrent left breast IDC.  Left breast lumpectomy on 9/1/21 showed 5 mm tumor, completely removed by biopsy needle, intermediate grade.  ER+ GA+ Her2 negative (pT1bNx).  Bone scan showed widespread metastatic bone disease.  CT chest abdomen and pelvis on 10/20/2021 showed extensive sclerotic changes in the sternum and thoracic spine, but no involvement of the organs.  E) started Ibrance and letrozole 10/22/2021.   2. 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 Diagnosed on biopsy on 05/22/2014. She underwent a lumpectomy on 06/27/2014 with final tumor size being 2 mm. It was ER/GA positive with HER-2 not performed.   3. Hypothyroidism.   4. Diabetes.   5. Hypertension.   6. Hyperlipidemia.   7. Autoimmune hepatitis.  8. Osteopenia, received zometa x 4 doses, completed February 2018.  9. Erythrocytosis    Subjective     CHIEF COMPLAINT: breast cancer    HISTORY OF PRESENT ILLNESS:   Radha John returns for follow-up.  She continues on Ibrance and letrozole and is tolerating them well.  She will start her off week of Ibrance Saturday 4/2/2022.  She does report 2-week history of bilateral lower extremity edema with left lower extremity worse than right.  The left lower  "extremity swelling goes up into the calf.  She does have some improvement in swelling when she elevates her legs in the evening.  She denies any bone pain.  No cough or dyspnea.  No headaches or diplopia.        Objective      /84   Pulse 58   Temp 96.2 °F (35.7 °C) (Temporal)   Resp 16   Ht 152.4 cm (60\")   Wt 77.6 kg (171 lb)   SpO2 95%   BMI 33.40 kg/m²    Vitals:    03/30/22 1016   PainSc: 0-No pain               ECOG score: 0             General: well appearing female in no acute distress  Neuro: alert and oriented  HEENT: sclera anicteric, oropharynx clear  Cardiovascular: Regular rate and rhythm  Lungs: Clear to auscultation bilaterally  Extremeties: Mild bilateral lower extremity edema with left leg greater than right  Skin: no rashes, lesions, bruising, or petechiae  Psych: mood and affect appropriate        RECENT LABS:  Lab Results   Component Value Date    WBC 2.50 (L) 03/30/2022    HGB 11.8 (L) 03/30/2022    HCT 35.3 03/30/2022    .5 (H) 03/30/2022     03/30/2022       Lab Results   Component Value Date    GLUCOSE 98 02/15/2022    BUN 24 (H) 02/15/2022    CREATININE 1.25 (H) 02/15/2022    EGFRIFAFRI 51 (L) 02/15/2022    BCR 19.2 02/15/2022    K 4.2 02/15/2022    CO2 29.0 02/15/2022    CALCIUM 8.8 02/15/2022    PROTENTOTREF 6.8 03/22/2021    ALBUMIN 3.70 02/15/2022    AST 11 02/15/2022    ALT 11 02/15/2022         Mammo Diagnostic Digital Tomosynthesis Left With CAD  Narrative:       EXAMINATION:LEFT DIGITAL DIAGNOSTIC MAMMOGRAM WITH TOMOSYNTHESIS      HISTORY:  76 year old female the history of bilateral breast  conservation therapy first mammogram status post resection of the second  malignancy in the patient's left breast, for post lobectomy protocol.     TECHNIQUE: Comminution 2-D 3-D standard views of the left breast     COMPARISON: Prior left mammograms dating back to 6/20/2018     FINDINGS: The breast tissue is composed of scattered areas of  fibroglandular densities. " Changes with second site of breast  conservation therapy are now present present in the far posterior medial  left breast. Extensive dystrophic calcifications are again noted. There  is new areas of fat necrosis anterior to the lumpectomy site. No  suspicious masses microcalcifications or areas of architectural  distortion are identified     Impression: BI-RADS 3 probable benign findings left breast  recommendation:           RECOMMENDATION:  6 month follow-up diagnostic bilateral mammogram using  2-D 3-D technique as per the lumpectomy protocol.     ________________________________________________________________________  _  Physician Order     Diagnostic 6 Month follow up Mammogram with Breast Ultrasound if needed.     Diagnosis: Abnormal Mammogram      This report was finalized on 3/4/2022 1:46 PM by Dr. Rut Cavazos MD.               Assessment/Plan   Radha John is a 76 y.o. female with bilateral stage I ER+ Her2 negative breast cancer, with a more recent recurrent 5 mm ER+ IDC in the left breast, s/p repeat lumpectomy, and imaging showing widespread metastatic bone involvement.    Continue Ibrance and letrozole.  She is tolerating it well.  CBC from today reviewed.  Hemoglobin 11.8.  Her white count is 2.5 and ANC is 1.4.  Platelet count is normal.  We will plan to repeat imaging early July.  We can certainly repeat imaging sooner if she has a change in symptoms.    Bone metastasis: Continue Zometa every 3 months.  Next dose due today.    Cancer related pain: No current pain.    Left lower extremity edema: We will do venous duplex of left lower extremity out of concerns of possible DVT.  I will contact her with results when available.    Follow-up in 6 weeks.            I spent 47 minutes caring for Radha on this date of service. This time includes time spent by me in the following activities: preparing for the visit, reviewing tests, obtaining and/or reviewing a separately obtained history,  performing a medically appropriate examination and/or evaluation, counseling and educating the patient/family/caregiver, ordering medications, tests, or procedures and documenting information in the medical record        Rhoda Llamas APRN  [facility] Hematology and Oncology    3/30/2022          CC:

## 2022-03-30 NOTE — TELEPHONE ENCOUNTER
Venous duplex from today shows no signs of DVT.  Instructed patient to wear compression stockings and watch sodium intake.

## 2022-03-31 ENCOUNTER — TELEPHONE (OUTPATIENT)
Dept: ONCOLOGY | Facility: CLINIC | Age: 77
End: 2022-03-31

## 2022-03-31 ENCOUNTER — SPECIALTY PHARMACY (OUTPATIENT)
Dept: ONCOLOGY | Facility: HOSPITAL | Age: 77
End: 2022-03-31

## 2022-03-31 LAB — CANCER AG27-29 SERPL-ACNC: 155.6 U/ML (ref 0–38.6)

## 2022-03-31 NOTE — TELEPHONE ENCOUNTER
Provider: ELVIS ANDERSON  Caller: STEFANY  Relationship to Patient: SELF    Reason for Call: STEFANY IS WANTING HER ULTRA SOUND AND LABS THAT SHE HAD DONE YESTERDAY SENT TO HUMERA WOODRUFF MD AT [facility].    FAX TO # [phone]

## 2022-04-08 ENCOUNTER — TELEPHONE (OUTPATIENT)
Dept: ONCOLOGY | Facility: CLINIC | Age: 77
End: 2022-04-08

## 2022-04-08 DIAGNOSIS — K75.4 AUTOIMMUNE HEPATITIS: ICD-10-CM

## 2022-04-08 RX ORDER — BUDESONIDE 3 MG/1
9 CAPSULE, COATED PELLETS ORAL EVERY MORNING
Qty: 90 CAPSULE | Refills: 5 | Status: SHIPPED | OUTPATIENT
Start: 2022-04-08 | End: 2022-10-10 | Stop reason: SDUPTHER

## 2022-04-08 NOTE — TELEPHONE ENCOUNTER
Caller: Radha John    Relationship: Self    Best call back number: [phone]    What is the best time to reach you: ANYTIME    Who are you requesting to speak with (clinical staff, provider,  specific staff member): DR GRIFFIN OR NURSE    What was the call regarding: PT WOULD LIKE SOMEONE TO CALL HER WITH HER LAB RESULTS FROM 3/30.     Do you require a callback: YES

## 2022-04-08 NOTE — TELEPHONE ENCOUNTER
Rx Refill Note  Requested Prescriptions     Pending Prescriptions Disp Refills   • Budesonide (ENTOCORT EC) 3 MG 24 hr capsule 90 capsule 5     Sig: Take 3 capsules by mouth Every Morning.      Last office visit with prescribing clinician: 3/26/2021      Next office visit with prescribing clinician: Visit date not found     Patient said she only received 40 pills last time so to make sure the quantity was 90 as usual           Merly Marcelino MA  04/08/22, 14:38 EDT

## 2022-04-13 ENCOUNTER — DOCUMENTATION (OUTPATIENT)
Dept: ONCOLOGY | Facility: HOSPITAL | Age: 77
End: 2022-04-13

## 2022-04-13 NOTE — PROGRESS NOTES
At the request of the patient's insurance provider, [facility]

## 2022-04-20 RX ORDER — AZATHIOPRINE 50 MG/1
100 TABLET ORAL DAILY
Qty: 180 TABLET | Refills: 0 | Status: SHIPPED | OUTPATIENT
Start: 2022-04-20 | End: 2022-07-19

## 2022-04-20 NOTE — TELEPHONE ENCOUNTER
Rx Refill Note  Requested Prescriptions     Pending Prescriptions Disp Refills   • azaTHIOprine (IMURAN) 50 MG tablet [Pharmacy Med Name: azaTHIOprine 50 MG Oral Tablet] 180 tablet 0     Sig: Take 2 tablets by mouth once daily      Last office visit with prescribing clinician: 3/26/2021      Next office visit with prescribing clinician: Visit date not found            Telma Villarreal MA  04/20/22, 09:18 EDT

## 2022-05-06 ENCOUNTER — TELEPHONE (OUTPATIENT)
Dept: ONCOLOGY | Facility: CLINIC | Age: 77
End: 2022-05-06

## 2022-05-06 NOTE — TELEPHONE ENCOUNTER
Caller: Radha John    Relationship: Self    Best call back number: [phone]      What was the call regarding: PATIENT WANTED TO KNOW IF SHE NEEDS LABS BEFORE HER APPOINTMENT WITH DR GRIFFIN    Do you require a callback: YES

## 2022-05-06 NOTE — TELEPHONE ENCOUNTER
I called patient back and told her that we would like to have labs before the appt.  She will stop at the lab window when she checks in on Thursday for the appt.

## 2022-05-12 ENCOUNTER — LAB (OUTPATIENT)
Dept: LAB | Facility: HOSPITAL | Age: 77
End: 2022-05-12

## 2022-05-12 ENCOUNTER — OFFICE VISIT (OUTPATIENT)
Dept: ONCOLOGY | Facility: CLINIC | Age: 77
End: 2022-05-12

## 2022-05-12 VITALS
OXYGEN SATURATION: 98 % | RESPIRATION RATE: 16 BRPM | SYSTOLIC BLOOD PRESSURE: 170 MMHG | HEART RATE: 76 BPM | TEMPERATURE: 96.9 F | WEIGHT: 168 LBS | HEIGHT: 60 IN | BODY MASS INDEX: 32.98 KG/M2 | DIASTOLIC BLOOD PRESSURE: 79 MMHG

## 2022-05-12 DIAGNOSIS — Z17.0 MALIGNANT NEOPLASM OF LEFT BREAST IN FEMALE, ESTROGEN RECEPTOR POSITIVE, UNSPECIFIED SITE OF BREAST: ICD-10-CM

## 2022-05-12 DIAGNOSIS — C50.912 INVASIVE DUCTAL CARCINOMA OF LEFT BREAST: ICD-10-CM

## 2022-05-12 DIAGNOSIS — C79.51 BONE METASTASES: ICD-10-CM

## 2022-05-12 DIAGNOSIS — C79.51 BONE METASTASES: Primary | ICD-10-CM

## 2022-05-12 DIAGNOSIS — C50.912 MALIGNANT NEOPLASM OF LEFT BREAST IN FEMALE, ESTROGEN RECEPTOR POSITIVE, UNSPECIFIED SITE OF BREAST: ICD-10-CM

## 2022-05-12 DIAGNOSIS — C50.911 INVASIVE DUCTAL CARCINOMA OF RIGHT BREAST: ICD-10-CM

## 2022-05-12 LAB
ALBUMIN SERPL-MCNC: 3.6 G/DL (ref 3.5–5.2)
ALBUMIN/GLOB SERPL: 1.2 G/DL
ALP SERPL-CCNC: 37 U/L (ref 39–117)
ALT SERPL W P-5'-P-CCNC: 10 U/L (ref 1–33)
ANION GAP SERPL CALCULATED.3IONS-SCNC: 10 MMOL/L (ref 5–15)
AST SERPL-CCNC: 15 U/L (ref 1–32)
BILIRUB SERPL-MCNC: 0.3 MG/DL (ref 0–1.2)
BUN SERPL-MCNC: 22 MG/DL (ref 8–23)
BUN/CREAT SERPL: 18.8 (ref 7–25)
CALCIUM SPEC-SCNC: 8.7 MG/DL (ref 8.6–10.5)
CANCER AG15-3 SERPL-ACNC: 125 U/ML
CHLORIDE SERPL-SCNC: 105 MMOL/L (ref 98–107)
CO2 SERPL-SCNC: 27 MMOL/L (ref 22–29)
CREAT SERPL-MCNC: 1.17 MG/DL (ref 0.57–1)
EGFRCR SERPLBLD CKD-EPI 2021: 48.5 ML/MIN/1.73
ERYTHROCYTE [DISTWIDTH] IN BLOOD BY AUTOMATED COUNT: 17.8 % (ref 12.3–15.4)
GLOBULIN UR ELPH-MCNC: 3 GM/DL
GLUCOSE SERPL-MCNC: 112 MG/DL (ref 65–99)
HCT VFR BLD AUTO: 36 % (ref 34–46.6)
HGB BLD-MCNC: 11.2 G/DL (ref 12–15.9)
LYMPHOCYTES # BLD AUTO: 1.2 10*3/MM3 (ref 0.7–3.1)
LYMPHOCYTES NFR BLD AUTO: 43.2 % (ref 19.6–45.3)
MCH RBC QN AUTO: 37.5 PG (ref 26.6–33)
MCHC RBC AUTO-ENTMCNC: 31.2 G/DL (ref 31.5–35.7)
MCV RBC AUTO: 120.2 FL (ref 79–97)
MONOCYTES # BLD AUTO: 0.1 10*3/MM3 (ref 0.1–0.9)
MONOCYTES NFR BLD AUTO: 5 % (ref 5–12)
NEUTROPHILS NFR BLD AUTO: 1.5 10*3/MM3 (ref 1.7–7)
NEUTROPHILS NFR BLD AUTO: 51.8 % (ref 42.7–76)
PLATELET # BLD AUTO: 272 10*3/MM3 (ref 140–450)
PMV BLD AUTO: 6.4 FL (ref 6–12)
POTASSIUM SERPL-SCNC: 4.1 MMOL/L (ref 3.5–5.2)
PROT SERPL-MCNC: 6.6 G/DL (ref 6–8.5)
RBC # BLD AUTO: 2.99 10*6/MM3 (ref 3.77–5.28)
SODIUM SERPL-SCNC: 142 MMOL/L (ref 136–145)
WBC NRBC COR # BLD: 2.8 10*3/MM3 (ref 3.4–10.8)

## 2022-05-12 PROCEDURE — 85025 COMPLETE CBC W/AUTO DIFF WBC: CPT

## 2022-05-12 PROCEDURE — 86300 IMMUNOASSAY TUMOR CA 15-3: CPT

## 2022-05-12 PROCEDURE — 99214 OFFICE O/P EST MOD 30 MIN: CPT | Performed by: INTERNAL MEDICINE

## 2022-05-12 PROCEDURE — 36415 COLL VENOUS BLD VENIPUNCTURE: CPT

## 2022-05-12 PROCEDURE — 80053 COMPREHEN METABOLIC PANEL: CPT

## 2022-05-12 PROCEDURE — 82248 BILIRUBIN DIRECT: CPT

## 2022-05-12 RX ORDER — KETOROLAC TROMETHAMINE 5 MG/ML
SOLUTION OPHTHALMIC EVERY 12 HOURS
COMMUNITY

## 2022-05-12 RX ORDER — LATANOPROST 50 UG/ML
SOLUTION/ DROPS OPHTHALMIC
COMMUNITY
Start: 2022-04-18

## 2022-05-12 NOTE — PROGRESS NOTES
PROBLEM LIST:  1. Left-sided pT1bN0 (IA), low-grade invasive ductal carcinoma diagnosed 05/01/2014. She presented with left-sided nipple discharge and an abnormality on mammogram in 04/2014. Biopsy showed invasive ductal cancer low-grade,estrogen receptor and progesterone receptor positive, HER-2 negative. Sheunderwent a left needle localization lumpectomy on 06/27/2014. Final tumor size  is 1.4 cm  a) Oncotype recurrence score was 0 which is associated with a 3% risk of 10 year recurrence.  b) Adjuvant Arimidex was started after the completion of radiotherapy in 08/2014.  c) Arimidex was discontinued in 11/2014 due to severe hot flashes. Arimidex resumed in 07/2015.  Completed 5 years of treatment.  D) recurrent left breast IDC.  Left breast lumpectomy on 9/1/21 showed 5 mm tumor, completely removed by biopsy needle, intermediate grade.  ER+ TN+ Her2 negative (pT1bNx).  Bone scan showed widespread metastatic bone disease.  CT chest abdomen and pelvis on 10/20/2021 showed extensive sclerotic changes in the sternum and thoracic spine, but no involvement of the organs.  E) started Ibrance and letrozole 10/22/2021.   2. 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 Diagnosed on biopsy on 05/22/2014. She underwent a lumpectomy on 06/27/2014 with final tumor size being 2 mm. It was ER/TN positive with HER-2 not performed.   3. Hypothyroidism.   4. Diabetes.   5. Hypertension.   6. Hyperlipidemia.   7. Autoimmune hepatitis.  8. Osteopenia, received zometa x 4 doses, completed February 2018.  9. Erythrocytosis    Subjective     CHIEF COMPLAINT: breast cancer    HISTORY OF PRESENT ILLNESS:   Radha John returns for follow-up.  She continues on Ibrance and anastrozole and is tolerating them well.  She says she is feeling well overall.  She notices that during the week off of Ibrance she has a little bit of aching in the back of her legs.  She has also noticed that her left side seems  "more swollen.  She has been wearing compression socks.  She notices swelling in the trunk as well as sometimes in the left arm.        Objective      /79   Pulse 76   Temp 96.9 °F (36.1 °C) (Temporal)   Resp 16   Ht 152.4 cm (60\")   Wt 76.2 kg (168 lb)   SpO2 98%   BMI 32.81 kg/m²    Vitals:    05/12/22 0850   PainSc: 0-No pain               ECOG score: 0             General: well appearing female in no acute distress  Neuro: alert and oriented  HEENT: sclera anicteric, oropharynx clear  Cardiovascular: Regular rate and rhythm  Lungs: Clear to auscultation bilaterally  Extremeties: Mild bilateral lower extremity edema  Skin: no rashes, lesions, bruising, or petechiae  Psych: mood and affect appropriate        RECENT LABS:  Lab Results   Component Value Date    WBC 2.80 (L) 05/12/2022    HGB 11.2 (L) 05/12/2022    HCT 36.0 05/12/2022    .2 (H) 05/12/2022     05/12/2022       Lab Results   Component Value Date    GLUCOSE 104 (H) 03/30/2022    BUN 20 03/30/2022    CREATININE 1.24 (H) 03/30/2022    EGFRIFAFRI 51 (L) 02/15/2022    BCR 16.1 03/30/2022    K 3.8 03/30/2022    CO2 29.0 03/30/2022    CALCIUM 8.4 (L) 03/30/2022    PROTENTOTREF 6.8 03/22/2021    ALBUMIN 4.00 03/30/2022    AST 13 03/30/2022    ALT 11 03/30/2022                   Assessment & Plan   Radha John is a 76 y.o. female with bilateral stage I ER+ Her2 negative breast cancer, with a more recent recurrent 5 mm ER+ IDC in the left breast, s/p repeat lumpectomy, and imaging showing widespread metastatic bone involvement.    Continue Ibrance and anastrozole.  She has mild leukopenia, but ANC is adequate for continuing treatment.  We will plan to repeat scans in about 6 weeks prior to return.    Bone metastasis: Continue Zometa every 3 months.  Next dose due end of June    Left-sided swelling: She previously had a lower extremity duplex which showed no evidence of DVT.  She does have some subtle swelling of the left side of " the trunk.  We discussed that this can be related to lymphedema from her breast cancer surgery.  I offered referral to Occupational Therapy but she feels that she is doing okay at this time.    Follow-up in 6 weeks.                    Sarah Prasad MD  [facility] Hematology and Oncology    5/12/2022          CC:

## 2022-05-13 LAB — CANCER AG27-29 SERPL-ACNC: 117.6 U/ML (ref 0–38.6)

## 2022-05-16 ENCOUNTER — SPECIALTY PHARMACY (OUTPATIENT)
Dept: ONCOLOGY | Facility: HOSPITAL | Age: 77
End: 2022-05-16

## 2022-06-21 DIAGNOSIS — C79.51 BONE METASTASES: ICD-10-CM

## 2022-06-21 DIAGNOSIS — C50.911 INVASIVE DUCTAL CARCINOMA OF RIGHT BREAST: ICD-10-CM

## 2022-06-22 ENCOUNTER — APPOINTMENT (OUTPATIENT)
Dept: ONCOLOGY | Facility: HOSPITAL | Age: 77
End: 2022-06-22

## 2022-06-27 ENCOUNTER — TELEPHONE (OUTPATIENT)
Dept: ONCOLOGY | Facility: CLINIC | Age: 77
End: 2022-06-27

## 2022-06-27 NOTE — TELEPHONE ENCOUNTER
Caller: STEFANY    Relationship to patient: SELF    Best call back number: [phone]     Patient is needing: TO KNOW IF SHE NEEDS LABS BEFORE HER F/U APPT WITH DR. GRIFFIN

## 2022-06-27 NOTE — TELEPHONE ENCOUNTER
PT notified and verbalized understanding labs can be drawn @ her convenience. PT states she will have them drawn the morning of her appt.  [id number]  ~Shell

## 2022-06-28 ENCOUNTER — HOSPITAL ENCOUNTER (OUTPATIENT)
Dept: NUCLEAR MEDICINE | Facility: HOSPITAL | Age: 77
Discharge: HOME | End: 2022-06-28

## 2022-06-28 ENCOUNTER — LAB (OUTPATIENT)
Dept: LAB | Facility: HOSPITAL | Age: 77
End: 2022-06-28

## 2022-06-28 ENCOUNTER — HOSPITAL ENCOUNTER (OUTPATIENT)
Dept: CT IMAGING | Facility: HOSPITAL | Age: 77
Discharge: HOME | End: 2022-06-28

## 2022-06-28 DIAGNOSIS — C50.911 INVASIVE DUCTAL CARCINOMA OF RIGHT BREAST: ICD-10-CM

## 2022-06-28 DIAGNOSIS — C79.51 BONE METASTASES: ICD-10-CM

## 2022-06-28 DIAGNOSIS — C50.912 INVASIVE DUCTAL CARCINOMA OF LEFT BREAST: ICD-10-CM

## 2022-06-28 LAB
ALBUMIN SERPL-MCNC: 3.9 G/DL (ref 3.5–5.2)
ALBUMIN/GLOB SERPL: 1.5 G/DL
ALP SERPL-CCNC: 35 U/L (ref 39–117)
ALT SERPL W P-5'-P-CCNC: 10 U/L (ref 1–33)
ANION GAP SERPL CALCULATED.3IONS-SCNC: 8 MMOL/L (ref 5–15)
AST SERPL-CCNC: 13 U/L (ref 1–32)
BILIRUB SERPL-MCNC: 0.3 MG/DL (ref 0–1.2)
BUN SERPL-MCNC: 19 MG/DL (ref 8–23)
BUN/CREAT SERPL: 20 (ref 7–25)
CALCIUM SPEC-SCNC: 8.6 MG/DL (ref 8.6–10.5)
CANCER AG15-3 SERPL-ACNC: 108 U/ML
CHLORIDE SERPL-SCNC: 105 MMOL/L (ref 98–107)
CO2 SERPL-SCNC: 28 MMOL/L (ref 22–29)
CREAT SERPL-MCNC: 0.95 MG/DL (ref 0.57–1)
EGFRCR SERPLBLD CKD-EPI 2021: 62.2 ML/MIN/1.73
ERYTHROCYTE [DISTWIDTH] IN BLOOD BY AUTOMATED COUNT: 19.3 % (ref 12.3–15.4)
GLOBULIN UR ELPH-MCNC: 2.6 GM/DL
GLUCOSE SERPL-MCNC: 97 MG/DL (ref 65–99)
HCT VFR BLD AUTO: 37.7 % (ref 34–46.6)
HGB BLD-MCNC: 11.9 G/DL (ref 12–15.9)
LYMPHOCYTES # BLD AUTO: 0.9 10*3/MM3 (ref 0.7–3.1)
LYMPHOCYTES NFR BLD AUTO: 30.6 % (ref 19.6–45.3)
MAGNESIUM SERPL-MCNC: 1.9 MG/DL (ref 1.6–2.4)
MCH RBC QN AUTO: 38.1 PG (ref 26.6–33)
MCHC RBC AUTO-ENTMCNC: 31.6 G/DL (ref 31.5–35.7)
MCV RBC AUTO: 120.3 FL (ref 79–97)
MONOCYTES # BLD AUTO: 0.1 10*3/MM3 (ref 0.1–0.9)
MONOCYTES NFR BLD AUTO: 4 % (ref 5–12)
NEUTROPHILS NFR BLD AUTO: 2 10*3/MM3 (ref 1.7–7)
NEUTROPHILS NFR BLD AUTO: 65.4 % (ref 42.7–76)
PHOSPHATE SERPL-MCNC: 3.2 MG/DL (ref 2.5–4.5)
PLATELET # BLD AUTO: 172 10*3/MM3 (ref 140–450)
PMV BLD AUTO: 7.3 FL (ref 6–12)
POTASSIUM SERPL-SCNC: 4.3 MMOL/L (ref 3.5–5.2)
PROT SERPL-MCNC: 6.5 G/DL (ref 6–8.5)
RBC # BLD AUTO: 3.13 10*6/MM3 (ref 3.77–5.28)
SODIUM SERPL-SCNC: 141 MMOL/L (ref 136–145)
WBC NRBC COR # BLD: 3.1 10*3/MM3 (ref 3.4–10.8)

## 2022-06-28 PROCEDURE — A9537 TC99M MEBROFENIN: HCPCS | Performed by: INTERNAL MEDICINE

## 2022-06-28 PROCEDURE — 71260 CT THORAX DX C+: CPT

## 2022-06-28 PROCEDURE — 0 TECHNETIUM TC 99M MEBROFENIN KIT: Performed by: INTERNAL MEDICINE

## 2022-06-28 PROCEDURE — 86300 IMMUNOASSAY TUMOR CA 15-3: CPT | Performed by: NURSE PRACTITIONER

## 2022-06-28 PROCEDURE — 84100 ASSAY OF PHOSPHORUS: CPT | Performed by: NURSE PRACTITIONER

## 2022-06-28 PROCEDURE — 74177 CT ABD & PELVIS W/CONTRAST: CPT

## 2022-06-28 PROCEDURE — 85025 COMPLETE CBC W/AUTO DIFF WBC: CPT

## 2022-06-28 PROCEDURE — 83735 ASSAY OF MAGNESIUM: CPT | Performed by: NURSE PRACTITIONER

## 2022-06-28 PROCEDURE — 36415 COLL VENOUS BLD VENIPUNCTURE: CPT

## 2022-06-28 PROCEDURE — 78306 BONE IMAGING WHOLE BODY: CPT

## 2022-06-28 PROCEDURE — 80053 COMPREHEN METABOLIC PANEL: CPT | Performed by: NURSE PRACTITIONER

## 2022-06-28 PROCEDURE — A9503 TC99M MEDRONATE: HCPCS | Performed by: INTERNAL MEDICINE

## 2022-06-28 PROCEDURE — 82565 ASSAY OF CREATININE: CPT

## 2022-06-28 PROCEDURE — 0 TECHNETIUM MEDRONATE KIT: Performed by: INTERNAL MEDICINE

## 2022-06-28 PROCEDURE — 25010000002 IOPAMIDOL 61 % SOLUTION: Performed by: INTERNAL MEDICINE

## 2022-06-28 RX ORDER — TC 99M MEDRONATE 20 MG/10ML
26.8 INJECTION, POWDER, LYOPHILIZED, FOR SOLUTION INTRAVENOUS
Status: COMPLETED | OUTPATIENT
Start: 2022-06-28 | End: 2022-06-28

## 2022-06-28 RX ORDER — KIT FOR THE PREPARATION OF TECHNETIUM TC 99M MEBROFENIN 45 MG/10ML
1 INJECTION, POWDER, LYOPHILIZED, FOR SOLUTION INTRAVENOUS
Status: COMPLETED | OUTPATIENT
Start: 2022-06-28 | End: 2022-06-28

## 2022-06-28 RX ADMIN — IOPAMIDOL 95 ML: 612 INJECTION, SOLUTION INTRAVENOUS at 09:27

## 2022-06-28 RX ADMIN — Medication 26.8 MILLICURIE: at 08:50

## 2022-06-28 RX ADMIN — MEBROFENIN 1 DOSE: 45 INJECTION, POWDER, LYOPHILIZED, FOR SOLUTION INTRAVENOUS at 08:50

## 2022-06-29 ENCOUNTER — SPECIALTY PHARMACY (OUTPATIENT)
Dept: ONCOLOGY | Facility: HOSPITAL | Age: 77
End: 2022-06-29

## 2022-06-29 ENCOUNTER — HOSPITAL ENCOUNTER (OUTPATIENT)
Dept: ONCOLOGY | Facility: HOSPITAL | Age: 77
Setting detail: INFUSION SERIES
Discharge: HOME | End: 2022-06-29

## 2022-06-29 ENCOUNTER — TELEPHONE (OUTPATIENT)
Dept: ONCOLOGY | Facility: OTHER | Age: 77
End: 2022-06-29

## 2022-06-29 ENCOUNTER — OFFICE VISIT (OUTPATIENT)
Dept: ONCOLOGY | Facility: CLINIC | Age: 77
End: 2022-06-29

## 2022-06-29 VITALS
RESPIRATION RATE: 16 BRPM | BODY MASS INDEX: 33.77 KG/M2 | OXYGEN SATURATION: 97 % | SYSTOLIC BLOOD PRESSURE: 138 MMHG | HEIGHT: 60 IN | TEMPERATURE: 97.3 F | WEIGHT: 172 LBS | DIASTOLIC BLOOD PRESSURE: 72 MMHG | HEART RATE: 53 BPM

## 2022-06-29 DIAGNOSIS — C50.911 INVASIVE DUCTAL CARCINOMA OF RIGHT BREAST: ICD-10-CM

## 2022-06-29 DIAGNOSIS — C79.51 BONE METASTASES: Primary | ICD-10-CM

## 2022-06-29 DIAGNOSIS — C50.912 INVASIVE DUCTAL CARCINOMA OF LEFT BREAST: ICD-10-CM

## 2022-06-29 LAB — CANCER AG27-29 SERPL-ACNC: 107.1 U/ML (ref 0–38.6)

## 2022-06-29 PROCEDURE — 96374 THER/PROPH/DIAG INJ IV PUSH: CPT

## 2022-06-29 PROCEDURE — 99214 OFFICE O/P EST MOD 30 MIN: CPT | Performed by: INTERNAL MEDICINE

## 2022-06-29 PROCEDURE — 25010000002 ZOLEDRONIC ACID PER 1 MG: Performed by: NURSE PRACTITIONER

## 2022-06-29 RX ORDER — SODIUM CHLORIDE 9 MG/ML
250 INJECTION, SOLUTION INTRAVENOUS ONCE
Status: CANCELLED | OUTPATIENT
Start: 2022-09-23

## 2022-06-29 RX ADMIN — ZOLEDRONIC ACID 4 MG: 4 INJECTION, SOLUTION, CONCENTRATE INTRAVENOUS at 11:56

## 2022-06-29 NOTE — PROGRESS NOTES
PROBLEM LIST:  1. Left-sided pT1bN0 (IA), low-grade invasive ductal carcinoma diagnosed 05/01/2014. She presented with left-sided nipple discharge and an abnormality on mammogram in 04/2014. Biopsy showed invasive ductal cancer low-grade,estrogen receptor and progesterone receptor positive, HER-2 negative. Sheunderwent a left needle localization lumpectomy on 06/27/2014. Final tumor size  is 1.4 cm  a) Oncotype recurrence score was 0 which is associated with a 3% risk of 10 year recurrence.  b) Adjuvant Arimidex was started after the completion of radiotherapy in 08/2014.  c) Arimidex was discontinued in 11/2014 due to severe hot flashes. Arimidex resumed in 07/2015.  Completed 5 years of treatment.  D) recurrent left breast IDC.  Left breast lumpectomy on 9/1/21 showed 5 mm tumor, completely removed by biopsy needle, intermediate grade.  ER+ ID+ Her2 negative (pT1bNx).  Bone scan showed widespread metastatic bone disease.  CT chest abdomen and pelvis on 10/20/2021 showed extensive sclerotic changes in the sternum and thoracic spine, but no involvement of the organs.  E) started Ibrance and letrozole 10/22/2021.   2. 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 Diagnosed on biopsy on 05/22/2014. She underwent a lumpectomy on 06/27/2014 with final tumor size being 2 mm. It was ER/ID positive with HER-2 not performed.   3. Hypothyroidism.   4. Diabetes.   5. Hypertension.   6. Hyperlipidemia.   7. Autoimmune hepatitis.  8. Osteopenia, received zometa x 4 doses, completed February 2018.  9. Erythrocytosis    Subjective     CHIEF COMPLAINT: breast cancer    HISTORY OF PRESENT ILLNESS:   Radha John returns for follow-up.  She continues on Ibrance and anastrozole.  She says she is feeling pretty well.  She feels a little more stiff during the week that she is off the Ibrance.  She has noticed that her hair is thinning.  Otherwise no complaints.      Objective      /72    "Pulse 53   Temp 97.3 °F (36.3 °C)   Resp 16   Ht 152.4 cm (60\")   Wt 78 kg (172 lb)   SpO2 97%   BMI 33.59 kg/m²    Vitals:    06/29/22 1015   PainSc: 0-No pain               ECOG score: 1             General: well appearing female in no acute distress  Neuro: alert and oriented  HEENT: sclera anicteric, oropharynx clear  Cardiovascular: Regular rate and rhythm  Lungs: Clear to auscultation bilaterally  Extremeties: Mild bilateral lower extremity edema  Skin: no rashes, lesions, bruising, or petechiae  Psych: mood and affect appropriate        RECENT LABS:  Lab Results   Component Value Date    WBC 3.10 (L) 06/28/2022    HGB 11.9 (L) 06/28/2022    HCT 37.7 06/28/2022    .3 (H) 06/28/2022     06/28/2022       Lab Results   Component Value Date    GLUCOSE 97 06/28/2022    BUN 19 06/28/2022    CREATININE 0.95 06/28/2022    EGFRIFAFRI 51 (L) 02/15/2022    BCR 20.0 06/28/2022    K 4.3 06/28/2022    CO2 28.0 06/28/2022    CALCIUM 8.6 06/28/2022    PROTENTOTREF 6.8 03/22/2021    ALBUMIN 3.90 06/28/2022    AST 13 06/28/2022    ALT 10 06/28/2022       NM Bone Scan Whole Body  Narrative: DATE OF EXAM: 06/28/2022 8:42 AM     NUCLEAR MEDICINE BONE SCAN WHOLE BODY     INDICATIONS: Breast cancer; C79.51-Secondary malignant neoplasm of bone.     COMPARISON: 01/03/2022 whole-body bone scan; chest, abdomen and pelvis  CT scan of 06/28/2022.     TECHNIQUE: The patient received 26.8 mCi of technetium 99m MDP  intravenously and 3 hour delayed anterior and posterior whole body bone  images were obtained.     FINDINGS:  History indicates breast cancer metastatic to bone. Previous whole body  bone scan report from 01/03/2022 indicates widespread bony metastatic  disease involving skull, sternum, ribs, spine, pelvis and femurs.     Today's study shows this pattern to appear unchanged, with diffuse  disease of the spine, multiple ill-defined low to moderate level areas  of uptake in the pelvis and proximal femurs, and " somewhat more distinct  but very small foci of uptake in the left femur, approximately the  posterior right sixth rib, and frontal midline the calvarium. No  interval change is appreciated. No new or progressive lesion is seen. No  unusual area of photopenia is identified. Renal uptake is relatively  weak, practically a SuperScan appearance associated with widespread  metastatic disease. Again findings are similar to prior study.     Impression: Evidence of widespread metastatic disease, but practically  unchanged from 01/03/2022 exam. No new or progressive bony lesions are  identified.     This report was finalized on 6/28/2022 11:03 PM by Dr. Eduardo Mcpherson MD.     CT Chest With Contrast Diagnostic, CT Abdomen Pelvis With Contrast  Narrative: Examination:   CT ABDOMEN PELVIS W CONTRAST  CT CHEST W CONTRAST DIAGNOSTIC     Date of Exam: 6/28/2022 9:08 AM     Indication: breast cancer; C79.51-Secondary malignant neoplasm of bone.     Comparison: 1/3/2022.     Technique: Axial volumetric contrast-enhanced CT imaging of the chest,  abdomen and pelvis was performed utilizing 95 mL Isovue-300 IV contrast.  Sagittal and coronal reconstructions were created for interpretation.  Automated exposure control and iterative reconstruction methods were  used.     Findings:  Chest: There is a cluster of dystrophic appearing calcification in the  upper right breast, which appears unchanged from the prior study. There  are groupings of linear and dystrophic calcification in the left breast,  as well appearing unchanged. There is no axillary adenopathy. There is  extensive mixed sclerotic and lucent metastatic disease to the bones,  which is most readily apparent in the spine, but also involving the  scapula, ribs and sternum. There is no evidence of interval pathologic  fracture. There are mild diffuse thoracic degenerative changes.     Patient is status post thyroidectomy. The trachea and the esophagus  appear within normal limits.  There is a tiny hiatal hernia. The heart  size is normal. No pericardial effusion. No mediastinal lymphadenopathy.  There are calcified left hilar granulomas.     There is no pneumothorax, pleural effusion or focal airspace  consolidation. There are a few emphysematous areas within the lungs.  There is a stable 4 mm nodule in the left upper lobe best seen on axial  image 17. This is unchanged since at least 2021. There are no new or  enlarging nodules in the lungs. Airways are patent. No significant  pleural disease.     Abdomen and pelvis: Subcutaneous fat and underlying musculature appear  within normal limits. There is stable diffuse sclerotic and lucent  metastatic disease to the bones most pronounced in the spine. No  interval pathologic fracture. No evidence of progressive skeletal  metastasis.     The liver, gallbladder, bile ducts, pancreas, spleen and adrenal glands  appear unremarkable. There are simple small bilateral kidney cysts. The  largest cyst occurs at the inferior right kidney and measures up to 34  mm diameter. No hydronephrosis. There is a large dystrophic  calcification at the left ovary. The right ovary is unremarkable.  Patient is status post hysterectomy. There is distal colonic  diverticulosis. The appendix is normal. Small bowel is nondistended. No  ascites, pneumoperitoneum or lymphadenopathy. Abdominal and pelvic  vasculature appear within normal limits.     Impression:    1. Stable widespread sclerotic metastatic disease to the bones. No  evidence of fracture.  2. No evidence of new or progressive metastatic disease.  3. No acute cardiopulmonary, abdominal or pelvic abnormality.     This report was finalized on 6/28/2022 11:17 AM by Ramon Kelly MD.         I personally reviewed the imaging studies          Assessment & Plan   Radha John is a 76 y.o. female with bilateral stage I ER+ Her2 negative breast cancer, with a more recent recurrent 5 mm ER+ IDC in the left breast, s/p  repeat lumpectomy, and imaging showing widespread metastatic bone involvement.    Continue Ibrance and anastrozole.  She is doing very well with downtrending tumor markers and stable findings on imaging.  I would plan to repeat scans in 4 to 6 months.  We will continue to monitor labs and tumor markers.    Bone metastasis: Continue Zometa every 3 months.     Follow-up in 6 weeks.                    Sarah Prasad MD  [facility] Hematology and Oncology    6/29/2022          CC:

## 2022-07-08 LAB — CREAT BLDA-MCNC: 1.2 MG/DL (ref 0.6–1.3)

## 2022-07-19 RX ORDER — AZATHIOPRINE 50 MG/1
TABLET ORAL
Qty: 180 TABLET | Refills: 0 | Status: SHIPPED | OUTPATIENT
Start: 2022-07-19 | End: 2022-10-19

## 2022-07-28 ENCOUNTER — TELEPHONE (OUTPATIENT)
Dept: ONCOLOGY | Facility: CLINIC | Age: 77
End: 2022-07-28

## 2022-07-28 NOTE — TELEPHONE ENCOUNTER
I faxed note/labs/imaging reports to Dr. Mague Reynolds [phone].  Called patient and left voicemail that I had faxed the information.

## 2022-07-28 NOTE — TELEPHONE ENCOUNTER
Caller: Radha John    Relationship: Self    Best call back number: [phone]     What form or medical record are you requesting:MOST RECENT OFFICE NOTE/ LABS/ SCANS TO BE SENT TO PCP MD. HUMERA WOODRUFF AT [facility]    Who is requesting this form or medical record from you: PATIENT    How would you like to receive the form or medical records (pick-up, mail, fax): FAX #[phone]    Timeframe paperwork needed: BEFORE 8/2/22

## 2022-08-03 ENCOUNTER — TELEPHONE (OUTPATIENT)
Dept: ONCOLOGY | Facility: CLINIC | Age: 77
End: 2022-08-03

## 2022-08-03 NOTE — TELEPHONE ENCOUNTER
Caller: Radha John    Relationship to patient: Self    Best call back number: [phone]    Type of visit: FOLLOW UP    Requested date: PLEASE CALL TO R/S    If rescheduling, when is the original appointment: 08/10

## 2022-08-03 NOTE — TELEPHONE ENCOUNTER
PT notified and verbalized understanding that she does need labs and they have already been ordered.  [id number]  ~Shell

## 2022-08-03 NOTE — TELEPHONE ENCOUNTER
Caller: Radha John    Relationship to patient: Self    Best call back number: [phone]    Patient is needing: TO SPEAK WITH ALIZE MOISE.S HE IS RETURNING THE CALL. ASHWIN TRIED WT BUT WAS UNSUCCESSFUL. [facility] ADVISED TO CB AFTER 1 PM. PT STATED SHE JUST WANTED TO GET THE MESSAGE TO SUMA TO CALL HER BACK.

## 2022-08-03 NOTE — TELEPHONE ENCOUNTER
Caller: Radha John    Relationship: Self    Best call back number: [phone]      What was the call regarding: PATIENT WANTED TO KNOW IF SHE NEEDS LABS BEFORE HER APPOINTMENT ON THE 12TH    Do you require a callback: YES

## 2022-08-03 NOTE — TELEPHONE ENCOUNTER
Caller: Radha Jonh    Relationship: Self    Best call back number: [phone]    What was the call regarding: RADHA WAS RETURNING SUMA'S CALL TO R/S HER APPT.    Do you require a callback: YES

## 2022-08-11 ENCOUNTER — TELEPHONE (OUTPATIENT)
Dept: ONCOLOGY | Facility: OTHER | Age: 77
End: 2022-08-11

## 2022-08-12 ENCOUNTER — LAB (OUTPATIENT)
Dept: LAB | Facility: HOSPITAL | Age: 77
End: 2022-08-12

## 2022-08-12 ENCOUNTER — SPECIALTY PHARMACY (OUTPATIENT)
Dept: ONCOLOGY | Facility: HOSPITAL | Age: 77
End: 2022-08-12

## 2022-08-12 ENCOUNTER — OFFICE VISIT (OUTPATIENT)
Dept: ONCOLOGY | Facility: CLINIC | Age: 77
End: 2022-08-12

## 2022-08-12 VITALS
WEIGHT: 174 LBS | OXYGEN SATURATION: 95 % | RESPIRATION RATE: 16 BRPM | HEIGHT: 60 IN | SYSTOLIC BLOOD PRESSURE: 148 MMHG | DIASTOLIC BLOOD PRESSURE: 67 MMHG | HEART RATE: 54 BPM | TEMPERATURE: 96.9 F | BODY MASS INDEX: 34.16 KG/M2

## 2022-08-12 DIAGNOSIS — C50.912 INVASIVE DUCTAL CARCINOMA OF LEFT BREAST: ICD-10-CM

## 2022-08-12 DIAGNOSIS — C79.51 BONE METASTASES: ICD-10-CM

## 2022-08-12 DIAGNOSIS — C50.911 INVASIVE DUCTAL CARCINOMA OF RIGHT BREAST: Primary | ICD-10-CM

## 2022-08-12 DIAGNOSIS — C50.911 INVASIVE DUCTAL CARCINOMA OF RIGHT BREAST: ICD-10-CM

## 2022-08-12 LAB
ALBUMIN SERPL-MCNC: 4 G/DL (ref 3.5–5.2)
ALBUMIN/GLOB SERPL: 1.4 G/DL
ALP SERPL-CCNC: 40 U/L (ref 39–117)
ALT SERPL W P-5'-P-CCNC: 9 U/L (ref 1–33)
ANION GAP SERPL CALCULATED.3IONS-SCNC: 7 MMOL/L (ref 5–15)
AST SERPL-CCNC: 12 U/L (ref 1–32)
BILIRUB SERPL-MCNC: 0.4 MG/DL (ref 0–1.2)
BUN SERPL-MCNC: 21 MG/DL (ref 8–23)
BUN/CREAT SERPL: 16.8 (ref 7–25)
CALCIUM SPEC-SCNC: 8.4 MG/DL (ref 8.6–10.5)
CANCER AG15-3 SERPL-ACNC: 90.4 U/ML
CHLORIDE SERPL-SCNC: 107 MMOL/L (ref 98–107)
CO2 SERPL-SCNC: 30 MMOL/L (ref 22–29)
CREAT SERPL-MCNC: 1.25 MG/DL (ref 0.57–1)
EGFRCR SERPLBLD CKD-EPI 2021: 44.8 ML/MIN/1.73
ERYTHROCYTE [DISTWIDTH] IN BLOOD BY AUTOMATED COUNT: 18.9 % (ref 12.3–15.4)
GLOBULIN UR ELPH-MCNC: 2.9 GM/DL
GLUCOSE SERPL-MCNC: 126 MG/DL (ref 65–99)
HCT VFR BLD AUTO: 37.7 % (ref 34–46.6)
HGB BLD-MCNC: 11.7 G/DL (ref 12–15.9)
LYMPHOCYTES # BLD AUTO: 0.8 10*3/MM3 (ref 0.7–3.1)
LYMPHOCYTES NFR BLD AUTO: 19.6 % (ref 19.6–45.3)
MCH RBC QN AUTO: 37.6 PG (ref 26.6–33)
MCHC RBC AUTO-ENTMCNC: 31 G/DL (ref 31.5–35.7)
MCV RBC AUTO: 121.2 FL (ref 79–97)
MONOCYTES # BLD AUTO: 0.2 10*3/MM3 (ref 0.1–0.9)
MONOCYTES NFR BLD AUTO: 4 % (ref 5–12)
NEUTROPHILS NFR BLD AUTO: 3.1 10*3/MM3 (ref 1.7–7)
NEUTROPHILS NFR BLD AUTO: 76.4 % (ref 42.7–76)
PLATELET # BLD AUTO: 284 10*3/MM3 (ref 140–450)
PMV BLD AUTO: 6.7 FL (ref 6–12)
POTASSIUM SERPL-SCNC: 4.4 MMOL/L (ref 3.5–5.2)
PROT SERPL-MCNC: 6.9 G/DL (ref 6–8.5)
RBC # BLD AUTO: 3.12 10*6/MM3 (ref 3.77–5.28)
SODIUM SERPL-SCNC: 144 MMOL/L (ref 136–145)
WBC NRBC COR # BLD: 4.1 10*3/MM3 (ref 3.4–10.8)

## 2022-08-12 PROCEDURE — 86300 IMMUNOASSAY TUMOR CA 15-3: CPT

## 2022-08-12 PROCEDURE — 82248 BILIRUBIN DIRECT: CPT | Performed by: INTERNAL MEDICINE

## 2022-08-12 PROCEDURE — 80053 COMPREHEN METABOLIC PANEL: CPT

## 2022-08-12 PROCEDURE — 85025 COMPLETE CBC W/AUTO DIFF WBC: CPT

## 2022-08-12 PROCEDURE — 99214 OFFICE O/P EST MOD 30 MIN: CPT | Performed by: NURSE PRACTITIONER

## 2022-08-12 RX ORDER — ATORVASTATIN CALCIUM 10 MG/1
TABLET, FILM COATED ORAL
COMMUNITY
Start: 2022-08-02

## 2022-08-12 RX ORDER — ANASTROZOLE 1 MG/1
1 TABLET ORAL DAILY
Qty: 30 TABLET | Refills: 11 | Status: SHIPPED | OUTPATIENT
Start: 2022-08-12 | End: 2023-03-21

## 2022-08-12 RX ORDER — DORZOLAMIDE HYDROCHLORIDE AND TIMOLOL MALEATE 20; 5 MG/ML; MG/ML
SOLUTION/ DROPS OPHTHALMIC
COMMUNITY
Start: 2022-07-27

## 2022-08-12 NOTE — PROGRESS NOTES
PROBLEM LIST:  1. Left-sided pT1bN0 (IA), low-grade invasive ductal carcinoma diagnosed 05/01/2014. She presented with left-sided nipple discharge and an abnormality on mammogram in 04/2014. Biopsy showed invasive ductal cancer low-grade,estrogen receptor and progesterone receptor positive, HER-2 negative. Sheunderwent a left needle localization lumpectomy on 06/27/2014. Final tumor size  is 1.4 cm  a) Oncotype recurrence score was 0 which is associated with a 3% risk of 10 year recurrence.  b) Adjuvant Arimidex was started after the completion of radiotherapy in 08/2014.  c) Arimidex was discontinued in 11/2014 due to severe hot flashes. Arimidex resumed in 07/2015.  Completed 5 years of treatment.  D) recurrent left breast IDC.  Left breast lumpectomy on 9/1/21 showed 5 mm tumor, completely removed by biopsy needle, intermediate grade.  ER+ MO+ Her2 negative (pT1bNx).  Bone scan showed widespread metastatic bone disease.  CT chest abdomen and pelvis on 10/20/2021 showed extensive sclerotic changes in the sternum and thoracic spine, but no involvement of the organs.  E) started Ibrance and anastrozole 10/22/2021.   2. 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 Diagnosed on biopsy on 05/22/2014. She underwent a lumpectomy on 06/27/2014 with final tumor size being 2 mm. It was ER/MO positive with HER-2 not performed.   3. Hypothyroidism.   4. Diabetes.   5. Hypertension.   6. Hyperlipidemia.   7. Autoimmune hepatitis.  8. Osteopenia, received zometa x 4 doses, completed February 2018.  9. Erythrocytosis    Subjective     CHIEF COMPLAINT: breast cancer    HISTORY OF PRESENT ILLNESS:   Radha John returns for follow-up.  She continues on Ibrance and anastrozole.  Overall she is tolerating the medication well.  On her off week of Ibrance she does notice occasional pain/aching in the backs of her legs but it is not bad enough to take over-the-counter medicines.  She denies any  "cough or shortness of air.  She is eating well.  She is wearing her compression stockings and her swelling in her legs is better.    She was recently started on atorvastatin 10 mg daily on 8/2/2022.  She has noticed some muscle cramping since starting the medication.    Objective      /67   Pulse 54   Temp 96.9 °F (36.1 °C) (Temporal)   Resp 16   Ht 152.4 cm (60\")   Wt 78.9 kg (174 lb)   SpO2 95%   BMI 33.98 kg/m²    Vitals:    08/12/22 1256   PainSc: 0-No pain               ECOG score: 1             General: well appearing female in no acute distress  Neuro: alert and oriented  HEENT: sclera anicteric, oropharynx clear  Cardiovascular: Regular rate and rhythm  Lungs: Clear to auscultation bilaterally  Extremeties: No lower extremity edema.  Wearing compression stockings today.  Skin: no rashes, lesions, bruising, or petechiae  Psych: mood and affect appropriate        RECENT LABS:  Lab Results   Component Value Date    WBC 3.10 (L) 06/28/2022    HGB 11.9 (L) 06/28/2022    HCT 37.7 06/28/2022    .3 (H) 06/28/2022     06/28/2022       Lab Results   Component Value Date    GLUCOSE 97 06/28/2022    BUN 19 06/28/2022    CREATININE 0.95 06/28/2022    EGFRIFAFRI 51 (L) 02/15/2022    BCR 20.0 06/28/2022    K 4.3 06/28/2022    CO2 28.0 06/28/2022    CALCIUM 8.6 06/28/2022    PROTENTOTREF 6.8 03/22/2021    ALBUMIN 3.90 06/28/2022    AST 13 06/28/2022    ALT 10 06/28/2022       NM Bone Scan Whole Body  Narrative: DATE OF EXAM: 06/28/2022 8:42 AM     NUCLEAR MEDICINE BONE SCAN WHOLE BODY     INDICATIONS: Breast cancer; C79.51-Secondary malignant neoplasm of bone.     COMPARISON: 01/03/2022 whole-body bone scan; chest, abdomen and pelvis  CT scan of 06/28/2022.     TECHNIQUE: The patient received 26.8 mCi of technetium 99m MDP  intravenously and 3 hour delayed anterior and posterior whole body bone  images were obtained.     FINDINGS:  History indicates breast cancer metastatic to bone. Previous whole " body  bone scan report from 01/03/2022 indicates widespread bony metastatic  disease involving skull, sternum, ribs, spine, pelvis and femurs.     Today's study shows this pattern to appear unchanged, with diffuse  disease of the spine, multiple ill-defined low to moderate level areas  of uptake in the pelvis and proximal femurs, and somewhat more distinct  but very small foci of uptake in the left femur, approximately the  posterior right sixth rib, and frontal midline the calvarium. No  interval change is appreciated. No new or progressive lesion is seen. No  unusual area of photopenia is identified. Renal uptake is relatively  weak, practically a SuperScan appearance associated with widespread  metastatic disease. Again findings are similar to prior study.     Impression: Evidence of widespread metastatic disease, but practically  unchanged from 01/03/2022 exam. No new or progressive bony lesions are  identified.     This report was finalized on 6/28/2022 11:03 PM by Dr. Eduardo Mcpherson MD.     CT Chest With Contrast Diagnostic, CT Abdomen Pelvis With Contrast  Narrative: Examination:   CT ABDOMEN PELVIS W CONTRAST  CT CHEST W CONTRAST DIAGNOSTIC     Date of Exam: 6/28/2022 9:08 AM     Indication: breast cancer; C79.51-Secondary malignant neoplasm of bone.     Comparison: 1/3/2022.     Technique: Axial volumetric contrast-enhanced CT imaging of the chest,  abdomen and pelvis was performed utilizing 95 mL Isovue-300 IV contrast.  Sagittal and coronal reconstructions were created for interpretation.  Automated exposure control and iterative reconstruction methods were  used.     Findings:  Chest: There is a cluster of dystrophic appearing calcification in the  upper right breast, which appears unchanged from the prior study. There  are groupings of linear and dystrophic calcification in the left breast,  as well appearing unchanged. There is no axillary adenopathy. There is  extensive mixed sclerotic and lucent  metastatic disease to the bones,  which is most readily apparent in the spine, but also involving the  scapula, ribs and sternum. There is no evidence of interval pathologic  fracture. There are mild diffuse thoracic degenerative changes.     Patient is status post thyroidectomy. The trachea and the esophagus  appear within normal limits. There is a tiny hiatal hernia. The heart  size is normal. No pericardial effusion. No mediastinal lymphadenopathy.  There are calcified left hilar granulomas.     There is no pneumothorax, pleural effusion or focal airspace  consolidation. There are a few emphysematous areas within the lungs.  There is a stable 4 mm nodule in the left upper lobe best seen on axial  image 17. This is unchanged since at least 2021. There are no new or  enlarging nodules in the lungs. Airways are patent. No significant  pleural disease.     Abdomen and pelvis: Subcutaneous fat and underlying musculature appear  within normal limits. There is stable diffuse sclerotic and lucent  metastatic disease to the bones most pronounced in the spine. No  interval pathologic fracture. No evidence of progressive skeletal  metastasis.     The liver, gallbladder, bile ducts, pancreas, spleen and adrenal glands  appear unremarkable. There are simple small bilateral kidney cysts. The  largest cyst occurs at the inferior right kidney and measures up to 34  mm diameter. No hydronephrosis. There is a large dystrophic  calcification at the left ovary. The right ovary is unremarkable.  Patient is status post hysterectomy. There is distal colonic  diverticulosis. The appendix is normal. Small bowel is nondistended. No  ascites, pneumoperitoneum or lymphadenopathy. Abdominal and pelvic  vasculature appear within normal limits.     Impression:    1. Stable widespread sclerotic metastatic disease to the bones. No  evidence of fracture.  2. No evidence of new or progressive metastatic disease.  3. No acute cardiopulmonary,  abdominal or pelvic abnormality.     This report was finalized on 6/28/2022 11:17 AM by Ramon Kelly MD.                  Assessment & Plan   Radha John is a 76 y.o. female with bilateral stage I ER+ Her2 negative breast cancer, with a more recent recurrent 5 mm ER+ IDC in the left breast, s/p repeat lumpectomy, and imaging showing widespread metastatic bone involvement.    Continue Ibrance and anastrozole.  Clinically she is doing well with no evidence of disease progression at this time.  We will plan on repeat scans 4 to 6 months from her last scans.  We will continue to monitor labs and tumor markers.  Refill for anastrozole sent to pharmacy today.  Labs from today are pending.    Mammogram scheduled for October 12, 2022.    Bone metastasis: Continue Zometa every 3 months.  Next dose due 9/22/2022.    Follow-up in 6 weeks.            I spent 35 minutes caring for Radha on this date of service. This time includes time spent by me in the following activities: preparing for the visit, obtaining and/or reviewing a separately obtained history, performing a medically appropriate examination and/or evaluation, counseling and educating the patient/family/caregiver, ordering medications, tests, or procedures and documenting information in the medical record          Rhoda Llamas APRN  [facility] Hematology and Oncology    8/12/2022          CC:

## 2022-08-13 LAB — CANCER AG27-29 SERPL-ACNC: 92.7 U/ML (ref 0–38.6)

## 2022-08-15 ENCOUNTER — TELEPHONE (OUTPATIENT)
Dept: ONCOLOGY | Facility: CLINIC | Age: 77
End: 2022-08-15

## 2022-08-15 DIAGNOSIS — C79.51 BONE METASTASES: ICD-10-CM

## 2022-08-15 DIAGNOSIS — C50.911 INVASIVE DUCTAL CARCINOMA OF RIGHT BREAST: Primary | ICD-10-CM

## 2022-08-15 NOTE — TELEPHONE ENCOUNTER
Caller: STEFANY    Relationship to patient: SELF    Best call back number: [phone]    Patient is needing: LAB RESULTS FROM Friday, 8-. SHE REQUESTS A CALL. SHE ALSO STATES SHE CANNOT GET IN ON [facility].

## 2022-08-15 NOTE — TELEPHONE ENCOUNTER
I called patient back and went over her cbc results.  I told her she would see them in [facility] once the provider had reviewed them. She verbalized understanding.

## 2022-09-02 ENCOUNTER — APPOINTMENT (OUTPATIENT)
Dept: MAMMOGRAPHY | Facility: HOSPITAL | Age: 77
End: 2022-09-02

## 2022-09-23 ENCOUNTER — SPECIALTY PHARMACY (OUTPATIENT)
Dept: ONCOLOGY | Facility: HOSPITAL | Age: 77
End: 2022-09-23

## 2022-09-23 ENCOUNTER — HOSPITAL ENCOUNTER (OUTPATIENT)
Dept: ONCOLOGY | Facility: HOSPITAL | Age: 77
Setting detail: INFUSION SERIES
Discharge: HOME | End: 2022-09-23

## 2022-09-23 ENCOUNTER — LAB (OUTPATIENT)
Dept: LAB | Facility: HOSPITAL | Age: 77
End: 2022-09-23

## 2022-09-23 ENCOUNTER — OFFICE VISIT (OUTPATIENT)
Dept: ONCOLOGY | Facility: CLINIC | Age: 77
End: 2022-09-23

## 2022-09-23 VITALS
HEART RATE: 61 BPM | TEMPERATURE: 97.4 F | DIASTOLIC BLOOD PRESSURE: 77 MMHG | OXYGEN SATURATION: 97 % | RESPIRATION RATE: 18 BRPM | SYSTOLIC BLOOD PRESSURE: 146 MMHG | WEIGHT: 173 LBS | BODY MASS INDEX: 33.96 KG/M2 | HEIGHT: 60 IN

## 2022-09-23 DIAGNOSIS — C50.911 INVASIVE DUCTAL CARCINOMA OF RIGHT BREAST: ICD-10-CM

## 2022-09-23 DIAGNOSIS — C79.51 BONE METASTASES: Primary | ICD-10-CM

## 2022-09-23 DIAGNOSIS — C50.912 INVASIVE DUCTAL CARCINOMA OF LEFT BREAST: ICD-10-CM

## 2022-09-23 DIAGNOSIS — C79.51 BONE METASTASES: ICD-10-CM

## 2022-09-23 LAB
ALBUMIN SERPL-MCNC: 3.8 G/DL (ref 3.5–5.2)
ALBUMIN/GLOB SERPL: 1.4 G/DL
ALP SERPL-CCNC: 33 U/L (ref 39–117)
ALT SERPL W P-5'-P-CCNC: 11 U/L (ref 1–33)
ANION GAP SERPL CALCULATED.3IONS-SCNC: 10 MMOL/L (ref 5–15)
AST SERPL-CCNC: 13 U/L (ref 1–32)
BASOPHILS # BLD AUTO: 0.05 10*3/MM3 (ref 0–0.2)
BASOPHILS NFR BLD AUTO: 2.1 % (ref 0–1.5)
BILIRUB SERPL-MCNC: 0.5 MG/DL (ref 0–1.2)
BUN SERPL-MCNC: 17 MG/DL (ref 8–23)
BUN/CREAT SERPL: 19.1 (ref 7–25)
CALCIUM SPEC-SCNC: 8.7 MG/DL (ref 8.6–10.5)
CANCER AG15-3 SERPL-ACNC: 77.7 U/ML
CHLORIDE SERPL-SCNC: 105 MMOL/L (ref 98–107)
CO2 SERPL-SCNC: 29 MMOL/L (ref 22–29)
CREAT SERPL-MCNC: 0.89 MG/DL (ref 0.57–1)
DEPRECATED RDW RBC AUTO: 71.6 FL (ref 37–54)
EGFRCR SERPLBLD CKD-EPI 2021: 66.9 ML/MIN/1.73
EOSINOPHIL # BLD AUTO: 0.01 10*3/MM3 (ref 0–0.4)
EOSINOPHIL NFR BLD AUTO: 0.4 % (ref 0.3–6.2)
ERYTHROCYTE [DISTWIDTH] IN BLOOD BY AUTOMATED COUNT: 15.6 % (ref 12.3–15.4)
GLOBULIN UR ELPH-MCNC: 2.8 GM/DL
GLUCOSE SERPL-MCNC: 104 MG/DL (ref 65–99)
HCT VFR BLD AUTO: 34.9 % (ref 34–46.6)
HGB BLD-MCNC: 11.6 G/DL (ref 12–15.9)
IMM GRANULOCYTES # BLD AUTO: 0.01 10*3/MM3 (ref 0–0.05)
IMM GRANULOCYTES NFR BLD AUTO: 0.4 % (ref 0–0.5)
LYMPHOCYTES # BLD AUTO: 1.09 10*3/MM3 (ref 0.7–3.1)
LYMPHOCYTES NFR BLD AUTO: 44.9 % (ref 19.6–45.3)
MAGNESIUM SERPL-MCNC: 1.6 MG/DL (ref 1.6–2.4)
MCH RBC QN AUTO: 41.3 PG (ref 26.6–33)
MCHC RBC AUTO-ENTMCNC: 33.2 G/DL (ref 31.5–35.7)
MCV RBC AUTO: 124.2 FL (ref 79–97)
MONOCYTES # BLD AUTO: 0.3 10*3/MM3 (ref 0.1–0.9)
MONOCYTES NFR BLD AUTO: 12.3 % (ref 5–12)
NEUTROPHILS NFR BLD AUTO: 0.97 10*3/MM3 (ref 1.7–7)
NEUTROPHILS NFR BLD AUTO: 39.9 % (ref 42.7–76)
PHOSPHATE SERPL-MCNC: 3.8 MG/DL (ref 2.5–4.5)
PLATELET # BLD AUTO: 140 10*3/MM3 (ref 140–450)
PMV BLD AUTO: 11 FL (ref 6–12)
POTASSIUM SERPL-SCNC: 3.7 MMOL/L (ref 3.5–5.2)
PROT SERPL-MCNC: 6.6 G/DL (ref 6–8.5)
RBC # BLD AUTO: 2.81 10*6/MM3 (ref 3.77–5.28)
SODIUM SERPL-SCNC: 144 MMOL/L (ref 136–145)
WBC NRBC COR # BLD: 2.43 10*3/MM3 (ref 3.4–10.8)

## 2022-09-23 PROCEDURE — 99214 OFFICE O/P EST MOD 30 MIN: CPT | Performed by: NURSE PRACTITIONER

## 2022-09-23 PROCEDURE — 86300 IMMUNOASSAY TUMOR CA 15-3: CPT

## 2022-09-23 PROCEDURE — 96365 THER/PROPH/DIAG IV INF INIT: CPT

## 2022-09-23 PROCEDURE — 36415 COLL VENOUS BLD VENIPUNCTURE: CPT

## 2022-09-23 PROCEDURE — 85025 COMPLETE CBC W/AUTO DIFF WBC: CPT

## 2022-09-23 PROCEDURE — 84100 ASSAY OF PHOSPHORUS: CPT

## 2022-09-23 PROCEDURE — 96374 THER/PROPH/DIAG INJ IV PUSH: CPT

## 2022-09-23 PROCEDURE — 83735 ASSAY OF MAGNESIUM: CPT

## 2022-09-23 PROCEDURE — 25010000002 ZOLEDRONIC ACID PER 1 MG: Performed by: INTERNAL MEDICINE

## 2022-09-23 PROCEDURE — 80053 COMPREHEN METABOLIC PANEL: CPT

## 2022-09-23 RX ORDER — SODIUM CHLORIDE 9 MG/ML
250 INJECTION, SOLUTION INTRAVENOUS ONCE
Status: DISCONTINUED | OUTPATIENT
Start: 2022-09-23 | End: 2022-09-24 | Stop reason: HOSPADM

## 2022-09-23 RX ADMIN — ZOLEDRONIC ACID 4 MG: 4 INJECTION, SOLUTION, CONCENTRATE INTRAVENOUS at 10:02

## 2022-09-23 NOTE — PROGRESS NOTES
PROBLEM LIST:  1. Left-sided pT1bN0 (IA), low-grade invasive ductal carcinoma diagnosed 05/01/2014. She presented with left-sided nipple discharge and an abnormality on mammogram in 04/2014. Biopsy showed invasive ductal cancer low-grade,estrogen receptor and progesterone receptor positive, HER-2 negative. Sheunderwent a left needle localization lumpectomy on 06/27/2014. Final tumor size  is 1.4 cm  a) Oncotype recurrence score was 0 which is associated with a 3% risk of 10 year recurrence.  b) Adjuvant Arimidex was started after the completion of radiotherapy in 08/2014.  c) Arimidex was discontinued in 11/2014 due to severe hot flashes. Arimidex resumed in 07/2015.  Completed 5 years of treatment.  D) recurrent left breast IDC.  Left breast lumpectomy on 9/1/21 showed 5 mm tumor, completely removed by biopsy needle, intermediate grade.  ER+ SC+ Her2 negative (pT1bNx).  Bone scan showed widespread metastatic bone disease.  CT chest abdomen and pelvis on 10/20/2021 showed extensive sclerotic changes in the sternum and thoracic spine, but no involvement of the organs.  E) started Ibrance and anastrozole 10/22/2021.   2. 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 Diagnosed on biopsy on 05/22/2014. She underwent a lumpectomy on 06/27/2014 with final tumor size being 2 mm. It was ER/SC positive with HER-2 not performed.   3. Hypothyroidism.   4. Diabetes.   5. Hypertension.   6. Hyperlipidemia.   7. Autoimmune hepatitis.  8. Osteopenia, received zometa x 4 doses, completed February 2018.  9. Erythrocytosis    Subjective     CHIEF COMPLAINT: breast cancer    HISTORY OF PRESENT ILLNESS:   Radha John returns for follow-up.  She continues on Ibrance and anastrozole.  She  continues to tolerate treatment well.  She does have some hair thinning and occasional stiffness in her joints.  She denies any nausea or vomiting.  No cough or shortness of air.      Objective      /77   " Pulse 61   Temp 97.4 °F (36.3 °C)   Resp 18   Ht 152.4 cm (60\")   Wt 78.5 kg (173 lb)   SpO2 97%   BMI 33.79 kg/m²    Vitals:    09/23/22 0811   PainSc: 0-No pain               ECOG score: 1             General: well appearing female in no acute distress  Neuro: alert and oriented  HEENT: sclera anicteric, oropharynx clear  Cardiovascular: Regular rate and rhythm  Lungs: Clear to auscultation bilaterally  Extremeties: No lower extremity edema.  Wearing compression stockings today.  Skin: no rashes, lesions, bruising, or petechiae  Psych: mood and affect appropriate        RECENT LABS:  Lab Results   Component Value Date    WBC 2.43 (L) 09/23/2022    HGB 11.6 (L) 09/23/2022    HCT 34.9 09/23/2022    .2 (H) 09/23/2022     09/23/2022       Lab Results   Component Value Date    GLUCOSE 104 (H) 09/23/2022    BUN 17 09/23/2022    CREATININE 0.89 09/23/2022    EGFRIFAFRI 51 (L) 02/15/2022    BCR 19.1 09/23/2022    K 3.7 09/23/2022    CO2 29.0 09/23/2022    CALCIUM 8.7 09/23/2022    PROTENTOTREF 6.8 03/22/2021    ALBUMIN 3.80 09/23/2022    AST 13 09/23/2022    ALT 11 09/23/2022       NM Bone Scan Whole Body  Narrative: DATE OF EXAM: 06/28/2022 8:42 AM     NUCLEAR MEDICINE BONE SCAN WHOLE BODY     INDICATIONS: Breast cancer; C79.51-Secondary malignant neoplasm of bone.     COMPARISON: 01/03/2022 whole-body bone scan; chest, abdomen and pelvis  CT scan of 06/28/2022.     TECHNIQUE: The patient received 26.8 mCi of technetium 99m MDP  intravenously and 3 hour delayed anterior and posterior whole body bone  images were obtained.     FINDINGS:  History indicates breast cancer metastatic to bone. Previous whole body  bone scan report from 01/03/2022 indicates widespread bony metastatic  disease involving skull, sternum, ribs, spine, pelvis and femurs.     Today's study shows this pattern to appear unchanged, with diffuse  disease of the spine, multiple ill-defined low to moderate level areas  of uptake in " the pelvis and proximal femurs, and somewhat more distinct  but very small foci of uptake in the left femur, approximately the  posterior right sixth rib, and frontal midline the calvarium. No  interval change is appreciated. No new or progressive lesion is seen. No  unusual area of photopenia is identified. Renal uptake is relatively  weak, practically a SuperScan appearance associated with widespread  metastatic disease. Again findings are similar to prior study.     Impression: Evidence of widespread metastatic disease, but practically  unchanged from 01/03/2022 exam. No new or progressive bony lesions are  identified.     This report was finalized on 6/28/2022 11:03 PM by Dr. Eduardo Mcpherson MD.     CT Chest With Contrast Diagnostic, CT Abdomen Pelvis With Contrast  Narrative: Examination:   CT ABDOMEN PELVIS W CONTRAST  CT CHEST W CONTRAST DIAGNOSTIC     Date of Exam: 6/28/2022 9:08 AM     Indication: breast cancer; C79.51-Secondary malignant neoplasm of bone.     Comparison: 1/3/2022.     Technique: Axial volumetric contrast-enhanced CT imaging of the chest,  abdomen and pelvis was performed utilizing 95 mL Isovue-300 IV contrast.  Sagittal and coronal reconstructions were created for interpretation.  Automated exposure control and iterative reconstruction methods were  used.     Findings:  Chest: There is a cluster of dystrophic appearing calcification in the  upper right breast, which appears unchanged from the prior study. There  are groupings of linear and dystrophic calcification in the left breast,  as well appearing unchanged. There is no axillary adenopathy. There is  extensive mixed sclerotic and lucent metastatic disease to the bones,  which is most readily apparent in the spine, but also involving the  scapula, ribs and sternum. There is no evidence of interval pathologic  fracture. There are mild diffuse thoracic degenerative changes.     Patient is status post thyroidectomy. The trachea and the  esophagus  appear within normal limits. There is a tiny hiatal hernia. The heart  size is normal. No pericardial effusion. No mediastinal lymphadenopathy.  There are calcified left hilar granulomas.     There is no pneumothorax, pleural effusion or focal airspace  consolidation. There are a few emphysematous areas within the lungs.  There is a stable 4 mm nodule in the left upper lobe best seen on axial  image 17. This is unchanged since at least 2021. There are no new or  enlarging nodules in the lungs. Airways are patent. No significant  pleural disease.     Abdomen and pelvis: Subcutaneous fat and underlying musculature appear  within normal limits. There is stable diffuse sclerotic and lucent  metastatic disease to the bones most pronounced in the spine. No  interval pathologic fracture. No evidence of progressive skeletal  metastasis.     The liver, gallbladder, bile ducts, pancreas, spleen and adrenal glands  appear unremarkable. There are simple small bilateral kidney cysts. The  largest cyst occurs at the inferior right kidney and measures up to 34  mm diameter. No hydronephrosis. There is a large dystrophic  calcification at the left ovary. The right ovary is unremarkable.  Patient is status post hysterectomy. There is distal colonic  diverticulosis. The appendix is normal. Small bowel is nondistended. No  ascites, pneumoperitoneum or lymphadenopathy. Abdominal and pelvic  vasculature appear within normal limits.     Impression:    1. Stable widespread sclerotic metastatic disease to the bones. No  evidence of fracture.  2. No evidence of new or progressive metastatic disease.  3. No acute cardiopulmonary, abdominal or pelvic abnormality.     This report was finalized on 6/28/2022 11:17 AM by Ramon Kelly MD.                  Assessment & Plan   Radha John is a 77 y.o. female with bilateral stage I ER+ Her2 negative breast cancer, with a more recent recurrent 5 mm ER+ IDC in the left breast, s/p  repeat lumpectomy, and imaging showing widespread metastatic bone involvement.    Continue Ibrance and anastrozole.  Clinically she is doing well with no evidence of disease progression at this time.  We will plan on repeat scans prior to return in 6 weeks.  We will continue to monitor labs and tumor markers.      Mammogram scheduled for October 12, 2022.    Bone metastasis: Continue Zometa every 3 months.  Next dose due today.    Follow-up in 6 weeks.                      Rhoda Llamas APRN  [facility] Hematology and Oncology    9/23/2022          CC:

## 2022-09-24 LAB — CANCER AG27-29 SERPL-ACNC: 79.4 U/ML (ref 0–38.6)

## 2022-09-27 ENCOUNTER — TELEPHONE (OUTPATIENT)
Dept: ONCOLOGY | Facility: CLINIC | Age: 77
End: 2022-09-27

## 2022-09-27 NOTE — TELEPHONE ENCOUNTER
Patient had called Richa and asked her to send a msg about her labs.  I spoke to CYDNEY Hopper and the tumor markers had gone down and her cmp looked good.  brando had already discussed the cbc with patient on Friday and ibrance was continued with the ANC of 0.97.  I told patient that both tumor markers had come down and her cmp was stable and she verbalized understanding.

## 2022-10-09 DIAGNOSIS — K75.4 AUTOIMMUNE HEPATITIS: ICD-10-CM

## 2022-10-10 RX ORDER — BUDESONIDE 3 MG/1
CAPSULE, COATED PELLETS ORAL
Qty: 90 CAPSULE | Refills: 0 | Status: SHIPPED | OUTPATIENT
Start: 2022-10-10 | End: 2022-11-08

## 2022-10-12 ENCOUNTER — HOSPITAL ENCOUNTER (OUTPATIENT)
Dept: MAMMOGRAPHY | Facility: HOSPITAL | Age: 77
Discharge: HOME | End: 2022-10-12
Admitting: SURGERY

## 2022-10-12 ENCOUNTER — TRANSCRIBE ORDERS (OUTPATIENT)
Dept: MAMMOGRAPHY | Facility: HOSPITAL | Age: 77
End: 2022-10-12

## 2022-10-12 DIAGNOSIS — R92.8 ABNORMAL MAMMOGRAM: ICD-10-CM

## 2022-10-12 DIAGNOSIS — R92.8 ABNORMAL MAMMOGRAM: Primary | ICD-10-CM

## 2022-10-12 PROCEDURE — 77066 DX MAMMO INCL CAD BI: CPT | Performed by: RADIOLOGY

## 2022-10-12 PROCEDURE — G0279 TOMOSYNTHESIS, MAMMO: HCPCS | Performed by: RADIOLOGY

## 2022-10-12 PROCEDURE — 77066 DX MAMMO INCL CAD BI: CPT

## 2022-10-12 PROCEDURE — G0279 TOMOSYNTHESIS, MAMMO: HCPCS

## 2022-10-19 ENCOUNTER — TELEPHONE (OUTPATIENT)
Dept: ONCOLOGY | Facility: CLINIC | Age: 77
End: 2022-10-19

## 2022-10-19 RX ORDER — AZATHIOPRINE 50 MG/1
TABLET ORAL
Qty: 180 TABLET | Refills: 1 | Status: SHIPPED | OUTPATIENT
Start: 2022-10-19 | End: 2023-03-02 | Stop reason: SDUPTHER

## 2022-10-19 NOTE — TELEPHONE ENCOUNTER
PT states she has not received any messages. PT is awaiting by her phone for a return phone call. [phone]  [id number]  ~Shell

## 2022-10-19 NOTE — TELEPHONE ENCOUNTER
Caller: Radha John    Relationship: Self    Best call back number: [phone]    What is the best time to reach you: ANYTIME    CAN LEAVE VOICEMAIL IF UNABLE TO REACH     Who are you requesting to speak with (clinical staff, provider,  specific staff member): DR GRIFFIN NURSE         What was the call regarding:     SCRIPT IBRANCE, NEED TO SPEAK WITH NURSE ABOUT, HAS A SABAS THAT IS WITH THAT, AND HAS CALLED [facility]ADA TWICE IN FINANCE AND HAD NOT HEARD BACK FROM HER.  NEEDING TO SPEAK WITH NURSE ABOUT THIS, AND THE SABAS     Do you require a callback: YES

## 2022-11-01 ENCOUNTER — HOSPITAL ENCOUNTER (OUTPATIENT)
Dept: NUCLEAR MEDICINE | Facility: HOSPITAL | Age: 77
Discharge: HOME | End: 2022-11-01

## 2022-11-01 ENCOUNTER — TELEPHONE (OUTPATIENT)
Dept: GASTROENTEROLOGY | Facility: CLINIC | Age: 77
End: 2022-11-01

## 2022-11-01 ENCOUNTER — LAB (OUTPATIENT)
Dept: LAB | Facility: HOSPITAL | Age: 77
End: 2022-11-01

## 2022-11-01 ENCOUNTER — HOSPITAL ENCOUNTER (OUTPATIENT)
Dept: CT IMAGING | Facility: HOSPITAL | Age: 77
Discharge: HOME | End: 2022-11-01

## 2022-11-01 DIAGNOSIS — C79.51 BONE METASTASES: ICD-10-CM

## 2022-11-01 DIAGNOSIS — C50.911 INVASIVE DUCTAL CARCINOMA OF RIGHT BREAST: ICD-10-CM

## 2022-11-01 DIAGNOSIS — C50.912 INVASIVE DUCTAL CARCINOMA OF LEFT BREAST: ICD-10-CM

## 2022-11-01 LAB
ALBUMIN SERPL-MCNC: 3.8 G/DL (ref 3.5–5.2)
ALBUMIN/GLOB SERPL: 1.3 G/DL
ALP SERPL-CCNC: 47 U/L (ref 39–117)
ALT SERPL W P-5'-P-CCNC: 7 U/L (ref 1–33)
ANION GAP SERPL CALCULATED.3IONS-SCNC: 8 MMOL/L (ref 5–15)
AST SERPL-CCNC: 13 U/L (ref 1–32)
BASOPHILS # BLD AUTO: 0.05 10*3/MM3 (ref 0–0.2)
BASOPHILS NFR BLD AUTO: 1.8 % (ref 0–1.5)
BILIRUB SERPL-MCNC: 0.5 MG/DL (ref 0–1.2)
BUN SERPL-MCNC: 19 MG/DL (ref 8–23)
BUN/CREAT SERPL: 15.6 (ref 7–25)
CALCIUM SPEC-SCNC: 8.1 MG/DL (ref 8.6–10.5)
CANCER AG15-3 SERPL-ACNC: 82.7 U/ML
CHLORIDE SERPL-SCNC: 103 MMOL/L (ref 98–107)
CO2 SERPL-SCNC: 27 MMOL/L (ref 22–29)
CREAT SERPL-MCNC: 1.22 MG/DL (ref 0.57–1)
DEPRECATED RDW RBC AUTO: 70.3 FL (ref 37–54)
EGFRCR SERPLBLD CKD-EPI 2021: 45.8 ML/MIN/1.73
EOSINOPHIL # BLD AUTO: 0.03 10*3/MM3 (ref 0–0.4)
EOSINOPHIL NFR BLD AUTO: 1.1 % (ref 0.3–6.2)
ERYTHROCYTE [DISTWIDTH] IN BLOOD BY AUTOMATED COUNT: 15.7 % (ref 12.3–15.4)
GLOBULIN UR ELPH-MCNC: 2.9 GM/DL
GLUCOSE SERPL-MCNC: 80 MG/DL (ref 65–99)
HCT VFR BLD AUTO: 35.1 % (ref 34–46.6)
HGB BLD-MCNC: 11.7 G/DL (ref 12–15.9)
IMM GRANULOCYTES # BLD AUTO: 0.01 10*3/MM3 (ref 0–0.05)
IMM GRANULOCYTES NFR BLD AUTO: 0.4 % (ref 0–0.5)
LYMPHOCYTES # BLD AUTO: 0.89 10*3/MM3 (ref 0.7–3.1)
LYMPHOCYTES NFR BLD AUTO: 32.8 % (ref 19.6–45.3)
MCH RBC QN AUTO: 40.2 PG (ref 26.6–33)
MCHC RBC AUTO-ENTMCNC: 33.3 G/DL (ref 31.5–35.7)
MCV RBC AUTO: 120.6 FL (ref 79–97)
MONOCYTES # BLD AUTO: 0.28 10*3/MM3 (ref 0.1–0.9)
MONOCYTES NFR BLD AUTO: 10.3 % (ref 5–12)
NEUTROPHILS NFR BLD AUTO: 1.45 10*3/MM3 (ref 1.7–7)
NEUTROPHILS NFR BLD AUTO: 53.6 % (ref 42.7–76)
PLATELET # BLD AUTO: 263 10*3/MM3 (ref 140–450)
PMV BLD AUTO: 10 FL (ref 6–12)
POTASSIUM SERPL-SCNC: 4.1 MMOL/L (ref 3.5–5.2)
PROT SERPL-MCNC: 6.7 G/DL (ref 6–8.5)
RBC # BLD AUTO: 2.91 10*6/MM3 (ref 3.77–5.28)
SODIUM SERPL-SCNC: 138 MMOL/L (ref 136–145)
WBC NRBC COR # BLD: 2.71 10*3/MM3 (ref 3.4–10.8)

## 2022-11-01 PROCEDURE — 86300 IMMUNOASSAY TUMOR CA 15-3: CPT

## 2022-11-01 PROCEDURE — 25010000002 IOPAMIDOL 61 % SOLUTION: Performed by: NURSE PRACTITIONER

## 2022-11-01 PROCEDURE — 0 TECHNETIUM MEDRONATE KIT: Performed by: NURSE PRACTITIONER

## 2022-11-01 PROCEDURE — 82565 ASSAY OF CREATININE: CPT

## 2022-11-01 PROCEDURE — 85025 COMPLETE CBC W/AUTO DIFF WBC: CPT

## 2022-11-01 PROCEDURE — 78306 BONE IMAGING WHOLE BODY: CPT

## 2022-11-01 PROCEDURE — 74177 CT ABD & PELVIS W/CONTRAST: CPT

## 2022-11-01 PROCEDURE — 80053 COMPREHEN METABOLIC PANEL: CPT

## 2022-11-01 PROCEDURE — 71260 CT THORAX DX C+: CPT

## 2022-11-01 PROCEDURE — A9503 TC99M MEDRONATE: HCPCS | Performed by: NURSE PRACTITIONER

## 2022-11-01 PROCEDURE — 36415 COLL VENOUS BLD VENIPUNCTURE: CPT

## 2022-11-01 RX ORDER — TC 99M MEDRONATE 20 MG/10ML
27.4 INJECTION, POWDER, LYOPHILIZED, FOR SOLUTION INTRAVENOUS
Status: COMPLETED | OUTPATIENT
Start: 2022-11-01 | End: 2022-11-01

## 2022-11-01 RX ADMIN — Medication 27.4 MILLICURIE: at 08:50

## 2022-11-01 RX ADMIN — IOPAMIDOL 85 ML: 612 INJECTION, SOLUTION INTRAVENOUS at 09:22

## 2022-11-01 NOTE — TELEPHONE ENCOUNTER
Pt. Called and said that she had labs done today for oncologist and said that it is time for Your labs as well. She asked if you could look at her results from today and put in a order for what you need.

## 2022-11-02 LAB — CANCER AG27-29 SERPL-ACNC: 86.7 U/ML (ref 0–38.6)

## 2022-11-03 ENCOUNTER — OFFICE VISIT (OUTPATIENT)
Dept: ONCOLOGY | Facility: CLINIC | Age: 77
End: 2022-11-03

## 2022-11-03 ENCOUNTER — SPECIALTY PHARMACY (OUTPATIENT)
Dept: ONCOLOGY | Facility: HOSPITAL | Age: 77
End: 2022-11-03

## 2022-11-03 VITALS
WEIGHT: 171 LBS | HEIGHT: 60 IN | TEMPERATURE: 96.9 F | OXYGEN SATURATION: 97 % | RESPIRATION RATE: 18 BRPM | SYSTOLIC BLOOD PRESSURE: 170 MMHG | DIASTOLIC BLOOD PRESSURE: 76 MMHG | HEART RATE: 56 BPM | BODY MASS INDEX: 33.57 KG/M2

## 2022-11-03 DIAGNOSIS — C79.51 BONE METASTASES: Primary | ICD-10-CM

## 2022-11-03 PROCEDURE — 99215 OFFICE O/P EST HI 40 MIN: CPT | Performed by: INTERNAL MEDICINE

## 2022-11-03 RX ORDER — METHOCARBAMOL 500 MG/1
TABLET, FILM COATED ORAL EVERY 8 HOURS
COMMUNITY

## 2022-11-03 NOTE — PROGRESS NOTES
PROBLEM LIST:  1. Left-sided pT1bN0 ([place]), low-grade invasive ductal carcinoma diagnosed 05/01/2014. She presented with left-sided nipple discharge and an abnormality on mammogram in 04/2014. Biopsy showed invasive ductal cancer low-grade,estrogen receptor and progesterone receptor positive, HER-2 negative. Sheunderwent a left needle localization lumpectomy on 06/27/2014. Final tumor size  is 1.4 cm  a) Oncotype recurrence score was 0 which is associated with a 3% risk of 10 year recurrence.  b) Adjuvant Arimidex was started after the completion of radiotherapy in 08/2014.  c) Arimidex was discontinued in 11/2014 due to severe hot flashes. Arimidex resumed in 07/2015.  Completed 5 years of treatment.  D) recurrent left breast IDC.  Left breast lumpectomy on 9/1/21 showed 5 mm tumor, completely removed by biopsy needle, intermediate grade.  ER+ CA+ Her2 negative (pT1bNx).  Bone scan showed widespread metastatic bone disease.  CT chest abdomen and pelvis on 10/20/2021 showed extensive sclerotic changes in the sternum and thoracic spine, but no involvement of the organs.  E) started Ibrance and anastrozole 10/22/2021.   2. 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 Diagnosed on biopsy on 05/22/2014. She underwent a lumpectomy on 06/27/2014 with final tumor size being 2 mm. It was ER/CA positive with HER-2 not performed.   3. Hypothyroidism.   4. Diabetes.   5. Hypertension.   6. Hyperlipidemia.   7. Autoimmune hepatitis.  8. Osteopenia, received zometa x 4 doses, completed February 2018.  9. Erythrocytosis    Subjective     CHIEF COMPLAINT: breast cancer    HISTORY OF PRESENT ILLNESS:   Radha John returns for follow-up.  She continues on Ibrance and anastrozole.  She says she is feeling okay.  No new bone pain.  She has had some hair thinning but is otherwise tolerating the Ibrance pretty well.    Objective      /76   Pulse 56   Temp 96.9 °F (36.1 °C) (Temporal)    "Resp 18   Ht 152.4 cm (60\")   Wt 77.6 kg (171 lb)   SpO2 97%   BMI 33.40 kg/m²    Vitals:    11/03/22 1004   PainSc: 0-No pain               ECOG score: 1             General: well appearing female in no acute distress  Neuro: alert and oriented  HEENT: sclera anicteric, oropharynx clear  Cardiovascular: Regular rate and rhythm  Lungs: Clear to auscultation bilaterally  Extremeties: No lower extremity edema.    Skin: no rashes, lesions, bruising, or petechiae  Psych: mood and affect appropriate        RECENT LABS:  Lab Results   Component Value Date    WBC 2.71 (L) 11/01/2022    HGB 11.7 (L) 11/01/2022    HCT 35.1 11/01/2022    .6 (H) 11/01/2022     11/01/2022       Lab Results   Component Value Date    GLUCOSE 80 11/01/2022    BUN 19 11/01/2022    CREATININE 1.22 (H) 11/01/2022    EGFRIFAFRI 51 (L) 02/15/2022    BCR 15.6 11/01/2022    K 4.1 11/01/2022    CO2 27.0 11/01/2022    CALCIUM 8.1 (L) 11/01/2022    PROTENTOTREF 6.8 03/22/2021    ALBUMIN 3.80 11/01/2022    AST 13 11/01/2022    ALT 7 11/01/2022       NM Bone Scan Whole Body  Narrative: DATE OF EXAM: 11/1/2022 8:42 AM     PROCEDURE: NM BONE SCAN WHOLE BODY-     INDICATIONS: breast cancer with mets evaluation of treatment;  C79.51-Secondary malignant neoplasm of bone; C50.911-Malignant neoplasm  of unspecified site of right female breast; C50.912-Malignant neoplasm  of unspecified site of left female breast     COMPARISON: 6/20/2022     TECHNIQUE: The patient received 27.4 mCi of technetium 99m MDP  intravenously and 3.5 hour delayed anterior and posterior whole body  bone images were obtained.     FINDINGS:  Again seen are multiple foci of abnormal uptake within the skull and  maxilla.  There are also foci of abnormal uptake within the left femoral  diaphysis and sternum.  Patchy uptake is seen throughout the ribs.   Patchy uptake is again noted throughout the thoracic and lumbar spine as  well as the right sacral ala Findings would be " compatible with  metastatic disease all of which appears similar to prior study.      Impression:    1.  No significant change in diffuse bony metastatic disease throughout  the axial and appendicular skeleton.     This report was finalized on 11/1/2022 4:42 PM by Zain Swann MD.     CT Chest With Contrast Diagnostic, CT Abdomen Pelvis With Contrast  Narrative: DATE OF EXAM: 11/1/2022 9:08 AM     PROCEDURE: CT CHEST W CONTRAST DIAGNOSTIC-, CT ABDOMEN PELVIS W  CONTRAST-     INDICATIONS: breast cancer with mets evaluation of treatment;  C79.51-Secondary malignant neoplasm of bone; C50.911-Malignant neoplasm  of unspecified site of right female breast; C50.912-Malignant neoplasm  of unspecified site of left female breast     COMPARISON: 6/28/2022     TECHNIQUE: Routine transaxial slices were obtained through the chest,  abdomen and pelvis after the intravenous administration of 85 mL of  Isovue 300. Reconstructed coronal and sagittal images were also  obtained. Automated exposure control and iterative construction methods  were used.     The radiation dose reduction device was turned on for each scan per the  ALARA (As Low as Reasonably Achievable) protocol.     FINDINGS:   CT chest: There is no pathologic axillary adenopathy. Irregular areas of  prominent sclerotic calcification throughout the breast soft tissues are  similar to comparison, without definite new soft tissue mass  appreciated. There is no pleural or pericardial effusion. There is no  new pathologic mediastinal or hilar lymphadenopathy. Nonaneurysmal  thoracic aorta. Evaluation of the lung fields demonstrates no evidence  of acute infectious process or new suspicious pulmonary nodularity.  Evaluation of the osseous structures redemonstrates evidence of diffuse  sclerotic metastatic involvement, similar to comparison and without  evidence of new pathologic fracture or definite extraosseous soft tissue  involvement.     CT abdomen pelvis: The body wall  soft tissues demonstrate no acute  finding. Evaluation of the osseous structures redemonstrates diffuse  sclerotic metastatic involvement, without evidence of new pathologic  fracture or definite new extraosseous soft tissue involvement. The  liver, spleen, pancreas and bilateral adrenal glands demonstrate  homogeneous enhancement without evidence of suspicious focal lesion. The  kidneys appear unchanged with multiple simple appearing cysts noted.  Small and large bowel loops are nondilated. The appendix is normal.  There is no new suspicious focal bowel wall thickening. There is no free  fluid or pneumoperitoneum. No new bulky retroperitoneal lymphadenopathy.  The pelvic viscera demonstrate no acute findings. Nonaneurysmal  abdominal aorta. Normal gallbladder.     Impression: Stable CT appearance of the chest, abdomen and pelvis including evidence  of diffuse sclerotic osseous metastatic involvement. There is no  evidence of new pathologic fracture or extraosseous soft tissue  involvement. There is no evidence of new metastatic disease otherwise.  No acute findings are otherwise present.     This report was finalized on 11/1/2022 11:00 AM by Armand Dailey.           NM Bone Scan Whole Body  Narrative: DATE OF EXAM: 11/1/2022 8:42 AM     PROCEDURE: NM BONE SCAN WHOLE BODY-     INDICATIONS: breast cancer with mets evaluation of treatment;  C79.51-Secondary malignant neoplasm of bone; C50.911-Malignant neoplasm  of unspecified site of right female breast; C50.912-Malignant neoplasm  of unspecified site of left female breast     COMPARISON: 6/20/2022     TECHNIQUE: The patient received 27.4 mCi of technetium 99m MDP  intravenously and 3.5 hour delayed anterior and posterior whole body  bone images were obtained.     FINDINGS:  Again seen are multiple foci of abnormal uptake within the skull and  maxilla.  There are also foci of abnormal uptake within the left femoral  diaphysis and sternum.  Patchy uptake is seen  throughout the ribs.   Patchy uptake is again noted throughout the thoracic and lumbar spine as  well as the right sacral ala Findings would be compatible with  metastatic disease all of which appears similar to prior study.      Impression:    1.  No significant change in diffuse bony metastatic disease throughout  the axial and appendicular skeleton.     This report was finalized on 11/1/2022 4:42 PM by Zain Swann MD.     CT Chest With Contrast Diagnostic, CT Abdomen Pelvis With Contrast  Narrative: DATE OF EXAM: 11/1/2022 9:08 AM     PROCEDURE: CT CHEST W CONTRAST DIAGNOSTIC-, CT ABDOMEN PELVIS W  CONTRAST-     INDICATIONS: breast cancer with mets evaluation of treatment;  C79.51-Secondary malignant neoplasm of bone; C50.911-Malignant neoplasm  of unspecified site of right female breast; C50.912-Malignant neoplasm  of unspecified site of left female breast     COMPARISON: 6/28/2022     TECHNIQUE: Routine transaxial slices were obtained through the chest,  abdomen and pelvis after the intravenous administration of 85 mL of  Isovue 300. Reconstructed coronal and sagittal images were also  obtained. Automated exposure control and iterative construction methods  were used.     The radiation dose reduction device was turned on for each scan per the  ALARA (As Low as Reasonably Achievable) protocol.     FINDINGS:   CT chest: There is no pathologic axillary adenopathy. Irregular areas of  prominent sclerotic calcification throughout the breast soft tissues are  similar to comparison, without definite new soft tissue mass  appreciated. There is no pleural or pericardial effusion. There is no  new pathologic mediastinal or hilar lymphadenopathy. Nonaneurysmal  thoracic aorta. Evaluation of the lung fields demonstrates no evidence  of acute infectious process or new suspicious pulmonary nodularity.  Evaluation of the osseous structures redemonstrates evidence of diffuse  sclerotic metastatic involvement, similar to  comparison and without  evidence of new pathologic fracture or definite extraosseous soft tissue  involvement.     CT abdomen pelvis: The body wall soft tissues demonstrate no acute  finding. Evaluation of the osseous structures redemonstrates diffuse  sclerotic metastatic involvement, without evidence of new pathologic  fracture or definite new extraosseous soft tissue involvement. The  liver, spleen, pancreas and bilateral adrenal glands demonstrate  homogeneous enhancement without evidence of suspicious focal lesion. The  kidneys appear unchanged with multiple simple appearing cysts noted.  Small and large bowel loops are nondilated. The appendix is normal.  There is no new suspicious focal bowel wall thickening. There is no free  fluid or pneumoperitoneum. No new bulky retroperitoneal lymphadenopathy.  The pelvic viscera demonstrate no acute findings. Nonaneurysmal  abdominal aorta. Normal gallbladder.     Impression: Stable CT appearance of the chest, abdomen and pelvis including evidence  of diffuse sclerotic osseous metastatic involvement. There is no  evidence of new pathologic fracture or extraosseous soft tissue  involvement. There is no evidence of new metastatic disease otherwise.  No acute findings are otherwise present.     This report was finalized on 11/1/2022 11:00 AM by Armand Dailey.       I personally reviewed the imaging studies.       Assessment & Plan   Radha John is a 77 y.o. female with bilateral stage I ER+ Her2 negative breast cancer, with a more recent recurrent 5 mm ER+ IDC in the left breast, s/p repeat lumpectomy, and imaging showing widespread metastatic bone involvement.    Continue Ibrance and anastrozole.  She has no signs of disease progression on imaging.  She has had a slight increase in her tumor markers.  This is something that we will continue to monitor but as long as she symptomatically and radiographically remained stable I would not recommend changing treatment.   Tentative plan to repeat scans in about 4 months.    She had a mammogram done and a biopsy is scheduled.  We discussed that in the setting of metastatic breast cancer it is an option to discontinue mammograph screening, except in the setting of palpable or noticeable changes in the breast.  She plans to proceed with a biopsy but will think about this.    Bone metastasis: Continue Zometa every 3 months.  Next dose due in December.    Follow-up in 6 weeks.          Total time of patient care on day of service including time prior to, face to face with patient, and following visit spent in reviewing records, lab results, imaging studies, discussion with patient, and documentation/charting was > 40 minutes.                Sarah Prasad MD  [facility]    11/3/2022          CC:

## 2022-11-08 DIAGNOSIS — K75.4 AUTOIMMUNE HEPATITIS: ICD-10-CM

## 2022-11-08 RX ORDER — BUDESONIDE 3 MG/1
CAPSULE, COATED PELLETS ORAL
Qty: 90 CAPSULE | Refills: 0 | Status: SHIPPED | OUTPATIENT
Start: 2022-11-08 | End: 2022-12-01

## 2022-11-09 ENCOUNTER — HOSPITAL ENCOUNTER (OUTPATIENT)
Dept: MAMMOGRAPHY | Facility: HOSPITAL | Age: 77
Discharge: HOME | End: 2022-11-09

## 2022-11-09 DIAGNOSIS — R92.8 ABNORMAL MAMMOGRAM: ICD-10-CM

## 2022-11-09 PROCEDURE — 19081 BX BREAST 1ST LESION STRTCTC: CPT | Performed by: RADIOLOGY

## 2022-11-09 PROCEDURE — A4648 IMPLANTABLE TISSUE MARKER: HCPCS

## 2022-11-09 PROCEDURE — 76098 X-RAY EXAM SURGICAL SPECIMEN: CPT

## 2022-11-09 PROCEDURE — 88305 TISSUE EXAM BY PATHOLOGIST: CPT | Performed by: SURGERY

## 2022-11-09 PROCEDURE — 77065 DX MAMMO INCL CAD UNI: CPT | Performed by: RADIOLOGY

## 2022-11-09 RX ORDER — LIDOCAINE HYDROCHLORIDE 20 MG/ML
2.5 INJECTION, SOLUTION INFILTRATION; PERINEURAL ONCE
Status: COMPLETED | OUTPATIENT
Start: 2022-11-09 | End: 2022-11-09

## 2022-11-09 RX ORDER — LIDOCAINE HYDROCHLORIDE AND EPINEPHRINE 20; 5 MG/ML; UG/ML
5 INJECTION, SOLUTION EPIDURAL; INFILTRATION; INTRACAUDAL; PERINEURAL ONCE
Status: COMPLETED | OUTPATIENT
Start: 2022-11-09 | End: 2022-11-09

## 2022-11-09 RX ADMIN — LIDOCAINE HYDROCHLORIDE 1.5 ML: 20 INJECTION, SOLUTION INFILTRATION; PERINEURAL at 11:12

## 2022-11-09 RX ADMIN — LIDOCAINE HYDROCHLORIDE,EPINEPHRINE BITARTRATE 4 ML: 20; .005 INJECTION, SOLUTION EPIDURAL; INFILTRATION; INTRACAUDAL; PERINEURAL at 11:12

## 2022-11-09 NOTE — PROGRESS NOTES
Alert and orientated.  Denies discomfort, no active bleeding, steri-strips not visualized, gauze dressing intact. Ice pack given. Verbalizes and demonstrates understanding of post-care instructions, written copy given.

## 2022-11-10 ENCOUNTER — TRANSCRIBE ORDERS (OUTPATIENT)
Dept: ADMINISTRATIVE | Facility: HOSPITAL | Age: 77
End: 2022-11-10

## 2022-11-10 ENCOUNTER — TELEPHONE (OUTPATIENT)
Dept: MAMMOGRAPHY | Facility: HOSPITAL | Age: 77
End: 2022-11-10

## 2022-11-10 DIAGNOSIS — Z12.31 VISIT FOR SCREENING MAMMOGRAM: Primary | ICD-10-CM

## 2022-11-10 NOTE — TELEPHONE ENCOUNTER
Patient notified of pathology results and recommendation. Verbalizes understanding. Denies discomfort. Denies signs and symptoms of infection. Questions answered, verbalized understanding. Patient states she will call back to schedule an appointment.

## 2022-11-11 NOTE — TELEPHONE ENCOUNTER
I talked with patient and let her know that the labs were improved from a few months ago.  She was interested in her tumor markers and both of those have decreased in response to treatment.  Patient verbalized understanding.     Acitretin Counseling:  I discussed with the patient the risks of acitretin including but not limited to hair loss, dry lips/skin/eyes, liver damage, hyperlipidemia, depression/suicidal ideation, photosensitivity.  Serious rare side effects can include but are not limited to pancreatitis, pseudotumor cerebri, bony changes, clot formation/stroke/heart attack.  Patient understands that alcohol is contraindicated since it can result in liver toxicity and significantly prolong the elimination of the drug by many years.

## 2022-11-29 LAB — CREAT BLDA-MCNC: 1.3 MG/DL (ref 0.6–1.3)

## 2022-12-01 DIAGNOSIS — K75.4 AUTOIMMUNE HEPATITIS: ICD-10-CM

## 2022-12-01 RX ORDER — BUDESONIDE 3 MG/1
CAPSULE, COATED PELLETS ORAL
Qty: 90 CAPSULE | Refills: 0 | Status: SHIPPED | OUTPATIENT
Start: 2022-12-01 | End: 2023-01-06 | Stop reason: SDUPTHER

## 2022-12-13 DIAGNOSIS — C79.51 BONE METASTASES: ICD-10-CM

## 2022-12-13 DIAGNOSIS — C50.911 INVASIVE DUCTAL CARCINOMA OF RIGHT BREAST: ICD-10-CM

## 2022-12-15 ENCOUNTER — LAB (OUTPATIENT)
Dept: LAB | Facility: HOSPITAL | Age: 77
End: 2022-12-15

## 2022-12-15 DIAGNOSIS — C79.51 BONE METASTASES: ICD-10-CM

## 2022-12-15 DIAGNOSIS — C50.911 INVASIVE DUCTAL CARCINOMA OF RIGHT BREAST: ICD-10-CM

## 2022-12-15 DIAGNOSIS — C50.911 INVASIVE DUCTAL CARCINOMA OF RIGHT BREAST: Primary | ICD-10-CM

## 2022-12-15 LAB
ALBUMIN SERPL-MCNC: 3.7 G/DL (ref 3.5–5.2)
ALBUMIN/GLOB SERPL: 1.3 G/DL
ALP SERPL-CCNC: 41 U/L (ref 39–117)
ALT SERPL W P-5'-P-CCNC: 10 U/L (ref 1–33)
ANION GAP SERPL CALCULATED.3IONS-SCNC: 9 MMOL/L (ref 5–15)
AST SERPL-CCNC: 13 U/L (ref 1–32)
BASOPHILS # BLD AUTO: 0.04 10*3/MM3 (ref 0–0.2)
BASOPHILS NFR BLD AUTO: 1.9 % (ref 0–1.5)
BILIRUB SERPL-MCNC: 0.4 MG/DL (ref 0–1.2)
BUN SERPL-MCNC: 16 MG/DL (ref 8–23)
BUN/CREAT SERPL: 15.2 (ref 7–25)
CALCIUM SPEC-SCNC: 8.4 MG/DL (ref 8.6–10.5)
CANCER AG15-3 SERPL-ACNC: 71.3 U/ML
CHLORIDE SERPL-SCNC: 106 MMOL/L (ref 98–107)
CO2 SERPL-SCNC: 28 MMOL/L (ref 22–29)
CREAT SERPL-MCNC: 1.05 MG/DL (ref 0.57–1)
DEPRECATED RDW RBC AUTO: 71.8 FL (ref 37–54)
EGFRCR SERPLBLD CKD-EPI 2021: 54.8 ML/MIN/1.73
EOSINOPHIL # BLD AUTO: 0.03 10*3/MM3 (ref 0–0.4)
EOSINOPHIL NFR BLD AUTO: 1.4 % (ref 0.3–6.2)
ERYTHROCYTE [DISTWIDTH] IN BLOOD BY AUTOMATED COUNT: 16.1 % (ref 12.3–15.4)
GLOBULIN UR ELPH-MCNC: 2.8 GM/DL
GLUCOSE SERPL-MCNC: 88 MG/DL (ref 65–99)
HCT VFR BLD AUTO: 31.9 % (ref 34–46.6)
HGB BLD-MCNC: 10.6 G/DL (ref 12–15.9)
IMM GRANULOCYTES # BLD AUTO: 0.01 10*3/MM3 (ref 0–0.05)
IMM GRANULOCYTES NFR BLD AUTO: 0.5 % (ref 0–0.5)
LYMPHOCYTES # BLD AUTO: 0.93 10*3/MM3 (ref 0.7–3.1)
LYMPHOCYTES NFR BLD AUTO: 44.5 % (ref 19.6–45.3)
MCH RBC QN AUTO: 40.5 PG (ref 26.6–33)
MCHC RBC AUTO-ENTMCNC: 33.2 G/DL (ref 31.5–35.7)
MCV RBC AUTO: 121.8 FL (ref 79–97)
MONOCYTES # BLD AUTO: 0.21 10*3/MM3 (ref 0.1–0.9)
MONOCYTES NFR BLD AUTO: 10 % (ref 5–12)
NEUTROPHILS NFR BLD AUTO: 0.87 10*3/MM3 (ref 1.7–7)
NEUTROPHILS NFR BLD AUTO: 41.7 % (ref 42.7–76)
PLATELET # BLD AUTO: 129 10*3/MM3 (ref 140–450)
PMV BLD AUTO: 11.1 FL (ref 6–12)
POTASSIUM SERPL-SCNC: 3.8 MMOL/L (ref 3.5–5.2)
PROT SERPL-MCNC: 6.5 G/DL (ref 6–8.5)
RBC # BLD AUTO: 2.62 10*6/MM3 (ref 3.77–5.28)
SODIUM SERPL-SCNC: 143 MMOL/L (ref 136–145)
WBC NRBC COR # BLD: 2.09 10*3/MM3 (ref 3.4–10.8)

## 2022-12-15 PROCEDURE — 85025 COMPLETE CBC W/AUTO DIFF WBC: CPT

## 2022-12-15 PROCEDURE — 86300 IMMUNOASSAY TUMOR CA 15-3: CPT

## 2022-12-15 PROCEDURE — 80053 COMPREHEN METABOLIC PANEL: CPT

## 2022-12-15 PROCEDURE — 36415 COLL VENOUS BLD VENIPUNCTURE: CPT

## 2022-12-16 ENCOUNTER — OFFICE VISIT (OUTPATIENT)
Dept: ONCOLOGY | Facility: CLINIC | Age: 77
End: 2022-12-16

## 2022-12-16 ENCOUNTER — HOSPITAL ENCOUNTER (OUTPATIENT)
Dept: ONCOLOGY | Facility: HOSPITAL | Age: 77
Setting detail: INFUSION SERIES
Discharge: HOME | End: 2022-12-16
Payer: MEDICARE

## 2022-12-16 ENCOUNTER — APPOINTMENT (OUTPATIENT)
Dept: ONCOLOGY | Facility: HOSPITAL | Age: 77
End: 2022-12-16
Payer: MEDICARE

## 2022-12-16 ENCOUNTER — SPECIALTY PHARMACY (OUTPATIENT)
Dept: ONCOLOGY | Facility: HOSPITAL | Age: 77
End: 2022-12-16

## 2022-12-16 VITALS
TEMPERATURE: 97.6 F | SYSTOLIC BLOOD PRESSURE: 174 MMHG | WEIGHT: 172 LBS | DIASTOLIC BLOOD PRESSURE: 71 MMHG | HEART RATE: 67 BPM | OXYGEN SATURATION: 93 % | HEIGHT: 60 IN | BODY MASS INDEX: 33.77 KG/M2 | RESPIRATION RATE: 18 BRPM

## 2022-12-16 DIAGNOSIS — C50.912 INVASIVE DUCTAL CARCINOMA OF LEFT BREAST: ICD-10-CM

## 2022-12-16 DIAGNOSIS — C50.911 INVASIVE DUCTAL CARCINOMA OF RIGHT BREAST: Primary | ICD-10-CM

## 2022-12-16 DIAGNOSIS — C79.51 BONE METASTASES: Primary | ICD-10-CM

## 2022-12-16 DIAGNOSIS — C50.911 INVASIVE DUCTAL CARCINOMA OF RIGHT BREAST: ICD-10-CM

## 2022-12-16 DIAGNOSIS — C79.51 BONE METASTASES: ICD-10-CM

## 2022-12-16 LAB
ALBUMIN SERPL-MCNC: 3.6 G/DL (ref 3.5–5.2)
ALBUMIN/GLOB SERPL: 1.3 G/DL
ALP SERPL-CCNC: 37 U/L (ref 39–117)
ALT SERPL W P-5'-P-CCNC: 10 U/L (ref 1–33)
ANION GAP SERPL CALCULATED.3IONS-SCNC: 6 MMOL/L (ref 5–15)
AST SERPL-CCNC: 17 U/L (ref 1–32)
BILIRUB SERPL-MCNC: 0.3 MG/DL (ref 0–1.2)
BUN SERPL-MCNC: 16 MG/DL (ref 8–23)
BUN/CREAT SERPL: 18 (ref 7–25)
CALCIUM SPEC-SCNC: 8.6 MG/DL (ref 8.6–10.5)
CANCER AG27-29 SERPL-ACNC: 96.2 U/ML (ref 0–38.6)
CHLORIDE SERPL-SCNC: 107 MMOL/L (ref 98–107)
CO2 SERPL-SCNC: 28 MMOL/L (ref 22–29)
CREAT SERPL-MCNC: 0.89 MG/DL (ref 0.57–1)
EGFRCR SERPLBLD CKD-EPI 2021: 66.9 ML/MIN/1.73
GLOBULIN UR ELPH-MCNC: 2.7 GM/DL
GLUCOSE SERPL-MCNC: 89 MG/DL (ref 65–99)
MAGNESIUM SERPL-MCNC: 1.9 MG/DL (ref 1.6–2.4)
PHOSPHATE SERPL-MCNC: 3.4 MG/DL (ref 2.5–4.5)
POTASSIUM SERPL-SCNC: 4.2 MMOL/L (ref 3.5–5.2)
PROT SERPL-MCNC: 6.3 G/DL (ref 6–8.5)
SODIUM SERPL-SCNC: 141 MMOL/L (ref 136–145)

## 2022-12-16 PROCEDURE — 96365 THER/PROPH/DIAG IV INF INIT: CPT

## 2022-12-16 PROCEDURE — 99214 OFFICE O/P EST MOD 30 MIN: CPT | Performed by: NURSE PRACTITIONER

## 2022-12-16 PROCEDURE — 80053 COMPREHEN METABOLIC PANEL: CPT | Performed by: NURSE PRACTITIONER

## 2022-12-16 PROCEDURE — 96374 THER/PROPH/DIAG INJ IV PUSH: CPT

## 2022-12-16 PROCEDURE — 84100 ASSAY OF PHOSPHORUS: CPT | Performed by: NURSE PRACTITIONER

## 2022-12-16 PROCEDURE — 25010000002 ZOLEDRONIC ACID PER 1 MG: Performed by: NURSE PRACTITIONER

## 2022-12-16 PROCEDURE — 83735 ASSAY OF MAGNESIUM: CPT | Performed by: NURSE PRACTITIONER

## 2022-12-16 RX ORDER — SODIUM CHLORIDE 9 MG/ML
250 INJECTION, SOLUTION INTRAVENOUS ONCE
Status: DISCONTINUED | OUTPATIENT
Start: 2022-12-16 | End: 2022-12-17 | Stop reason: HOSPADM

## 2022-12-16 RX ORDER — SODIUM CHLORIDE 9 MG/ML
250 INJECTION, SOLUTION INTRAVENOUS ONCE
Status: CANCELLED | OUTPATIENT
Start: 2022-12-16

## 2022-12-16 RX ADMIN — ZOLEDRONIC ACID 3.5 MG: 4 INJECTION, SOLUTION, CONCENTRATE INTRAVENOUS at 09:47

## 2022-12-16 NOTE — PROGRESS NOTES
PROBLEM LIST:  1. Left-sided pT1bN0 (IA), low-grade invasive ductal carcinoma diagnosed 05/01/2014. She presented with left-sided nipple discharge and an abnormality on mammogram in 04/2014. Biopsy showed invasive ductal cancer low-grade,estrogen receptor and progesterone receptor positive, HER-2 negative. Sheunderwent a left needle localization lumpectomy on 06/27/2014. Final tumor size  is 1.4 cm  a) Oncotype recurrence score was 0 which is associated with a 3% risk of 10 year recurrence.  b) Adjuvant Arimidex was started after the completion of radiotherapy in 08/2014.  c) Arimidex was discontinued in 11/2014 due to severe hot flashes. Arimidex resumed in 07/2015.  Completed 5 years of treatment.  D) recurrent left breast IDC.  Left breast lumpectomy on 9/1/21 showed 5 mm tumor, completely removed by biopsy needle, intermediate grade.  ER+ WA+ Her2 negative (pT1bNx).  Bone scan showed widespread metastatic bone disease.  CT chest abdomen and pelvis on 10/20/2021 showed extensive sclerotic changes in the sternum and thoracic spine, but no involvement of the organs.  E) started Ibrance and anastrozole 10/22/2021.   2. 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 Diagnosed on biopsy on 05/22/2014. She underwent a lumpectomy on 06/27/2014 with final tumor size being 2 mm. It was ER/WA positive with HER-2 not performed.   3. Hypothyroidism.   4. Diabetes.   5. Hypertension.   6. Hyperlipidemia.   7. Autoimmune hepatitis.  8. Osteopenia, received zometa x 4 doses, completed February 2018.  9. Erythrocytosis    Subjective     CHIEF COMPLAINT: breast cancer    HISTORY OF PRESENT ILLNESS:   Radha John returns for follow-up.  She continues on Ibrance and anastrozole.  This is her off week of Ibrance.  She is due to restart Ibrance tomorrow.  Overall she is doing okay.  She has some hair thinning.  During her off week she has some intermittent left thigh pain that is controlled with  "ibuprofen.  Otherwise she denies any pain.  No cough, dyspnea, headaches or diplopia.      Objective      /71   Pulse 67   Temp 97.6 °F (36.4 °C)   Resp 18   Ht 152.4 cm (60\")   Wt 78 kg (172 lb)   SpO2 93%   BMI 33.59 kg/m²    Vitals:    12/16/22 0817   PainSc: 0-No pain               ECOG score: 1             General: well appearing female in no acute distress  Neuro: alert and oriented  HEENT: sclera anicteric, oropharynx clear  Cardiovascular: Regular rate and rhythm  Lungs: Clear to auscultation bilaterally  Extremeties: No lower extremity edema.    Skin: no rashes, lesions, bruising, or petechiae  Psych: mood and affect appropriate        RECENT LABS:  Lab Results   Component Value Date    WBC 2.09 (L) 12/15/2022    HGB 10.6 (L) 12/15/2022    HCT 31.9 (L) 12/15/2022    .8 (H) 12/15/2022     (L) 12/15/2022       Lab Results   Component Value Date    GLUCOSE 88 12/15/2022    BUN 16 12/15/2022    CREATININE 1.05 (H) 12/15/2022    EGFRIFAFRI 51 (L) 02/15/2022    BCR 15.2 12/15/2022    K 3.8 12/15/2022    CO2 28.0 12/15/2022    CALCIUM 8.4 (L) 12/15/2022    PROTENTOTREF 6.8 03/22/2021    ALBUMIN 3.70 12/15/2022    AST 13 12/15/2022    ALT 10 12/15/2022       Mammo Stereotactic Breast Biopsy Initial With & Without Device, Mammo Post Clip Placement Left, Mammo Breast Specimen  10G SENO-RX VACUUM ASSISTED STEREOTACTIC/TOMOSYNTHESIS GUIDED BIOPSY:   AFFIRM     HISTORY: Increasing calcifications associated with the patient's  lumpectomy bed     PROCEDURE:  Written and verbal consent was obtained for stereotactic  biopsy/tomosynthesis guided biopsy of the calcifications associated with  the patient's lumpectomy bed.  \"Time-out\" was observed to verify  patient's identity and correct location of the breast abnormality.  The  breast was sterilized with chloraprep and 2 % lidocaine with and without  epinephrine was utilized for local anesthesia.  A small skin incision  was made with a scalpel and a " 10 gauge Seno-Rx biopsy probe was advanced  into the breast.  The position ot the needle was confirmed with  tomosyntheis/stereotactic images.  3 core samples were obtained at the  biopsy site.  A specimen radiograph was obtained. [The specimen  radiograph confirmed the presence of calcifications.]  A post biopsy  marking clip was placed. It was thought that the first clip did not  deploy therefore the second clip was deployed. Routine left CC and ML  mammographic images were obtained to document clip position and post  biopsy changes. These images revealed appropriate clip placement of the  more inferior clip..      Upon completion of the procedure, compression was applied to the biopsy  site until all appreciable bleeding subsided and a sterile dressing was  applied. Post biopsy instructions were reviewed with the patient by our  clinical breast imaging staff. A written copy of these instructions was  also given to the patient. The patient tolerated the procedure well and  no immediate complications occurred.     SUMMARY:  10 gauge stereotactic/tomosynthesis guided vacuum assisted  core biopsy of suspicious microcalcifications located in the left lower  inner quadrant.  A marking clip was placed at the biopsy site.      PATHOLOGY:  Pathology demonstrated organizing fat necrosis with  associated calcification, benign adipose and skeletal muscle tissue. No  breast parenchymal identified.     This is considered concordant with imaging findings.     RECOMMENDATION: The patient may return to routine annual screening  mammography.      The patient will be called with final biopsy results and recommendations  by our breast care nurse.     This report was finalized on 11/10/2022 1:15 PM by Macey Key MD.           Mammo Stereotactic Breast Biopsy Initial With & Without Device, Mammo Post Clip Placement Left, Mammo Breast Specimen  10G SENO-RX VACUUM ASSISTED STEREOTACTIC/TOMOSYNTHESIS GUIDED BIOPSY:   AFFIRM    "  HISTORY: Increasing calcifications associated with the patient's  lumpectomy bed     PROCEDURE:  Written and verbal consent was obtained for stereotactic  biopsy/tomosynthesis guided biopsy of the calcifications associated with  the patient's lumpectomy bed.  \"Time-out\" was observed to verify  patient's identity and correct location of the breast abnormality.  The  breast was sterilized with chloraprep and 2 % lidocaine with and without  epinephrine was utilized for local anesthesia.  A small skin incision  was made with a scalpel and a 10 gauge Seno-Rx biopsy probe was advanced  into the breast.  The position ot the needle was confirmed with  tomosyntheis/stereotactic images.  3 core samples were obtained at the  biopsy site.  A specimen radiograph was obtained. [The specimen  radiograph confirmed the presence of calcifications.]  A post biopsy  marking clip was placed. It was thought that the first clip did not  deploy therefore the second clip was deployed. Routine left CC and ML  mammographic images were obtained to document clip position and post  biopsy changes. These images revealed appropriate clip placement of the  more inferior clip..      Upon completion of the procedure, compression was applied to the biopsy  site until all appreciable bleeding subsided and a sterile dressing was  applied. Post biopsy instructions were reviewed with the patient by our  clinical breast imaging staff. A written copy of these instructions was  also given to the patient. The patient tolerated the procedure well and  no immediate complications occurred.     SUMMARY:  10 gauge stereotactic/tomosynthesis guided vacuum assisted  core biopsy of suspicious microcalcifications located in the left lower  inner quadrant.  A marking clip was placed at the biopsy site.      PATHOLOGY:  Pathology demonstrated organizing fat necrosis with  associated calcification, benign adipose and skeletal muscle tissue. No  breast parenchymal " identified.     This is considered concordant with imaging findings.     RECOMMENDATION: The patient may return to routine annual screening  mammography.      The patient will be called with final biopsy results and recommendations  by our breast care nurse.     This report was finalized on 11/10/2022 1:15 PM by Macey Key MD.              Assessment & Plan   Radha John is a 77 y.o. female with bilateral stage I ER+ Her2 negative breast cancer, with a more recent recurrent 5 mm ER+ IDC in the left breast, s/p repeat lumpectomy, and imaging showing widespread metastatic bone involvement.    Continue Ibrance and anastrozole.  She has no signs of disease progression on imaging from November 2022.  She has had a slight increase in her tumor markers.  This is something that we will continue to monitor but as long as she symptomatically and radiographically remained stable I would not recommend changing treatment.  We will plan on repeat scans in March.  The scans can be ordered at her next visit.    Labs from 12/15/2022 were reviewed.  Her ANC is low at 870.  I will have her hold her Ibrance for 1 week and restart on 12/24/2022.  Remaining labs stable.    Left breast biopsy on 11/9/2022 showed fat necrosis with associated calcification. Benign adipose and skeletal muscle tissue.  No breast parenchyma identified.  She can return to annual screening mammogram.  Mammogram scheduled for 10/16/2023.    Bone metastasis: Continue Zometa every 3 months.  Next dose due today.    Follow-up in 6 weeks.                Rhoda Llamas APRN  [facility]    12/16/2022          CC:

## 2023-01-01 ENCOUNTER — APPOINTMENT (OUTPATIENT)
Dept: GENERAL RADIOLOGY | Facility: HOSPITAL | Age: 78
DRG: 871 | End: 2023-01-01
Payer: MEDICARE

## 2023-01-01 ENCOUNTER — APPOINTMENT (OUTPATIENT)
Dept: CT IMAGING | Facility: HOSPITAL | Age: 78
DRG: 871 | End: 2023-01-01
Payer: MEDICARE

## 2023-01-01 ENCOUNTER — APPOINTMENT (OUTPATIENT)
Dept: CARDIOLOGY | Facility: HOSPITAL | Age: 78
DRG: 871 | End: 2023-01-01
Payer: MEDICARE

## 2023-01-01 ENCOUNTER — TELEPHONE (OUTPATIENT)
Dept: INFUSION THERAPY | Facility: HOSPITAL | Age: 78
End: 2023-01-01

## 2023-01-01 ENCOUNTER — SPECIALTY PHARMACY (OUTPATIENT)
Dept: ONCOLOGY | Facility: HOSPITAL | Age: 78
End: 2023-01-01
Payer: MEDICARE

## 2023-01-01 ENCOUNTER — HOSPITAL ENCOUNTER (OUTPATIENT)
Dept: ONCOLOGY | Facility: HOSPITAL | Age: 78
Discharge: HOME | End: 2023-09-18
Admitting: NURSE PRACTITIONER
Payer: MEDICARE

## 2023-01-01 ENCOUNTER — TELEPHONE (OUTPATIENT)
Dept: ONCOLOGY | Facility: CLINIC | Age: 78
End: 2023-01-01
Payer: MEDICARE

## 2023-01-01 ENCOUNTER — HOSPITAL ENCOUNTER (INPATIENT)
Facility: HOSPITAL | Age: 78
LOS: 4 days | DRG: 871 | End: 2023-09-23
Attending: EMERGENCY MEDICINE | Admitting: INTERNAL MEDICINE
Payer: MEDICARE

## 2023-01-01 ENCOUNTER — TELEPHONE (OUTPATIENT)
Dept: ONCOLOGY | Facility: CLINIC | Age: 78
End: 2023-01-01

## 2023-01-01 VITALS
WEIGHT: 160 LBS | SYSTOLIC BLOOD PRESSURE: 144 MMHG | OXYGEN SATURATION: 80 % | HEART RATE: 44 BPM | BODY MASS INDEX: 31.41 KG/M2 | RESPIRATION RATE: 20 BRPM | TEMPERATURE: 99.5 F | DIASTOLIC BLOOD PRESSURE: 81 MMHG | HEIGHT: 60 IN

## 2023-01-01 VITALS
SYSTOLIC BLOOD PRESSURE: 79 MMHG | RESPIRATION RATE: 16 BRPM | HEIGHT: 60 IN | DIASTOLIC BLOOD PRESSURE: 50 MMHG | WEIGHT: 158 LBS | BODY MASS INDEX: 31.02 KG/M2 | HEART RATE: 76 BPM | TEMPERATURE: 96 F

## 2023-01-01 DIAGNOSIS — D70.9 NEUTROPENIA, UNSPECIFIED TYPE: ICD-10-CM

## 2023-01-01 DIAGNOSIS — Z86.39 HISTORY OF DIABETES MELLITUS: ICD-10-CM

## 2023-01-01 DIAGNOSIS — Z79.899 ON ANTINEOPLASTIC CHEMOTHERAPY: ICD-10-CM

## 2023-01-01 DIAGNOSIS — C50.911 INVASIVE DUCTAL CARCINOMA OF RIGHT BREAST: Primary | ICD-10-CM

## 2023-01-01 DIAGNOSIS — N17.9 ACUTE KIDNEY INJURY: ICD-10-CM

## 2023-01-01 DIAGNOSIS — J96.01 ACUTE RESPIRATORY FAILURE WITH HYPOXIA AND HYPERCAPNIA: ICD-10-CM

## 2023-01-01 DIAGNOSIS — J96.02 ACUTE RESPIRATORY FAILURE WITH HYPOXIA AND HYPERCAPNIA: ICD-10-CM

## 2023-01-01 DIAGNOSIS — A41.9 ACUTE SEPSIS: Primary | ICD-10-CM

## 2023-01-01 LAB
ABO GROUP BLD: NORMAL
ALBUMIN SERPL-MCNC: 2.1 G/DL (ref 3.5–5.2)
ALBUMIN SERPL-MCNC: 2.2 G/DL (ref 3.5–5.2)
ALBUMIN SERPL-MCNC: 2.5 G/DL (ref 3.5–5.2)
ALBUMIN SERPL-MCNC: 2.5 G/DL (ref 3.5–5.2)
ALBUMIN SERPL-MCNC: 3 G/DL (ref 3.5–5.2)
ALBUMIN/GLOB SERPL: 0.9 G/DL
ALBUMIN/GLOB SERPL: 0.9 G/DL
ALBUMIN/GLOB SERPL: 1.1 G/DL
ALP SERPL-CCNC: 32 U/L (ref 39–117)
ALP SERPL-CCNC: 32 U/L (ref 39–117)
ALP SERPL-CCNC: 33 U/L (ref 39–117)
ALP SERPL-CCNC: 38 U/L (ref 39–117)
ALP SERPL-CCNC: 60 U/L (ref 39–117)
ALT SERPL W P-5'-P-CCNC: 12 U/L (ref 1–33)
ALT SERPL W P-5'-P-CCNC: 14 U/L (ref 1–33)
ALT SERPL W P-5'-P-CCNC: 19 U/L (ref 1–33)
ALT SERPL W P-5'-P-CCNC: 8 U/L (ref 1–33)
ALT SERPL W P-5'-P-CCNC: 9 U/L (ref 1–33)
ANION GAP SERPL CALCULATED.3IONS-SCNC: 16 MMOL/L (ref 5–15)
ANION GAP SERPL CALCULATED.3IONS-SCNC: 17 MMOL/L (ref 5–15)
ANION GAP SERPL CALCULATED.3IONS-SCNC: 19 MMOL/L (ref 5–15)
ANION GAP SERPL CALCULATED.3IONS-SCNC: 20 MMOL/L (ref 5–15)
ANION GAP SERPL CALCULATED.3IONS-SCNC: 20 MMOL/L (ref 5–15)
ANION GAP SERPL CALCULATED.3IONS-SCNC: 21 MMOL/L (ref 5–15)
ANION GAP SERPL CALCULATED.3IONS-SCNC: 28 MMOL/L (ref 5–15)
ANION GAP SERPL CALCULATED.3IONS-SCNC: 37 MMOL/L (ref 5–15)
ANION GAP SERPL CALCULATED.3IONS-SCNC: 37 MMOL/L (ref 5–15)
ARTERIAL PATENCY WRIST A: ABNORMAL
ARTERIAL PATENCY WRIST A: POSITIVE
AST SERPL-CCNC: 13 U/L (ref 1–32)
AST SERPL-CCNC: 19 U/L (ref 1–32)
AST SERPL-CCNC: 22 U/L (ref 1–32)
AST SERPL-CCNC: 28 U/L (ref 1–32)
AST SERPL-CCNC: 9 U/L (ref 1–32)
ATMOSPHERIC PRESS: ABNORMAL MM[HG]
BACTERIA BLD CULT: ABNORMAL
BACTERIA ID TEST ISLT QL CULT: ABNORMAL
BACTERIA SPEC AEROBE CULT: ABNORMAL
BACTERIA UR QL AUTO: ABNORMAL /HPF
BASE EXCESS BLDA CALC-SCNC: -1.9 MMOL/L (ref 0–2)
BASE EXCESS BLDA CALC-SCNC: -7.7 MMOL/L (ref 0–2)
BASE EXCESS BLDA CALC-SCNC: -8.4 MMOL/L (ref 0–2)
BASE EXCESS BLDA CALC-SCNC: 0.7 MMOL/L (ref 0–2)
BASE EXCESS BLDA CALC-SCNC: 1.2 MMOL/L (ref 0–2)
BASE EXCESS BLDA CALC-SCNC: 2.3 MMOL/L (ref 0–2)
BASOPHILS # BLD AUTO: 0 10*3/MM3 (ref 0–0.2)
BASOPHILS # BLD MANUAL: 0 10*3/MM3 (ref 0–0.2)
BASOPHILS # BLD MANUAL: 0 10*3/MM3 (ref 0–0.2)
BASOPHILS NFR BLD AUTO: 0 % (ref 0–1.5)
BASOPHILS NFR BLD MANUAL: 0 % (ref 0–1.5)
BASOPHILS NFR BLD MANUAL: 0 % (ref 0–1.5)
BDY SITE: ABNORMAL
BH BB BLOOD EXPIRATION DATE: NORMAL
BH BB BLOOD TYPE BARCODE: 5100
BH BB BLOOD TYPE BARCODE: 6200
BH BB BLOOD TYPE BARCODE: 7300
BH BB BLOOD TYPE BARCODE: 8400
BH BB DISPENSE STATUS: NORMAL
BH BB PRODUCT CODE: NORMAL
BH BB UNIT NUMBER: NORMAL
BH CV ECHO MEAS - AO MAX PG: 8.9 MMHG
BH CV ECHO MEAS - AO MEAN PG: 5 MMHG
BH CV ECHO MEAS - AO ROOT DIAM: 3 CM
BH CV ECHO MEAS - AO V2 MAX: 149 CM/SEC
BH CV ECHO MEAS - AO V2 VTI: 20.2 CM
BH CV ECHO MEAS - AVA(I,D): 2.24 CM2
BH CV ECHO MEAS - EDV(CUBED): 32.8 ML
BH CV ECHO MEAS - EDV(MOD-SP2): 83.3 ML
BH CV ECHO MEAS - EDV(MOD-SP4): 81.2 ML
BH CV ECHO MEAS - EF(MOD-SP2): 65.3 %
BH CV ECHO MEAS - EF(MOD-SP4): 53.7 %
BH CV ECHO MEAS - ESV(CUBED): 10.6 ML
BH CV ECHO MEAS - ESV(MOD-SP2): 28.9 ML
BH CV ECHO MEAS - ESV(MOD-SP4): 37.6 ML
BH CV ECHO MEAS - FS: 31.3 %
BH CV ECHO MEAS - IVS/LVPW: 1 CM
BH CV ECHO MEAS - IVSD: 1 CM
BH CV ECHO MEAS - LA DIMENSION: 3.2 CM
BH CV ECHO MEAS - LAT PEAK E' VEL: 8.6 CM/SEC
BH CV ECHO MEAS - LV MASS(C)D: 90.3 GRAMS
BH CV ECHO MEAS - LV MAX PG: 5.2 MMHG
BH CV ECHO MEAS - LV MEAN PG: 3 MMHG
BH CV ECHO MEAS - LV V1 MAX: 114 CM/SEC
BH CV ECHO MEAS - LV V1 VTI: 14.4 CM
BH CV ECHO MEAS - LVIDD: 3.2 CM
BH CV ECHO MEAS - LVIDS: 2.2 CM
BH CV ECHO MEAS - LVOT AREA: 3.1 CM2
BH CV ECHO MEAS - LVOT DIAM: 2 CM
BH CV ECHO MEAS - LVPWD: 1 CM
BH CV ECHO MEAS - MED PEAK E' VEL: 5.8 CM/SEC
BH CV ECHO MEAS - MV A MAX VEL: 117 CM/SEC
BH CV ECHO MEAS - MV DEC SLOPE: 492 CM/SEC2
BH CV ECHO MEAS - MV E MAX VEL: 82.3 CM/SEC
BH CV ECHO MEAS - MV E/A: 0.7
BH CV ECHO MEAS - MV MAX PG: 6.9 MMHG
BH CV ECHO MEAS - MV MEAN PG: 2 MMHG
BH CV ECHO MEAS - MV P1/2T: 50.6 MSEC
BH CV ECHO MEAS - MV V2 VTI: 19.6 CM
BH CV ECHO MEAS - MVA(P1/2T): 4.3 CM2
BH CV ECHO MEAS - MVA(VTI): 2.31 CM2
BH CV ECHO MEAS - PA ACC TIME: 0.1 SEC
BH CV ECHO MEAS - RAP SYSTOLE: 8 MMHG
BH CV ECHO MEAS - RVSP: 40 MMHG
BH CV ECHO MEAS - SV(LVOT): 45.2 ML
BH CV ECHO MEAS - SV(MOD-SP2): 54.4 ML
BH CV ECHO MEAS - SV(MOD-SP4): 43.6 ML
BH CV ECHO MEAS - TAPSE (>1.6): 1.88 CM
BH CV ECHO MEAS - TR MAX PG: 32.4 MMHG
BH CV ECHO MEAS - TR MAX VEL: 284.7 CM/SEC
BH CV ECHO MEASUREMENTS AVERAGE E/E' RATIO: 11.43
BH CV XLRA - RV BASE: 3.8 CM
BH CV XLRA - RV LENGTH: 6.1 CM
BH CV XLRA - RV MID: 3.2 CM
BH CV XLRA - TDI S': 14.5 CM/SEC
BILIRUB SERPL-MCNC: 0.5 MG/DL (ref 0–1.2)
BILIRUB SERPL-MCNC: 0.7 MG/DL (ref 0–1.2)
BILIRUB SERPL-MCNC: 1.1 MG/DL (ref 0–1.2)
BILIRUB SERPL-MCNC: 1.6 MG/DL (ref 0–1.2)
BILIRUB SERPL-MCNC: 1.7 MG/DL (ref 0–1.2)
BILIRUB UR QL STRIP: NEGATIVE
BLD GP AB SCN SERPL QL: NEGATIVE
BODY TEMPERATURE: 37 C
BOTTLE TYPE: ABNORMAL
BUN SERPL-MCNC: 30 MG/DL (ref 8–23)
BUN SERPL-MCNC: 31 MG/DL (ref 8–23)
BUN SERPL-MCNC: 32 MG/DL (ref 8–23)
BUN SERPL-MCNC: 34 MG/DL (ref 8–23)
BUN/CREAT SERPL: 20.8 (ref 7–25)
BUN/CREAT SERPL: 20.9 (ref 7–25)
BUN/CREAT SERPL: 21.1 (ref 7–25)
BUN/CREAT SERPL: 21.3 (ref 7–25)
BUN/CREAT SERPL: 21.6 (ref 7–25)
BUN/CREAT SERPL: 21.7 (ref 7–25)
BUN/CREAT SERPL: 21.7 (ref 7–25)
BUN/CREAT SERPL: 21.8 (ref 7–25)
BUN/CREAT SERPL: 22.6 (ref 7–25)
BUN/CREAT SERPL: 22.9 (ref 7–25)
BUN/CREAT SERPL: 23.1 (ref 7–25)
BUN/CREAT SERPL: 23.1 (ref 7–25)
BUN/CREAT SERPL: 23.5 (ref 7–25)
BUN/CREAT SERPL: 23.8 (ref 7–25)
BURR CELLS BLD QL SMEAR: ABNORMAL
BURR CELLS BLD QL SMEAR: ABNORMAL
BURR CELLS BLD QL SMEAR: NORMAL
CA-I SERPL ISE-MCNC: 0.75 MMOL/L (ref 1.12–1.32)
CA-I SERPL ISE-MCNC: 0.75 MMOL/L (ref 1.12–1.32)
CA-I SERPL ISE-MCNC: 0.76 MMOL/L (ref 1.12–1.32)
CA-I SERPL ISE-MCNC: 0.83 MMOL/L (ref 1.12–1.32)
CA-I SERPL ISE-MCNC: 0.92 MMOL/L (ref 1.12–1.32)
CALCIUM SPEC-SCNC: 5.6 MG/DL (ref 8.6–10.5)
CALCIUM SPEC-SCNC: 5.6 MG/DL (ref 8.6–10.5)
CALCIUM SPEC-SCNC: 5.7 MG/DL (ref 8.6–10.5)
CALCIUM SPEC-SCNC: 5.8 MG/DL (ref 8.6–10.5)
CALCIUM SPEC-SCNC: 5.9 MG/DL (ref 8.6–10.5)
CALCIUM SPEC-SCNC: 6 MG/DL (ref 8.6–10.5)
CALCIUM SPEC-SCNC: 6 MG/DL (ref 8.6–10.5)
CALCIUM SPEC-SCNC: 6.3 MG/DL (ref 8.6–10.5)
CALCIUM SPEC-SCNC: 6.4 MG/DL (ref 8.6–10.5)
CALCIUM SPEC-SCNC: 6.6 MG/DL (ref 8.6–10.5)
CALCIUM SPEC-SCNC: 7.5 MG/DL (ref 8.6–10.5)
CHLORIDE SERPL-SCNC: 101 MMOL/L (ref 98–107)
CHLORIDE SERPL-SCNC: 104 MMOL/L (ref 98–107)
CHLORIDE SERPL-SCNC: 105 MMOL/L (ref 98–107)
CHLORIDE SERPL-SCNC: 109 MMOL/L (ref 98–107)
CHLORIDE SERPL-SCNC: 111 MMOL/L (ref 98–107)
CHLORIDE SERPL-SCNC: 113 MMOL/L (ref 98–107)
CHLORIDE SERPL-SCNC: 89 MMOL/L (ref 98–107)
CHLORIDE SERPL-SCNC: 89 MMOL/L (ref 98–107)
CHLORIDE SERPL-SCNC: 91 MMOL/L (ref 98–107)
CHLORIDE SERPL-SCNC: 92 MMOL/L (ref 98–107)
CHLORIDE SERPL-SCNC: 95 MMOL/L (ref 98–107)
CHLORIDE SERPL-SCNC: 97 MMOL/L (ref 98–107)
CLARITY UR: ABNORMAL
CO2 BLDA-SCNC: 18.2 MMOL/L (ref 22–33)
CO2 BLDA-SCNC: 21.6 MMOL/L (ref 22–33)
CO2 BLDA-SCNC: 24.1 MMOL/L (ref 22–33)
CO2 BLDA-SCNC: 27.7 MMOL/L (ref 22–33)
CO2 BLDA-SCNC: 28.8 MMOL/L (ref 22–33)
CO2 BLDA-SCNC: 29.7 MMOL/L (ref 22–33)
CO2 SERPL-SCNC: 13 MMOL/L (ref 22–29)
CO2 SERPL-SCNC: 15 MMOL/L (ref 22–29)
CO2 SERPL-SCNC: 16 MMOL/L (ref 22–29)
CO2 SERPL-SCNC: 18 MMOL/L (ref 22–29)
CO2 SERPL-SCNC: 18 MMOL/L (ref 22–29)
CO2 SERPL-SCNC: 19 MMOL/L (ref 22–29)
CO2 SERPL-SCNC: 21 MMOL/L (ref 22–29)
CO2 SERPL-SCNC: 26 MMOL/L (ref 22–29)
CO2 SERPL-SCNC: 27 MMOL/L (ref 22–29)
CO2 SERPL-SCNC: 32 MMOL/L (ref 22–29)
COHGB MFR BLD: 0.8 % (ref 0–2)
COHGB MFR BLD: 0.9 % (ref 0–2)
COHGB MFR BLD: 0.9 % (ref 0–2)
COHGB MFR BLD: 1 % (ref 0–2)
COHGB MFR BLD: 1.1 % (ref 0–2)
COHGB MFR BLD: 1.2 % (ref 0–2)
COLOR UR: YELLOW
CREAT SERPL-MCNC: 1.3 MG/DL (ref 0.57–1)
CREAT SERPL-MCNC: 1.32 MG/DL (ref 0.57–1)
CREAT SERPL-MCNC: 1.34 MG/DL (ref 0.57–1)
CREAT SERPL-MCNC: 1.37 MG/DL (ref 0.57–1)
CREAT SERPL-MCNC: 1.38 MG/DL (ref 0.57–1)
CREAT SERPL-MCNC: 1.38 MG/DL (ref 0.57–1)
CREAT SERPL-MCNC: 1.39 MG/DL (ref 0.57–1)
CREAT SERPL-MCNC: 1.4 MG/DL (ref 0.57–1)
CREAT SERPL-MCNC: 1.41 MG/DL (ref 0.57–1)
CREAT SERPL-MCNC: 1.42 MG/DL (ref 0.57–1)
CREAT SERPL-MCNC: 1.44 MG/DL (ref 0.57–1)
CREAT SERPL-MCNC: 1.47 MG/DL (ref 0.57–1)
CREAT SERPL-MCNC: 1.47 MG/DL (ref 0.57–1)
CREAT SERPL-MCNC: 1.48 MG/DL (ref 0.57–1)
D-LACTATE SERPL-SCNC: 10.4 MMOL/L (ref 0.5–2)
D-LACTATE SERPL-SCNC: 10.5 MMOL/L (ref 0.5–2)
D-LACTATE SERPL-SCNC: 10.6 MMOL/L (ref 0.5–2)
D-LACTATE SERPL-SCNC: 10.9 MMOL/L (ref 0.5–2)
D-LACTATE SERPL-SCNC: 11 MMOL/L (ref 0.5–2)
D-LACTATE SERPL-SCNC: 16.1 MMOL/L (ref 0.5–2)
D-LACTATE SERPL-SCNC: 19.7 MMOL/L (ref 0.5–2)
D-LACTATE SERPL-SCNC: 20.8 MMOL/L (ref 0.5–2)
D-LACTATE SERPL-SCNC: 4.8 MMOL/L (ref 0.5–2)
D-LACTATE SERPL-SCNC: 5.5 MMOL/L (ref 0.5–2)
D-LACTATE SERPL-SCNC: 5.6 MMOL/L (ref 0.5–2)
D-LACTATE SERPL-SCNC: 5.8 MMOL/L (ref 0.5–2)
D-LACTATE SERPL-SCNC: 5.9 MMOL/L (ref 0.5–2)
D-LACTATE SERPL-SCNC: 6.3 MMOL/L (ref 0.5–2)
D-LACTATE SERPL-SCNC: 6.6 MMOL/L (ref 0.5–2)
D-LACTATE SERPL-SCNC: 6.9 MMOL/L (ref 0.5–2)
D-LACTATE SERPL-SCNC: 9.3 MMOL/L (ref 0.5–2)
DEPRECATED RDW RBC AUTO: 55 FL (ref 37–54)
DEPRECATED RDW RBC AUTO: 55.4 FL (ref 37–54)
DEPRECATED RDW RBC AUTO: 56.6 FL (ref 37–54)
DEPRECATED RDW RBC AUTO: 56.8 FL (ref 37–54)
DEPRECATED RDW RBC AUTO: 57.9 FL (ref 37–54)
DEPRECATED RDW RBC AUTO: 58.2 FL (ref 37–54)
DEPRECATED RDW RBC AUTO: 58.6 FL (ref 37–54)
EGFRCR SERPLBLD CKD-EPI 2021: 36.1 ML/MIN/1.73
EGFRCR SERPLBLD CKD-EPI 2021: 36.4 ML/MIN/1.73
EGFRCR SERPLBLD CKD-EPI 2021: 36.4 ML/MIN/1.73
EGFRCR SERPLBLD CKD-EPI 2021: 37.3 ML/MIN/1.73
EGFRCR SERPLBLD CKD-EPI 2021: 37.9 ML/MIN/1.73
EGFRCR SERPLBLD CKD-EPI 2021: 38.3 ML/MIN/1.73
EGFRCR SERPLBLD CKD-EPI 2021: 38.6 ML/MIN/1.73
EGFRCR SERPLBLD CKD-EPI 2021: 38.9 ML/MIN/1.73
EGFRCR SERPLBLD CKD-EPI 2021: 39.3 ML/MIN/1.73
EGFRCR SERPLBLD CKD-EPI 2021: 39.3 ML/MIN/1.73
EGFRCR SERPLBLD CKD-EPI 2021: 39.6 ML/MIN/1.73
EGFRCR SERPLBLD CKD-EPI 2021: 40.7 ML/MIN/1.73
EGFRCR SERPLBLD CKD-EPI 2021: 41.4 ML/MIN/1.73
EGFRCR SERPLBLD CKD-EPI 2021: 42.2 ML/MIN/1.73
EOSINOPHIL # BLD AUTO: 0 10*3/MM3 (ref 0–0.4)
EOSINOPHIL # BLD MANUAL: 0 10*3/MM3 (ref 0–0.4)
EOSINOPHIL # BLD MANUAL: 0 10*3/MM3 (ref 0–0.4)
EOSINOPHIL NFR BLD AUTO: 0 % (ref 0.3–6.2)
EOSINOPHIL NFR BLD MANUAL: 0 % (ref 0.3–6.2)
EOSINOPHIL NFR BLD MANUAL: 0 % (ref 0.3–6.2)
EPAP: 0
EPAP: 10
ERYTHROCYTE [DISTWIDTH] IN BLOOD BY AUTOMATED COUNT: 14.7 % (ref 12.3–15.4)
ERYTHROCYTE [DISTWIDTH] IN BLOOD BY AUTOMATED COUNT: 15.5 % (ref 12.3–15.4)
ERYTHROCYTE [DISTWIDTH] IN BLOOD BY AUTOMATED COUNT: 15.8 % (ref 12.3–15.4)
ERYTHROCYTE [DISTWIDTH] IN BLOOD BY AUTOMATED COUNT: 15.9 % (ref 12.3–15.4)
ERYTHROCYTE [DISTWIDTH] IN BLOOD BY AUTOMATED COUNT: 16 % (ref 12.3–15.4)
FLUAV RNA RESP QL NAA+PROBE: NOT DETECTED
FLUBV RNA RESP QL NAA+PROBE: NOT DETECTED
GLOBULIN UR ELPH-MCNC: 2 GM/DL
GLOBULIN UR ELPH-MCNC: 2.2 GM/DL
GLOBULIN UR ELPH-MCNC: 2.3 GM/DL
GLOBULIN UR ELPH-MCNC: 2.5 GM/DL
GLOBULIN UR ELPH-MCNC: 3.4 GM/DL
GLUCOSE BLDC GLUCOMTR-MCNC: 206 MG/DL (ref 70–130)
GLUCOSE BLDC GLUCOMTR-MCNC: 236 MG/DL (ref 70–130)
GLUCOSE BLDC GLUCOMTR-MCNC: 237 MG/DL (ref 70–130)
GLUCOSE BLDC GLUCOMTR-MCNC: 251 MG/DL (ref 70–130)
GLUCOSE BLDC GLUCOMTR-MCNC: 282 MG/DL (ref 70–130)
GLUCOSE BLDC GLUCOMTR-MCNC: 286 MG/DL (ref 70–130)
GLUCOSE BLDC GLUCOMTR-MCNC: 293 MG/DL (ref 70–130)
GLUCOSE BLDC GLUCOMTR-MCNC: 302 MG/DL (ref 70–130)
GLUCOSE BLDC GLUCOMTR-MCNC: 52 MG/DL (ref 70–130)
GLUCOSE SERPL-MCNC: 137 MG/DL (ref 65–99)
GLUCOSE SERPL-MCNC: 159 MG/DL (ref 65–99)
GLUCOSE SERPL-MCNC: 177 MG/DL (ref 65–99)
GLUCOSE SERPL-MCNC: 185 MG/DL (ref 65–99)
GLUCOSE SERPL-MCNC: 211 MG/DL (ref 65–99)
GLUCOSE SERPL-MCNC: 216 MG/DL (ref 65–99)
GLUCOSE SERPL-MCNC: 223 MG/DL (ref 65–99)
GLUCOSE SERPL-MCNC: 244 MG/DL (ref 65–99)
GLUCOSE SERPL-MCNC: 247 MG/DL (ref 65–99)
GLUCOSE SERPL-MCNC: 260 MG/DL (ref 65–99)
GLUCOSE SERPL-MCNC: 269 MG/DL (ref 65–99)
GLUCOSE SERPL-MCNC: 273 MG/DL (ref 65–99)
GLUCOSE SERPL-MCNC: 274 MG/DL (ref 65–99)
GLUCOSE SERPL-MCNC: 55 MG/DL (ref 65–99)
GLUCOSE UR STRIP-MCNC: ABNORMAL MG/DL
GRAM STN SPEC: ABNORMAL
HCO3 BLDA-SCNC: 17.2 MMOL/L (ref 20–26)
HCO3 BLDA-SCNC: 19.9 MMOL/L (ref 20–26)
HCO3 BLDA-SCNC: 22.9 MMOL/L (ref 20–26)
HCO3 BLDA-SCNC: 26.3 MMOL/L (ref 20–26)
HCO3 BLDA-SCNC: 27.2 MMOL/L (ref 20–26)
HCO3 BLDA-SCNC: 28.2 MMOL/L (ref 20–26)
HCT VFR BLD AUTO: 22.4 % (ref 34–46.6)
HCT VFR BLD AUTO: 22.7 % (ref 34–46.6)
HCT VFR BLD AUTO: 24.2 % (ref 34–46.6)
HCT VFR BLD AUTO: 25.4 % (ref 34–46.6)
HCT VFR BLD AUTO: 26.3 % (ref 34–46.6)
HCT VFR BLD AUTO: 26.4 % (ref 34–46.6)
HCT VFR BLD AUTO: 39.8 % (ref 34–46.6)
HCT VFR BLD CALC: 21.3 % (ref 38–51)
HCT VFR BLD CALC: 23.8 % (ref 38–51)
HCT VFR BLD CALC: 24 % (ref 38–51)
HCT VFR BLD CALC: 24.3 % (ref 38–51)
HCT VFR BLD CALC: 25 % (ref 38–51)
HCT VFR BLD CALC: 25.9 % (ref 38–51)
HGB BLD-MCNC: 12.9 G/DL (ref 12–15.9)
HGB BLD-MCNC: 7.5 G/DL (ref 12–15.9)
HGB BLD-MCNC: 7.6 G/DL (ref 12–15.9)
HGB BLD-MCNC: 7.7 G/DL (ref 12–15.9)
HGB BLD-MCNC: 8.5 G/DL (ref 12–15.9)
HGB BLD-MCNC: 8.5 G/DL (ref 12–15.9)
HGB BLD-MCNC: 8.8 G/DL (ref 12–15.9)
HGB BLDA-MCNC: 6.9 G/DL (ref 14–18)
HGB BLDA-MCNC: 7.8 G/DL (ref 14–18)
HGB BLDA-MCNC: 7.8 G/DL (ref 14–18)
HGB BLDA-MCNC: 7.9 G/DL (ref 14–18)
HGB BLDA-MCNC: 8.2 G/DL (ref 14–18)
HGB BLDA-MCNC: 8.5 G/DL (ref 14–18)
HGB UR QL STRIP.AUTO: ABNORMAL
HOLD SPECIMEN: NORMAL
HYALINE CASTS UR QL AUTO: ABNORMAL /LPF
IMM GRANULOCYTES # BLD AUTO: 0 10*3/MM3 (ref 0–0.05)
IMM GRANULOCYTES # BLD AUTO: 0.01 10*3/MM3 (ref 0–0.05)
IMM GRANULOCYTES NFR BLD AUTO: 0 % (ref 0–0.5)
IMM GRANULOCYTES NFR BLD AUTO: 5.9 % (ref 0–0.5)
INHALED O2 CONCENTRATION: 100 %
INHALED O2 CONCENTRATION: 25 %
INHALED O2 CONCENTRATION: 40 %
INHALED O2 CONCENTRATION: 50 %
INHALED O2 CONCENTRATION: 50 %
INHALED O2 CONCENTRATION: 55 %
IPAP: 0
IPAP: 20
ISOLATED FROM: ABNORMAL
KETONES UR QL STRIP: ABNORMAL
LEFT ATRIUM VOLUME INDEX: 27.4 ML/M2
LEUKOCYTE ESTERASE UR QL STRIP.AUTO: NEGATIVE
LYMPHOCYTES # BLD AUTO: 0.04 10*3/MM3 (ref 0.7–3.1)
LYMPHOCYTES # BLD AUTO: 0.05 10*3/MM3 (ref 0.7–3.1)
LYMPHOCYTES # BLD AUTO: 0.09 10*3/MM3 (ref 0.7–3.1)
LYMPHOCYTES # BLD AUTO: 0.15 10*3/MM3 (ref 0.7–3.1)
LYMPHOCYTES # BLD MANUAL: 0.08 10*3/MM3 (ref 0.7–3.1)
LYMPHOCYTES # BLD MANUAL: 0.16 10*3/MM3 (ref 0.7–3.1)
LYMPHOCYTES NFR BLD AUTO: 71.4 % (ref 19.6–45.3)
LYMPHOCYTES NFR BLD AUTO: 80 % (ref 19.6–45.3)
LYMPHOCYTES NFR BLD AUTO: 81.8 % (ref 19.6–45.3)
LYMPHOCYTES NFR BLD AUTO: 88.2 % (ref 19.6–45.3)
LYMPHOCYTES NFR BLD MANUAL: 1 % (ref 5–12)
LYMPHOCYTES NFR BLD MANUAL: 4 % (ref 5–12)
Lab: ABNORMAL
MACROCYTES BLD QL SMEAR: ABNORMAL
MACROCYTES BLD QL SMEAR: ABNORMAL
MAGNESIUM SERPL-MCNC: 1.1 MG/DL (ref 1.6–2.4)
MAGNESIUM SERPL-MCNC: 1.6 MG/DL (ref 1.6–2.4)
MAGNESIUM SERPL-MCNC: 1.8 MG/DL (ref 1.6–2.4)
MAGNESIUM SERPL-MCNC: 1.8 MG/DL (ref 1.6–2.4)
MAGNESIUM SERPL-MCNC: 1.9 MG/DL (ref 1.6–2.4)
MAGNESIUM SERPL-MCNC: 2 MG/DL (ref 1.6–2.4)
MCH RBC QN AUTO: 32.2 PG (ref 26.6–33)
MCH RBC QN AUTO: 32.6 PG (ref 26.6–33)
MCH RBC QN AUTO: 32.7 PG (ref 26.6–33)
MCH RBC QN AUTO: 32.8 PG (ref 26.6–33)
MCH RBC QN AUTO: 32.9 PG (ref 26.6–33)
MCHC RBC AUTO-ENTMCNC: 31.8 G/DL (ref 31.5–35.7)
MCHC RBC AUTO-ENTMCNC: 32.3 G/DL (ref 31.5–35.7)
MCHC RBC AUTO-ENTMCNC: 32.4 G/DL (ref 31.5–35.7)
MCHC RBC AUTO-ENTMCNC: 33.3 G/DL (ref 31.5–35.7)
MCHC RBC AUTO-ENTMCNC: 33.5 G/DL (ref 31.5–35.7)
MCV RBC AUTO: 100.8 FL (ref 79–97)
MCV RBC AUTO: 101.3 FL (ref 79–97)
MCV RBC AUTO: 101.5 FL (ref 79–97)
MCV RBC AUTO: 97.3 FL (ref 79–97)
MCV RBC AUTO: 97.4 FL (ref 79–97)
MCV RBC AUTO: 97.8 FL (ref 79–97)
MCV RBC AUTO: 98.2 FL (ref 79–97)
METHGB BLD QL: 0.7 % (ref 0–1.5)
METHGB BLD QL: 0.7 % (ref 0–1.5)
METHGB BLD QL: 0.8 % (ref 0–1.5)
METHGB BLD QL: 0.9 % (ref 0–1.5)
MODALITY: ABNORMAL
MONOCYTES # BLD AUTO: 0 10*3/MM3 (ref 0.1–0.9)
MONOCYTES # BLD AUTO: 0 10*3/MM3 (ref 0.1–0.9)
MONOCYTES # BLD AUTO: 0.01 10*3/MM3 (ref 0.1–0.9)
MONOCYTES # BLD AUTO: 0.01 10*3/MM3 (ref 0.1–0.9)
MONOCYTES # BLD: 0 10*3/MM3 (ref 0.1–0.9)
MONOCYTES # BLD: 0 10*3/MM3 (ref 0.1–0.9)
MONOCYTES NFR BLD AUTO: 0 % (ref 5–12)
MONOCYTES NFR BLD AUTO: 0 % (ref 5–12)
MONOCYTES NFR BLD AUTO: 5.9 % (ref 5–12)
MONOCYTES NFR BLD AUTO: 9.1 % (ref 5–12)
MUCOUS THREADS URNS QL MICRO: ABNORMAL /HPF
NEUTROPHILS # BLD AUTO: 0 10*3/MM3 (ref 1.7–7)
NEUTROPHILS # BLD AUTO: 0 10*3/MM3 (ref 1.7–7)
NEUTROPHILS NFR BLD AUTO: 0 % (ref 42.7–76)
NEUTROPHILS NFR BLD AUTO: 0 10*3/MM3 (ref 1.7–7)
NEUTROPHILS NFR BLD AUTO: 0.01 10*3/MM3 (ref 1.7–7)
NEUTROPHILS NFR BLD AUTO: 0.01 10*3/MM3 (ref 1.7–7)
NEUTROPHILS NFR BLD AUTO: 0.02 10*3/MM3 (ref 1.7–7)
NEUTROPHILS NFR BLD AUTO: 20 % (ref 42.7–76)
NEUTROPHILS NFR BLD AUTO: 28.6 % (ref 42.7–76)
NEUTROPHILS NFR BLD AUTO: 9.1 % (ref 42.7–76)
NEUTROPHILS NFR BLD MANUAL: 0 % (ref 42.7–76)
NEUTROPHILS NFR BLD MANUAL: 0 % (ref 42.7–76)
NITRITE UR QL STRIP: NEGATIVE
NOTIFIED BY: ABNORMAL
NOTIFIED WHO: ABNORMAL
NRBC BLD AUTO-RTO: 0 /100 WBC (ref 0–0.2)
OXYHGB MFR BLDV: 91.5 % (ref 94–99)
OXYHGB MFR BLDV: 92.4 % (ref 94–99)
OXYHGB MFR BLDV: 92.5 % (ref 94–99)
OXYHGB MFR BLDV: 94.7 % (ref 94–99)
OXYHGB MFR BLDV: 95.4 % (ref 94–99)
OXYHGB MFR BLDV: 97.2 % (ref 94–99)
PAW @ PEAK INSP FLOW SETTING VENT: 0 CMH2O
PAW @ PEAK INSP FLOW SETTING VENT: 33 CMH2O
PCO2 BLDA: 31.8 MM HG (ref 35–45)
PCO2 BLDA: 38.1 MM HG (ref 35–45)
PCO2 BLDA: 46.2 MM HG (ref 35–45)
PCO2 BLDA: 49.9 MM HG (ref 35–45)
PCO2 BLDA: 50 MM HG (ref 35–45)
PCO2 BLDA: 55.6 MM HG (ref 35–45)
PCO2 TEMP ADJ BLD: 31.8 MM HG (ref 35–45)
PCO2 TEMP ADJ BLD: 38.1 MM HG (ref 35–45)
PCO2 TEMP ADJ BLD: 46.2 MM HG (ref 35–45)
PCO2 TEMP ADJ BLD: 49.9 MM HG (ref 35–45)
PCO2 TEMP ADJ BLD: 50 MM HG (ref 35–45)
PCO2 TEMP ADJ BLD: 55.6 MM HG (ref 35–45)
PEEP RESPIRATORY: 8 CM[H2O]
PEEP RESPIRATORY: 8 CM[H2O]
PH BLDA: 7.16 PH UNITS (ref 7.35–7.45)
PH BLDA: 7.34 PH UNITS (ref 7.35–7.45)
PH BLDA: 7.34 PH UNITS (ref 7.35–7.45)
PH BLDA: 7.36 PH UNITS (ref 7.35–7.45)
PH BLDA: 7.36 PH UNITS (ref 7.35–7.45)
PH BLDA: 7.39 PH UNITS (ref 7.35–7.45)
PH UR STRIP.AUTO: 5.5 [PH] (ref 5–8)
PH, TEMP CORRECTED: 7.16 PH UNITS
PH, TEMP CORRECTED: 7.34 PH UNITS
PH, TEMP CORRECTED: 7.34 PH UNITS
PH, TEMP CORRECTED: 7.36 PH UNITS
PH, TEMP CORRECTED: 7.36 PH UNITS
PH, TEMP CORRECTED: 7.39 PH UNITS
PHOSPHATE SERPL-MCNC: 2.1 MG/DL (ref 2.5–4.5)
PHOSPHATE SERPL-MCNC: 2.2 MG/DL (ref 2.5–4.5)
PHOSPHATE SERPL-MCNC: 2.4 MG/DL (ref 2.5–4.5)
PHOSPHATE SERPL-MCNC: 3.4 MG/DL (ref 2.5–4.5)
PHOSPHATE SERPL-MCNC: 3.5 MG/DL (ref 2.5–4.5)
PLAT MORPH BLD: NORMAL
PLATELET # BLD AUTO: 15 10*3/MM3 (ref 140–450)
PLATELET # BLD AUTO: 37 10*3/MM3 (ref 140–450)
PLATELET # BLD AUTO: 4 10*3/MM3 (ref 140–450)
PLATELET # BLD AUTO: 5 10*3/MM3 (ref 140–450)
PLATELET # BLD AUTO: 55 10*3/MM3 (ref 140–450)
PLATELET # BLD AUTO: 9 10*3/MM3 (ref 140–450)
PLATELET # BLD AUTO: ABNORMAL 10*3/UL
PMV BLD AUTO: 10 FL (ref 6–12)
PMV BLD AUTO: 11.1 FL (ref 6–12)
PMV BLD AUTO: 12.3 FL (ref 6–12)
PO2 BLDA: 132 MM HG (ref 83–108)
PO2 BLDA: 68.4 MM HG (ref 83–108)
PO2 BLDA: 73.4 MM HG (ref 83–108)
PO2 BLDA: 75.3 MM HG (ref 83–108)
PO2 BLDA: 77.9 MM HG (ref 83–108)
PO2 BLDA: 88.4 MM HG (ref 83–108)
PO2 TEMP ADJ BLD: 132 MM HG (ref 83–108)
PO2 TEMP ADJ BLD: 68.4 MM HG (ref 83–108)
PO2 TEMP ADJ BLD: 73.4 MM HG (ref 83–108)
PO2 TEMP ADJ BLD: 75.3 MM HG (ref 83–108)
PO2 TEMP ADJ BLD: 77.9 MM HG (ref 83–108)
PO2 TEMP ADJ BLD: 88.4 MM HG (ref 83–108)
POTASSIUM SERPL-SCNC: 3.1 MMOL/L (ref 3.5–5.2)
POTASSIUM SERPL-SCNC: 3.3 MMOL/L (ref 3.5–5.2)
POTASSIUM SERPL-SCNC: 3.4 MMOL/L (ref 3.5–5.2)
POTASSIUM SERPL-SCNC: 3.5 MMOL/L (ref 3.5–5.2)
POTASSIUM SERPL-SCNC: 3.6 MMOL/L (ref 3.5–5.2)
POTASSIUM SERPL-SCNC: 3.8 MMOL/L (ref 3.5–5.2)
POTASSIUM SERPL-SCNC: 3.9 MMOL/L (ref 3.5–5.2)
POTASSIUM SERPL-SCNC: 4.1 MMOL/L (ref 3.5–5.2)
PROCALCITONIN SERPL-MCNC: 44.96 NG/ML (ref 0–0.25)
PROT SERPL-MCNC: 4.1 G/DL (ref 6–8.5)
PROT SERPL-MCNC: 4.7 G/DL (ref 6–8.5)
PROT SERPL-MCNC: 4.7 G/DL (ref 6–8.5)
PROT SERPL-MCNC: 4.8 G/DL (ref 6–8.5)
PROT SERPL-MCNC: 6.4 G/DL (ref 6–8.5)
PROT UR QL STRIP: ABNORMAL
PSV: 10 CMH2O
QT INTERVAL: 316 MS
QTC INTERVAL: 506 MS
RBC # BLD AUTO: 2.28 10*6/MM3 (ref 3.77–5.28)
RBC # BLD AUTO: 2.33 10*6/MM3 (ref 3.77–5.28)
RBC # BLD AUTO: 2.39 10*6/MM3 (ref 3.77–5.28)
RBC # BLD AUTO: 2.59 10*6/MM3 (ref 3.77–5.28)
RBC # BLD AUTO: 2.61 10*6/MM3 (ref 3.77–5.28)
RBC # BLD AUTO: 2.7 10*6/MM3 (ref 3.77–5.28)
RBC # BLD AUTO: 3.95 10*6/MM3 (ref 3.77–5.28)
RBC # UR STRIP: ABNORMAL /HPF
RBC MORPH BLD: NORMAL
REF LAB TEST METHOD: ABNORMAL
RH BLD: POSITIVE
SARS-COV-2 RNA RESP QL NAA+PROBE: NOT DETECTED
SMALL PLATELETS BLD QL SMEAR: NORMAL
SODIUM SERPL-SCNC: 137 MMOL/L (ref 136–145)
SODIUM SERPL-SCNC: 138 MMOL/L (ref 136–145)
SODIUM SERPL-SCNC: 139 MMOL/L (ref 136–145)
SODIUM SERPL-SCNC: 139 MMOL/L (ref 136–145)
SODIUM SERPL-SCNC: 141 MMOL/L (ref 136–145)
SODIUM SERPL-SCNC: 142 MMOL/L (ref 136–145)
SODIUM SERPL-SCNC: 143 MMOL/L (ref 136–145)
SP GR UR STRIP: 1.02 (ref 1–1.03)
SQUAMOUS #/AREA URNS HPF: ABNORMAL /HPF
T&S EXPIRATION DATE: NORMAL
TARGETS BLD QL SMEAR: NORMAL
TOTAL RATE: 0 BREATHS/MINUTE
TOTAL RATE: 18 BREATHS/MINUTE
TOTAL RATE: 20 BREATHS/MINUTE
TOTAL RATE: 24 BREATHS/MINUTE
TROPONIN T SERPL HS-MCNC: 14 NG/L
TSH SERPL DL<=0.05 MIU/L-ACNC: 1.59 UIU/ML (ref 0.27–4.2)
UNIT  ABO: NORMAL
UNIT  RH: NORMAL
UROBILINOGEN UR QL STRIP: ABNORMAL
VARIANT LYMPHS NFR BLD MANUAL: 20 % (ref 0–5)
VARIANT LYMPHS NFR BLD MANUAL: 6 % (ref 0–5)
VARIANT LYMPHS NFR BLD MANUAL: 76 % (ref 19.6–45.3)
VARIANT LYMPHS NFR BLD MANUAL: 93 % (ref 19.6–45.3)
VENTILATOR MODE: ABNORMAL
VT ON VENT VENT: 0.29 ML
VT ON VENT VENT: 0.4 ML
WBC # UR STRIP: ABNORMAL /HPF
WBC MORPH BLD: NORMAL
WBC NRBC COR # BLD: 0.05 10*3/MM3 (ref 3.4–10.8)
WBC NRBC COR # BLD: 0.05 10*3/MM3 (ref 3.4–10.8)
WBC NRBC COR # BLD: 0.07 10*3/MM3 (ref 3.4–10.8)
WBC NRBC COR # BLD: 0.08 10*3/MM3 (ref 3.4–10.8)
WBC NRBC COR # BLD: 0.11 10*3/MM3 (ref 3.4–10.8)
WBC NRBC COR # BLD: 0.16 10*3/MM3 (ref 3.4–10.8)
WBC NRBC COR # BLD: 0.17 10*3/MM3 (ref 3.4–10.8)
WHOLE BLOOD HOLD COAG: NORMAL
WHOLE BLOOD HOLD SPECIMEN: NORMAL

## 2023-01-01 PROCEDURE — P9047 ALBUMIN (HUMAN), 25%, 50ML: HCPCS | Performed by: HOSPITALIST

## 2023-01-01 PROCEDURE — 74018 RADEX ABDOMEN 1 VIEW: CPT

## 2023-01-01 PROCEDURE — 85025 COMPLETE CBC W/AUTO DIFF WBC: CPT | Performed by: INTERNAL MEDICINE

## 2023-01-01 PROCEDURE — 25010000002 ALBUMIN HUMAN 5% PER 50 ML: Performed by: INTERNAL MEDICINE

## 2023-01-01 PROCEDURE — 25010000002 PHENYLEPHRINE 10 MG/ML SOLUTION 5 ML VIAL

## 2023-01-01 PROCEDURE — P9100 PATHOGEN TEST FOR PLATELETS: HCPCS

## 2023-01-01 PROCEDURE — 87255 GENET VIRUS ISOLATE HSV: CPT | Performed by: INTERNAL MEDICINE

## 2023-01-01 PROCEDURE — 82805 BLOOD GASES W/O2 SATURATION: CPT

## 2023-01-01 PROCEDURE — 81001 URINALYSIS AUTO W/SCOPE: CPT | Performed by: EMERGENCY MEDICINE

## 2023-01-01 PROCEDURE — 25010000002 ACYCLOVIR PER 5 MG: Performed by: INTERNAL MEDICINE

## 2023-01-01 PROCEDURE — 99233 SBSQ HOSP IP/OBS HIGH 50: CPT | Performed by: INTERNAL MEDICINE

## 2023-01-01 PROCEDURE — 84484 ASSAY OF TROPONIN QUANT: CPT

## 2023-01-01 PROCEDURE — 82330 ASSAY OF CALCIUM: CPT | Performed by: INTERNAL MEDICINE

## 2023-01-01 PROCEDURE — 94660 CPAP INITIATION&MGMT: CPT

## 2023-01-01 PROCEDURE — 25010000002 FILGRASTIM PER 300 MCG: Performed by: INTERNAL MEDICINE

## 2023-01-01 PROCEDURE — C1751 CATH, INF, PER/CENT/MIDLINE: HCPCS

## 2023-01-01 PROCEDURE — 25010000002 MAGNESIUM SULFATE IN D5W 1G/100ML (PREMIX) 1-5 GM/100ML-% SOLUTION: Performed by: INTERNAL MEDICINE

## 2023-01-01 PROCEDURE — 83605 ASSAY OF LACTIC ACID: CPT | Performed by: PHYSICIAN ASSISTANT

## 2023-01-01 PROCEDURE — P9035 PLATELET PHERES LEUKOREDUCED: HCPCS

## 2023-01-01 PROCEDURE — 94002 VENT MGMT INPAT INIT DAY: CPT

## 2023-01-01 PROCEDURE — 71260 CT THORAX DX C+: CPT

## 2023-01-01 PROCEDURE — 94799 UNLISTED PULMONARY SVC/PX: CPT

## 2023-01-01 PROCEDURE — 83605 ASSAY OF LACTIC ACID: CPT | Performed by: INTERNAL MEDICINE

## 2023-01-01 PROCEDURE — 87186 SC STD MICRODIL/AGAR DIL: CPT | Performed by: INTERNAL MEDICINE

## 2023-01-01 PROCEDURE — 80053 COMPREHEN METABOLIC PANEL: CPT | Performed by: INTERNAL MEDICINE

## 2023-01-01 PROCEDURE — 25010000002 CALCIUM GLUCONATE-NACL 1-0.675 GM/50ML-% SOLUTION: Performed by: INTERNAL MEDICINE

## 2023-01-01 PROCEDURE — 80053 COMPREHEN METABOLIC PANEL: CPT | Performed by: HOSPITALIST

## 2023-01-01 PROCEDURE — 99291 CRITICAL CARE FIRST HOUR: CPT | Performed by: INTERNAL MEDICINE

## 2023-01-01 PROCEDURE — 5A09357 ASSISTANCE WITH RESPIRATORY VENTILATION, LESS THAN 24 CONSECUTIVE HOURS, CONTINUOUS POSITIVE AIRWAY PRESSURE: ICD-10-PCS | Performed by: INTERNAL MEDICINE

## 2023-01-01 PROCEDURE — 82948 REAGENT STRIP/BLOOD GLUCOSE: CPT

## 2023-01-01 PROCEDURE — 87636 SARSCOV2 & INF A&B AMP PRB: CPT | Performed by: EMERGENCY MEDICINE

## 2023-01-01 PROCEDURE — P9041 ALBUMIN (HUMAN),5%, 50ML: HCPCS | Performed by: INTERNAL MEDICINE

## 2023-01-01 PROCEDURE — 83735 ASSAY OF MAGNESIUM: CPT | Performed by: INTERNAL MEDICINE

## 2023-01-01 PROCEDURE — 25010000002 PIPERACILLIN SOD-TAZOBACTAM PER 1 G

## 2023-01-01 PROCEDURE — 25010000002 VANCOMYCIN 10 G RECONSTITUTED SOLUTION: Performed by: EMERGENCY MEDICINE

## 2023-01-01 PROCEDURE — 86901 BLOOD TYPING SEROLOGIC RH(D): CPT

## 2023-01-01 PROCEDURE — 25010000002 VASOPRESSIN 0.2 UNIT/ML SOLUTION: Performed by: INTERNAL MEDICINE

## 2023-01-01 PROCEDURE — 83050 HGB METHEMOGLOBIN QUAN: CPT

## 2023-01-01 PROCEDURE — 71045 X-RAY EXAM CHEST 1 VIEW: CPT

## 2023-01-01 PROCEDURE — 03HY32Z INSERTION OF MONITORING DEVICE INTO UPPER ARTERY, PERCUTANEOUS APPROACH: ICD-10-PCS | Performed by: INTERNAL MEDICINE

## 2023-01-01 PROCEDURE — 25010000002 CALCIUM GLUCONATE 2-0.675 GM/100ML-% SOLUTION: Performed by: NURSE PRACTITIONER

## 2023-01-01 PROCEDURE — 99223 1ST HOSP IP/OBS HIGH 75: CPT | Performed by: HOSPITALIST

## 2023-01-01 PROCEDURE — 85007 BL SMEAR W/DIFF WBC COUNT: CPT | Performed by: INTERNAL MEDICINE

## 2023-01-01 PROCEDURE — 93005 ELECTROCARDIOGRAM TRACING: CPT | Performed by: EMERGENCY MEDICINE

## 2023-01-01 PROCEDURE — 25010000002 MICAFUNGIN SODIUM 100 MG RECONSTITUTED SOLUTION: Performed by: INTERNAL MEDICINE

## 2023-01-01 PROCEDURE — 25010000002 AMIODARONE IN DEXTROSE 5% 360-4.14 MG/200ML-% SOLUTION: Performed by: INTERNAL MEDICINE

## 2023-01-01 PROCEDURE — 87150 DNA/RNA AMPLIFIED PROBE: CPT | Performed by: EMERGENCY MEDICINE

## 2023-01-01 PROCEDURE — 84100 ASSAY OF PHOSPHORUS: CPT | Performed by: INTERNAL MEDICINE

## 2023-01-01 PROCEDURE — 96360 HYDRATION IV INFUSION INIT: CPT

## 2023-01-01 PROCEDURE — 99285 EMERGENCY DEPT VISIT HI MDM: CPT

## 2023-01-01 PROCEDURE — 93005 ELECTROCARDIOGRAM TRACING: CPT | Performed by: INTERNAL MEDICINE

## 2023-01-01 PROCEDURE — 85027 COMPLETE CBC AUTOMATED: CPT | Performed by: NURSE PRACTITIONER

## 2023-01-01 PROCEDURE — 25010000002 FENTANYL 10 MCG/1 ML NS: Performed by: INTERNAL MEDICINE

## 2023-01-01 PROCEDURE — 25010000002 MORPHINE PER 10 MG: Performed by: HOSPITALIST

## 2023-01-01 PROCEDURE — 31500 INSERT EMERGENCY AIRWAY: CPT | Performed by: INTERNAL MEDICINE

## 2023-01-01 PROCEDURE — 25010000002 ALBUMIN HUMAN 25% PER 50 ML: Performed by: HOSPITALIST

## 2023-01-01 PROCEDURE — 82375 ASSAY CARBOXYHB QUANT: CPT

## 2023-01-01 PROCEDURE — 25010000002 ONDANSETRON PER 1 MG: Performed by: EMERGENCY MEDICINE

## 2023-01-01 PROCEDURE — 83605 ASSAY OF LACTIC ACID: CPT | Performed by: EMERGENCY MEDICINE

## 2023-01-01 PROCEDURE — 0 CEFEPIME PER 500 MG: Performed by: INTERNAL MEDICINE

## 2023-01-01 PROCEDURE — 36430 TRANSFUSION BLD/BLD COMPNT: CPT

## 2023-01-01 PROCEDURE — 96375 TX/PRO/DX INJ NEW DRUG ADDON: CPT

## 2023-01-01 PROCEDURE — 83735 ASSAY OF MAGNESIUM: CPT | Performed by: HOSPITALIST

## 2023-01-01 PROCEDURE — 25010000002 ONDANSETRON PER 1 MG: Performed by: HOSPITALIST

## 2023-01-01 PROCEDURE — 74176 CT ABD & PELVIS W/O CONTRAST: CPT

## 2023-01-01 PROCEDURE — 99233 SBSQ HOSP IP/OBS HIGH 50: CPT | Performed by: HOSPITALIST

## 2023-01-01 PROCEDURE — C1894 INTRO/SHEATH, NON-LASER: HCPCS

## 2023-01-01 PROCEDURE — 94761 N-INVAS EAR/PLS OXIMETRY MLT: CPT

## 2023-01-01 PROCEDURE — 93306 TTE W/DOPPLER COMPLETE: CPT

## 2023-01-01 PROCEDURE — 25010000002 CALCIUM GLUCONATE 2-0.675 GM/100ML-% SOLUTION: Performed by: INTERNAL MEDICINE

## 2023-01-01 PROCEDURE — 25010000002 PIPERACILLIN SOD-TAZOBACTAM PER 1 G: Performed by: EMERGENCY MEDICINE

## 2023-01-01 PROCEDURE — 82330 ASSAY OF CALCIUM: CPT

## 2023-01-01 PROCEDURE — 25010000002 MIDAZOLAM PER 1 MG

## 2023-01-01 PROCEDURE — 99232 SBSQ HOSP IP/OBS MODERATE 35: CPT | Performed by: INTERNAL MEDICINE

## 2023-01-01 PROCEDURE — 36600 WITHDRAWAL OF ARTERIAL BLOOD: CPT

## 2023-01-01 PROCEDURE — 84100 ASSAY OF PHOSPHORUS: CPT | Performed by: HOSPITALIST

## 2023-01-01 PROCEDURE — 87186 SC STD MICRODIL/AGAR DIL: CPT | Performed by: EMERGENCY MEDICINE

## 2023-01-01 PROCEDURE — 0 POTASSIUM CHLORIDE PER 2 MEQ: Performed by: INTERNAL MEDICINE

## 2023-01-01 PROCEDURE — 25010000002 THIAMINE PER 100 MG: Performed by: INTERNAL MEDICINE

## 2023-01-01 PROCEDURE — 25010000002 PIPERACILLIN SOD-TAZOBACTAM PER 1 G: Performed by: PHYSICIAN ASSISTANT

## 2023-01-01 PROCEDURE — 80053 COMPREHEN METABOLIC PANEL: CPT

## 2023-01-01 PROCEDURE — 87040 BLOOD CULTURE FOR BACTERIA: CPT | Performed by: EMERGENCY MEDICINE

## 2023-01-01 PROCEDURE — 36620 INSERTION CATHETER ARTERY: CPT | Performed by: NURSE PRACTITIONER

## 2023-01-01 PROCEDURE — 93005 ELECTROCARDIOGRAM TRACING: CPT

## 2023-01-01 PROCEDURE — 87077 CULTURE AEROBIC IDENTIFY: CPT | Performed by: INTERNAL MEDICINE

## 2023-01-01 PROCEDURE — 25510000001 IOPAMIDOL 61 % SOLUTION: Performed by: HOSPITALIST

## 2023-01-01 PROCEDURE — 0BH17EZ INSERTION OF ENDOTRACHEAL AIRWAY INTO TRACHEA, VIA NATURAL OR ARTIFICIAL OPENING: ICD-10-PCS | Performed by: INTERNAL MEDICINE

## 2023-01-01 PROCEDURE — 86900 BLOOD TYPING SEROLOGIC ABO: CPT

## 2023-01-01 PROCEDURE — 87086 URINE CULTURE/COLONY COUNT: CPT | Performed by: INTERNAL MEDICINE

## 2023-01-01 PROCEDURE — 85025 COMPLETE CBC W/AUTO DIFF WBC: CPT | Performed by: EMERGENCY MEDICINE

## 2023-01-01 PROCEDURE — 84443 ASSAY THYROID STIM HORMONE: CPT

## 2023-01-01 PROCEDURE — 93306 TTE W/DOPPLER COMPLETE: CPT | Performed by: INTERNAL MEDICINE

## 2023-01-01 PROCEDURE — 83735 ASSAY OF MAGNESIUM: CPT

## 2023-01-01 PROCEDURE — 86850 RBC ANTIBODY SCREEN: CPT

## 2023-01-01 PROCEDURE — 25010000002 MORPHINE PER 10 MG: Performed by: EMERGENCY MEDICINE

## 2023-01-01 PROCEDURE — 25010000002 CALCIUM GLUCONATE-NACL 1-0.675 GM/50ML-% SOLUTION: Performed by: NURSE PRACTITIONER

## 2023-01-01 PROCEDURE — 70491 CT SOFT TISSUE NECK W/DYE: CPT

## 2023-01-01 PROCEDURE — 25010000002 DEXAMETHASONE SODIUM PHOSPHATE 100 MG/10ML SOLUTION: Performed by: NURSE PRACTITIONER

## 2023-01-01 PROCEDURE — 5A1945Z RESPIRATORY VENTILATION, 24-96 CONSECUTIVE HOURS: ICD-10-PCS | Performed by: INTERNAL MEDICINE

## 2023-01-01 PROCEDURE — 85007 BL SMEAR W/DIFF WBC COUNT: CPT | Performed by: EMERGENCY MEDICINE

## 2023-01-01 PROCEDURE — 96374 THER/PROPH/DIAG INJ IV PUSH: CPT

## 2023-01-01 PROCEDURE — 94003 VENT MGMT INPAT SUBQ DAY: CPT

## 2023-01-01 PROCEDURE — 81232 DPYD GENE COMMON VARIANTS: CPT | Performed by: INTERNAL MEDICINE

## 2023-01-01 PROCEDURE — 84145 PROCALCITONIN (PCT): CPT | Performed by: EMERGENCY MEDICINE

## 2023-01-01 PROCEDURE — 02HV33Z INSERTION OF INFUSION DEVICE INTO SUPERIOR VENA CAVA, PERCUTANEOUS APPROACH: ICD-10-PCS | Performed by: INTERNAL MEDICINE

## 2023-01-01 PROCEDURE — 25010000002 AMIODARONE IN DEXTROSE 5% 150-4.21 MG/100ML-% SOLUTION: Performed by: INTERNAL MEDICINE

## 2023-01-01 RX ORDER — LYSINE HCL 500 MG
1 TABLET ORAL DAILY
COMMUNITY

## 2023-01-01 RX ORDER — ONDANSETRON 2 MG/ML
4 INJECTION INTRAMUSCULAR; INTRAVENOUS ONCE
Status: COMPLETED | OUTPATIENT
Start: 2023-01-01 | End: 2023-01-01

## 2023-01-01 RX ORDER — POLYETHYLENE GLYCOL 3350 17 G/17G
17 POWDER, FOR SOLUTION ORAL DAILY PRN
Status: DISCONTINUED | OUTPATIENT
Start: 2023-01-01 | End: 2023-01-01

## 2023-01-01 RX ORDER — POTASSIUM CHLORIDE 1.5 G/1.58G
40 POWDER, FOR SOLUTION ORAL EVERY 4 HOURS
Status: COMPLETED | OUTPATIENT
Start: 2023-01-01 | End: 2023-01-01

## 2023-01-01 RX ORDER — ALBUMIN, HUMAN INJ 5% 5 %
500 SOLUTION INTRAVENOUS ONCE
Status: COMPLETED | OUTPATIENT
Start: 2023-01-01 | End: 2023-01-01

## 2023-01-01 RX ORDER — AMOXICILLIN 250 MG
2 CAPSULE ORAL 2 TIMES DAILY
Status: DISCONTINUED | OUTPATIENT
Start: 2023-01-01 | End: 2023-01-01

## 2023-01-01 RX ORDER — SODIUM CHLORIDE 0.9 % (FLUSH) 0.9 %
10 SYRINGE (ML) INJECTION AS NEEDED
Status: DISCONTINUED | OUTPATIENT
Start: 2023-01-01 | End: 2023-01-01

## 2023-01-01 RX ORDER — SODIUM CHLORIDE 0.9 % (FLUSH) 0.9 %
10 SYRINGE (ML) INJECTION AS NEEDED
Status: DISCONTINUED | OUTPATIENT
Start: 2023-01-01 | End: 2023-01-01 | Stop reason: HOSPADM

## 2023-01-01 RX ORDER — ETOMIDATE 2 MG/ML
20 INJECTION INTRAVENOUS ONCE
Status: COMPLETED | OUTPATIENT
Start: 2023-01-01 | End: 2023-01-01

## 2023-01-01 RX ORDER — CALCIUM GLUCONATE 20 MG/ML
2000 INJECTION, SOLUTION INTRAVENOUS
Status: DISCONTINUED | OUTPATIENT
Start: 2023-01-01 | End: 2023-01-01

## 2023-01-01 RX ORDER — CALCIUM GLUCONATE 20 MG/ML
1000 INJECTION, SOLUTION INTRAVENOUS
Status: DISCONTINUED | OUTPATIENT
Start: 2023-01-01 | End: 2023-01-01

## 2023-01-01 RX ORDER — SODIUM CHLORIDE 0.9 % (FLUSH) 0.9 %
10 SYRINGE (ML) INJECTION EVERY 12 HOURS SCHEDULED
Status: DISCONTINUED | OUTPATIENT
Start: 2023-01-01 | End: 2023-01-01 | Stop reason: HOSPADM

## 2023-01-01 RX ORDER — NOREPINEPHRINE BITARTRATE 0.03 MG/ML
.02-.3 INJECTION, SOLUTION INTRAVENOUS
Status: DISCONTINUED | OUTPATIENT
Start: 2023-01-01 | End: 2023-01-01

## 2023-01-01 RX ORDER — CALCIUM GLUCONATE 20 MG/ML
1000 INJECTION, SOLUTION INTRAVENOUS
Status: COMPLETED | OUTPATIENT
Start: 2023-01-01 | End: 2023-01-01

## 2023-01-01 RX ORDER — CHLORHEXIDINE GLUCONATE 0.12 MG/ML
15 RINSE ORAL EVERY 12 HOURS SCHEDULED
Status: DISCONTINUED | OUTPATIENT
Start: 2023-01-01 | End: 2023-01-01 | Stop reason: HOSPADM

## 2023-01-01 RX ORDER — MORPHINE SULFATE 4 MG/ML
4 INJECTION, SOLUTION INTRAMUSCULAR; INTRAVENOUS ONCE
Status: COMPLETED | OUTPATIENT
Start: 2023-01-01 | End: 2023-01-01

## 2023-01-01 RX ORDER — MIDAZOLAM HYDROCHLORIDE 1 MG/ML
4 INJECTION INTRAMUSCULAR; INTRAVENOUS ONCE
Status: COMPLETED | OUTPATIENT
Start: 2023-01-01 | End: 2023-01-01

## 2023-01-01 RX ORDER — ONDANSETRON 2 MG/ML
4 INJECTION INTRAMUSCULAR; INTRAVENOUS EVERY 6 HOURS PRN
Status: DISCONTINUED | OUTPATIENT
Start: 2023-01-01 | End: 2023-01-01 | Stop reason: HOSPADM

## 2023-01-01 RX ORDER — VANCOMYCIN/0.9 % SOD CHLORIDE 1.5G/250ML
20 PLASTIC BAG, INJECTION (ML) INTRAVENOUS ONCE
Status: COMPLETED | OUTPATIENT
Start: 2023-01-01 | End: 2023-01-01

## 2023-01-01 RX ORDER — BISACODYL 10 MG
10 SUPPOSITORY, RECTAL RECTAL DAILY PRN
Status: DISCONTINUED | OUTPATIENT
Start: 2023-01-01 | End: 2023-01-01

## 2023-01-01 RX ORDER — PANTOPRAZOLE SODIUM 40 MG/1
40 TABLET, DELAYED RELEASE ORAL
Status: DISCONTINUED | OUTPATIENT
Start: 2023-01-01 | End: 2023-01-01

## 2023-01-01 RX ORDER — DEXTROSE MONOHYDRATE 25 G/50ML
50 INJECTION, SOLUTION INTRAVENOUS
Status: DISCONTINUED | OUTPATIENT
Start: 2023-01-01 | End: 2023-01-01 | Stop reason: HOSPADM

## 2023-01-01 RX ORDER — THIAMINE HYDROCHLORIDE 100 MG/ML
200 INJECTION, SOLUTION INTRAMUSCULAR; INTRAVENOUS EVERY 8 HOURS
Status: COMPLETED | OUTPATIENT
Start: 2023-01-01 | End: 2023-01-01

## 2023-01-01 RX ORDER — CALCIUM GLUCONATE 20 MG/ML
2000 INJECTION, SOLUTION INTRAVENOUS ONCE
Status: COMPLETED | OUTPATIENT
Start: 2023-01-01 | End: 2023-01-01

## 2023-01-01 RX ORDER — MAGNESIUM SULFATE 1 G/100ML
1 INJECTION INTRAVENOUS
Status: DISPENSED | OUTPATIENT
Start: 2023-01-01 | End: 2023-01-01

## 2023-01-01 RX ORDER — BISACODYL 5 MG/1
5 TABLET, DELAYED RELEASE ORAL DAILY PRN
Status: DISCONTINUED | OUTPATIENT
Start: 2023-01-01 | End: 2023-01-01

## 2023-01-01 RX ORDER — CALCIUM GLUCONATE 20 MG/ML
2000 INJECTION, SOLUTION INTRAVENOUS
Status: COMPLETED | OUTPATIENT
Start: 2023-01-01 | End: 2023-01-01

## 2023-01-01 RX ORDER — MIDAZOLAM HYDROCHLORIDE 1 MG/ML
INJECTION INTRAMUSCULAR; INTRAVENOUS
Status: COMPLETED
Start: 2023-01-01 | End: 2023-01-01

## 2023-01-01 RX ORDER — NALOXONE HCL 0.4 MG/ML
0.4 VIAL (ML) INJECTION
Status: DISCONTINUED | OUTPATIENT
Start: 2023-01-01 | End: 2023-01-01 | Stop reason: HOSPADM

## 2023-01-01 RX ORDER — MORPHINE SULFATE 2 MG/ML
1 INJECTION, SOLUTION INTRAMUSCULAR; INTRAVENOUS EVERY 4 HOURS PRN
Status: DISCONTINUED | OUTPATIENT
Start: 2023-01-01 | End: 2023-01-01 | Stop reason: HOSPADM

## 2023-01-01 RX ORDER — SODIUM CHLORIDE 9 MG/ML
40 INJECTION, SOLUTION INTRAVENOUS AS NEEDED
Status: DISCONTINUED | OUTPATIENT
Start: 2023-01-01 | End: 2023-01-01 | Stop reason: HOSPADM

## 2023-01-01 RX ORDER — FENTANYL CITRATE 50 UG/ML
INJECTION, SOLUTION INTRAMUSCULAR; INTRAVENOUS
Status: DISPENSED
Start: 2023-01-01 | End: 2023-01-01

## 2023-01-01 RX ORDER — GLYCOPYRROLATE 0.2 MG/ML
0.4 INJECTION INTRAMUSCULAR; INTRAVENOUS EVERY 4 HOURS PRN
Status: DISCONTINUED | OUTPATIENT
Start: 2023-01-01 | End: 2023-01-01 | Stop reason: HOSPADM

## 2023-01-01 RX ORDER — ETOMIDATE 2 MG/ML
INJECTION INTRAVENOUS
Status: COMPLETED
Start: 2023-01-01 | End: 2023-01-01

## 2023-01-01 RX ORDER — CALCIUM GLUCONATE 20 MG/ML
2000 INJECTION, SOLUTION INTRAVENOUS
Status: DISPENSED | OUTPATIENT
Start: 2023-01-01 | End: 2023-01-01

## 2023-01-01 RX ORDER — MAGNESIUM SULFATE 1 G/100ML
1 INJECTION INTRAVENOUS ONCE
Status: COMPLETED | OUTPATIENT
Start: 2023-01-01 | End: 2023-01-01

## 2023-01-01 RX ORDER — SODIUM CHLORIDE 9 MG/ML
150 INJECTION, SOLUTION INTRAVENOUS CONTINUOUS
Status: DISCONTINUED | OUTPATIENT
Start: 2023-01-01 | End: 2023-01-01

## 2023-01-01 RX ORDER — ALBUMIN, HUMAN INJ 5% 5 %
250 SOLUTION INTRAVENOUS ONCE
Status: COMPLETED | OUTPATIENT
Start: 2023-01-01 | End: 2023-01-01

## 2023-01-01 RX ORDER — ROCURONIUM BROMIDE 50 MG/5 ML
50 SYRINGE (ML) INTRAVENOUS ONCE
Status: DISCONTINUED | OUTPATIENT
Start: 2023-01-01 | End: 2023-01-01

## 2023-01-01 RX ORDER — ALBUMIN (HUMAN) 12.5 G/50ML
25 SOLUTION INTRAVENOUS ONCE
Status: COMPLETED | OUTPATIENT
Start: 2023-01-01 | End: 2023-01-01

## 2023-01-01 RX ORDER — LORAZEPAM 2 MG/ML
2 INJECTION INTRAMUSCULAR
Status: DISCONTINUED | OUTPATIENT
Start: 2023-01-01 | End: 2023-01-01 | Stop reason: HOSPADM

## 2023-01-01 RX ORDER — DIPHENHYDRAMINE HYDROCHLORIDE AND LIDOCAINE HYDROCHLORIDE AND ALUMINUM HYDROXIDE AND MAGNESIUM HYDRO
5 KIT EVERY 6 HOURS SCHEDULED
Status: DISCONTINUED | OUTPATIENT
Start: 2023-01-01 | End: 2023-01-01 | Stop reason: HOSPADM

## 2023-01-01 RX ORDER — POTASSIUM CHLORIDE 1.5 G/1.58G
40 POWDER, FOR SOLUTION ORAL ONCE
Status: COMPLETED | OUTPATIENT
Start: 2023-01-01 | End: 2023-01-01

## 2023-01-01 RX ORDER — PANTOPRAZOLE SODIUM 40 MG/10ML
40 INJECTION, POWDER, LYOPHILIZED, FOR SOLUTION INTRAVENOUS
Status: DISCONTINUED | OUTPATIENT
Start: 2023-01-01 | End: 2023-01-01

## 2023-01-01 RX ORDER — POTASSIUM CHLORIDE 29.8 MG/ML
20 INJECTION INTRAVENOUS
Status: COMPLETED | OUTPATIENT
Start: 2023-01-01 | End: 2023-01-01

## 2023-01-01 RX ADMIN — SODIUM BICARBONATE 150 MEQ: 84 INJECTION, SOLUTION INTRAVENOUS at 01:02

## 2023-01-01 RX ADMIN — SODIUM BICARBONATE 150 MEQ: 84 INJECTION, SOLUTION INTRAVENOUS at 23:18

## 2023-01-01 RX ADMIN — Medication 10 ML: at 20:29

## 2023-01-01 RX ADMIN — DIPHENHYDRAMINE HYDROCHLORIDE AND LIDOCAINE HYDROCHLORIDE AND ALUMINUM HYDROXIDE AND MAGNESIUM HYDRO 5 ML: KIT at 13:28

## 2023-01-01 RX ADMIN — Medication 10 ML: at 08:52

## 2023-01-01 RX ADMIN — POTASSIUM CHLORIDE 40 MEQ: 1.5 POWDER, FOR SOLUTION ORAL at 23:54

## 2023-01-01 RX ADMIN — PIPERACILLIN SODIUM AND TAZOBACTAM SODIUM 3.38 G: 3; .375 INJECTION, SOLUTION INTRAVENOUS at 14:33

## 2023-01-01 RX ADMIN — CALCIUM GLUCONATE 2000 MG: 20 INJECTION, SOLUTION INTRAVENOUS at 11:36

## 2023-01-01 RX ADMIN — FILGRASTIM 300 MCG: 300 INJECTION, SOLUTION INTRAVENOUS; SUBCUTANEOUS at 17:24

## 2023-01-01 RX ADMIN — MINERAL OIL AND WHITE PETROLATUM: 150; 830 OINTMENT OPHTHALMIC at 18:00

## 2023-01-01 RX ADMIN — ONDANSETRON 4 MG: 2 INJECTION INTRAMUSCULAR; INTRAVENOUS at 08:34

## 2023-01-01 RX ADMIN — ONDANSETRON 4 MG: 2 INJECTION INTRAMUSCULAR; INTRAVENOUS at 15:05

## 2023-01-01 RX ADMIN — NOREPINEPHRINE BITARTRATE 0.18 MCG/KG/MIN: 0.03 INJECTION, SOLUTION INTRAVENOUS at 14:38

## 2023-01-01 RX ADMIN — MAGNESIUM SULFATE HEPTAHYDRATE 1 G: 1 INJECTION, SOLUTION INTRAVENOUS at 16:01

## 2023-01-01 RX ADMIN — DEXAMETHASONE SODIUM PHOSPHATE 12 MG: 10 INJECTION, SOLUTION INTRAMUSCULAR; INTRAVENOUS at 14:34

## 2023-01-01 RX ADMIN — CEFEPIME 2000 MG: 2 INJECTION, POWDER, FOR SOLUTION INTRAVENOUS at 12:04

## 2023-01-01 RX ADMIN — URIDINE TRIACETATE 10 G: 951 GRANULE ORAL at 11:29

## 2023-01-01 RX ADMIN — AMIODARONE HYDROCHLORIDE 0.5 MG/MIN: 1.8 INJECTION, SOLUTION INTRAVENOUS at 02:12

## 2023-01-01 RX ADMIN — PIPERACILLIN SODIUM AND TAZOBACTAM SODIUM 4.5 G: 4; .5 INJECTION, SOLUTION INTRAVENOUS at 03:52

## 2023-01-01 RX ADMIN — SODIUM BICARBONATE 150 MEQ: 84 INJECTION, SOLUTION INTRAVENOUS at 15:21

## 2023-01-01 RX ADMIN — SODIUM CHLORIDE 1000 ML: 9 INJECTION, SOLUTION INTRAVENOUS at 14:33

## 2023-01-01 RX ADMIN — PHENYLEPHRINE HYDROCHLORIDE 3 MCG/KG/MIN: 10 INJECTION INTRAVENOUS at 21:02

## 2023-01-01 RX ADMIN — SODIUM CHLORIDE 150 ML/HR: 9 INJECTION, SOLUTION INTRAVENOUS at 18:18

## 2023-01-01 RX ADMIN — ALBUMIN (HUMAN) 500 ML: 12.5 INJECTION, SOLUTION INTRAVENOUS at 09:00

## 2023-01-01 RX ADMIN — SENNOSIDES AND DOCUSATE SODIUM 2 TABLET: 50; 8.6 TABLET ORAL at 08:06

## 2023-01-01 RX ADMIN — NOREPINEPHRINE BITARTRATE 0.02 MCG/KG/MIN: 0.03 INJECTION, SOLUTION INTRAVENOUS at 19:51

## 2023-01-01 RX ADMIN — PHENYLEPHRINE HYDROCHLORIDE 3 MCG/KG/MIN: 10 INJECTION INTRAVENOUS at 09:22

## 2023-01-01 RX ADMIN — Medication 10 ML: at 08:38

## 2023-01-01 RX ADMIN — PHENYLEPHRINE HYDROCHLORIDE 3 MCG/KG/MIN: 10 INJECTION INTRAVENOUS at 02:23

## 2023-01-01 RX ADMIN — SODIUM PHOSPHATE, MONOBASIC, MONOHYDRATE AND SODIUM PHOSPHATE, DIBASIC, ANHYDROUS 15 MMOL: 142; 276 INJECTION, SOLUTION INTRAVENOUS at 21:29

## 2023-01-01 RX ADMIN — Medication 150 MCG/HR: at 11:45

## 2023-01-01 RX ADMIN — SENNOSIDES AND DOCUSATE SODIUM 2 TABLET: 50; 8.6 TABLET ORAL at 21:29

## 2023-01-01 RX ADMIN — DIPHENHYDRAMINE HYDROCHLORIDE AND LIDOCAINE HYDROCHLORIDE AND ALUMINUM HYDROXIDE AND MAGNESIUM HYDRO 5 ML: KIT at 17:59

## 2023-01-01 RX ADMIN — POTASSIUM CHLORIDE 40 MEQ: 1.5 POWDER, FOR SOLUTION ORAL at 04:42

## 2023-01-01 RX ADMIN — NOREPINEPHRINE BITARTRATE 0.24 MCG/KG/MIN: 0.03 INJECTION, SOLUTION INTRAVENOUS at 01:55

## 2023-01-01 RX ADMIN — PIPERACILLIN SODIUM AND TAZOBACTAM SODIUM 4.5 G: 4; .5 INJECTION, SOLUTION INTRAVENOUS at 20:29

## 2023-01-01 RX ADMIN — AMIODARONE HYDROCHLORIDE 0.5 MG/MIN: 1.8 INJECTION, SOLUTION INTRAVENOUS at 23:59

## 2023-01-01 RX ADMIN — DIPHENHYDRAMINE HYDROCHLORIDE AND LIDOCAINE HYDROCHLORIDE AND ALUMINUM HYDROXIDE AND MAGNESIUM HYDRO 5 ML: KIT at 00:00

## 2023-01-01 RX ADMIN — VASOPRESSIN 0.03 UNITS/MIN: 0.2 INJECTION INTRAVENOUS at 07:55

## 2023-01-01 RX ADMIN — SODIUM CHLORIDE 150 ML/HR: 9 INJECTION, SOLUTION INTRAVENOUS at 07:30

## 2023-01-01 RX ADMIN — ALBUMIN (HUMAN) 250 ML: 12.5 INJECTION, SOLUTION INTRAVENOUS at 13:04

## 2023-01-01 RX ADMIN — PHENYLEPHRINE HYDROCHLORIDE 0.5 MCG/KG/MIN: 10 INJECTION INTRAVENOUS at 15:51

## 2023-01-01 RX ADMIN — Medication 50 MCG/HR: at 13:17

## 2023-01-01 RX ADMIN — SODIUM BICARBONATE 150 MEQ: 84 INJECTION, SOLUTION INTRAVENOUS at 07:49

## 2023-01-01 RX ADMIN — URIDINE TRIACETATE 10 G: 951 GRANULE ORAL at 13:28

## 2023-01-01 RX ADMIN — PHENYLEPHRINE HYDROCHLORIDE 3 MCG/KG/MIN: 10 INJECTION INTRAVENOUS at 14:37

## 2023-01-01 RX ADMIN — Medication 10 ML: at 00:00

## 2023-01-01 RX ADMIN — URIDINE TRIACETATE 10 G: 951 GRANULE ORAL at 05:35

## 2023-01-01 RX ADMIN — CHLORHEXIDINE GLUCONATE 15 ML: 1.2 SOLUTION ORAL at 21:29

## 2023-01-01 RX ADMIN — POTASSIUM CHLORIDE 40 MEQ: 1.5 POWDER, FOR SOLUTION ORAL at 00:28

## 2023-01-01 RX ADMIN — CALCIUM GLUCONATE 2000 MG: 20 INJECTION, SOLUTION INTRAVENOUS at 15:59

## 2023-01-01 RX ADMIN — URIDINE TRIACETATE 10 G: 951 GRANULE ORAL at 06:04

## 2023-01-01 RX ADMIN — SODIUM BICARBONATE 150 MEQ: 84 INJECTION, SOLUTION INTRAVENOUS at 06:22

## 2023-01-01 RX ADMIN — ACYCLOVIR SODIUM 250 MG: 500 INJECTION, SOLUTION INTRAVENOUS at 17:07

## 2023-01-01 RX ADMIN — POTASSIUM CHLORIDE 20 MEQ: 29.8 INJECTION, SOLUTION INTRAVENOUS at 14:09

## 2023-01-01 RX ADMIN — PHENYLEPHRINE HYDROCHLORIDE 3 MCG/KG/MIN: 10 INJECTION INTRAVENOUS at 01:22

## 2023-01-01 RX ADMIN — NOREPINEPHRINE BITARTRATE 0.3 MCG/KG/MIN: 0.03 INJECTION, SOLUTION INTRAVENOUS at 09:56

## 2023-01-01 RX ADMIN — AMIODARONE HYDROCHLORIDE 0.5 MG/MIN: 1.8 INJECTION, SOLUTION INTRAVENOUS at 11:29

## 2023-01-01 RX ADMIN — ACYCLOVIR SODIUM 250 MG: 500 INJECTION, SOLUTION INTRAVENOUS at 06:22

## 2023-01-01 RX ADMIN — SODIUM CHLORIDE 1000 ML: 9 INJECTION, SOLUTION INTRAVENOUS at 13:14

## 2023-01-01 RX ADMIN — DIPHENHYDRAMINE HYDROCHLORIDE AND LIDOCAINE HYDROCHLORIDE AND ALUMINUM HYDROXIDE AND MAGNESIUM HYDRO 5 ML: KIT at 01:23

## 2023-01-01 RX ADMIN — PHENYLEPHRINE HYDROCHLORIDE 2.8 MCG/KG/MIN: 10 INJECTION INTRAVENOUS at 18:01

## 2023-01-01 RX ADMIN — ACYCLOVIR SODIUM 250 MG: 500 INJECTION, SOLUTION INTRAVENOUS at 15:21

## 2023-01-01 RX ADMIN — NOREPINEPHRINE BITARTRATE 0.3 MCG/KG/MIN: 0.03 INJECTION, SOLUTION INTRAVENOUS at 07:55

## 2023-01-01 RX ADMIN — THIAMINE HYDROCHLORIDE 200 MG: 100 INJECTION, SOLUTION INTRAMUSCULAR; INTRAVENOUS at 00:37

## 2023-01-01 RX ADMIN — Medication 10 ML: at 09:10

## 2023-01-01 RX ADMIN — ETOMIDATE 20 MG: 40 INJECTION, SOLUTION INTRAVENOUS at 12:46

## 2023-01-01 RX ADMIN — Medication 10 ML: at 21:32

## 2023-01-01 RX ADMIN — VANCOMYCIN HYDROCHLORIDE 1500 MG: 10 INJECTION, POWDER, LYOPHILIZED, FOR SOLUTION INTRAVENOUS at 15:10

## 2023-01-01 RX ADMIN — SODIUM CHLORIDE 1000 ML: 9 INJECTION, SOLUTION INTRAVENOUS at 13:28

## 2023-01-01 RX ADMIN — PIPERACILLIN SODIUM AND TAZOBACTAM SODIUM 4.5 G: 4; .5 INJECTION, SOLUTION INTRAVENOUS at 20:01

## 2023-01-01 RX ADMIN — AMIODARONE HYDROCHLORIDE 150 MG: 1.5 INJECTION, SOLUTION INTRAVENOUS at 13:51

## 2023-01-01 RX ADMIN — POTASSIUM CHLORIDE 40 MEQ: 1.5 POWDER, FOR SOLUTION ORAL at 07:47

## 2023-01-01 RX ADMIN — ACYCLOVIR SODIUM 250 MG: 500 INJECTION, SOLUTION INTRAVENOUS at 05:33

## 2023-01-01 RX ADMIN — ALBUMIN (HUMAN) 25 G: 0.25 INJECTION, SOLUTION INTRAVENOUS at 07:46

## 2023-01-01 RX ADMIN — SODIUM BICARBONATE 150 MEQ: 84 INJECTION, SOLUTION INTRAVENOUS at 09:29

## 2023-01-01 RX ADMIN — URIDINE TRIACETATE 10 G: 951 GRANULE ORAL at 00:29

## 2023-01-01 RX ADMIN — ONDANSETRON 4 MG: 2 INJECTION INTRAMUSCULAR; INTRAVENOUS at 12:16

## 2023-01-01 RX ADMIN — MICAFUNGIN SODIUM 100 MG: 100 INJECTION, POWDER, LYOPHILIZED, FOR SOLUTION INTRAVENOUS at 21:24

## 2023-01-01 RX ADMIN — MIDAZOLAM HYDROCHLORIDE 2 MG: 1 INJECTION INTRAMUSCULAR; INTRAVENOUS at 12:45

## 2023-01-01 RX ADMIN — VASOPRESSIN 0.03 UNITS/MIN: 0.2 INJECTION INTRAVENOUS at 21:44

## 2023-01-01 RX ADMIN — URIDINE TRIACETATE 10 G: 951 GRANULE ORAL at 12:04

## 2023-01-01 RX ADMIN — MORPHINE SULFATE 4 MG: 4 INJECTION, SOLUTION INTRAMUSCULAR; INTRAVENOUS at 15:05

## 2023-01-01 RX ADMIN — URIDINE TRIACETATE 10 G: 951 GRANULE ORAL at 05:18

## 2023-01-01 RX ADMIN — MICAFUNGIN SODIUM 100 MG: 100 INJECTION, POWDER, LYOPHILIZED, FOR SOLUTION INTRAVENOUS at 01:15

## 2023-01-01 RX ADMIN — PANTOPRAZOLE SODIUM 40 MG: 40 TABLET, DELAYED RELEASE ORAL at 05:18

## 2023-01-01 RX ADMIN — MORPHINE SULFATE 1 MG: 2 INJECTION, SOLUTION INTRAMUSCULAR; INTRAVENOUS at 17:51

## 2023-01-01 RX ADMIN — Medication 10 ML: at 21:33

## 2023-01-01 RX ADMIN — ALBUMIN (HUMAN) 250 ML: 12.5 INJECTION, SOLUTION INTRAVENOUS at 21:02

## 2023-01-01 RX ADMIN — PANTOPRAZOLE SODIUM 40 MG: 40 TABLET, DELAYED RELEASE ORAL at 05:33

## 2023-01-01 RX ADMIN — POTASSIUM & SODIUM PHOSPHATES POWDER PACK 280-160-250 MG 2 PACKET: 280-160-250 PACK at 07:47

## 2023-01-01 RX ADMIN — ACYCLOVIR SODIUM 250 MG: 500 INJECTION, SOLUTION INTRAVENOUS at 17:24

## 2023-01-01 RX ADMIN — IOPAMIDOL 80 ML: 612 INJECTION, SOLUTION INTRAVENOUS at 13:57

## 2023-01-01 RX ADMIN — THIAMINE HYDROCHLORIDE 200 MG: 100 INJECTION, SOLUTION INTRAMUSCULAR; INTRAVENOUS at 10:33

## 2023-01-01 RX ADMIN — CALCIUM GLUCONATE 1000 MG: 20 INJECTION, SOLUTION INTRAVENOUS at 09:34

## 2023-01-01 RX ADMIN — SODIUM BICARBONATE 150 MEQ: 84 INJECTION, SOLUTION INTRAVENOUS at 03:03

## 2023-01-01 RX ADMIN — Medication 10 ML: at 20:15

## 2023-01-01 RX ADMIN — SODIUM CHLORIDE, POTASSIUM CHLORIDE, SODIUM LACTATE AND CALCIUM CHLORIDE 1000 ML: 600; 310; 30; 20 INJECTION, SOLUTION INTRAVENOUS at 19:51

## 2023-01-01 RX ADMIN — Medication 10 ML: at 21:54

## 2023-01-01 RX ADMIN — CALCIUM GLUCONATE 1000 MG: 20 INJECTION, SOLUTION INTRAVENOUS at 11:30

## 2023-01-01 RX ADMIN — URIDINE TRIACETATE 10 G: 951 GRANULE ORAL at 05:34

## 2023-01-01 RX ADMIN — PIPERACILLIN SODIUM AND TAZOBACTAM SODIUM 3.38 G: 3; .375 INJECTION, SOLUTION INTRAVENOUS at 05:10

## 2023-01-01 RX ADMIN — POTASSIUM CHLORIDE 40 MEQ: 1.5 POWDER, FOR SOLUTION ORAL at 10:25

## 2023-01-01 RX ADMIN — THIAMINE HYDROCHLORIDE 500 MG: 100 INJECTION, SOLUTION INTRAMUSCULAR; INTRAVENOUS at 12:02

## 2023-01-01 RX ADMIN — MORPHINE SULFATE 1 MG: 2 INJECTION, SOLUTION INTRAMUSCULAR; INTRAVENOUS at 13:28

## 2023-01-01 RX ADMIN — CALCIUM GLUCONATE 1000 MG: 20 INJECTION, SOLUTION INTRAVENOUS at 06:48

## 2023-01-01 RX ADMIN — CALCIUM GLUCONATE 1000 MG: 20 INJECTION, SOLUTION INTRAVENOUS at 14:37

## 2023-01-01 RX ADMIN — POTASSIUM CHLORIDE 20 MEQ: 29.8 INJECTION, SOLUTION INTRAVENOUS at 15:22

## 2023-01-01 RX ADMIN — MAGNESIUM SULFATE HEPTAHYDRATE 1 G: 1 INJECTION, SOLUTION INTRAVENOUS at 09:09

## 2023-01-01 RX ADMIN — POTASSIUM CHLORIDE 40 MEQ: 1.5 POWDER, FOR SOLUTION ORAL at 04:03

## 2023-01-01 RX ADMIN — Medication 10 ML: at 13:29

## 2023-01-01 RX ADMIN — SODIUM CHLORIDE 1000 ML: 9 INJECTION, SOLUTION INTRAVENOUS at 02:15

## 2023-01-01 RX ADMIN — SODIUM CHLORIDE 1500 ML: 9 INJECTION, SOLUTION INTRAVENOUS at 07:40

## 2023-01-01 RX ADMIN — ACYCLOVIR SODIUM 250 MG: 500 INJECTION, SOLUTION INTRAVENOUS at 05:18

## 2023-01-01 RX ADMIN — PHENYLEPHRINE HYDROCHLORIDE 3 MCG/KG/MIN: 10 INJECTION INTRAVENOUS at 21:45

## 2023-01-01 RX ADMIN — CEFEPIME 2000 MG: 2 INJECTION, POWDER, FOR SOLUTION INTRAVENOUS at 10:46

## 2023-01-01 RX ADMIN — PIPERACILLIN SODIUM AND TAZOBACTAM SODIUM 4.5 G: 4; .5 INJECTION, SOLUTION INTRAVENOUS at 10:34

## 2023-01-01 RX ADMIN — GLYCOPYRROLATE 0.4 MG: 0.2 INJECTION INTRAMUSCULAR; INTRAVENOUS at 13:05

## 2023-01-01 RX ADMIN — CALCIUM GLUCONATE 2000 MG: 20 INJECTION, SOLUTION INTRAVENOUS at 12:04

## 2023-01-01 RX ADMIN — CEFEPIME 2000 MG: 2 INJECTION, POWDER, FOR SOLUTION INTRAVENOUS at 23:07

## 2023-01-01 RX ADMIN — PIPERACILLIN SODIUM AND TAZOBACTAM SODIUM 4.5 G: 4; .5 INJECTION, SOLUTION INTRAVENOUS at 13:23

## 2023-01-01 RX ADMIN — DEXTROSE MONOHYDRATE 50 ML: 25 INJECTION, SOLUTION INTRAVENOUS at 07:40

## 2023-01-01 RX ADMIN — NOREPINEPHRINE BITARTRATE 0.02 MCG/KG/MIN: 0.03 INJECTION, SOLUTION INTRAVENOUS at 00:39

## 2023-01-01 RX ADMIN — CHLORHEXIDINE GLUCONATE 15 ML: 1.2 SOLUTION ORAL at 08:06

## 2023-01-01 RX ADMIN — SODIUM BICARBONATE 150 MEQ: 84 INJECTION, SOLUTION INTRAVENOUS at 10:16

## 2023-01-01 RX ADMIN — MORPHINE SULFATE 1 MG: 2 INJECTION, SOLUTION INTRAMUSCULAR; INTRAVENOUS at 19:55

## 2023-01-01 RX ADMIN — THIAMINE HYDROCHLORIDE 500 MG: 100 INJECTION, SOLUTION INTRAMUSCULAR; INTRAVENOUS at 23:54

## 2023-01-01 RX ADMIN — VASOPRESSIN 0.03 UNITS/MIN: 0.2 INJECTION INTRAVENOUS at 13:04

## 2023-01-01 RX ADMIN — URIDINE TRIACETATE 10 G: 951 GRANULE ORAL at 17:07

## 2023-01-01 RX ADMIN — PANTOPRAZOLE SODIUM 40 MG: 40 INJECTION, POWDER, LYOPHILIZED, FOR SOLUTION INTRAVENOUS at 06:05

## 2023-01-01 RX ADMIN — THIAMINE HYDROCHLORIDE 500 MG: 100 INJECTION, SOLUTION INTRAMUSCULAR; INTRAVENOUS at 15:36

## 2023-01-01 RX ADMIN — ETOMIDATE 20 MG: 2 INJECTION INTRAVENOUS at 12:46

## 2023-01-01 RX ADMIN — SODIUM BICARBONATE 150 MEQ: 84 INJECTION, SOLUTION INTRAVENOUS at 17:58

## 2023-01-01 RX ADMIN — MIDAZOLAM HYDROCHLORIDE 2 MG: 1 INJECTION, SOLUTION INTRAMUSCULAR; INTRAVENOUS at 12:45

## 2023-01-01 RX ADMIN — FILGRASTIM 300 MCG: 300 INJECTION, SOLUTION INTRAVENOUS; SUBCUTANEOUS at 17:59

## 2023-01-01 RX ADMIN — SODIUM CHLORIDE, POTASSIUM CHLORIDE, SODIUM LACTATE AND CALCIUM CHLORIDE 1000 ML: 600; 310; 30; 20 INJECTION, SOLUTION INTRAVENOUS at 21:43

## 2023-01-01 RX ADMIN — CALCIUM GLUCONATE 1000 MG: 20 INJECTION, SOLUTION INTRAVENOUS at 10:27

## 2023-01-01 RX ADMIN — AMIODARONE HYDROCHLORIDE 1 MG/MIN: 1.8 INJECTION, SOLUTION INTRAVENOUS at 14:04

## 2023-01-01 RX ADMIN — AMIODARONE HYDROCHLORIDE 0.5 MG/MIN: 1.8 INJECTION, SOLUTION INTRAVENOUS at 20:07

## 2023-01-01 RX ADMIN — FILGRASTIM 300 MCG: 300 INJECTION, SOLUTION INTRAVENOUS; SUBCUTANEOUS at 17:07

## 2023-01-01 RX ADMIN — MAGNESIUM SULFATE HEPTAHYDRATE 1 G: 1 INJECTION, SOLUTION INTRAVENOUS at 10:25

## 2023-01-01 RX ADMIN — DIPHENHYDRAMINE HYDROCHLORIDE AND LIDOCAINE HYDROCHLORIDE AND ALUMINUM HYDROXIDE AND MAGNESIUM HYDRO 5 ML: KIT at 23:21

## 2023-01-01 RX ADMIN — URIDINE TRIACETATE 10 G: 951 GRANULE ORAL at 23:42

## 2023-01-01 RX ADMIN — URIDINE TRIACETATE 10 G: 951 GRANULE ORAL at 17:24

## 2023-01-01 RX ADMIN — PHENYLEPHRINE HYDROCHLORIDE 1.2 MCG/KG/MIN: 10 INJECTION INTRAVENOUS at 06:48

## 2023-01-01 RX ADMIN — CALCIUM GLUCONATE 1000 MG: 20 INJECTION, SOLUTION INTRAVENOUS at 08:05

## 2023-01-01 RX ADMIN — URIDINE TRIACETATE 10 G: 951 GRANULE ORAL at 23:20

## 2023-01-01 RX ADMIN — PIPERACILLIN SODIUM AND TAZOBACTAM SODIUM 4.5 G: 4; .5 INJECTION, SOLUTION INTRAVENOUS at 04:02

## 2023-01-01 RX ADMIN — URIDINE TRIACETATE 10 G: 951 GRANULE ORAL at 23:30

## 2023-01-01 RX ADMIN — PHENYLEPHRINE HYDROCHLORIDE 3 MCG/KG/MIN: 10 INJECTION INTRAVENOUS at 06:05

## 2023-01-01 RX ADMIN — SODIUM BICARBONATE 150 MEQ: 84 INJECTION, SOLUTION INTRAVENOUS at 20:15

## 2023-01-01 RX ADMIN — NOREPINEPHRINE BITARTRATE 0.3 MCG/KG/MIN: 0.03 INJECTION, SOLUTION INTRAVENOUS at 04:03

## 2023-01-01 RX ADMIN — SODIUM BICARBONATE 150 MEQ: 84 INJECTION INTRAVENOUS at 00:40

## 2023-01-01 RX ADMIN — SODIUM BICARBONATE 150 MEQ: 84 INJECTION, SOLUTION INTRAVENOUS at 14:28

## 2023-01-01 RX ADMIN — THIAMINE HYDROCHLORIDE 200 MG: 100 INJECTION, SOLUTION INTRAMUSCULAR; INTRAVENOUS at 18:19

## 2023-01-01 RX ADMIN — SODIUM BICARBONATE 150 MEQ: 84 INJECTION, SOLUTION INTRAVENOUS at 23:19

## 2023-01-01 RX ADMIN — DIPHENHYDRAMINE HYDROCHLORIDE AND LIDOCAINE HYDROCHLORIDE AND ALUMINUM HYDROXIDE AND MAGNESIUM HYDRO 5 ML: KIT at 06:33

## 2023-01-01 RX ADMIN — URIDINE TRIACETATE 10 G: 951 GRANULE ORAL at 18:22

## 2023-01-01 RX ADMIN — MICAFUNGIN SODIUM 100 MG: 100 INJECTION, POWDER, LYOPHILIZED, FOR SOLUTION INTRAVENOUS at 21:30

## 2023-01-06 DIAGNOSIS — K75.4 AUTOIMMUNE HEPATITIS: ICD-10-CM

## 2023-01-06 RX ORDER — BUDESONIDE 3 MG/1
3 CAPSULE, COATED PELLETS ORAL EVERY MORNING
Qty: 90 CAPSULE | Refills: 0 | Status: SHIPPED | OUTPATIENT
Start: 2023-01-06 | End: 2023-02-06 | Stop reason: SDUPTHER

## 2023-01-13 ENCOUNTER — TELEPHONE (OUTPATIENT)
Dept: GASTROENTEROLOGY | Facility: CLINIC | Age: 78
End: 2023-01-13
Payer: MEDICARE

## 2023-01-17 ENCOUNTER — LAB (OUTPATIENT)
Dept: LAB | Facility: HOSPITAL | Age: 78
End: 2023-01-17
Payer: MEDICARE

## 2023-01-17 ENCOUNTER — TELEPHONE (OUTPATIENT)
Dept: ONCOLOGY | Facility: CLINIC | Age: 78
End: 2023-01-17
Payer: MEDICARE

## 2023-01-17 DIAGNOSIS — C50.911 INVASIVE DUCTAL CARCINOMA OF RIGHT BREAST: ICD-10-CM

## 2023-01-17 DIAGNOSIS — C79.51 BONE METASTASES: ICD-10-CM

## 2023-01-17 LAB
ALBUMIN SERPL-MCNC: 3.9 G/DL (ref 3.5–5.2)
ALBUMIN/GLOB SERPL: 1.3 G/DL
ALP SERPL-CCNC: 72 U/L (ref 39–117)
ALT SERPL W P-5'-P-CCNC: 9 U/L (ref 1–33)
ANION GAP SERPL CALCULATED.3IONS-SCNC: 10 MMOL/L (ref 5–15)
AST SERPL-CCNC: 17 U/L (ref 1–32)
BASOPHILS # BLD AUTO: 0.04 10*3/MM3 (ref 0–0.2)
BASOPHILS NFR BLD AUTO: 1.6 % (ref 0–1.5)
BILIRUB SERPL-MCNC: 0.4 MG/DL (ref 0–1.2)
BUN SERPL-MCNC: 18 MG/DL (ref 8–23)
BUN/CREAT SERPL: 14.1 (ref 7–25)
CALCIUM SPEC-SCNC: 8.3 MG/DL (ref 8.6–10.5)
CHLORIDE SERPL-SCNC: 107 MMOL/L (ref 98–107)
CO2 SERPL-SCNC: 27 MMOL/L (ref 22–29)
CREAT SERPL-MCNC: 1.28 MG/DL (ref 0.57–1)
DEPRECATED RDW RBC AUTO: 74.7 FL (ref 37–54)
EGFRCR SERPLBLD CKD-EPI 2021: 43.2 ML/MIN/1.73
EOSINOPHIL # BLD AUTO: 0.03 10*3/MM3 (ref 0–0.4)
EOSINOPHIL NFR BLD AUTO: 1.2 % (ref 0.3–6.2)
ERYTHROCYTE [DISTWIDTH] IN BLOOD BY AUTOMATED COUNT: 16.6 % (ref 12.3–15.4)
GLOBULIN UR ELPH-MCNC: 2.9 GM/DL
GLUCOSE SERPL-MCNC: 81 MG/DL (ref 65–99)
HCT VFR BLD AUTO: 34.1 % (ref 34–46.6)
HGB BLD-MCNC: 11.3 G/DL (ref 12–15.9)
IMM GRANULOCYTES # BLD AUTO: 0 10*3/MM3 (ref 0–0.05)
IMM GRANULOCYTES NFR BLD AUTO: 0 % (ref 0–0.5)
LYMPHOCYTES # BLD AUTO: 1.08 10*3/MM3 (ref 0.7–3.1)
LYMPHOCYTES NFR BLD AUTO: 42.4 % (ref 19.6–45.3)
MCH RBC QN AUTO: 39.2 PG (ref 26.6–33)
MCHC RBC AUTO-ENTMCNC: 33.1 G/DL (ref 31.5–35.7)
MCV RBC AUTO: 118.4 FL (ref 79–97)
MONOCYTES # BLD AUTO: 0.21 10*3/MM3 (ref 0.1–0.9)
MONOCYTES NFR BLD AUTO: 8.2 % (ref 5–12)
NEUTROPHILS NFR BLD AUTO: 1.19 10*3/MM3 (ref 1.7–7)
NEUTROPHILS NFR BLD AUTO: 46.6 % (ref 42.7–76)
PLATELET # BLD AUTO: 140 10*3/MM3 (ref 140–450)
PMV BLD AUTO: 11.7 FL (ref 6–12)
POTASSIUM SERPL-SCNC: 4.4 MMOL/L (ref 3.5–5.2)
PROT SERPL-MCNC: 6.8 G/DL (ref 6–8.5)
RBC # BLD AUTO: 2.88 10*6/MM3 (ref 3.77–5.28)
SODIUM SERPL-SCNC: 144 MMOL/L (ref 136–145)
WBC NRBC COR # BLD: 2.55 10*3/MM3 (ref 3.4–10.8)

## 2023-01-17 PROCEDURE — 36415 COLL VENOUS BLD VENIPUNCTURE: CPT

## 2023-01-17 PROCEDURE — 85025 COMPLETE CBC W/AUTO DIFF WBC: CPT

## 2023-01-17 PROCEDURE — 80053 COMPREHEN METABOLIC PANEL: CPT

## 2023-01-17 NOTE — TELEPHONE ENCOUNTER
Caller: Radha John    Relationship: Self    Best call back number:[phone]    What is the best time to reach you: ANYTIME    Who are you requesting to speak with (clinical staff, provider,  specific staff member): CLINICAL     What was the call regarding: RADHA IS WANTING TO GO GET HER LABS DONE TOMORROW, SHE WAS MAKING SURE THAT WAS OK TO GET THEM DONE THIS EARLY, AND TO GET AN APPT AT THE OFFICE FOR THEM      Do you require a callback: YES

## 2023-01-17 NOTE — TELEPHONE ENCOUNTER
Spoke with patient, she wanted to come in early for the appointment as well. She is rescheduled for 1/18/23 at 10:00am.

## 2023-01-18 ENCOUNTER — OFFICE VISIT (OUTPATIENT)
Dept: ONCOLOGY | Facility: CLINIC | Age: 78
End: 2023-01-18
Payer: MEDICARE

## 2023-01-18 VITALS
BODY MASS INDEX: 33.38 KG/M2 | OXYGEN SATURATION: 96 % | DIASTOLIC BLOOD PRESSURE: 74 MMHG | TEMPERATURE: 96.8 F | RESPIRATION RATE: 18 BRPM | WEIGHT: 170 LBS | HEART RATE: 53 BPM | HEIGHT: 60 IN | SYSTOLIC BLOOD PRESSURE: 172 MMHG

## 2023-01-18 DIAGNOSIS — L64.0 DRUG-INDUCED ANDROGENIC ALOPECIA: ICD-10-CM

## 2023-01-18 DIAGNOSIS — C50.911 INVASIVE DUCTAL CARCINOMA OF RIGHT BREAST: ICD-10-CM

## 2023-01-18 DIAGNOSIS — C50.912 INVASIVE DUCTAL CARCINOMA OF LEFT BREAST: ICD-10-CM

## 2023-01-18 DIAGNOSIS — C79.51 BONE METASTASES: Primary | ICD-10-CM

## 2023-01-18 PROCEDURE — 99214 OFFICE O/P EST MOD 30 MIN: CPT | Performed by: NURSE PRACTITIONER

## 2023-01-18 RX ORDER — SODIUM CHLORIDE 9 MG/ML
250 INJECTION, SOLUTION INTRAVENOUS ONCE
Status: CANCELLED | OUTPATIENT
Start: 2023-03-08

## 2023-01-18 NOTE — PROGRESS NOTES
PROBLEM LIST:  1. Left-sided pT1bN0 (IA), low-grade invasive ductal carcinoma diagnosed 05/01/2014. She presented with left-sided nipple discharge and an abnormality on mammogram in 04/2014. Biopsy showed invasive ductal cancer low-grade,estrogen receptor and progesterone receptor positive, HER-2 negative. Sheunderwent a left needle localization lumpectomy on 06/27/2014. Final tumor size  is 1.4 cm  a) Oncotype recurrence score was 0 which is associated with a 3% risk of 10 year recurrence.  b) Adjuvant Arimidex was started after the completion of radiotherapy in 08/2014.  c) Arimidex was discontinued in 11/2014 due to severe hot flashes. Arimidex resumed in 07/2015.  Completed 5 years of treatment.  D) recurrent left breast IDC.  Left breast lumpectomy on 9/1/21 showed 5 mm tumor, completely removed by biopsy needle, intermediate grade.  ER+ WV+ Her2 negative (pT1bNx).  Bone scan showed widespread metastatic bone disease.  CT chest abdomen and pelvis on 10/20/2021 showed extensive sclerotic changes in the sternum and thoracic spine, but no involvement of the organs.  E) started Ibrance and anastrozole 10/22/2021.   2. 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 Diagnosed on biopsy on 05/22/2014. She underwent a lumpectomy on 06/27/2014 with final tumor size being 2 mm. It was ER/WV positive with HER-2 not performed.   3. Hypothyroidism.   4. Diabetes.   5. Hypertension.   6. Hyperlipidemia.   7. Autoimmune hepatitis.  8. Osteopenia, received zometa x 4 doses, completed February 2018.  9. Erythrocytosis    Subjective     CHIEF COMPLAINT: breast cancer    HISTORY OF PRESENT ILLNESS:   Radha John returns for follow-up.  She continues on Ibrance and anastrozole.  This is her off week of Ibrance.  She is due to start Ibrance Saturday, 1/21/2023.  She continues to have significant hair loss related to the Ibrance.  She had an episode of bilateral upper leg pain on Monday that  "lasted most of the day.  She took ibuprofen and used a heating pad for the pain.  By Tuesday the pain had resolved and has had no further episodes.  She denies any new concerns at this time.        Objective      /74   Pulse 53   Temp 96.8 °F (36 °C) (Infrared)   Resp 18   Ht 152.4 cm (60\")   Wt 77.1 kg (170 lb)   SpO2 96%   BMI 33.20 kg/m²    Vitals:    01/18/23 0944   PainSc: 0-No pain               ECOG score: 1             General: well appearing female in no acute distress  Neuro: alert and oriented  HEENT: sclera anicteric, oropharynx clear  Cardiovascular: Regular rate and rhythm  Lungs: Clear to auscultation bilaterally  Extremeties: No lower extremity edema.    Skin: no rashes, lesions, bruising, or petechiae  Psych: mood and affect appropriate        RECENT LABS:  Lab Results   Component Value Date    WBC 2.55 (L) 01/17/2023    HGB 11.3 (L) 01/17/2023    HCT 34.1 01/17/2023    .4 (H) 01/17/2023     01/17/2023       Lab Results   Component Value Date    GLUCOSE 81 01/17/2023    BUN 18 01/17/2023    CREATININE 1.28 (H) 01/17/2023    EGFRIFAFRI 51 (L) 02/15/2022    BCR 14.1 01/17/2023    K 4.4 01/17/2023    CO2 27.0 01/17/2023    CALCIUM 8.3 (L) 01/17/2023    PROTENTOTREF 6.8 03/22/2021    ALBUMIN 3.9 01/17/2023    AST 17 01/17/2023    ALT 9 01/17/2023       Mammo Stereotactic Breast Biopsy Initial With & Without Device, Mammo Post Clip Placement Left, Mammo Breast Specimen  10G SENO-RX VACUUM ASSISTED STEREOTACTIC/TOMOSYNTHESIS GUIDED BIOPSY:   AFFIRM     HISTORY: Increasing calcifications associated with the patient's  lumpectomy bed     PROCEDURE:  Written and verbal consent was obtained for stereotactic  biopsy/tomosynthesis guided biopsy of the calcifications associated with  the patient's lumpectomy bed.  \"Time-out\" was observed to verify  patient's identity and correct location of the breast abnormality.  The  breast was sterilized with chloraprep and 2 % lidocaine with and " without  epinephrine was utilized for local anesthesia.  A small skin incision  was made with a scalpel and a 10 gauge Seno-Rx biopsy probe was advanced  into the breast.  The position ot the needle was confirmed with  tomosyntheis/stereotactic images.  3 core samples were obtained at the  biopsy site.  A specimen radiograph was obtained. [The specimen  radiograph confirmed the presence of calcifications.]  A post biopsy  marking clip was placed. It was thought that the first clip did not  deploy therefore the second clip was deployed. Routine left CC and ML  mammographic images were obtained to document clip position and post  biopsy changes. These images revealed appropriate clip placement of the  more inferior clip..      Upon completion of the procedure, compression was applied to the biopsy  site until all appreciable bleeding subsided and a sterile dressing was  applied. Post biopsy instructions were reviewed with the patient by our  clinical breast imaging staff. A written copy of these instructions was  also given to the patient. The patient tolerated the procedure well and  no immediate complications occurred.     SUMMARY:  10 gauge stereotactic/tomosynthesis guided vacuum assisted  core biopsy of suspicious microcalcifications located in the left lower  inner quadrant.  A marking clip was placed at the biopsy site.      PATHOLOGY:  Pathology demonstrated organizing fat necrosis with  associated calcification, benign adipose and skeletal muscle tissue. No  breast parenchymal identified.     This is considered concordant with imaging findings.     RECOMMENDATION: The patient may return to routine annual screening  mammography.      The patient will be called with final biopsy results and recommendations  by our breast care nurse.     This report was finalized on 11/10/2022 1:15 PM by Macey Key MD.           Mammo Stereotactic Breast Biopsy Initial With & Without Device, Mammo Post Clip Placement Left,  "Mammo Breast Specimen  10G SENO-RX VACUUM ASSISTED STEREOTACTIC/TOMOSYNTHESIS GUIDED BIOPSY:   AFFIRM     HISTORY: Increasing calcifications associated with the patient's  lumpectomy bed     PROCEDURE:  Written and verbal consent was obtained for stereotactic  biopsy/tomosynthesis guided biopsy of the calcifications associated with  the patient's lumpectomy bed.  \"Time-out\" was observed to verify  patient's identity and correct location of the breast abnormality.  The  breast was sterilized with chloraprep and 2 % lidocaine with and without  epinephrine was utilized for local anesthesia.  A small skin incision  was made with a scalpel and a 10 gauge Seno-Rx biopsy probe was advanced  into the breast.  The position ot the needle was confirmed with  tomosyntheis/stereotactic images.  3 core samples were obtained at the  biopsy site.  A specimen radiograph was obtained. [The specimen  radiograph confirmed the presence of calcifications.]  A post biopsy  marking clip was placed. It was thought that the first clip did not  deploy therefore the second clip was deployed. Routine left CC and ML  mammographic images were obtained to document clip position and post  biopsy changes. These images revealed appropriate clip placement of the  more inferior clip..      Upon completion of the procedure, compression was applied to the biopsy  site until all appreciable bleeding subsided and a sterile dressing was  applied. Post biopsy instructions were reviewed with the patient by our  clinical breast imaging staff. A written copy of these instructions was  also given to the patient. The patient tolerated the procedure well and  no immediate complications occurred.     SUMMARY:  10 gauge stereotactic/tomosynthesis guided vacuum assisted  core biopsy of suspicious microcalcifications located in the left lower  inner quadrant.  A marking clip was placed at the biopsy site.      PATHOLOGY:  Pathology demonstrated organizing fat necrosis " with  associated calcification, benign adipose and skeletal muscle tissue. No  breast parenchymal identified.     This is considered concordant with imaging findings.     RECOMMENDATION: The patient may return to routine annual screening  mammography.      The patient will be called with final biopsy results and recommendations  by our breast care nurse.     This report was finalized on 11/10/2022 1:15 PM by Macey Key MD.              Assessment & Plan   Radha John is a 77 y.o. female with bilateral stage I ER+ Her2 negative breast cancer, with a more recent recurrent 5 mm ER+ IDC in the left breast, s/p repeat lumpectomy, and imaging showing widespread metastatic bone involvement.    Continue Ibrance and anastrozole.  She has no signs of disease progression on imaging from November 2022.  She has had a slight increase in her tumor markers.  We will plan on repeating scans in March to evaluate response to treatment.  If she does have increased bone pain in the next few weeks I have asked her to contact us and we can move her scans sooner.  Labs from yesterday were reviewed.  Her ANC is 1190, WBC 2.88 and hemoglobin 11.3.  We will continue treatment unchanged at this time.    Left breast biopsy on 11/9/2022 showed fat necrosis with associated calcification. Benign adipose and skeletal muscle tissue.  No breast parenchyma identified.  She can return to annual screening mammogram.  Mammogram scheduled for 10/16/2023.    Bone metastasis: Continue Zometa every 3 months.  Next dose due March 8, 2023.    Follow-up in 6 weeks.                Rhoda Llamas APRN  [facility]    1/18/2023          CC:

## 2023-01-26 ENCOUNTER — SPECIALTY PHARMACY (OUTPATIENT)
Dept: ONCOLOGY | Facility: HOSPITAL | Age: 78
End: 2023-01-26
Payer: MEDICARE

## 2023-02-06 DIAGNOSIS — K75.4 AUTOIMMUNE HEPATITIS: ICD-10-CM

## 2023-02-06 RX ORDER — BUDESONIDE 3 MG/1
3 CAPSULE, COATED PELLETS ORAL EVERY MORNING
Qty: 90 CAPSULE | Refills: 3 | Status: SHIPPED | OUTPATIENT
Start: 2023-02-06 | End: 2023-03-02 | Stop reason: SDUPTHER

## 2023-02-09 RX ORDER — BUDESONIDE 3 MG/1
9 CAPSULE, COATED PELLETS ORAL DAILY
Qty: 90 CAPSULE | Refills: 11 | Status: SHIPPED | OUTPATIENT
Start: 2023-02-09 | End: 2023-05-10

## 2023-02-17 DIAGNOSIS — K75.4 AUTOIMMUNE HEPATITIS: Primary | ICD-10-CM

## 2023-02-27 ENCOUNTER — LAB (OUTPATIENT)
Dept: LAB | Facility: HOSPITAL | Age: 78
End: 2023-02-27
Payer: MEDICARE

## 2023-02-27 DIAGNOSIS — K75.4 AUTOIMMUNE HEPATITIS: ICD-10-CM

## 2023-02-27 LAB
ALBUMIN SERPL-MCNC: 3.8 G/DL (ref 3.5–5.2)
ALBUMIN/GLOB SERPL: 1.2 G/DL
ALP SERPL-CCNC: 81 U/L (ref 39–117)
ALT SERPL W P-5'-P-CCNC: 7 U/L (ref 1–33)
ANION GAP SERPL CALCULATED.3IONS-SCNC: 7 MMOL/L (ref 5–15)
AST SERPL-CCNC: 14 U/L (ref 1–32)
BASOPHILS # BLD AUTO: 0.04 10*3/MM3 (ref 0–0.2)
BASOPHILS NFR BLD AUTO: 1.3 % (ref 0–1.5)
BILIRUB SERPL-MCNC: 0.4 MG/DL (ref 0–1.2)
BUN SERPL-MCNC: 19 MG/DL (ref 8–23)
BUN/CREAT SERPL: 15.4 (ref 7–25)
CALCIUM SPEC-SCNC: 8.4 MG/DL (ref 8.6–10.5)
CHLORIDE SERPL-SCNC: 106 MMOL/L (ref 98–107)
CO2 SERPL-SCNC: 28 MMOL/L (ref 22–29)
CREAT SERPL-MCNC: 1.23 MG/DL (ref 0.57–1)
DEPRECATED RDW RBC AUTO: 75.7 FL (ref 37–54)
EGFRCR SERPLBLD CKD-EPI 2021: 45.4 ML/MIN/1.73
EOSINOPHIL # BLD AUTO: 0.03 10*3/MM3 (ref 0–0.4)
EOSINOPHIL NFR BLD AUTO: 1 % (ref 0.3–6.2)
ERYTHROCYTE [DISTWIDTH] IN BLOOD BY AUTOMATED COUNT: 18.3 % (ref 12.3–15.4)
GLOBULIN UR ELPH-MCNC: 3.1 GM/DL
GLUCOSE SERPL-MCNC: 100 MG/DL (ref 65–99)
HCT VFR BLD AUTO: 32.2 % (ref 34–46.6)
HGB BLD-MCNC: 10.8 G/DL (ref 12–15.9)
IGG1 SER-MCNC: 828 MG/DL (ref 700–1600)
LYMPHOCYTES # BLD AUTO: 0.84 10*3/MM3 (ref 0.7–3.1)
LYMPHOCYTES NFR BLD AUTO: 28.1 % (ref 19.6–45.3)
MCH RBC QN AUTO: 37.6 PG (ref 26.6–33)
MCHC RBC AUTO-ENTMCNC: 33.5 G/DL (ref 31.5–35.7)
MCV RBC AUTO: 112.2 FL (ref 79–97)
MONOCYTES # BLD AUTO: 0.22 10*3/MM3 (ref 0.1–0.9)
MONOCYTES NFR BLD AUTO: 7.4 % (ref 5–12)
NEUTROPHILS NFR BLD AUTO: 1.83 10*3/MM3 (ref 1.7–7)
NEUTROPHILS NFR BLD AUTO: 61.2 % (ref 42.7–76)
PLATELET # BLD AUTO: 275 10*3/MM3 (ref 140–450)
PMV BLD AUTO: 9.7 FL (ref 6–12)
POTASSIUM SERPL-SCNC: 4.1 MMOL/L (ref 3.5–5.2)
PROT SERPL-MCNC: 6.9 G/DL (ref 6–8.5)
RBC # BLD AUTO: 2.87 10*6/MM3 (ref 3.77–5.28)
SODIUM SERPL-SCNC: 141 MMOL/L (ref 136–145)
WBC NRBC COR # BLD: 2.99 10*3/MM3 (ref 3.4–10.8)

## 2023-02-27 PROCEDURE — 80053 COMPREHEN METABOLIC PANEL: CPT

## 2023-02-27 PROCEDURE — 85025 COMPLETE CBC W/AUTO DIFF WBC: CPT

## 2023-02-27 PROCEDURE — 82784 ASSAY IGA/IGD/IGG/IGM EACH: CPT

## 2023-02-28 ENCOUNTER — TELEPHONE (OUTPATIENT)
Dept: ONCOLOGY | Facility: CLINIC | Age: 78
End: 2023-02-28
Payer: MEDICARE

## 2023-02-28 NOTE — TELEPHONE ENCOUNTER
Caller: Radha John    Relationship: Self    Best call back number: [phone]    What is the best time to reach you: ASAP    Who are you requesting to speak with (clinical staff, provider,  specific staff member): CLINICAL        What was the call regarding: PT HAD BLOODWORK YESTERDAY WITH DR. SMART, DOES SHE NEED TO GET MORE LABS DONE FOR US BEFORE HER APPT W/ DR GRIFFIN ON 3/9?    Do you require a callback: YES, PLEASE ADVISE

## 2023-02-28 NOTE — TELEPHONE ENCOUNTER
I called patient back to let her know that we will be checking labs including tumor markers next week since she will be receiving the zometa infusion.  She verbalized understanding.

## 2023-03-02 ENCOUNTER — OFFICE VISIT (OUTPATIENT)
Dept: GASTROENTEROLOGY | Facility: CLINIC | Age: 78
End: 2023-03-02
Payer: MEDICARE

## 2023-03-02 ENCOUNTER — TELEPHONE (OUTPATIENT)
Dept: GASTROENTEROLOGY | Facility: CLINIC | Age: 78
End: 2023-03-02

## 2023-03-02 VITALS
WEIGHT: 169.8 LBS | BODY MASS INDEX: 33.34 KG/M2 | SYSTOLIC BLOOD PRESSURE: 137 MMHG | HEIGHT: 60 IN | TEMPERATURE: 97.7 F | HEART RATE: 61 BPM | DIASTOLIC BLOOD PRESSURE: 78 MMHG

## 2023-03-02 DIAGNOSIS — K75.4 AUTOIMMUNE HEPATITIS: Primary | ICD-10-CM

## 2023-03-02 PROCEDURE — 99214 OFFICE O/P EST MOD 30 MIN: CPT | Performed by: NURSE PRACTITIONER

## 2023-03-02 RX ORDER — AZATHIOPRINE 50 MG/1
TABLET ORAL
Qty: 180 TABLET | Refills: 11 | Status: SHIPPED | OUTPATIENT
Start: 2023-03-02

## 2023-03-02 RX ORDER — BUDESONIDE 3 MG/1
3 CAPSULE, COATED PELLETS ORAL EVERY MORNING
Qty: 90 CAPSULE | Refills: 3 | Status: SHIPPED | OUTPATIENT
Start: 2023-03-02

## 2023-03-02 NOTE — TELEPHONE ENCOUNTER
PA completed today for Budesonide and it is already approved and is approved from 01/06/2023 through 12/31/2023.

## 2023-03-02 NOTE — PROGRESS NOTES
GASTROENTEROLOGY OFFICE NOTE  Radha John  [id number]  1945    CARE TEAM  Patient Care Team:  Mague Reynolds MD as PCP - General (Internal Medicine)  Armand Duffy MD (Inactive) as Consulting Physician (Gynecology)  Sarah Prasad MD as Consulting Physician (Hematology and Oncology)    Referring Provider: Mague Reynolds MD    Chief Complaint   Patient presents with   • Hepatitis   • Constipation        HISTORY OF PRESENT ILLNESS:  Patient presents for follow-up of idiopathic autoimmune hepatitis on Imuran 100 mg a day and budesonide 9 mg a day with most recent liver enzymes on February 27 indicating remission; AST 14/ALT 7/ALP 81 and T. bili 0.4.  She is feeling well without any excessive fatigue and certainly no evidence of jaundice, dark urine or a colonic stools.    PAST MEDICAL HISTORY  Past Medical History:   Diagnosis Date   • Adenomatous polyp of colon 10/23/2019   • Arthritis    • Diabetes mellitus (HCC)    • Disease of thyroid gland    • Diverticulosis    • Gout    • Hepatitis    • History of kidney stones    • Hx of radiation therapy 06/2014   • Hyperlipidemia    • Hypertension    • Invasive ductal carcinoma of breast (HCC) 09/01/2021   • Malignant neoplasm of breast (HCC) 2014   • Menopause         PAST SURGICAL HISTORY  Past Surgical History:   Procedure Laterality Date   • BREAST BIOPSY Bilateral    • BREAST LUMPECTOMY Bilateral 06/27/2014   • BREAST LUMPECTOMY Left 09/01/2021   • CATARACT EXTRACTION Right    • COLON RESECTION N/A 12/8/2020    Procedure: ROBOTIC LOW ANTERIOR RESECTION, TAKEDOWN OF COLOVAGINAL FISTULA, URETEROLYSIS LEFT;  Surgeon: Sonam Olvera MD;  Location:  [facility];  Service: DaVinci;  Laterality: N/A;   • COLONOSCOPY     • CYSTOSCOPY N/A 12/8/2020    Procedure: CYSTOSCOPY, TEMPORY BILATERAL URETERAL STENTS;  Surgeon: Rosie Gottlieb MD;  Location:  [facility];  Service: Gynecology;  Laterality: N/A;   • HEMORRHOIDECTOMY     • PARATHYROIDECTOMY   07/19/2016    Dr. Izabela Hermosillo @ [facility]   • THYROID SURGERY     • VAGINAL HYSTERECTOMY          MEDICATIONS:    Current Outpatient Medications:   •  albuterol sulfate  (90 Base) MCG/ACT inhaler, INHALE 2 PUFFS BY MOUTH EVERY 4 TO 6 HOURS, Disp: , Rfl:   •  amLODIPine (NORVASC) 10 MG tablet, Take 1 tablet by mouth Daily., Disp: , Rfl:   •  anastrozole (ARIMIDEX) 1 MG tablet, Take 1 tablet by mouth Daily., Disp: 30 tablet, Rfl: 11  •  aspirin 81 MG EC tablet, Take 1 tablet by mouth Daily., Disp: , Rfl:   •  atorvastatin (LIPITOR) 10 MG tablet, , Disp: , Rfl:   •  azaTHIOprine (IMURAN) 50 MG tablet, Take 2 tablets by mouth daily, Disp: 180 tablet, Rfl: 11  •  Budesonide (ENTOCORT EC) 3 MG 24 hr capsule, Take 3 capsules by mouth Daily for 90 days., Disp: 90 capsule, Rfl: 11  •  Budesonide (ENTOCORT EC) 3 MG 24 hr capsule, Take 1 capsule by mouth Every Morning., Disp: 90 capsule, Rfl: 3  •  Cholecalciferol (VITAMIN D-3 PO), Take 1 tablet by mouth daily., Disp: , Rfl:   •  dorzolamide-timolol (COSOPT) 22.3-6.8 MG/ML ophthalmic solution, INSTILL 1 DROP INTO EACH EYE TWICE DAILY, Disp: , Rfl:   •  Durezol 0.05 % ophthalmic emulsion, INSTILL 1 DROP INTO EACH EYE 4 TIMES DAILY, Disp: , Rfl:   •  fluticasone (FLONASE) 50 MCG/ACT nasal spray, 1 spray into the nostril(s) as directed by provider As Needed., Disp: , Rfl:   •  glucose blood test strip, True Metrix Glucose Test Strip  Take 1 strip every day by miscell. route., Disp: , Rfl:   •  ibuprofen (ADVIL,MOTRIN) 400 MG tablet, Take 1 tablet by mouth Every 6 (Six) Hours As Needed for Mild Pain , Moderate Pain , Fever or Headache., Disp: 50 tablet, Rfl: 0  •  ketorolac (ACULAR) 0.5 % ophthalmic solution, , Disp: , Rfl:   •  ketorolac (ACULAR) 0.5 % ophthalmic solution, Apply  to eye(s) as directed by provider Every 12 (Twelve) Hours., Disp: , Rfl:   •  latanoprost (XALATAN) 0.005 % ophthalmic solution, INSTILL 1 DROP INTO EACH EYE IN THE EVENING, Disp: , Rfl:   •   levothyroxine (SYNTHROID, LEVOTHROID) 75 MCG tablet, , Disp: , Rfl:   •  loratadine (CLARITIN) 10 MG tablet, Take 1 tablet by mouth Daily., Disp: , Rfl:   •  metFORMIN ER (GLUCOPHAGE-XR) 500 MG 24 hr tablet, Take 1 tablet by mouth Daily., Disp: , Rfl:   •  methocarbamol (ROBAXIN) 500 MG tablet, TAKE 2 TABLETS BY MOUTH THREE TIMES DAILY AS NEEDED FOR 7 DAYS, Disp: , Rfl:   •  metoprolol tartrate (LOPRESSOR) 50 MG tablet, Take 1 tablet by mouth 2 (Two) Times a Day., Disp: , Rfl:   •  montelukast (SINGULAIR) 10 MG tablet, Take 1 tablet by mouth Daily., Disp: , Rfl:   •  neomycin (MYCIFRADIN) 500 MG tablet, , Disp: , Rfl:   •  nystatin (MYCOSTATIN) 498478 UNIT/GM cream, , Disp: , Rfl:   •  olmesartan (BENICAR) 40 MG tablet, Take 1 tablet by mouth Daily., Disp: , Rfl:   •  ondansetron (ZOFRAN) 8 MG tablet, Take 1 tablet by mouth 3 (Three) Times a Day As Needed for Nausea or Vomiting., Disp: 30 tablet, Rfl: 5  •  Palbociclib (Ibrance) 125 MG tablet, TAKE 1 TABLET BY MOUTH ONCE  DAILY WITH OR WITHOUT FOOD FOR  21 DAYS, FOLLOWED BY 7 DAYS OFF, REPEATED EVERY 28 DAYS, Disp: 21 tablet, Rfl: 11  •  polycarbophil 625 MG tablet tablet, Take 1 tablet by mouth Daily. Takes two daily, Disp: , Rfl:   •  prednisoLONE acetate (PRED FORTE) 1 % ophthalmic suspension, INSTILL 1 DROP INTO AFFECTED EYE(S) SIX TIMES DAILY, Disp: , Rfl:   •  Sodium Sulfate-Mag Sulfate-KCl (Sutab) 5254-987-079 MG tablet, , Disp: , Rfl:   •  zolpidem (AMBIEN) 10 MG tablet, Take 1 tablet by mouth At Night As Needed for Sleep., Disp: 30 tablet, Rfl: 1  •  levothyroxine (SYNTHROID, LEVOTHROID) 88 MCG tablet, , Disp: , Rfl:   •  methocarbamol (ROBAXIN) 500 MG tablet, Every 8 (Eight) Hours., Disp: , Rfl:   •  metroNIDAZOLE (FLAGYL) 500 MG tablet, , Disp: , Rfl:     ALLERGIES  Allergies   Allergen Reactions   • Erythromycin Swelling     C/O lips swelling   • Hydrocodone-Acetaminophen Nausea Only     Lortab   • Acetaminophen Unknown - Low Severity   • Amoxicillin  "Nausea Only   • Hydrocodone Unknown - Low Severity       FAMILY HISTORY:  Family History   Problem Relation Age of Onset   • Hypertension Father    • Hypertension Paternal Grandmother    • Hypertension Other    • Other Other         CAD   • Breast cancer Sister 50   • Ovarian cancer Niece    • Endometrial cancer Neg Hx    • Colon cancer Neg Hx    • Colon polyps Neg Hx        SOCIAL HISTORY  Social History     Socioeconomic History   • Marital status:    Tobacco Use   • Smoking status: Former   • Smokeless tobacco: Never   • Tobacco comments:     quit 40 years ago   Vaping Use   • Vaping Use: Never used   Substance and Sexual Activity   • Alcohol use: No   • Drug use: No   • Sexual activity: Yes     Partners: Male     Birth control/protection: Post-menopausal, Surgical         PHYSICAL EXAM   /78 (BP Location: Left arm, Patient Position: Sitting, Cuff Size: Adult)   Pulse 61   Temp 97.7 °F (36.5 °C) (Temporal)   Ht 152.4 cm (60\")   Wt 77 kg (169 lb 12.8 oz)   BMI 33.16 kg/m²   Physical Exam  Constitutional:       Appearance: Normal appearance.   HENT:      Head: Normocephalic and atraumatic.   Pulmonary:      Effort: Pulmonary effort is normal.   Neurological:      Mental Status: She is alert and oriented to person, place, and time.   Psychiatric:         Mood and Affect: Mood normal.         Thought Content: Thought content normal.           Results Review:     Latest Reference Range & Units 02/27/23 07:15   Glucose 65 - 99 mg/dL 100 (H)   Sodium 136 - 145 mmol/L 141   Potassium 3.5 - 5.2 mmol/L 4.1   CO2 22.0 - 29.0 mmol/L 28.0   Chloride 98 - 107 mmol/L 106   Anion Gap 5.0 - 15.0 mmol/L 7.0   Creatinine 0.57 - 1.00 mg/dL 1.23 (H)   BUN 8 - 23 mg/dL 19   BUN/Creatinine Ratio 7.0 - 25.0  15.4   Calcium 8.6 - 10.5 mg/dL 8.4 (L)   eGFR >60.0 mL/min/1.73 45.4 (L)   Alkaline Phosphatase 39 - 117 U/L 81   Total Protein 6.0 - 8.5 g/dL 6.9   ALT (SGPT) 1 - 33 U/L 7   AST (SGOT) 1 - 32 U/L 14   Total " Bilirubin 0.0 - 1.2 mg/dL 0.4   Albumin 3.5 - 5.2 g/dL 3.8   Globulin gm/dL 3.1   A/G Ratio g/dL 1.2   WBC 3.40 - 10.80 10*3/mm3 2.99 (L)   RBC 3.77 - 5.28 10*6/mm3 2.87 (L)   Hemoglobin 12.0 - 15.9 g/dL 10.8 (L)   Hematocrit 34.0 - 46.6 % 32.2 (L)   RDW 12.3 - 15.4 % 18.3 (H)   MCV 79.0 - 97.0 fL 112.2 (H)   MCH 26.6 - 33.0 pg 37.6 (H)   MCHC 31.5 - 35.7 g/dL 33.5   MPV 6.0 - 12.0 fL 9.7   Platelets 140 - 450 10*3/mm3 275   RDW-SD 37.0 - 54.0 fl 75.7 (H)   (H): Data is abnormally high  (L): Data is abnormally low  ASSESSMENT / PLAN  Diagnoses and all orders for this visit:    1. Autoimmune hepatitis (HCC) (Primary)  -     Cancel: CBC & Differential; Future  -     Comprehensive Metabolic Panel; Standing  -     azaTHIOprine (IMURAN) 50 MG tablet; Take 2 tablets by mouth daily  Dispense: 180 tablet; Refill: 11  -     Budesonide (ENTOCORT EC) 3 MG 24 hr capsule; Take 1 capsule by mouth Every Morning.  Dispense: 90 capsule; Refill: 3  -     CBC & Differential; Standing       ASSESSMENT  1.-Idiopathic autoimmune hepatitis in remission on Imuran 100 mg daily and budesonide 9 mg/day.  Continue.  Continue to monitor liver enzymes and CBC every 3 to 4 months  2.-Status post repair of colovaginal fistula  3.-History of breast cancer  4.-History of stage II fibrosis on liver biopsy years ago  5.-Asymptomatic cholelithiasis.  6.-High risk medication use without indication of untoward effects     PLAN  1.-Continue budesonide 9 mg/day/Imuran 100 mg/day  2.-Lab recheck every 3 months -standing order placed  3.-Follow-up appointment in 12 months      Return in about 1 year (around 3/2/2024).    I discussed the patients findings and my recommendations with patient    Les Marinelli, APRN

## 2023-03-06 ENCOUNTER — HOSPITAL ENCOUNTER (OUTPATIENT)
Dept: NUCLEAR MEDICINE | Facility: HOSPITAL | Age: 78
Discharge: HOME | End: 2023-03-06
Payer: MEDICARE

## 2023-03-06 ENCOUNTER — HOSPITAL ENCOUNTER (OUTPATIENT)
Dept: CT IMAGING | Facility: HOSPITAL | Age: 78
Discharge: HOME | End: 2023-03-06
Admitting: NURSE PRACTITIONER
Payer: MEDICARE

## 2023-03-06 DIAGNOSIS — C50.912 INVASIVE DUCTAL CARCINOMA OF LEFT BREAST: ICD-10-CM

## 2023-03-06 DIAGNOSIS — C79.51 BONE METASTASES: ICD-10-CM

## 2023-03-06 DIAGNOSIS — C50.911 INVASIVE DUCTAL CARCINOMA OF RIGHT BREAST: ICD-10-CM

## 2023-03-06 PROCEDURE — 25510000001 IOPAMIDOL 61 % SOLUTION: Performed by: NURSE PRACTITIONER

## 2023-03-06 PROCEDURE — 71260 CT THORAX DX C+: CPT

## 2023-03-06 PROCEDURE — 74177 CT ABD & PELVIS W/CONTRAST: CPT

## 2023-03-06 PROCEDURE — A9503 TC99M MEDRONATE: HCPCS | Performed by: NURSE PRACTITIONER

## 2023-03-06 PROCEDURE — 0 TECHNETIUM MEDRONATE KIT: Performed by: NURSE PRACTITIONER

## 2023-03-06 PROCEDURE — 78306 BONE IMAGING WHOLE BODY: CPT

## 2023-03-06 RX ORDER — TC 99M MEDRONATE 20 MG/10ML
27.5 INJECTION, POWDER, LYOPHILIZED, FOR SOLUTION INTRAVENOUS
Status: COMPLETED | OUTPATIENT
Start: 2023-03-06 | End: 2023-03-06

## 2023-03-06 RX ADMIN — Medication 27.5 MILLICURIE: at 09:10

## 2023-03-06 RX ADMIN — IOPAMIDOL 75 ML: 612 INJECTION, SOLUTION INTRAVENOUS at 10:05

## 2023-03-09 ENCOUNTER — HOSPITAL ENCOUNTER (OUTPATIENT)
Dept: ONCOLOGY | Facility: HOSPITAL | Age: 78
Discharge: HOME | End: 2023-03-09
Admitting: NURSE PRACTITIONER
Payer: MEDICARE

## 2023-03-09 ENCOUNTER — SPECIALTY PHARMACY (OUTPATIENT)
Dept: ONCOLOGY | Facility: HOSPITAL | Age: 78
End: 2023-03-09
Payer: MEDICARE

## 2023-03-09 ENCOUNTER — OFFICE VISIT (OUTPATIENT)
Dept: ONCOLOGY | Facility: CLINIC | Age: 78
End: 2023-03-09
Payer: MEDICARE

## 2023-03-09 VITALS
HEIGHT: 60 IN | OXYGEN SATURATION: 97 % | WEIGHT: 169 LBS | RESPIRATION RATE: 18 BRPM | DIASTOLIC BLOOD PRESSURE: 74 MMHG | HEART RATE: 91 BPM | TEMPERATURE: 97.1 F | BODY MASS INDEX: 33.18 KG/M2 | SYSTOLIC BLOOD PRESSURE: 163 MMHG

## 2023-03-09 DIAGNOSIS — C79.51 BONE METASTASES: Primary | ICD-10-CM

## 2023-03-09 DIAGNOSIS — C50.911 INVASIVE DUCTAL CARCINOMA OF RIGHT BREAST: ICD-10-CM

## 2023-03-09 DIAGNOSIS — Z17.0 MALIGNANT NEOPLASM OF LEFT BREAST IN FEMALE, ESTROGEN RECEPTOR POSITIVE, UNSPECIFIED SITE OF BREAST: Primary | ICD-10-CM

## 2023-03-09 DIAGNOSIS — C50.912 MALIGNANT NEOPLASM OF LEFT BREAST IN FEMALE, ESTROGEN RECEPTOR POSITIVE, UNSPECIFIED SITE OF BREAST: Primary | ICD-10-CM

## 2023-03-09 DIAGNOSIS — C50.912 INVASIVE DUCTAL CARCINOMA OF LEFT BREAST: ICD-10-CM

## 2023-03-09 LAB
ALBUMIN SERPL-MCNC: 3.6 G/DL (ref 3.5–5.2)
ALBUMIN/GLOB SERPL: 1.3 G/DL
ALP SERPL-CCNC: 162 U/L (ref 39–117)
ALT SERPL W P-5'-P-CCNC: 6 U/L (ref 1–33)
ANION GAP SERPL CALCULATED.3IONS-SCNC: 10 MMOL/L (ref 5–15)
AST SERPL-CCNC: 25 U/L (ref 1–32)
BASOPHILS # BLD AUTO: 0.03 10*3/MM3 (ref 0–0.2)
BASOPHILS NFR BLD AUTO: 1.1 % (ref 0–1.5)
BILIRUB SERPL-MCNC: 0.4 MG/DL (ref 0–1.2)
BUN SERPL-MCNC: 18 MG/DL (ref 8–23)
BUN/CREAT SERPL: 15.4 (ref 7–25)
CALCIUM SPEC-SCNC: 7.9 MG/DL (ref 8.6–10.5)
CANCER AG15-3 SERPL-ACNC: 101 U/ML
CHLORIDE SERPL-SCNC: 106 MMOL/L (ref 98–107)
CO2 SERPL-SCNC: 26 MMOL/L (ref 22–29)
CREAT BLDA-MCNC: 1.3 MG/DL (ref 0.6–1.3)
CREAT SERPL-MCNC: 1.17 MG/DL (ref 0.57–1)
DEPRECATED RDW RBC AUTO: 79.6 FL (ref 37–54)
EGFRCR SERPLBLD CKD-EPI 2021: 48.2 ML/MIN/1.73
EOSINOPHIL # BLD AUTO: 0.01 10*3/MM3 (ref 0–0.4)
EOSINOPHIL NFR BLD AUTO: 0.4 % (ref 0.3–6.2)
ERYTHROCYTE [DISTWIDTH] IN BLOOD BY AUTOMATED COUNT: 17.8 % (ref 12.3–15.4)
GLOBULIN UR ELPH-MCNC: 2.8 GM/DL
GLUCOSE SERPL-MCNC: 81 MG/DL (ref 65–99)
HCT VFR BLD AUTO: 32.7 % (ref 34–46.6)
HGB BLD-MCNC: 10.7 G/DL (ref 12–15.9)
IMM GRANULOCYTES # BLD AUTO: 0 10*3/MM3 (ref 0–0.05)
IMM GRANULOCYTES NFR BLD AUTO: 0 % (ref 0–0.5)
LYMPHOCYTES # BLD AUTO: 0.67 10*3/MM3 (ref 0.7–3.1)
LYMPHOCYTES NFR BLD AUTO: 24.8 % (ref 19.6–45.3)
MAGNESIUM SERPL-MCNC: 1.8 MG/DL (ref 1.6–2.4)
MCH RBC QN AUTO: 39.3 PG (ref 26.6–33)
MCHC RBC AUTO-ENTMCNC: 32.7 G/DL (ref 31.5–35.7)
MCV RBC AUTO: 120.2 FL (ref 79–97)
MONOCYTES # BLD AUTO: 0.16 10*3/MM3 (ref 0.1–0.9)
MONOCYTES NFR BLD AUTO: 5.9 % (ref 5–12)
NEUTROPHILS NFR BLD AUTO: 1.83 10*3/MM3 (ref 1.7–7)
NEUTROPHILS NFR BLD AUTO: 67.8 % (ref 42.7–76)
PHOSPHATE SERPL-MCNC: 3.1 MG/DL (ref 2.5–4.5)
PLATELET # BLD AUTO: 148 10*3/MM3 (ref 140–450)
PMV BLD AUTO: 10.5 FL (ref 6–12)
POTASSIUM SERPL-SCNC: 3.7 MMOL/L (ref 3.5–5.2)
PROT SERPL-MCNC: 6.4 G/DL (ref 6–8.5)
RBC # BLD AUTO: 2.72 10*6/MM3 (ref 3.77–5.28)
SODIUM SERPL-SCNC: 142 MMOL/L (ref 136–145)
WBC NRBC COR # BLD: 2.7 10*3/MM3 (ref 3.4–10.8)

## 2023-03-09 PROCEDURE — 96374 THER/PROPH/DIAG INJ IV PUSH: CPT

## 2023-03-09 PROCEDURE — 84100 ASSAY OF PHOSPHORUS: CPT | Performed by: NURSE PRACTITIONER

## 2023-03-09 PROCEDURE — 3077F SYST BP >= 140 MM HG: CPT | Performed by: INTERNAL MEDICINE

## 2023-03-09 PROCEDURE — 99214 OFFICE O/P EST MOD 30 MIN: CPT | Performed by: INTERNAL MEDICINE

## 2023-03-09 PROCEDURE — 86300 IMMUNOASSAY TUMOR CA 15-3: CPT | Performed by: NURSE PRACTITIONER

## 2023-03-09 PROCEDURE — 82565 ASSAY OF CREATININE: CPT

## 2023-03-09 PROCEDURE — 1125F AMNT PAIN NOTED PAIN PRSNT: CPT | Performed by: INTERNAL MEDICINE

## 2023-03-09 PROCEDURE — 25010000002 ZOLEDRONIC ACID PER 1 MG: Performed by: NURSE PRACTITIONER

## 2023-03-09 PROCEDURE — 85025 COMPLETE CBC W/AUTO DIFF WBC: CPT | Performed by: NURSE PRACTITIONER

## 2023-03-09 PROCEDURE — 83735 ASSAY OF MAGNESIUM: CPT | Performed by: NURSE PRACTITIONER

## 2023-03-09 PROCEDURE — 3078F DIAST BP <80 MM HG: CPT | Performed by: INTERNAL MEDICINE

## 2023-03-09 PROCEDURE — 80053 COMPREHEN METABOLIC PANEL: CPT | Performed by: NURSE PRACTITIONER

## 2023-03-09 RX ADMIN — ZOLEDRONIC ACID 3 MG: 4 INJECTION, SOLUTION, CONCENTRATE INTRAVENOUS at 11:10

## 2023-03-09 NOTE — PROGRESS NOTES
PROBLEM LIST:  1. Left-sided pT1bN0 (IA), low-grade invasive ductal carcinoma diagnosed 05/01/2014. She presented with left-sided nipple discharge and an abnormality on mammogram in 04/2014. Biopsy showed invasive ductal cancer low-grade,estrogen receptor and progesterone receptor positive, HER-2 negative. Sheunderwent a left needle localization lumpectomy on 06/27/2014. Final tumor size  is 1.4 cm  a) Oncotype recurrence score was 0 which is associated with a 3% risk of 10 year recurrence.  b) Adjuvant Arimidex was started after the completion of radiotherapy in 08/2014.  c) Arimidex was discontinued in 11/2014 due to severe hot flashes. Arimidex resumed in 07/2015.  Completed 5 years of treatment.  D) recurrent left breast IDC.  Left breast lumpectomy on 9/1/21 showed 5 mm tumor, completely removed by biopsy needle, intermediate grade.  ER+ CO+ Her2 negative (pT1bNx).  Bone scan showed widespread metastatic bone disease.  CT chest abdomen and pelvis on 10/20/2021 showed extensive sclerotic changes in the sternum and thoracic spine, but no involvement of the organs.  E) started Ibrance and anastrozole 10/22/2021.   2. 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 Diagnosed on biopsy on 05/22/2014. She underwent a lumpectomy on 06/27/2014 with final tumor size being 2 mm. It was ER/CO positive with HER-2 not performed.   3. Hypothyroidism.   4. Diabetes.   5. Hypertension.   6. Hyperlipidemia.   7. Autoimmune hepatitis.  8. Osteopenia, received zometa x 4 doses, completed February 2018.  9. Erythrocytosis    Subjective     CHIEF COMPLAINT: breast cancer    HISTORY OF PRESENT ILLNESS:   Radha John returns for follow-up.  She continues on Ibrance and anastrozole.  She says she is doing okay overall.  She is having a hairpiece made because she has lost so much hair on the Ibrance.  She says she has some occasional bone pain.  Her left leg hurt after her bone scan the other day  "but it has resolved now.  [facility] and [facility] and this does not really bother her.  She is able to weight-bear and walk without difficulty.  However sometimes when she lays in bed at night her left leg will ache.        Objective      Temp 97.1 °F (36.2 °C) (Infrared)   Resp 18   Ht 152.4 cm (60\")   Wt 76.7 kg (169 lb)   BMI 33.01 kg/m²    Vitals:    03/09/23 0919   PainSc:   3               ECOG score: 1             General: well appearing female in no acute distress  Neuro: alert and oriented  HEENT: sclera anicteric, oropharynx clear  Cardiovascular: Regular rate and rhythm  Lungs: Clear to auscultation bilaterally  Extremeties: No lower extremity edema.    Skin: no rashes, lesions, bruising, or petechiae  Psych: mood and affect appropriate        RECENT LABS:  Lab Results   Component Value Date    WBC 2.99 (L) 02/27/2023    HGB 10.8 (L) 02/27/2023    HCT 32.2 (L) 02/27/2023    .2 (H) 02/27/2023     02/27/2023       Lab Results   Component Value Date    GLUCOSE 100 (H) 02/27/2023    BUN 19 02/27/2023    CREATININE 1.23 (H) 02/27/2023    EGFRIFAFRI 51 (L) 02/15/2022    BCR 15.4 02/27/2023    K 4.1 02/27/2023    CO2 28.0 02/27/2023    CALCIUM 8.4 (L) 02/27/2023    PROTENTOTREF 6.8 03/22/2021    ALBUMIN 3.8 02/27/2023    AST 14 02/27/2023    ALT 7 02/27/2023       NM Bone Scan Whole Body  Narrative: DATE OF EXAM: 3/6/2023 9:00 AM EST    PROCEDURE: NM BONE SCAN WHOLE BODY    INDICATIONS: breast cancer with mets evaluation of treatment    COMPARISON: 11/1/2022, 6/28/2022. Correlation is also made with CT the chest, abdomen, and pelvis 3/6/2023    TECHNIQUE: The patient received 27.5 mCi of technetium 99m MDP intravenously and 3 hour delayed anterior and posterior whole body bone images were obtained.    FINDINGS:  There remains multifocal abnormal radiopharmaceutical accumulation throughout the skull, axial spine, proximal humeri bilaterally, proximal scapula, bilateral " ribs, osseous pelvis, and proximal femurs bilaterally with greater involvement of the left   femur. There is also involvement of the sternum. There is evidence for mild progression when compared to prior studies.    Physiologic activity is noted within the collecting system.   Impression: Continued evidence for diffuse multifocal osseous metastatic disease. There is evidence for mild progression of abnormal activity when compared to prior studies.    Electronically Signed: Gwyn Chou    3/6/2023 1:57 PM EST    Workstation ID: [id number]  CT Chest With Contrast Diagnostic  Narrative: CT CHEST W CONTRAST DIAGNOSTIC, CT ABDOMEN PELVIS W CONTRAST    Date of Exam: 3/6/2023 9:50 AM EST    Indication: breast cancer with mets evaluation of treatment    Comparison: 11/1/2022, 6/28/2022    Technique: Axial CT images were obtained of the chest after the uneventful intravenous administration of 75 mL Isovue-300.  Reconstructed coronal and sagittal images were also obtained. Automated exposure control and iterative construction methods were   used.    Findings:  Chest: Postoperative changes of fat necrosis are redemonstrated in the left breast. There are also changes of fat necrosis in the right upper chest. No axillary, mediastinal, or hilar lymphadenopathy is seen. Main pulmonary artery is dilated, which can   be seen with pulmonary hypertension. Heart size is mildly enlarged. There is no pericardial or pleural effusion. Lungs are clear. There are stable thin-walled cysts in both lungs. No new or enlarging lung nodules are seen. Widespread sclerotic lesions   are again seen throughout the spine, ribs, and sternum. No pathologic fractures are identified.    Abdomen/pelvis: Bilateral renal cysts are redemonstrated. Adrenal glands, spleen, pancreas, gallbladder, and liver appear unremarkable. There is stool throughout the colon, and the rectum is mild to moderately distended by stool. Urinary bladder is not   well-distended.  Coarse left adnexal calcification is stable. No significant free fluid or lymphadenopathy is seen in the abdomen or pelvis. Diffuse sclerotic lesions are redemonstrated throughout the spine and pelvis. No pathologic fractures are   identified.  Impression: Impression:  1.Stable appearance of diffuse sclerotic osseous metastatic disease. No pathologic fracture seen.  2.No additional evidence of metastatic disease is identified in the chest, abdomen, or pelvis.    Electronically Signed: Yasmine Lee    3/6/2023 10:34 AM EST    Workstation ID: [id number]  CT Abdomen Pelvis With Contrast  Narrative: CT CHEST W CONTRAST DIAGNOSTIC, CT ABDOMEN PELVIS W CONTRAST    Date of Exam: 3/6/2023 9:50 AM EST    Indication: breast cancer with mets evaluation of treatment    Comparison: 11/1/2022, 6/28/2022    Technique: Axial CT images were obtained of the chest after the uneventful intravenous administration of 75 mL Isovue-300.  Reconstructed coronal and sagittal images were also obtained. Automated exposure control and iterative construction methods were   used.    Findings:  Chest: Postoperative changes of fat necrosis are redemonstrated in the left breast. There are also changes of fat necrosis in the right upper chest. No axillary, mediastinal, or hilar lymphadenopathy is seen. Main pulmonary artery is dilated, which can   be seen with pulmonary hypertension. Heart size is mildly enlarged. There is no pericardial or pleural effusion. Lungs are clear. There are stable thin-walled cysts in both lungs. No new or enlarging lung nodules are seen. Widespread sclerotic lesions   are again seen throughout the spine, ribs, and sternum. No pathologic fractures are identified.    Abdomen/pelvis: Bilateral renal cysts are redemonstrated. Adrenal glands, spleen, pancreas, gallbladder, and liver appear unremarkable. There is stool throughout the colon, and the rectum is mild to moderately distended by stool. Urinary bladder is not    well-distended. Coarse left adnexal calcification is stable. No significant free fluid or lymphadenopathy is seen in the abdomen or pelvis. Diffuse sclerotic lesions are redemonstrated throughout the spine and pelvis. No pathologic fractures are   identified.  Impression: Impression:  1.Stable appearance of diffuse sclerotic osseous metastatic disease. No pathologic fracture seen.  2.No additional evidence of metastatic disease is identified in the chest, abdomen, or pelvis.    Electronically Signed: Yasmine Lee    3/6/2023 10:34 AM EST    Workstation ID: [id number]        NM Bone Scan Whole Body  Narrative: DATE OF EXAM: 3/6/2023 9:00 AM EST    PROCEDURE: NM BONE SCAN WHOLE BODY    INDICATIONS: breast cancer with mets evaluation of treatment    COMPARISON: 11/1/2022, 6/28/2022. Correlation is also made with CT the chest, abdomen, and pelvis 3/6/2023    TECHNIQUE: The patient received 27.5 mCi of technetium 99m MDP intravenously and 3 hour delayed anterior and posterior whole body bone images were obtained.    FINDINGS:  There remains multifocal abnormal radiopharmaceutical accumulation throughout the skull, axial spine, proximal humeri bilaterally, proximal scapula, bilateral ribs, osseous pelvis, and proximal femurs bilaterally with greater involvement of the left   femur. There is also involvement of the sternum. There is evidence for mild progression when compared to prior studies.    Physiologic activity is noted within the collecting system.   Impression: Continued evidence for diffuse multifocal osseous metastatic disease. There is evidence for mild progression of abnormal activity when compared to prior studies.    Electronically Signed: Gwyn Chou    3/6/2023 1:57 PM EST    Workstation ID: [id number]  CT Chest With Contrast Diagnostic  Narrative: CT CHEST W CONTRAST DIAGNOSTIC, CT ABDOMEN PELVIS W CONTRAST    Date of Exam: 3/6/2023 9:50 AM EST    Indication: breast cancer with mets evaluation of  treatment    Comparison: 11/1/2022, 6/28/2022    Technique: Axial CT images were obtained of the chest after the uneventful intravenous administration of 75 mL Isovue-300.  Reconstructed coronal and sagittal images were also obtained. Automated exposure control and iterative construction methods were   used.    Findings:  Chest: Postoperative changes of fat necrosis are redemonstrated in the left breast. There are also changes of fat necrosis in the right upper chest. No axillary, mediastinal, or hilar lymphadenopathy is seen. Main pulmonary artery is dilated, which can   be seen with pulmonary hypertension. Heart size is mildly enlarged. There is no pericardial or pleural effusion. Lungs are clear. There are stable thin-walled cysts in both lungs. No new or enlarging lung nodules are seen. Widespread sclerotic lesions   are again seen throughout the spine, ribs, and sternum. No pathologic fractures are identified.    Abdomen/pelvis: Bilateral renal cysts are redemonstrated. Adrenal glands, spleen, pancreas, gallbladder, and liver appear unremarkable. There is stool throughout the colon, and the rectum is mild to moderately distended by stool. Urinary bladder is not   well-distended. Coarse left adnexal calcification is stable. No significant free fluid or lymphadenopathy is seen in the abdomen or pelvis. Diffuse sclerotic lesions are redemonstrated throughout the spine and pelvis. No pathologic fractures are   identified.  Impression: Impression:  1.Stable appearance of diffuse sclerotic osseous metastatic disease. No pathologic fracture seen.  2.No additional evidence of metastatic disease is identified in the chest, abdomen, or pelvis.    Electronically Signed: Yasmine Lee    3/6/2023 10:34 AM EST    Workstation ID: [id number]  CT Abdomen Pelvis With Contrast  Narrative: CT CHEST W CONTRAST DIAGNOSTIC, CT ABDOMEN PELVIS W CONTRAST    Date of Exam: 3/6/2023 9:50 AM EST    Indication: breast cancer with mets  evaluation of treatment    Comparison: 11/1/2022, 6/28/2022    Technique: Axial CT images were obtained of the chest after the uneventful intravenous administration of 75 mL Isovue-300.  Reconstructed coronal and sagittal images were also obtained. Automated exposure control and iterative construction methods were   used.    Findings:  Chest: Postoperative changes of fat necrosis are redemonstrated in the left breast. There are also changes of fat necrosis in the right upper chest. No axillary, mediastinal, or hilar lymphadenopathy is seen. Main pulmonary artery is dilated, which can   be seen with pulmonary hypertension. Heart size is mildly enlarged. There is no pericardial or pleural effusion. Lungs are clear. There are stable thin-walled cysts in both lungs. No new or enlarging lung nodules are seen. Widespread sclerotic lesions   are again seen throughout the spine, ribs, and sternum. No pathologic fractures are identified.    Abdomen/pelvis: Bilateral renal cysts are redemonstrated. Adrenal glands, spleen, pancreas, gallbladder, and liver appear unremarkable. There is stool throughout the colon, and the rectum is mild to moderately distended by stool. Urinary bladder is not   well-distended. Coarse left adnexal calcification is stable. No significant free fluid or lymphadenopathy is seen in the abdomen or pelvis. Diffuse sclerotic lesions are redemonstrated throughout the spine and pelvis. No pathologic fractures are   identified.  Impression: Impression:  1.Stable appearance of diffuse sclerotic osseous metastatic disease. No pathologic fracture seen.  2.No additional evidence of metastatic disease is identified in the chest, abdomen, or pelvis.    Electronically Signed: Yasmine Lee    3/6/2023 10:34 AM EST    Workstation ID: [id number]       I personally reviewed the imaging studies    Assessment & Plan   Radha John is a 77 y.o. female with bilateral stage I ER+ Her2 negative breast cancer, with a more  recent recurrent 5 mm ER+ IDC in the left breast, s/p repeat lumpectomy, and imaging showing widespread metastatic bone involvement.    Her scans overall appear stable, with some possible mild progression in the bone disease.  No visceral involvement.  Bone pain is occasionally noticeable in the left femur, but otherwise seems to be stable.  We will check tumor markers today.  I will also send [id number] testing.  I will contact her in a few weeks once all of these results have returned.  We discussed that it is possible we may need to think about changing treatment in the near future, particularly if her bone pain progresses.    Bone metastasis: Continue Zometa every 3 months.  Next dose today.    Follow-up in 1 month.                Sarah Prasad MD  [facility]    3/9/2023          CC:

## 2023-03-10 LAB — CANCER AG27-29 SERPL-ACNC: 128.3 U/ML (ref 0–38.6)

## 2023-03-14 ENCOUNTER — TELEPHONE (OUTPATIENT)
Dept: ONCOLOGY | Facility: CLINIC | Age: 78
End: 2023-03-14
Payer: MEDICARE

## 2023-03-14 NOTE — TELEPHONE ENCOUNTER
Per Dr. Prasad, I called patient back about her lab results and told her that the cbc and cmp were stable except for alk phos being elevated.  Also, that the tumor markers had both elevated but that we had collected Guardant and we don't have those results yet as it will tell us molecular information to allow Dr. Prasad to make treatment changes when we need to.  Patient is feeling well with just occasional pain in her legs but otherwise feels okay.  She appreciated the call about the labs.

## 2023-03-14 NOTE — TELEPHONE ENCOUNTER
Caller: Radha John    Relationship: Self    Best call back number: [phone]    What is the best time to reach you: ASAP    Who are you requesting to speak with (clinical staff, provider,  specific staff member): CLINICAL    What was the call regarding: PT IS CALLING FOR LAB RESULTS FROM LAST WEEK    Do you require a callback: YES

## 2023-03-21 ENCOUNTER — TELEPHONE (OUTPATIENT)
Dept: ONCOLOGY | Facility: CLINIC | Age: 78
End: 2023-03-21
Payer: MEDICARE

## 2023-03-21 ENCOUNTER — SPECIALTY PHARMACY (OUTPATIENT)
Dept: ONCOLOGY | Facility: HOSPITAL | Age: 78
End: 2023-03-21
Payer: MEDICARE

## 2023-03-21 DIAGNOSIS — C79.51 BONE METASTASES: Primary | ICD-10-CM

## 2023-03-21 DIAGNOSIS — C50.912 MALIGNANT NEOPLASM OF LEFT BREAST IN FEMALE, ESTROGEN RECEPTOR POSITIVE, UNSPECIFIED SITE OF BREAST: ICD-10-CM

## 2023-03-21 DIAGNOSIS — Z17.0 MALIGNANT NEOPLASM OF LEFT BREAST IN FEMALE, ESTROGEN RECEPTOR POSITIVE, UNSPECIFIED SITE OF BREAST: ICD-10-CM

## 2023-03-21 RX ORDER — LAMOTRIGINE 25 MG/1
500 TABLET ORAL ONCE
Status: CANCELLED | OUTPATIENT
Start: 2023-03-30

## 2023-03-21 RX ORDER — LAMOTRIGINE 25 MG/1
500 TABLET ORAL ONCE
OUTPATIENT
Start: 2023-04-27

## 2023-03-21 RX ORDER — LAMOTRIGINE 25 MG/1
500 TABLET ORAL ONCE
OUTPATIENT
Start: 2023-04-13

## 2023-03-21 NOTE — PROGRESS NOTES
Called patient re: test results.  Tumor markers are elevated, bone scan had shown some mild progression.  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    Will plan to stop anastrozole and ibrance, start fulvestrant next week.  Continue zometa.    F/u with me 3 weeks as scheduled.

## 2023-03-21 NOTE — TELEPHONE ENCOUNTER
Spoke with patient, we are keeping the same appointment. There was no availability in the morning.

## 2023-03-21 NOTE — TELEPHONE ENCOUNTER
Caller: Radha John    Relationship: Self    Best call back number: [phone]    What is the best time to reach you: ASAP    Who are you requesting to speak with (clinical staff, provider,  specific staff member): SUMA/SCHEDULING    What was the call regarding: PT WANTS TO SEE IF HER CHEMO ED AND INJECTION CAN BE MOVED UP TO A MORNING APPT ON 3/30, PREFERS MORNING APPT'S.    Do you require a callback: YES, PLEASE CALL PT BACK TO ADVISE.  OK TO LEAVE VM.

## 2023-03-23 NOTE — TELEPHONE ENCOUNTER
I returned patients call and she said it was Ibrance, Anastrazole and zofran that said to stop taking.  I reviewed chart and Dr. Prasad had instructed her on the phone to stop the Ibrance and Anastrozole as we are switching her treatment.  I did confirm with Dr. Prasad in clinic patient is to stop those.  Patient said she had still been taking them and now knows to stop.  I told her to hang on to the zofran as well in case she needs it.

## 2023-03-23 NOTE — TELEPHONE ENCOUNTER
Caller: Radha John    Relationship: Self    Best call back number: [phone]    What is the best time to reach you: ASAP    Who are you requesting to speak with (clinical staff, provider,  specific staff member): CLINICAL    What was the call regarding: PT WAS IN HER MYCHART AND SEEN WHERE IT SAID TO STOP TAKING 3 OF HER PRESCRIPTIONS, PT WAS UNAWARE OF THIS PLEASE CALL BACK TO ADVISE    Do you require a callback: YES PLEASE

## 2023-03-30 ENCOUNTER — DOCUMENTATION (OUTPATIENT)
Dept: NUTRITION | Facility: HOSPITAL | Age: 78
End: 2023-03-30
Payer: MEDICARE

## 2023-03-30 ENCOUNTER — HOSPITAL ENCOUNTER (OUTPATIENT)
Dept: ONCOLOGY | Facility: HOSPITAL | Age: 78
Discharge: HOME | End: 2023-03-30
Admitting: INTERNAL MEDICINE
Payer: MEDICARE

## 2023-03-30 ENCOUNTER — APPOINTMENT (OUTPATIENT)
Dept: ONCOLOGY | Facility: HOSPITAL | Age: 78
End: 2023-03-30
Payer: MEDICARE

## 2023-03-30 VITALS
SYSTOLIC BLOOD PRESSURE: 143 MMHG | TEMPERATURE: 97.2 F | HEART RATE: 68 BPM | HEIGHT: 60 IN | DIASTOLIC BLOOD PRESSURE: 74 MMHG | RESPIRATION RATE: 18 BRPM | WEIGHT: 167.5 LBS | BODY MASS INDEX: 32.89 KG/M2

## 2023-03-30 DIAGNOSIS — C79.51 BONE METASTASES: Primary | ICD-10-CM

## 2023-03-30 DIAGNOSIS — Z17.0 MALIGNANT NEOPLASM OF LEFT BREAST IN FEMALE, ESTROGEN RECEPTOR POSITIVE, UNSPECIFIED SITE OF BREAST: ICD-10-CM

## 2023-03-30 DIAGNOSIS — C50.912 MALIGNANT NEOPLASM OF LEFT BREAST IN FEMALE, ESTROGEN RECEPTOR POSITIVE, UNSPECIFIED SITE OF BREAST: ICD-10-CM

## 2023-03-30 DIAGNOSIS — C79.51 BONE METASTASES: ICD-10-CM

## 2023-03-30 LAB
ALBUMIN SERPL-MCNC: 3.8 G/DL (ref 3.5–5.2)
ALBUMIN/GLOB SERPL: 1.3 G/DL
ALP SERPL-CCNC: 106 U/L (ref 39–117)
ALT SERPL W P-5'-P-CCNC: 9 U/L (ref 1–33)
ANION GAP SERPL CALCULATED.3IONS-SCNC: 10 MMOL/L (ref 5–15)
AST SERPL-CCNC: 18 U/L (ref 1–32)
BASOPHILS # BLD AUTO: 0.05 10*3/MM3 (ref 0–0.2)
BASOPHILS NFR BLD AUTO: 1.2 % (ref 0–1.5)
BILIRUB SERPL-MCNC: 0.4 MG/DL (ref 0–1.2)
BUN SERPL-MCNC: 16 MG/DL (ref 8–23)
BUN/CREAT SERPL: 14.7 (ref 7–25)
CALCIUM SPEC-SCNC: 9 MG/DL (ref 8.6–10.5)
CANCER AG15-3 SERPL-ACNC: 111 U/ML
CHLORIDE SERPL-SCNC: 108 MMOL/L (ref 98–107)
CO2 SERPL-SCNC: 26 MMOL/L (ref 22–29)
CREAT SERPL-MCNC: 1.09 MG/DL (ref 0.57–1)
DEPRECATED RDW RBC AUTO: 80.3 FL (ref 37–54)
EGFRCR SERPLBLD CKD-EPI 2021: 52.4 ML/MIN/1.73
EOSINOPHIL # BLD AUTO: 0.04 10*3/MM3 (ref 0–0.4)
EOSINOPHIL NFR BLD AUTO: 1 % (ref 0.3–6.2)
ERYTHROCYTE [DISTWIDTH] IN BLOOD BY AUTOMATED COUNT: 18.4 % (ref 12.3–15.4)
GLOBULIN UR ELPH-MCNC: 2.9 GM/DL
GLUCOSE SERPL-MCNC: 95 MG/DL (ref 65–99)
HCT VFR BLD AUTO: 34.2 % (ref 34–46.6)
HGB BLD-MCNC: 11.1 G/DL (ref 12–15.9)
IMM GRANULOCYTES # BLD AUTO: 0.05 10*3/MM3 (ref 0–0.05)
IMM GRANULOCYTES NFR BLD AUTO: 1.2 % (ref 0–0.5)
LYMPHOCYTES # BLD AUTO: 1.04 10*3/MM3 (ref 0.7–3.1)
LYMPHOCYTES NFR BLD AUTO: 25.9 % (ref 19.6–45.3)
MCH RBC QN AUTO: 38.9 PG (ref 26.6–33)
MCHC RBC AUTO-ENTMCNC: 32.5 G/DL (ref 31.5–35.7)
MCV RBC AUTO: 120 FL (ref 79–97)
MONOCYTES # BLD AUTO: 0.53 10*3/MM3 (ref 0.1–0.9)
MONOCYTES NFR BLD AUTO: 13.2 % (ref 5–12)
NEUTROPHILS NFR BLD AUTO: 2.31 10*3/MM3 (ref 1.7–7)
NEUTROPHILS NFR BLD AUTO: 57.5 % (ref 42.7–76)
PLATELET # BLD AUTO: 232 10*3/MM3 (ref 140–450)
PMV BLD AUTO: 10.2 FL (ref 6–12)
POTASSIUM SERPL-SCNC: 4 MMOL/L (ref 3.5–5.2)
PROT SERPL-MCNC: 6.7 G/DL (ref 6–8.5)
RBC # BLD AUTO: 2.85 10*6/MM3 (ref 3.77–5.28)
SODIUM SERPL-SCNC: 144 MMOL/L (ref 136–145)
WBC NRBC COR # BLD: 4.02 10*3/MM3 (ref 3.4–10.8)

## 2023-03-30 PROCEDURE — 80053 COMPREHEN METABOLIC PANEL: CPT | Performed by: INTERNAL MEDICINE

## 2023-03-30 PROCEDURE — 86300 IMMUNOASSAY TUMOR CA 15-3: CPT | Performed by: INTERNAL MEDICINE

## 2023-03-30 PROCEDURE — 36415 COLL VENOUS BLD VENIPUNCTURE: CPT

## 2023-03-30 PROCEDURE — 96402 CHEMO HORMON ANTINEOPL SQ/IM: CPT

## 2023-03-30 PROCEDURE — 96372 THER/PROPH/DIAG INJ SC/IM: CPT

## 2023-03-30 PROCEDURE — 85025 COMPLETE CBC W/AUTO DIFF WBC: CPT | Performed by: INTERNAL MEDICINE

## 2023-03-30 PROCEDURE — 25010000002 FULVESTRANT PER 25 MG: Performed by: INTERNAL MEDICINE

## 2023-03-30 RX ORDER — LAMOTRIGINE 25 MG/1
500 TABLET ORAL ONCE
Status: COMPLETED | OUTPATIENT
Start: 2023-03-30 | End: 2023-03-30

## 2023-03-30 RX ADMIN — FULVESTRANT 500 MG: 50 INJECTION, SOLUTION INTRAMUSCULAR at 09:57

## 2023-03-30 NOTE — PROGRESS NOTES
"Outpatient Oncology Nutrition     Reason for Visit:Oncology Nutrition Screening, Patient Education and Follow Up Visit during her infusion clinic appointment.  Patient starting a new treatment regimen today due to progressive disease.  Note patient has been seen by my colleague, Ayana Stephenson RD, during previous treatment.     Patient Name:  Radha John    :  1945    MRN:  [id number]    Date of Encounter: 2023    Nutrition Assessment     Diagnosis: recurrent left breast IDC with bone mets     Current Treatment:  Faslodex     Patient Active Problem List:    Patient Active Problem List   Diagnosis   • 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   • Invasive ductal carcinoma of right breast (HCC)   • Hypothyroidism   • Diabetes (HCC)   • Hypertension   • Hyperlipidemia   • Autoimmune hepatitis (HCC)   • Erythrocytosis   • High risk medication use   • Adenomatous polyp of colon   • Malignant neoplasm of breast (HCC)   • Hepatitis   • Gout   • Disease of thyroid gland   • Diabetes mellitus (HCC)   • Arthritis   • Menopause   • Colovaginal fistula   • Diverticulosis, severe with colovaginal fistula   • Status post colectomy, LAR with takedown of colovaginal fistula   • S/P cystoscopy with ureteral catheter placement   • Bone metastases (HCC)       Food / Nutrition Related History   Briefly discussed her history of diabetes and diabetes diet basics.     Hydration Status   Discussed the importance of hydration and encouraged her to increase her intake.    Goal: ~64 ounces     Enteral Feeding       Anthropometric Measurements     Height:    Ht Readings from Last 1 Encounters:   23 152.4 cm (60\")       Weight:    Wt Readings from Last 1 Encounters:   23 76 kg (167 lb 8 oz)       BMI:  32.7 - Obese    Weight Change: ~7# (4%) weight loss over the past ~7 months     Review of Lab Data (Time Frame - 1 month / 2 month)   Labs reviewed - 3/30/23    Medication Review   MAR reviewed - Metformin ER, " Vitamin D, and patient reports taking calcium     Nutrition Focused Physical Findings       Nutrition Impact Symptoms   Constipation - mild / occasional    Physical Activity   Not my normal self, but able to be up and about with fairly normal activities    Current Nutritional Intake     Oral diet:  Diabetic     Intake: oral intake has been normal     Malnutrition Risk Assessment     Recent weight loss over the past 6 months:  Yes    How much weight loss:  1 = 2-13 lbs    Eating poorly because of a decreased appetite:  0 = No    Malnutrition Screening Score:     MST = 0 or 1 Patient not at risk for malnutrition    Nutrition Diagnosis     Problem    Etiology    Signs / Symptoms      Nutrition Intervention   Reviewed the importance of good nutrition during her treatment course focusing on adequate calorie, protein, nutrient and fluid intake.  Advised her to be consuming 3 meals daily but to consider eating smaller more frequent meals/snacks throughout the day if she experiences nausea to aid with management.  Emphasized the importance of protein and its role in the diet; reviewed high protein foods; and recommended she have a protein source at each meal/snack.  Discussed tips to aid with constipation management including increasing intake of foods high in insoluble fiber and water.      Goal   To achieve adequate nutritional and hydration intake.  To aid with nutrition impact symptom management as needed.     Monitoring / Evaluation   Answered her questions and she voiced understanding of information discussed.  RD's contact information provided and encouraged to call with questions.  Will follow up as indicated.    Roya Quezada MS, RD, LD

## 2023-03-31 ENCOUNTER — TELEPHONE (OUTPATIENT)
Dept: GASTROENTEROLOGY | Facility: CLINIC | Age: 78
End: 2023-03-31
Payer: MEDICARE

## 2023-03-31 LAB — CANCER AG27-29 SERPL-ACNC: 146.2 U/ML (ref 0–38.6)

## 2023-03-31 NOTE — TELEPHONE ENCOUNTER
----- Message from Poncho Hansen MD sent at 3/30/2023  8:15 PM EDT -----  Unremarkable CBC with no significant changes from prior.  Persistent macrocytosis noted.  This is a longstanding finding.  Hemoglobin is stable.

## 2023-03-31 NOTE — TELEPHONE ENCOUNTER
Called Pt to let her know that her Hemoglobin was stable. Pt verbalized understanding and said  Thank you Dr. Hansen and you must really know what you doing and she really appreciates you.

## 2023-03-31 NOTE — PROGRESS NOTES
Unremarkable CBC with no significant changes from prior.  Persistent macrocytosis noted.  This is a longstanding finding.  Hemoglobin is stable.

## 2023-04-11 DIAGNOSIS — K75.4 AUTOIMMUNE HEPATITIS: ICD-10-CM

## 2023-04-11 RX ORDER — BUDESONIDE 3 MG/1
9 CAPSULE, COATED PELLETS ORAL DAILY
Qty: 90 CAPSULE | Refills: 11 | Status: CANCELLED | OUTPATIENT
Start: 2023-04-11 | End: 2023-07-10

## 2023-04-11 RX ORDER — BUDESONIDE 3 MG/1
9 CAPSULE, COATED PELLETS ORAL DAILY
Qty: 90 CAPSULE | Refills: 11 | Status: SHIPPED | OUTPATIENT
Start: 2023-04-11 | End: 2023-07-10

## 2023-04-13 ENCOUNTER — LAB (OUTPATIENT)
Dept: LAB | Facility: HOSPITAL | Age: 78
End: 2023-04-13
Payer: MEDICARE

## 2023-04-13 ENCOUNTER — HOSPITAL ENCOUNTER (OUTPATIENT)
Dept: ONCOLOGY | Facility: HOSPITAL | Age: 78
Discharge: HOME | End: 2023-04-13
Payer: MEDICARE

## 2023-04-13 ENCOUNTER — OFFICE VISIT (OUTPATIENT)
Dept: ONCOLOGY | Facility: CLINIC | Age: 78
End: 2023-04-13
Payer: MEDICARE

## 2023-04-13 VITALS
HEART RATE: 58 BPM | TEMPERATURE: 97.3 F | WEIGHT: 167 LBS | RESPIRATION RATE: 18 BRPM | BODY MASS INDEX: 32.79 KG/M2 | DIASTOLIC BLOOD PRESSURE: 66 MMHG | OXYGEN SATURATION: 97 % | SYSTOLIC BLOOD PRESSURE: 143 MMHG | HEIGHT: 60 IN

## 2023-04-13 DIAGNOSIS — C50.912 MALIGNANT NEOPLASM OF LEFT BREAST IN FEMALE, ESTROGEN RECEPTOR POSITIVE, UNSPECIFIED SITE OF BREAST: ICD-10-CM

## 2023-04-13 DIAGNOSIS — C79.51 MALIGNANT NEOPLASM METASTATIC TO BONE: ICD-10-CM

## 2023-04-13 DIAGNOSIS — Z17.0 MALIGNANT NEOPLASM OF LEFT BREAST IN FEMALE, ESTROGEN RECEPTOR POSITIVE, UNSPECIFIED SITE OF BREAST: ICD-10-CM

## 2023-04-13 DIAGNOSIS — C79.51 MALIGNANT NEOPLASM METASTATIC TO BONE: Primary | ICD-10-CM

## 2023-04-13 LAB
BASOPHILS # BLD AUTO: 0.05 10*3/MM3 (ref 0–0.2)
BASOPHILS NFR BLD AUTO: 0.8 % (ref 0–1.5)
DEPRECATED RDW RBC AUTO: 74.5 FL (ref 37–54)
EOSINOPHIL # BLD AUTO: 0.09 10*3/MM3 (ref 0–0.4)
EOSINOPHIL NFR BLD AUTO: 1.4 % (ref 0.3–6.2)
ERYTHROCYTE [DISTWIDTH] IN BLOOD BY AUTOMATED COUNT: 17.1 % (ref 12.3–15.4)
HCT VFR BLD AUTO: 34.4 % (ref 34–46.6)
HGB BLD-MCNC: 11.1 G/DL (ref 12–15.9)
IMM GRANULOCYTES # BLD AUTO: 0.19 10*3/MM3 (ref 0–0.05)
IMM GRANULOCYTES NFR BLD AUTO: 3 % (ref 0–0.5)
LYMPHOCYTES # BLD AUTO: 1.38 10*3/MM3 (ref 0.7–3.1)
LYMPHOCYTES NFR BLD AUTO: 22.1 % (ref 19.6–45.3)
MCH RBC QN AUTO: 38.1 PG (ref 26.6–33)
MCHC RBC AUTO-ENTMCNC: 32.3 G/DL (ref 31.5–35.7)
MCV RBC AUTO: 118.2 FL (ref 79–97)
MONOCYTES # BLD AUTO: 0.89 10*3/MM3 (ref 0.1–0.9)
MONOCYTES NFR BLD AUTO: 14.3 % (ref 5–12)
NEUTROPHILS NFR BLD AUTO: 3.64 10*3/MM3 (ref 1.7–7)
NEUTROPHILS NFR BLD AUTO: 58.4 % (ref 42.7–76)
PLATELET # BLD AUTO: 289 10*3/MM3 (ref 140–450)
PMV BLD AUTO: 9.8 FL (ref 6–12)
RBC # BLD AUTO: 2.91 10*6/MM3 (ref 3.77–5.28)
WBC NRBC COR # BLD: 6.24 10*3/MM3 (ref 3.4–10.8)

## 2023-04-13 PROCEDURE — 96372 THER/PROPH/DIAG INJ SC/IM: CPT

## 2023-04-13 PROCEDURE — 1126F AMNT PAIN NOTED NONE PRSNT: CPT | Performed by: INTERNAL MEDICINE

## 2023-04-13 PROCEDURE — 85025 COMPLETE CBC W/AUTO DIFF WBC: CPT

## 2023-04-13 PROCEDURE — 3077F SYST BP >= 140 MM HG: CPT | Performed by: INTERNAL MEDICINE

## 2023-04-13 PROCEDURE — 3078F DIAST BP <80 MM HG: CPT | Performed by: INTERNAL MEDICINE

## 2023-04-13 PROCEDURE — 99214 OFFICE O/P EST MOD 30 MIN: CPT | Performed by: INTERNAL MEDICINE

## 2023-04-13 PROCEDURE — 96402 CHEMO HORMON ANTINEOPL SQ/IM: CPT

## 2023-04-13 PROCEDURE — 36415 COLL VENOUS BLD VENIPUNCTURE: CPT

## 2023-04-13 PROCEDURE — 25010000002 FULVESTRANT PER 25 MG: Performed by: INTERNAL MEDICINE

## 2023-04-13 RX ORDER — LAMOTRIGINE 25 MG/1
500 TABLET ORAL ONCE
Status: COMPLETED | OUTPATIENT
Start: 2023-04-13 | End: 2023-04-13

## 2023-04-13 RX ADMIN — FULVESTRANT 500 MG: 50 INJECTION, SOLUTION INTRAMUSCULAR at 10:09

## 2023-04-13 NOTE — PROGRESS NOTES
PROBLEM LIST:  1. Left-sided pT1bN0 (IA), low-grade invasive ductal carcinoma diagnosed 05/01/2014. She presented with left-sided nipple discharge and an abnormality on mammogram in 04/2014. Biopsy showed invasive ductal cancer low-grade,estrogen receptor and progesterone receptor positive, HER-2 negative. Sheunderwent a left needle localization lumpectomy on 06/27/2014. Final tumor size  is 1.4 cm  a) Oncotype recurrence score was 0 which is associated with a 3% risk of 10 year recurrence.  b) Adjuvant Arimidex was started after the completion of radiotherapy in 08/2014.  c) Arimidex was discontinued in 11/2014 due to severe hot flashes. Arimidex resumed in 07/2015.  Completed 5 years of treatment.  D) recurrent left breast IDC.  Left breast lumpectomy on 9/1/21 showed 5 mm tumor, completely removed by biopsy needle, intermediate grade.  ER+ MN+ Her2 negative (pT1bNx).  Bone scan showed widespread metastatic bone disease.  CT chest abdomen and pelvis on 10/20/2021 showed extensive sclerotic changes in the sternum and thoracic spine, but no involvement of the organs.  E) started Ibrance and anastrozole 10/22/2021.   F) CT scans, bone scan 3/6/23 showed mild progression in the bones.  Guardant 360 showed Rb1 mutation, no evidence [id number].  Started on fulvestrant March 2023.  2. 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 Diagnosed on biopsy on 05/22/2014. She underwent a lumpectomy on 06/27/2014 with final tumor size being 2 mm. It was ER/MN positive with HER-2 not performed.   3. Hypothyroidism.   4. Diabetes.   5. Hypertension.   6. Hyperlipidemia.   7. Autoimmune hepatitis.  8. Osteopenia, received zometa x 4 doses, completed February 2018.  9. Erythrocytosis    Subjective     CHIEF COMPLAINT: breast cancer    HISTORY OF PRESENT ILLNESS:   Radha John returns for follow-up.  She says she did okay with the injections.  No significant reactions or soreness.  She has a  "little bit of pain in her back and legs after walking upstairs but nothing severe.        Objective      /66   Pulse 58   Temp 97.3 °F (36.3 °C) (Infrared)   Resp 18   Ht 152.4 cm (60\")   Wt 75.8 kg (167 lb)   SpO2 97%   BMI 32.61 kg/m²    Vitals:    04/13/23 0835   PainSc: 0-No pain               ECOG score: 1             General: well appearing female in no acute distress  Neuro: alert and oriented  HEENT: sclera anicteric, oropharynx clear  Cardiovascular: Regular rate and rhythm  Lungs: Clear to auscultation bilaterally  Extremeties: No lower extremity edema.    Skin: no rashes, lesions, bruising, or petechiae  Psych: mood and affect appropriate        RECENT LABS:  Lab Results   Component Value Date    WBC 6.24 04/13/2023    HGB 11.1 (L) 04/13/2023    HCT 34.4 04/13/2023    .2 (H) 04/13/2023     04/13/2023       Lab Results   Component Value Date    GLUCOSE 95 03/30/2023    BUN 16 03/30/2023    CREATININE 1.09 (H) 03/30/2023    EGFRIFAFRI 51 (L) 02/15/2022    BCR 14.7 03/30/2023    K 4.0 03/30/2023    CO2 26.0 03/30/2023    CALCIUM 9.0 03/30/2023    PROTENTOTREF 6.8 03/22/2021    ALBUMIN 3.8 03/30/2023    AST 18 03/30/2023    ALT 9 03/30/2023                      Assessment & Plan   Radha John is a 77 y.o. female with bilateral stage I ER+ Her2 negative breast cancer, with a more recent recurrent 5 mm ER+ IDC in the left breast, s/p repeat lumpectomy, and imaging showing widespread metastatic bone involvement.    She has started on treatment with fulvestrant.  She is tolerated it well so far.  No significant issues with the injections.  We will plan to repeat imaging after 3 months of treatment.    Bone metastasis: Continue Zometa every 3 months.      Follow-up in 6 weeks.                Sarah Prasad MD  [facility]    4/13/2023          CC:          "

## 2023-04-17 ENCOUNTER — TELEPHONE (OUTPATIENT)
Dept: ONCOLOGY | Facility: CLINIC | Age: 78
End: 2023-04-17
Payer: MEDICARE

## 2023-04-17 NOTE — TELEPHONE ENCOUNTER
I called patient back and told her that she does not have to take the anastrozole and ibrance any longer.  She verbalized understanding.

## 2023-04-17 NOTE — TELEPHONE ENCOUNTER
Caller: Radha John    Relationship: Self    Best call back number: [phone]    What was the call regarding: RADHA CALLED TO SEE IF SHE WAS SUPPOSE TO DISCONTINUE ANY OF HER MEDICATIONS ASIDE FROM ANASTROZOLE AND IBRANCE.    Do you require a callback: YES

## 2023-04-28 ENCOUNTER — HOSPITAL ENCOUNTER (OUTPATIENT)
Dept: ONCOLOGY | Facility: HOSPITAL | Age: 78
Discharge: HOME | End: 2023-04-28
Payer: MEDICARE

## 2023-04-28 VITALS
HEIGHT: 60 IN | RESPIRATION RATE: 16 BRPM | DIASTOLIC BLOOD PRESSURE: 67 MMHG | BODY MASS INDEX: 32.79 KG/M2 | TEMPERATURE: 97.8 F | WEIGHT: 167 LBS | HEART RATE: 61 BPM | SYSTOLIC BLOOD PRESSURE: 136 MMHG

## 2023-04-28 DIAGNOSIS — C50.912 MALIGNANT NEOPLASM OF LEFT BREAST IN FEMALE, ESTROGEN RECEPTOR POSITIVE, UNSPECIFIED SITE OF BREAST: ICD-10-CM

## 2023-04-28 DIAGNOSIS — Z17.0 MALIGNANT NEOPLASM OF LEFT BREAST IN FEMALE, ESTROGEN RECEPTOR POSITIVE, UNSPECIFIED SITE OF BREAST: ICD-10-CM

## 2023-04-28 DIAGNOSIS — C79.51 MALIGNANT NEOPLASM METASTATIC TO BONE: Primary | ICD-10-CM

## 2023-04-28 LAB
ALBUMIN SERPL-MCNC: 3.4 G/DL (ref 3.5–5.2)
ALBUMIN/GLOB SERPL: 1.1 G/DL
ALP SERPL-CCNC: 99 U/L (ref 39–117)
ALT SERPL W P-5'-P-CCNC: 7 U/L (ref 1–33)
ANION GAP SERPL CALCULATED.3IONS-SCNC: 8 MMOL/L (ref 5–15)
AST SERPL-CCNC: 14 U/L (ref 1–32)
BASOPHILS # BLD AUTO: 0.04 10*3/MM3 (ref 0–0.2)
BASOPHILS NFR BLD AUTO: 0.7 % (ref 0–1.5)
BILIRUB SERPL-MCNC: 0.3 MG/DL (ref 0–1.2)
BUN SERPL-MCNC: 21 MG/DL (ref 8–23)
BUN/CREAT SERPL: 23.9 (ref 7–25)
CALCIUM SPEC-SCNC: 8.5 MG/DL (ref 8.6–10.5)
CANCER AG15-3 SERPL-ACNC: 135 U/ML
CHLORIDE SERPL-SCNC: 109 MMOL/L (ref 98–107)
CO2 SERPL-SCNC: 25 MMOL/L (ref 22–29)
CREAT SERPL-MCNC: 0.88 MG/DL (ref 0.57–1)
DEPRECATED RDW RBC AUTO: 69.6 FL (ref 37–54)
EGFRCR SERPLBLD CKD-EPI 2021: 67.8 ML/MIN/1.73
EOSINOPHIL # BLD AUTO: 0.15 10*3/MM3 (ref 0–0.4)
EOSINOPHIL NFR BLD AUTO: 2.8 % (ref 0.3–6.2)
ERYTHROCYTE [DISTWIDTH] IN BLOOD BY AUTOMATED COUNT: 15.9 % (ref 12.3–15.4)
GLOBULIN UR ELPH-MCNC: 3 GM/DL
GLUCOSE SERPL-MCNC: 91 MG/DL (ref 65–99)
HCT VFR BLD AUTO: 37.2 % (ref 34–46.6)
HGB BLD-MCNC: 11.9 G/DL (ref 12–15.9)
IMM GRANULOCYTES # BLD AUTO: 0.05 10*3/MM3 (ref 0–0.05)
IMM GRANULOCYTES NFR BLD AUTO: 0.9 % (ref 0–0.5)
LYMPHOCYTES # BLD AUTO: 1.19 10*3/MM3 (ref 0.7–3.1)
LYMPHOCYTES NFR BLD AUTO: 22.1 % (ref 19.6–45.3)
MCH RBC QN AUTO: 37.5 PG (ref 26.6–33)
MCHC RBC AUTO-ENTMCNC: 32 G/DL (ref 31.5–35.7)
MCV RBC AUTO: 117.4 FL (ref 79–97)
MONOCYTES # BLD AUTO: 0.67 10*3/MM3 (ref 0.1–0.9)
MONOCYTES NFR BLD AUTO: 12.4 % (ref 5–12)
NEUTROPHILS NFR BLD AUTO: 3.29 10*3/MM3 (ref 1.7–7)
NEUTROPHILS NFR BLD AUTO: 61.1 % (ref 42.7–76)
PLATELET # BLD AUTO: 221 10*3/MM3 (ref 140–450)
PMV BLD AUTO: 9.2 FL (ref 6–12)
POTASSIUM SERPL-SCNC: 4.1 MMOL/L (ref 3.5–5.2)
PROT SERPL-MCNC: 6.4 G/DL (ref 6–8.5)
RBC # BLD AUTO: 3.17 10*6/MM3 (ref 3.77–5.28)
SODIUM SERPL-SCNC: 142 MMOL/L (ref 136–145)
WBC NRBC COR # BLD: 5.39 10*3/MM3 (ref 3.4–10.8)

## 2023-04-28 PROCEDURE — 25010000002 FULVESTRANT PER 25 MG: Performed by: INTERNAL MEDICINE

## 2023-04-28 PROCEDURE — 80053 COMPREHEN METABOLIC PANEL: CPT | Performed by: INTERNAL MEDICINE

## 2023-04-28 PROCEDURE — 86300 IMMUNOASSAY TUMOR CA 15-3: CPT | Performed by: INTERNAL MEDICINE

## 2023-04-28 PROCEDURE — 96402 CHEMO HORMON ANTINEOPL SQ/IM: CPT

## 2023-04-28 PROCEDURE — 36415 COLL VENOUS BLD VENIPUNCTURE: CPT

## 2023-04-28 PROCEDURE — 85025 COMPLETE CBC W/AUTO DIFF WBC: CPT | Performed by: INTERNAL MEDICINE

## 2023-04-28 PROCEDURE — 96372 THER/PROPH/DIAG INJ SC/IM: CPT

## 2023-04-28 RX ORDER — LAMOTRIGINE 25 MG/1
500 TABLET ORAL ONCE
Status: COMPLETED | OUTPATIENT
Start: 2023-04-28 | End: 2023-04-28

## 2023-04-28 RX ADMIN — FULVESTRANT 500 MG: 50 INJECTION, SOLUTION INTRAMUSCULAR at 11:25

## 2023-04-29 LAB — CANCER AG27-29 SERPL-ACNC: 160.2 U/ML (ref 0–38.6)

## 2023-05-25 ENCOUNTER — LAB (OUTPATIENT)
Dept: LAB | Facility: HOSPITAL | Age: 78
End: 2023-05-25
Payer: MEDICARE

## 2023-05-25 DIAGNOSIS — C79.51 MALIGNANT NEOPLASM METASTATIC TO BONE: Primary | ICD-10-CM

## 2023-05-25 DIAGNOSIS — C50.912 MALIGNANT NEOPLASM OF LEFT BREAST IN FEMALE, ESTROGEN RECEPTOR POSITIVE, UNSPECIFIED SITE OF BREAST: ICD-10-CM

## 2023-05-25 DIAGNOSIS — Z17.0 MALIGNANT NEOPLASM OF LEFT BREAST IN FEMALE, ESTROGEN RECEPTOR POSITIVE, UNSPECIFIED SITE OF BREAST: ICD-10-CM

## 2023-05-25 DIAGNOSIS — C79.51 MALIGNANT NEOPLASM METASTATIC TO BONE: ICD-10-CM

## 2023-05-25 LAB
ALBUMIN SERPL-MCNC: 3.7 G/DL (ref 3.5–5.2)
ALBUMIN/GLOB SERPL: 1.1 G/DL
ALP SERPL-CCNC: 97 U/L (ref 39–117)
ALT SERPL W P-5'-P-CCNC: 8 U/L (ref 1–33)
ANION GAP SERPL CALCULATED.3IONS-SCNC: 8 MMOL/L (ref 5–15)
AST SERPL-CCNC: 16 U/L (ref 1–32)
BASOPHILS # BLD AUTO: 0.03 10*3/MM3 (ref 0–0.2)
BASOPHILS NFR BLD AUTO: 0.4 % (ref 0–1.5)
BILIRUB SERPL-MCNC: 0.3 MG/DL (ref 0–1.2)
BUN SERPL-MCNC: 17 MG/DL (ref 8–23)
BUN/CREAT SERPL: 19.3 (ref 7–25)
CALCIUM SPEC-SCNC: 8.5 MG/DL (ref 8.6–10.5)
CANCER AG15-3 SERPL-ACNC: 138 U/ML
CHLORIDE SERPL-SCNC: 107 MMOL/L (ref 98–107)
CO2 SERPL-SCNC: 29 MMOL/L (ref 22–29)
CREAT SERPL-MCNC: 0.88 MG/DL (ref 0.57–1)
DEPRECATED RDW RBC AUTO: 62.8 FL (ref 37–54)
EGFRCR SERPLBLD CKD-EPI 2021: 67.8 ML/MIN/1.73
EOSINOPHIL # BLD AUTO: 0.08 10*3/MM3 (ref 0–0.4)
EOSINOPHIL NFR BLD AUTO: 1.1 % (ref 0.3–6.2)
ERYTHROCYTE [DISTWIDTH] IN BLOOD BY AUTOMATED COUNT: 14.8 % (ref 12.3–15.4)
GLOBULIN UR ELPH-MCNC: 3.3 GM/DL
GLUCOSE SERPL-MCNC: 114 MG/DL (ref 65–99)
HCT VFR BLD AUTO: 40.8 % (ref 34–46.6)
HGB BLD-MCNC: 12.9 G/DL (ref 12–15.9)
IMM GRANULOCYTES # BLD AUTO: 0.05 10*3/MM3 (ref 0–0.05)
IMM GRANULOCYTES NFR BLD AUTO: 0.7 % (ref 0–0.5)
LYMPHOCYTES # BLD AUTO: 0.99 10*3/MM3 (ref 0.7–3.1)
LYMPHOCYTES NFR BLD AUTO: 14 % (ref 19.6–45.3)
MCH RBC QN AUTO: 35.9 PG (ref 26.6–33)
MCHC RBC AUTO-ENTMCNC: 31.6 G/DL (ref 31.5–35.7)
MCV RBC AUTO: 113.6 FL (ref 79–97)
MONOCYTES # BLD AUTO: 0.71 10*3/MM3 (ref 0.1–0.9)
MONOCYTES NFR BLD AUTO: 10.1 % (ref 5–12)
NEUTROPHILS NFR BLD AUTO: 5.19 10*3/MM3 (ref 1.7–7)
NEUTROPHILS NFR BLD AUTO: 73.7 % (ref 42.7–76)
PLATELET # BLD AUTO: 249 10*3/MM3 (ref 140–450)
PMV BLD AUTO: 9.7 FL (ref 6–12)
POTASSIUM SERPL-SCNC: 3.9 MMOL/L (ref 3.5–5.2)
PROT SERPL-MCNC: 7 G/DL (ref 6–8.5)
RBC # BLD AUTO: 3.59 10*6/MM3 (ref 3.77–5.28)
SODIUM SERPL-SCNC: 144 MMOL/L (ref 136–145)
WBC NRBC COR # BLD: 7.05 10*3/MM3 (ref 3.4–10.8)

## 2023-05-25 PROCEDURE — 80053 COMPREHEN METABOLIC PANEL: CPT

## 2023-05-25 PROCEDURE — 86300 IMMUNOASSAY TUMOR CA 15-3: CPT

## 2023-05-25 PROCEDURE — 36415 COLL VENOUS BLD VENIPUNCTURE: CPT

## 2023-05-25 PROCEDURE — 85025 COMPLETE CBC W/AUTO DIFF WBC: CPT

## 2023-05-26 ENCOUNTER — HOSPITAL ENCOUNTER (OUTPATIENT)
Dept: ONCOLOGY | Facility: HOSPITAL | Age: 78
Discharge: HOME | End: 2023-05-26
Admitting: NURSE PRACTITIONER
Payer: MEDICARE

## 2023-05-26 ENCOUNTER — OFFICE VISIT (OUTPATIENT)
Dept: ONCOLOGY | Facility: CLINIC | Age: 78
End: 2023-05-26
Payer: MEDICARE

## 2023-05-26 VITALS
WEIGHT: 168 LBS | TEMPERATURE: 97.1 F | HEIGHT: 60 IN | RESPIRATION RATE: 18 BRPM | DIASTOLIC BLOOD PRESSURE: 73 MMHG | SYSTOLIC BLOOD PRESSURE: 150 MMHG | HEART RATE: 53 BPM | BODY MASS INDEX: 32.98 KG/M2 | OXYGEN SATURATION: 95 %

## 2023-05-26 DIAGNOSIS — Z17.0 MALIGNANT NEOPLASM OF LEFT BREAST IN FEMALE, ESTROGEN RECEPTOR POSITIVE, UNSPECIFIED SITE OF BREAST: Primary | ICD-10-CM

## 2023-05-26 DIAGNOSIS — Z17.0 MALIGNANT NEOPLASM OF LEFT BREAST IN FEMALE, ESTROGEN RECEPTOR POSITIVE, UNSPECIFIED SITE OF BREAST: ICD-10-CM

## 2023-05-26 DIAGNOSIS — C50.912 MALIGNANT NEOPLASM OF LEFT BREAST IN FEMALE, ESTROGEN RECEPTOR POSITIVE, UNSPECIFIED SITE OF BREAST: Primary | ICD-10-CM

## 2023-05-26 DIAGNOSIS — C50.912 MALIGNANT NEOPLASM OF LEFT BREAST IN FEMALE, ESTROGEN RECEPTOR POSITIVE, UNSPECIFIED SITE OF BREAST: ICD-10-CM

## 2023-05-26 DIAGNOSIS — C50.912 INVASIVE DUCTAL CARCINOMA OF LEFT BREAST: ICD-10-CM

## 2023-05-26 DIAGNOSIS — C50.911 INVASIVE DUCTAL CARCINOMA OF RIGHT BREAST: ICD-10-CM

## 2023-05-26 DIAGNOSIS — C79.51 MALIGNANT NEOPLASM METASTATIC TO BONE: Primary | ICD-10-CM

## 2023-05-26 DIAGNOSIS — C79.51 MALIGNANT NEOPLASM METASTATIC TO BONE: ICD-10-CM

## 2023-05-26 LAB — CANCER AG27-29 SERPL-ACNC: 175.4 U/ML (ref 0–38.6)

## 2023-05-26 PROCEDURE — 1159F MED LIST DOCD IN RCRD: CPT | Performed by: NURSE PRACTITIONER

## 2023-05-26 PROCEDURE — 3078F DIAST BP <80 MM HG: CPT | Performed by: NURSE PRACTITIONER

## 2023-05-26 PROCEDURE — 25010000002 FULVESTRANT PER 25 MG: Performed by: NURSE PRACTITIONER

## 2023-05-26 PROCEDURE — 96402 CHEMO HORMON ANTINEOPL SQ/IM: CPT

## 2023-05-26 PROCEDURE — 99214 OFFICE O/P EST MOD 30 MIN: CPT | Performed by: NURSE PRACTITIONER

## 2023-05-26 PROCEDURE — 3077F SYST BP >= 140 MM HG: CPT | Performed by: NURSE PRACTITIONER

## 2023-05-26 PROCEDURE — 1126F AMNT PAIN NOTED NONE PRSNT: CPT | Performed by: NURSE PRACTITIONER

## 2023-05-26 PROCEDURE — 1160F RVW MEDS BY RX/DR IN RCRD: CPT | Performed by: NURSE PRACTITIONER

## 2023-05-26 RX ORDER — LAMOTRIGINE 25 MG/1
500 TABLET ORAL ONCE
Status: CANCELLED | OUTPATIENT
Start: 2023-05-26

## 2023-05-26 RX ORDER — LAMOTRIGINE 25 MG/1
500 TABLET ORAL ONCE
OUTPATIENT
Start: 2023-06-23

## 2023-05-26 RX ORDER — LAMOTRIGINE 25 MG/1
500 TABLET ORAL ONCE
Status: COMPLETED | OUTPATIENT
Start: 2023-05-26 | End: 2023-05-26

## 2023-05-26 RX ORDER — SODIUM CHLORIDE 9 MG/ML
250 INJECTION, SOLUTION INTRAVENOUS ONCE
OUTPATIENT
Start: 2023-06-01

## 2023-05-26 RX ADMIN — FULVESTRANT 500 MG: 50 INJECTION, SOLUTION INTRAMUSCULAR at 09:18

## 2023-05-26 NOTE — PROGRESS NOTES
PROBLEM LIST:  1. Left-sided pT1bN0 (IA), low-grade invasive ductal carcinoma diagnosed 05/01/2014. She presented with left-sided nipple discharge and an abnormality on mammogram in 04/2014. Biopsy showed invasive ductal cancer low-grade,estrogen receptor and progesterone receptor positive, HER-2 negative. Sheunderwent a left needle localization lumpectomy on 06/27/2014. Final tumor size  is 1.4 cm  a) Oncotype recurrence score was 0 which is associated with a 3% risk of 10 year recurrence.  b) Adjuvant Arimidex was started after the completion of radiotherapy in 08/2014.  c) Arimidex was discontinued in 11/2014 due to severe hot flashes. Arimidex resumed in 07/2015.  Completed 5 years of treatment.  D) recurrent left breast IDC.  Left breast lumpectomy on 9/1/21 showed 5 mm tumor, completely removed by biopsy needle, intermediate grade.  ER+ [place]+ Her2 negative (pT1bNx).  Bone scan showed widespread metastatic bone disease.  CT chest abdomen and pelvis on 10/20/2021 showed extensive sclerotic changes in the sternum and thoracic spine, but no involvement of the organs.  E) started Ibrance and anastrozole 10/22/2021.   F) CT scans, bone scan 3/6/23 showed mild progression in the bones.  Guardant 360 showed Rb1 mutation, no evidence [id number].  Started on fulvestrant March 2023.  2. 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 Diagnosed on biopsy on 05/22/2014. She underwent a lumpectomy on 06/27/2014 with final tumor size being 2 mm. It was ER/[place] positive with HER-2 not performed.   3. Hypothyroidism.   4. Diabetes.   5. Hypertension.   6. Hyperlipidemia.   7. Autoimmune hepatitis.  8. Osteopenia, received zometa x 4 doses, completed February 2018.  9. Erythrocytosis    Subjective     CHIEF COMPLAINT: breast cancer    HISTORY OF PRESENT ILLNESS:   Radha John returns for follow-up.  She started fulvestrant March 30, 2023.  She is tolerating it relatively well.  She has  "noticed increased pain in bilateral thighs over the last few weeks.  At times the pain is a 9 out of 10.  It is made worse after she walks a lot.  Currently she has no pain.  She has been using heat, Tylenol and ibuprofen for the pain.  She describes the pain as aching.  She denies any cough or shortness of air.  No nausea.  No headaches or diplopia.          Objective      /73   Pulse 53   Temp 97.1 °F (36.2 °C)   Resp 18   Ht 152.4 cm (60\")   Wt 76.2 kg (168 lb)   SpO2 95%   BMI 32.81 kg/m²    Vitals:    05/26/23 0803   PainSc: 0-No pain               ECOG score: 1             General: well appearing female in no acute distress  Neuro: alert and oriented  HEENT: sclera anicteric, oropharynx clear  Cardiovascular: Regular rate and rhythm  Lungs: Clear to auscultation bilaterally  Extremeties: No lower extremity edema.    Skin: no rashes, lesions, bruising, or petechiae  Psych: mood and affect appropriate        RECENT LABS:  Lab Results   Component Value Date    WBC 7.05 05/25/2023    HGB 12.9 05/25/2023    HCT 40.8 05/25/2023    .6 (H) 05/25/2023     05/25/2023       Lab Results   Component Value Date    GLUCOSE 114 (H) 05/25/2023    BUN 17 05/25/2023    CREATININE 0.88 05/25/2023    EGFRIFAFRI 51 (L) 02/15/2022    BCR 19.3 05/25/2023    K 3.9 05/25/2023    CO2 29.0 05/25/2023    CALCIUM 8.5 (L) 05/25/2023    PROTENTOTREF 6.8 03/22/2021    ALBUMIN 3.7 05/25/2023    AST 16 05/25/2023    ALT 8 05/25/2023                    CA 27.29  Lab Results   Component Value Date    LABCA2 175.4 (H) 05/25/2023    LABCA2 160.2 (H) 04/28/2023    LABCA2 146.2 (H) 03/30/2023         Assessment & Plan   Radha John is a 77 y.o. female with bilateral stage I ER+ Her2 negative breast cancer, with a more recent recurrent 5 mm ER+ IDC in the left breast, s/p repeat lumpectomy, and imaging showing widespread metastatic bone involvement.    She has started on treatment with fulvestrant March 30, 2023.  She " is tolerated it well so far.  No significant issues with the injections.  Since she is having increased pain in her thighs bilaterally that is similar to her prior cancer pain I will order bone scan and CT scans and have her follow-up after scans are completed with Dr. Sarah Prasad.  Labs from 5/25/2023 were reviewed and are stable.  Her CA 27.29 is slightly increased.    Bone metastasis: Continue Zometa every 3 months.  Dose next due 6/1/2023.    Follow-up in 1 week with scans prior to return.                 Rhoda Llamas, CYDNEY  [facility]    5/26/2023          CC:

## 2023-05-30 ENCOUNTER — HOSPITAL ENCOUNTER (OUTPATIENT)
Dept: NUCLEAR MEDICINE | Facility: HOSPITAL | Age: 78
Discharge: HOME | End: 2023-05-30

## 2023-05-30 ENCOUNTER — HOSPITAL ENCOUNTER (OUTPATIENT)
Dept: CT IMAGING | Facility: HOSPITAL | Age: 78
Discharge: HOME | End: 2023-05-30
Admitting: NURSE PRACTITIONER

## 2023-05-30 DIAGNOSIS — C50.912 INVASIVE DUCTAL CARCINOMA OF LEFT BREAST: ICD-10-CM

## 2023-05-30 DIAGNOSIS — Z17.0 MALIGNANT NEOPLASM OF LEFT BREAST IN FEMALE, ESTROGEN RECEPTOR POSITIVE, UNSPECIFIED SITE OF BREAST: ICD-10-CM

## 2023-05-30 DIAGNOSIS — C50.912 MALIGNANT NEOPLASM OF LEFT BREAST IN FEMALE, ESTROGEN RECEPTOR POSITIVE, UNSPECIFIED SITE OF BREAST: ICD-10-CM

## 2023-05-30 DIAGNOSIS — C50.911 INVASIVE DUCTAL CARCINOMA OF RIGHT BREAST: ICD-10-CM

## 2023-05-30 DIAGNOSIS — C79.51 MALIGNANT NEOPLASM METASTATIC TO BONE: ICD-10-CM

## 2023-05-30 PROCEDURE — 0 TECHNETIUM MEDRONATE KIT: Performed by: NURSE PRACTITIONER

## 2023-05-30 PROCEDURE — 25510000001 IOPAMIDOL 61 % SOLUTION: Performed by: NURSE PRACTITIONER

## 2023-05-30 PROCEDURE — 78306 BONE IMAGING WHOLE BODY: CPT

## 2023-05-30 PROCEDURE — 71260 CT THORAX DX C+: CPT

## 2023-05-30 PROCEDURE — 74177 CT ABD & PELVIS W/CONTRAST: CPT

## 2023-05-30 PROCEDURE — A9503 TC99M MEDRONATE: HCPCS | Performed by: NURSE PRACTITIONER

## 2023-05-30 RX ORDER — TC 99M MEDRONATE 20 MG/10ML
26.8 INJECTION, POWDER, LYOPHILIZED, FOR SOLUTION INTRAVENOUS
Status: COMPLETED | OUTPATIENT
Start: 2023-05-30 | End: 2023-05-30

## 2023-05-30 RX ADMIN — Medication 26.8 MILLICURIE: at 10:18

## 2023-05-30 RX ADMIN — IOPAMIDOL 85 ML: 612 INJECTION, SOLUTION INTRAVENOUS at 10:36

## 2023-06-01 ENCOUNTER — HOSPITAL ENCOUNTER (OUTPATIENT)
Dept: ONCOLOGY | Facility: HOSPITAL | Age: 78
Discharge: HOME | End: 2023-06-01
Admitting: NURSE PRACTITIONER

## 2023-06-01 ENCOUNTER — TELEPHONE (OUTPATIENT)
Dept: ONCOLOGY | Facility: CLINIC | Age: 78
End: 2023-06-01

## 2023-06-01 ENCOUNTER — OFFICE VISIT (OUTPATIENT)
Dept: ONCOLOGY | Facility: CLINIC | Age: 78
End: 2023-06-01

## 2023-06-01 VITALS
OXYGEN SATURATION: 97 % | WEIGHT: 166 LBS | DIASTOLIC BLOOD PRESSURE: 68 MMHG | TEMPERATURE: 96.9 F | SYSTOLIC BLOOD PRESSURE: 142 MMHG | RESPIRATION RATE: 16 BRPM | HEART RATE: 55 BPM | BODY MASS INDEX: 32.59 KG/M2 | HEIGHT: 60 IN

## 2023-06-01 DIAGNOSIS — C79.51 MALIGNANT NEOPLASM METASTATIC TO BONE: Primary | ICD-10-CM

## 2023-06-01 DIAGNOSIS — Z17.0 MALIGNANT NEOPLASM OF LEFT BREAST IN FEMALE, ESTROGEN RECEPTOR POSITIVE, UNSPECIFIED SITE OF BREAST: ICD-10-CM

## 2023-06-01 DIAGNOSIS — C50.912 MALIGNANT NEOPLASM OF LEFT BREAST IN FEMALE, ESTROGEN RECEPTOR POSITIVE, UNSPECIFIED SITE OF BREAST: ICD-10-CM

## 2023-06-01 DIAGNOSIS — C50.911 INVASIVE DUCTAL CARCINOMA OF RIGHT BREAST: ICD-10-CM

## 2023-06-01 DIAGNOSIS — C50.912 INVASIVE DUCTAL CARCINOMA OF LEFT BREAST: ICD-10-CM

## 2023-06-01 LAB
ALBUMIN SERPL-MCNC: 3.5 G/DL (ref 3.5–5.2)
ALBUMIN/GLOB SERPL: 1.2 G/DL
ALP SERPL-CCNC: 99 U/L (ref 39–117)
ALT SERPL W P-5'-P-CCNC: 7 U/L (ref 1–33)
ANION GAP SERPL CALCULATED.3IONS-SCNC: 11 MMOL/L (ref 5–15)
AST SERPL-CCNC: 16 U/L (ref 1–32)
BASOPHILS # BLD AUTO: 0.03 10*3/MM3 (ref 0–0.2)
BASOPHILS NFR BLD AUTO: 0.4 % (ref 0–1.5)
BILIRUB SERPL-MCNC: 0.4 MG/DL (ref 0–1.2)
BUN SERPL-MCNC: 18 MG/DL (ref 8–23)
BUN/CREAT SERPL: 22.5 (ref 7–25)
CALCIUM SPEC-SCNC: 8.6 MG/DL (ref 8.6–10.5)
CANCER AG15-3 SERPL-ACNC: 134 U/ML
CHLORIDE SERPL-SCNC: 108 MMOL/L (ref 98–107)
CO2 SERPL-SCNC: 25 MMOL/L (ref 22–29)
CREAT BLDA-MCNC: 1 MG/DL (ref 0.6–1.3)
CREAT SERPL-MCNC: 0.8 MG/DL (ref 0.57–1)
DEPRECATED RDW RBC AUTO: 62.7 FL (ref 37–54)
EGFRCR SERPLBLD CKD-EPI 2021: 76 ML/MIN/1.73
EOSINOPHIL # BLD AUTO: 0.06 10*3/MM3 (ref 0–0.4)
EOSINOPHIL NFR BLD AUTO: 0.8 % (ref 0.3–6.2)
ERYTHROCYTE [DISTWIDTH] IN BLOOD BY AUTOMATED COUNT: 14.9 % (ref 12.3–15.4)
GLOBULIN UR ELPH-MCNC: 2.9 GM/DL
GLUCOSE SERPL-MCNC: 92 MG/DL (ref 65–99)
HCT VFR BLD AUTO: 38.4 % (ref 34–46.6)
HGB BLD-MCNC: 12.1 G/DL (ref 12–15.9)
IMM GRANULOCYTES # BLD AUTO: 0.04 10*3/MM3 (ref 0–0.05)
IMM GRANULOCYTES NFR BLD AUTO: 0.6 % (ref 0–0.5)
LYMPHOCYTES # BLD AUTO: 1.02 10*3/MM3 (ref 0.7–3.1)
LYMPHOCYTES NFR BLD AUTO: 14.3 % (ref 19.6–45.3)
MAGNESIUM SERPL-MCNC: 2 MG/DL (ref 1.6–2.4)
MCH RBC QN AUTO: 35.4 PG (ref 26.6–33)
MCHC RBC AUTO-ENTMCNC: 31.5 G/DL (ref 31.5–35.7)
MCV RBC AUTO: 112.3 FL (ref 79–97)
MONOCYTES # BLD AUTO: 0.97 10*3/MM3 (ref 0.1–0.9)
MONOCYTES NFR BLD AUTO: 13.6 % (ref 5–12)
NEUTROPHILS NFR BLD AUTO: 4.99 10*3/MM3 (ref 1.7–7)
NEUTROPHILS NFR BLD AUTO: 70.3 % (ref 42.7–76)
PHOSPHATE SERPL-MCNC: 3.3 MG/DL (ref 2.5–4.5)
PLATELET # BLD AUTO: 190 10*3/MM3 (ref 140–450)
PMV BLD AUTO: 8.9 FL (ref 6–12)
POTASSIUM SERPL-SCNC: 3.7 MMOL/L (ref 3.5–5.2)
PROT SERPL-MCNC: 6.4 G/DL (ref 6–8.5)
RBC # BLD AUTO: 3.42 10*6/MM3 (ref 3.77–5.28)
SODIUM SERPL-SCNC: 144 MMOL/L (ref 136–145)
WBC NRBC COR # BLD: 7.11 10*3/MM3 (ref 3.4–10.8)

## 2023-06-01 PROCEDURE — 85025 COMPLETE CBC W/AUTO DIFF WBC: CPT | Performed by: NURSE PRACTITIONER

## 2023-06-01 PROCEDURE — 86300 IMMUNOASSAY TUMOR CA 15-3: CPT | Performed by: NURSE PRACTITIONER

## 2023-06-01 PROCEDURE — 82565 ASSAY OF CREATININE: CPT

## 2023-06-01 PROCEDURE — 96374 THER/PROPH/DIAG INJ IV PUSH: CPT

## 2023-06-01 PROCEDURE — 80053 COMPREHEN METABOLIC PANEL: CPT | Performed by: NURSE PRACTITIONER

## 2023-06-01 PROCEDURE — 84100 ASSAY OF PHOSPHORUS: CPT | Performed by: NURSE PRACTITIONER

## 2023-06-01 PROCEDURE — 25010000002 ZOLEDRONIC ACID PER 1 MG: Performed by: NURSE PRACTITIONER

## 2023-06-01 PROCEDURE — 83735 ASSAY OF MAGNESIUM: CPT | Performed by: NURSE PRACTITIONER

## 2023-06-01 RX ORDER — SODIUM CHLORIDE 9 MG/ML
250 INJECTION, SOLUTION INTRAVENOUS ONCE
Status: DISCONTINUED | OUTPATIENT
Start: 2023-06-01 | End: 2023-06-02 | Stop reason: HOSPADM

## 2023-06-01 RX ORDER — LAMOTRIGINE 25 MG/1
500 TABLET ORAL ONCE
OUTPATIENT
Start: 2023-07-21

## 2023-06-01 RX ADMIN — ZOLEDRONIC ACID 3.5 MG: 4 INJECTION, SOLUTION, CONCENTRATE INTRAVENOUS at 12:24

## 2023-06-01 NOTE — TELEPHONE ENCOUNTER
Caller: [facility]    Best call back number: [phone]    What is the best time to reach you: ANYTIME    Who are you requesting to speak with (clinical staff, provider,  specific staff member): CLINICAL     Do you know the name of the person who called: SEBAS    What was the call regarding: SEBAS CALLING FROM [facility] NEED TO VERIFY INJECTION DATES AND ALSO SCAN DATES ON PATIENT.    REFERENCE NUMBER [id number]

## 2023-06-01 NOTE — TELEPHONE ENCOUNTER
Called [employer] number listed but wasn't able to ever talk to a rep.  Kept trying to hit 0 as they wanted a selection of eligibility and benefits and policy number.  Unable to help with this call.

## 2023-06-01 NOTE — PROGRESS NOTES
PROBLEM LIST:  1. Left-sided pT1bN0 (IA), low-grade invasive ductal carcinoma diagnosed 05/01/2014. She presented with left-sided nipple discharge and an abnormality on mammogram in 04/2014. Biopsy showed invasive ductal cancer low-grade,estrogen receptor and progesterone receptor positive, HER-2 negative. She underwent a left needle localization lumpectomy on 06/27/2014. Final tumor size  is 1.4 cm  a) Oncotype recurrence score was 0 which is associated with a 3% risk of 10 year recurrence.  b) Adjuvant Arimidex was started after the completion of radiotherapy in 08/2014.  c) Arimidex was discontinued in 11/2014 due to severe hot flashes. Arimidex resumed in 07/2015.  Completed 5 years of treatment.  D) recurrent left breast IDC.  Left breast lumpectomy on 9/1/21 showed 5 mm tumor, completely removed by biopsy needle, intermediate grade.  ER+ MT+ Her2 negative (pT1bNx).  Bone scan showed widespread metastatic bone disease.  CT chest abdomen and pelvis on 10/20/2021 showed extensive sclerotic changes in the sternum and thoracic spine, but no involvement of the organs.  E) started Ibrance and anastrozole 10/22/2021.   F) CT scans, bone scan 3/6/23 showed mild progression in the bones.  Guardant 360 showed Rb1 mutation, no evidence [id number].  Started on fulvestrant March 2023.  2. 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 Diagnosed on biopsy on 05/22/2014. She underwent a lumpectomy on 06/27/2014 with final tumor size being 2 mm. It was ER/MT positive with HER-2 not performed.   3. Hypothyroidism.   4. Diabetes.   5. Hypertension.   6. Hyperlipidemia.   7. Autoimmune hepatitis.  8. Osteopenia, received zometa x 4 doses, completed February 2018.  9. Erythrocytosis    Subjective     CHIEF COMPLAINT: breast cancer    HISTORY OF PRESENT ILLNESS:   Radha John returns for follow-up.  She started fulvestrant March 30, 2023.  She says that her leg pain has improved.  The day  "that she came in last time had been right after she had had a long day of shopping and walking and wearing heels.  She is not having any new or progressive pain today.        Objective      /68   Pulse 55   Temp 96.9 °F (36.1 °C) (Infrared)   Resp 16   Ht 152.4 cm (60\")   Wt 75.3 kg (166 lb)   SpO2 97%   BMI 32.42 kg/m²    Vitals:    06/01/23 0915   PainSc: 0-No pain               ECOG score: 1             General: well appearing female in no acute distress  Neuro: alert and oriented  HEENT: sclera anicteric, oropharynx clear  Cardiovascular: Regular rate and rhythm  Lungs: Clear to auscultation bilaterally  Extremeties: No lower extremity edema.    Skin: no rashes, lesions, bruising, or petechiae  Psych: mood and affect appropriate    I have reexamined the patient and the results are consistent with the previously documented exam. Sarah Prasad MD          RECENT LABS:  Lab Results   Component Value Date    WBC 7.05 05/25/2023    HGB 12.9 05/25/2023    HCT 40.8 05/25/2023    .6 (H) 05/25/2023     05/25/2023       Lab Results   Component Value Date    GLUCOSE 114 (H) 05/25/2023    BUN 17 05/25/2023    CREATININE 0.88 05/25/2023    EGFRIFAFRI 51 (L) 02/15/2022    BCR 19.3 05/25/2023    K 3.9 05/25/2023    CO2 29.0 05/25/2023    CALCIUM 8.5 (L) 05/25/2023    PROTENTOTREF 6.8 03/22/2021    ALBUMIN 3.7 05/25/2023    AST 16 05/25/2023    ALT 8 05/25/2023                    CA 27.29  Lab Results   Component Value Date    LABCA2 175.4 (H) 05/25/2023    LABCA2 160.2 (H) 04/28/2023    LABCA2 146.2 (H) 03/30/2023     NM Bone Scan Whole Body  Narrative: DATE OF EXAM: 5/30/2023 10:12 AM EDT    PROCEDURE: NM BONE SCAN WHOLE BODY    INDICATIONS: assess for cancer  breast cancer with mets evaluation of treatment    COMPARISON: Whole body bone scan 3/6/2023    TECHNIQUE: The patient received 26.8 mCi of technetium 99m MDP intravenously and 3 hour delayed anterior and posterior whole body bone images were " obtained.    FINDINGS:  Extensive abnormal radiopharmaceutical uptake is seen throughout the calvarium, cervical, thoracic and lumbar spine, pelvis, right femur, shoulders, bilateral ribs, proximal humeral shafts, consistent with the appearance of osseous metastatic disease.        Impression: Axial and appendicular metastatic disease is thought to be stable since 3/6/2023.    Electronically Signed: Maceyata Reyna    5/31/2023 12:28 PM EDT    Workstation ID: [id number]    I personally reviewed the imaging studies    Assessment & Plan   Radha John is a 77 y.o. female with bilateral stage I ER+ Her2 negative breast cancer, with a more recent recurrent 5 mm ER+ IDC in the left breast, s/p repeat lumpectomy, and imaging showing widespread metastatic bone involvement.    She has started on treatment with fulvestrant March 30, 2023.  She continues to tolerate this fairly well.  We reviewed her scans which show stable findings, no evidence of progression in the bones.  One of her tumor markers has increased but the other has decreased slightly.  We will continue to monitor and plan to repeat scans in about 3 months.    Bone metastasis: Continue Zometa every 3 months.  Dose next due 6/1/2023.    Follow-up in 1 month.          Total time of patient care on day of service including time prior to, face to face with patient, and following visit spent in reviewing records, lab results, imaging studies, discussion with patient, and documentation/charting was > 40 minutes.      Sarah Prasad MD  [facility] On[facility]    6/1/2023          CC:

## 2023-06-02 LAB — CANCER AG27-29 SERPL-ACNC: 173.7 U/ML (ref 0–38.6)

## 2023-06-06 ENCOUNTER — TELEPHONE (OUTPATIENT)
Dept: ONCOLOGY | Facility: CLINIC | Age: 78
End: 2023-06-06
Payer: MEDICARE

## 2023-06-06 NOTE — TELEPHONE ENCOUNTER
Caller: Radha John    Relationship to patient: Self    Best call back number: [phone]     Chief complaint: R/S    Type of visit: FOLLOW UP 2 AND INJECTION    Requested date: PT WOULD LIKE TO SEE IF SHE CAN COME IN ON 06/26/23. IF SHE CAN NOT GET 06/26/23 SHE COULD DO 06/27-06/28    If rescheduling, when is the original appointment: 06/23/23    Additional notes: PLEASE CALL PT WITH NEW APPT DATE/TIME.

## 2023-07-24 ENCOUNTER — LAB (OUTPATIENT)
Dept: LAB | Facility: HOSPITAL | Age: 78
End: 2023-07-24
Payer: MEDICARE

## 2023-07-24 ENCOUNTER — OFFICE VISIT (OUTPATIENT)
Dept: ONCOLOGY | Facility: CLINIC | Age: 78
End: 2023-07-24
Payer: MEDICARE

## 2023-07-24 ENCOUNTER — HOSPITAL ENCOUNTER (OUTPATIENT)
Dept: ONCOLOGY | Facility: HOSPITAL | Age: 78
Discharge: HOME | End: 2023-07-24
Payer: MEDICARE

## 2023-07-24 VITALS
HEART RATE: 54 BPM | TEMPERATURE: 97.1 F | HEIGHT: 60 IN | OXYGEN SATURATION: 99 % | BODY MASS INDEX: 32.2 KG/M2 | DIASTOLIC BLOOD PRESSURE: 82 MMHG | RESPIRATION RATE: 16 BRPM | WEIGHT: 164 LBS | SYSTOLIC BLOOD PRESSURE: 151 MMHG

## 2023-07-24 DIAGNOSIS — C79.51 MALIGNANT NEOPLASM METASTATIC TO BONE: ICD-10-CM

## 2023-07-24 DIAGNOSIS — Z17.0 MALIGNANT NEOPLASM OF LEFT BREAST IN FEMALE, ESTROGEN RECEPTOR POSITIVE, UNSPECIFIED SITE OF BREAST: Primary | ICD-10-CM

## 2023-07-24 DIAGNOSIS — C50.912 MALIGNANT NEOPLASM OF LEFT BREAST IN FEMALE, ESTROGEN RECEPTOR POSITIVE, UNSPECIFIED SITE OF BREAST: Primary | ICD-10-CM

## 2023-07-24 DIAGNOSIS — C50.912 INVASIVE DUCTAL CARCINOMA OF LEFT BREAST: ICD-10-CM

## 2023-07-24 DIAGNOSIS — C50.911 INVASIVE DUCTAL CARCINOMA OF RIGHT BREAST: ICD-10-CM

## 2023-07-24 LAB
ALBUMIN SERPL-MCNC: 3.5 G/DL (ref 3.5–5.2)
ALBUMIN/GLOB SERPL: 1.1 G/DL
ALP SERPL-CCNC: 92 U/L (ref 39–117)
ALT SERPL W P-5'-P-CCNC: 16 U/L (ref 1–33)
ANION GAP SERPL CALCULATED.3IONS-SCNC: 10 MMOL/L (ref 5–15)
AST SERPL-CCNC: 20 U/L (ref 1–32)
BASOPHILS # BLD AUTO: 0.02 10*3/MM3 (ref 0–0.2)
BASOPHILS NFR BLD AUTO: 0.4 % (ref 0–1.5)
BILIRUB SERPL-MCNC: 0.3 MG/DL (ref 0–1.2)
BUN SERPL-MCNC: 20 MG/DL (ref 8–23)
BUN/CREAT SERPL: 19.6 (ref 7–25)
CALCIUM SPEC-SCNC: 8.6 MG/DL (ref 8.6–10.5)
CANCER AG15-3 SERPL-ACNC: 160 U/ML
CHLORIDE SERPL-SCNC: 106 MMOL/L (ref 98–107)
CO2 SERPL-SCNC: 28 MMOL/L (ref 22–29)
CREAT SERPL-MCNC: 1.02 MG/DL (ref 0.57–1)
DEPRECATED RDW RBC AUTO: 59.6 FL (ref 37–54)
EGFRCR SERPLBLD CKD-EPI 2021: 56.8 ML/MIN/1.73
EOSINOPHIL # BLD AUTO: 0.06 10*3/MM3 (ref 0–0.4)
EOSINOPHIL NFR BLD AUTO: 1.1 % (ref 0.3–6.2)
ERYTHROCYTE [DISTWIDTH] IN BLOOD BY AUTOMATED COUNT: 15.1 % (ref 12.3–15.4)
GLOBULIN UR ELPH-MCNC: 3.2 GM/DL
GLUCOSE SERPL-MCNC: 88 MG/DL (ref 65–99)
HCT VFR BLD AUTO: 41.9 % (ref 34–46.6)
HGB BLD-MCNC: 13.3 G/DL (ref 12–15.9)
IMM GRANULOCYTES # BLD AUTO: 0.08 10*3/MM3 (ref 0–0.05)
IMM GRANULOCYTES NFR BLD AUTO: 1.4 % (ref 0–0.5)
LYMPHOCYTES # BLD AUTO: 1.5 10*3/MM3 (ref 0.7–3.1)
LYMPHOCYTES NFR BLD AUTO: 26.5 % (ref 19.6–45.3)
MCH RBC QN AUTO: 33.8 PG (ref 26.6–33)
MCHC RBC AUTO-ENTMCNC: 31.7 G/DL (ref 31.5–35.7)
MCV RBC AUTO: 106.6 FL (ref 79–97)
MONOCYTES # BLD AUTO: 0.63 10*3/MM3 (ref 0.1–0.9)
MONOCYTES NFR BLD AUTO: 11.1 % (ref 5–12)
NEUTROPHILS NFR BLD AUTO: 3.38 10*3/MM3 (ref 1.7–7)
NEUTROPHILS NFR BLD AUTO: 59.5 % (ref 42.7–76)
PLATELET # BLD AUTO: 215 10*3/MM3 (ref 140–450)
PMV BLD AUTO: 9.2 FL (ref 6–12)
POTASSIUM SERPL-SCNC: 4.1 MMOL/L (ref 3.5–5.2)
PROT SERPL-MCNC: 6.7 G/DL (ref 6–8.5)
RBC # BLD AUTO: 3.93 10*6/MM3 (ref 3.77–5.28)
SODIUM SERPL-SCNC: 144 MMOL/L (ref 136–145)
WBC NRBC COR # BLD: 5.67 10*3/MM3 (ref 3.4–10.8)

## 2023-07-24 PROCEDURE — 99214 OFFICE O/P EST MOD 30 MIN: CPT | Performed by: NURSE PRACTITIONER

## 2023-07-24 PROCEDURE — 96402 CHEMO HORMON ANTINEOPL SQ/IM: CPT

## 2023-07-24 PROCEDURE — 36415 COLL VENOUS BLD VENIPUNCTURE: CPT | Performed by: NURSE PRACTITIONER

## 2023-07-24 PROCEDURE — 80053 COMPREHEN METABOLIC PANEL: CPT | Performed by: INTERNAL MEDICINE

## 2023-07-24 PROCEDURE — 3077F SYST BP >= 140 MM HG: CPT | Performed by: NURSE PRACTITIONER

## 2023-07-24 PROCEDURE — 86300 IMMUNOASSAY TUMOR CA 15-3: CPT | Performed by: INTERNAL MEDICINE

## 2023-07-24 PROCEDURE — 25010000002 FULVESTRANT PER 25 MG: Performed by: INTERNAL MEDICINE

## 2023-07-24 PROCEDURE — 96372 THER/PROPH/DIAG INJ SC/IM: CPT

## 2023-07-24 PROCEDURE — 1160F RVW MEDS BY RX/DR IN RCRD: CPT | Performed by: NURSE PRACTITIONER

## 2023-07-24 PROCEDURE — 1126F AMNT PAIN NOTED NONE PRSNT: CPT | Performed by: NURSE PRACTITIONER

## 2023-07-24 PROCEDURE — 3079F DIAST BP 80-89 MM HG: CPT | Performed by: NURSE PRACTITIONER

## 2023-07-24 PROCEDURE — 85025 COMPLETE CBC W/AUTO DIFF WBC: CPT | Performed by: INTERNAL MEDICINE

## 2023-07-24 PROCEDURE — 1159F MED LIST DOCD IN RCRD: CPT | Performed by: NURSE PRACTITIONER

## 2023-07-24 RX ORDER — LAMOTRIGINE 25 MG/1
500 TABLET ORAL ONCE
Status: COMPLETED | OUTPATIENT
Start: 2023-07-24 | End: 2023-07-24

## 2023-07-24 RX ORDER — LAMOTRIGINE 25 MG/1
500 TABLET ORAL ONCE
OUTPATIENT
Start: 2023-08-21

## 2023-07-24 RX ADMIN — FULVESTRANT 500 MG: 50 INJECTION INTRAMUSCULAR at 09:12

## 2023-07-24 NOTE — PROGRESS NOTES
PROBLEM LIST:  1. Left-sided pT1bN0 (IA), low-grade invasive ductal carcinoma diagnosed 05/01/2014. She presented with left-sided nipple discharge and an abnormality on mammogram in 04/2014. Biopsy showed invasive ductal cancer low-grade,estrogen receptor and progesterone receptor positive, HER-2 negative. She underwent a left needle localization lumpectomy on 06/27/2014. Final tumor size  is 1.4 cm  a) Oncotype recurrence score was 0 which is associated with a 3% risk of 10 year recurrence.  b) Adjuvant Arimidex was started after the completion of radiotherapy in 08/2014.  c) Arimidex was discontinued in 11/2014 due to severe hot flashes. Arimidex resumed in 07/2015.  Completed 5 years of treatment.  D) recurrent left breast IDC.  Left breast lumpectomy on 9/1/21 showed 5 mm tumor, completely removed by biopsy needle, intermediate grade.  ER+ RI+ Her2 negative (pT1bNx).  Bone scan showed widespread metastatic bone disease.  CT chest abdomen and pelvis on 10/20/2021 showed extensive sclerotic changes in the sternum and thoracic spine, but no involvement of the organs.  E) started Ibrance and anastrozole 10/22/2021.   F) CT scans, bone scan 3/6/23 showed mild progression in the bones.  Guardant 360 showed Rb1 mutation, no evidence [id number].  Started on fulvestrant March 2023.  2. 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 Diagnosed on biopsy on 05/22/2014. She underwent a lumpectomy on 06/27/2014 with final tumor size being 2 mm. It was ER/RI positive with HER-2 not performed.   3. Hypothyroidism.   4. Diabetes.   5. Hypertension.   6. Hyperlipidemia.   7. Autoimmune hepatitis.  8. Osteopenia, received zometa x 4 doses, completed February 2018.  9. Erythrocytosis    Subjective     CHIEF COMPLAINT: breast cancer    HISTORY OF PRESENT ILLNESS:   Radha John returns for follow-up.  She started fulvestrant March 30, 2023.  Overall tolerating treatment well.  She does have  "some joint stiffness at times and will take an occasional ibuprofen.  She uses Voltaren gel on her hip joints.  She remains active and goes to [id number] during the week.  No new issues or concerns reported today.      Objective      /82 Comment: KEELEY  Pulse 54   Temp 97.1 °F (36.2 °C) (Infrared)   Resp 16   Ht 152.4 cm (60\")   Wt 74.4 kg (164 lb)   SpO2 99% Comment: RA  BMI 32.03 kg/m²    Vitals:    07/24/23 0820   PainSc: 0-No pain         ECOG score: 1             General: well appearing female in no acute distress  Neuro: alert and oriented  HEENT: sclera anicteric, oropharynx clear  Cardiovascular: Regular rate and rhythm  Lungs: Clear to auscultation bilaterally  Extremeties: No lower extremity edema.    Skin: no rashes, lesions, bruising, or petechiae  Psych: mood and affect appropriate    I have reexamined the patient and the results are consistent with the previously documented exam. Violet Kemp, APRN      RECENT LABS:  Lab Results   Component Value Date    WBC 5.67 07/24/2023    HGB 13.3 07/24/2023    HCT 41.9 07/24/2023    .6 (H) 07/24/2023     07/24/2023       Lab Results   Component Value Date    GLUCOSE 88 07/24/2023    BUN 20 07/24/2023    CREATININE 1.02 (H) 07/24/2023    EGFRIFAFRI 51 (L) 02/15/2022    BCR 19.6 07/24/2023    K 4.1 07/24/2023    CO2 28.0 07/24/2023    CALCIUM 8.6 07/24/2023    PROTENTOTREF 6.8 03/22/2021    ALBUMIN 3.5 07/24/2023    AST 20 07/24/2023    ALT 16 07/24/2023          CA 27.29  Lab Results   Component Value Date    LABCA2 195.2 (H) 06/22/2023    LABCA2 173.7 (H) 06/01/2023    LABCA2 175.4 (H) 05/25/2023     NM Bone Scan Whole Body  Narrative: DATE OF EXAM: 5/30/2023 10:12 AM EDT    PROCEDURE: NM BONE SCAN WHOLE BODY    INDICATIONS: assess for cancer  breast cancer with mets evaluation of treatment    COMPARISON: Whole body bone scan 3/6/2023    TECHNIQUE: The patient received 26.8 mCi of technetium 99m MDP intravenously and 3 hour " delayed anterior and posterior whole body bone images were obtained.    FINDINGS:  Extensive abnormal radiopharmaceutical uptake is seen throughout the calvarium, cervical, thoracic and lumbar spine, pelvis, right femur, shoulders, bilateral ribs, proximal humeral shafts, consistent with the appearance of osseous metastatic disease.        Impression: Axial and appendicular metastatic disease is thought to be stable since 3/6/2023.    Electronically Signed: Macey Reyna    5/31/2023 12:28 PM EDT    Workstation ID: [id number]        Assessment & Plan   Radha John is a 77 y.o. female with bilateral stage I ER+ Her2 negative breast cancer, with a more recent recurrent 5 mm ER+ IDC in the left breast, s/p repeat lumpectomy, and imaging showing widespread metastatic bone involvement.    She has started on treatment with fulvestrant March 30, 2023.  She continues to tolerate this fairly well.  Her scans from the end of May are stable with no evidence of progression in the bones.  I reviewed her labs from today that are appropriate for treatment.  Her tumor markers have been slowly increasing, pending today.  We will continue to monitor and plan to repeat scans prior to next infusion.      Bone metastasis: Continue Zometa every 3 months.  Dose next due 8/24/2023.    Follow-up in 1 month.    iVolet Kemp, CYDNEY  [facility] On[facility]    7/24/2023

## 2023-07-25 LAB — CANCER AG27-29 SERPL-ACNC: 212.7 U/ML (ref 0–38.6)

## 2023-08-21 ENCOUNTER — HOSPITAL ENCOUNTER (OUTPATIENT)
Dept: NUCLEAR MEDICINE | Facility: HOSPITAL | Age: 78
Discharge: HOME | End: 2023-08-21
Payer: MEDICARE

## 2023-08-21 ENCOUNTER — HOSPITAL ENCOUNTER (OUTPATIENT)
Dept: CT IMAGING | Facility: HOSPITAL | Age: 78
Discharge: HOME | End: 2023-08-21
Admitting: NURSE PRACTITIONER
Payer: MEDICARE

## 2023-08-21 DIAGNOSIS — C50.912 MALIGNANT NEOPLASM OF LEFT BREAST IN FEMALE, ESTROGEN RECEPTOR POSITIVE, UNSPECIFIED SITE OF BREAST: ICD-10-CM

## 2023-08-21 DIAGNOSIS — Z17.0 MALIGNANT NEOPLASM OF LEFT BREAST IN FEMALE, ESTROGEN RECEPTOR POSITIVE, UNSPECIFIED SITE OF BREAST: ICD-10-CM

## 2023-08-21 DIAGNOSIS — C79.51 MALIGNANT NEOPLASM METASTATIC TO BONE: ICD-10-CM

## 2023-08-21 PROCEDURE — 0 TECHNETIUM MEDRONATE KIT: Performed by: NURSE PRACTITIONER

## 2023-08-21 PROCEDURE — 25510000001 IOPAMIDOL 61 % SOLUTION: Performed by: NURSE PRACTITIONER

## 2023-08-21 PROCEDURE — 71260 CT THORAX DX C+: CPT

## 2023-08-21 PROCEDURE — 78306 BONE IMAGING WHOLE BODY: CPT

## 2023-08-21 PROCEDURE — A9503 TC99M MEDRONATE: HCPCS | Performed by: NURSE PRACTITIONER

## 2023-08-21 PROCEDURE — 74177 CT ABD & PELVIS W/CONTRAST: CPT

## 2023-08-21 RX ORDER — TC 99M MEDRONATE 20 MG/10ML
26.7 INJECTION, POWDER, LYOPHILIZED, FOR SOLUTION INTRAVENOUS
Status: COMPLETED | OUTPATIENT
Start: 2023-08-21 | End: 2023-08-21

## 2023-08-21 RX ADMIN — IOPAMIDOL 85 ML: 612 INJECTION, SOLUTION INTRAVENOUS at 09:59

## 2023-08-21 RX ADMIN — TC 99M MEDRONATE 26.7 MILLICURIE: 20 INJECTION, POWDER, LYOPHILIZED, FOR SOLUTION INTRAVENOUS at 09:35

## 2023-08-23 ENCOUNTER — HOSPITAL ENCOUNTER (OUTPATIENT)
Dept: ONCOLOGY | Facility: HOSPITAL | Age: 78
Discharge: HOME | End: 2023-08-23
Admitting: NURSE PRACTITIONER
Payer: MEDICARE

## 2023-08-23 ENCOUNTER — OFFICE VISIT (OUTPATIENT)
Dept: ONCOLOGY | Facility: CLINIC | Age: 78
End: 2023-08-23
Payer: MEDICARE

## 2023-08-23 ENCOUNTER — SPECIALTY PHARMACY (OUTPATIENT)
Dept: ONCOLOGY | Facility: HOSPITAL | Age: 78
End: 2023-08-23
Payer: MEDICARE

## 2023-08-23 ENCOUNTER — TELEPHONE (OUTPATIENT)
Dept: GASTROENTEROLOGY | Facility: CLINIC | Age: 78
End: 2023-08-23
Payer: MEDICARE

## 2023-08-23 VITALS
SYSTOLIC BLOOD PRESSURE: 166 MMHG | WEIGHT: 164 LBS | HEIGHT: 60 IN | TEMPERATURE: 97.6 F | BODY MASS INDEX: 32.2 KG/M2 | OXYGEN SATURATION: 97 % | HEART RATE: 68 BPM | DIASTOLIC BLOOD PRESSURE: 88 MMHG | RESPIRATION RATE: 16 BRPM

## 2023-08-23 DIAGNOSIS — C50.911 INVASIVE DUCTAL CARCINOMA OF RIGHT BREAST: Primary | ICD-10-CM

## 2023-08-23 DIAGNOSIS — Z17.0 MALIGNANT NEOPLASM OF LEFT BREAST IN FEMALE, ESTROGEN RECEPTOR POSITIVE, UNSPECIFIED SITE OF BREAST: Primary | ICD-10-CM

## 2023-08-23 DIAGNOSIS — C50.912 MALIGNANT NEOPLASM OF LEFT BREAST IN FEMALE, ESTROGEN RECEPTOR POSITIVE, UNSPECIFIED SITE OF BREAST: Primary | ICD-10-CM

## 2023-08-23 DIAGNOSIS — C79.51 MALIGNANT NEOPLASM METASTATIC TO BONE: ICD-10-CM

## 2023-08-23 DIAGNOSIS — C50.912 INVASIVE DUCTAL CARCINOMA OF LEFT BREAST: ICD-10-CM

## 2023-08-23 LAB
ALBUMIN SERPL-MCNC: 3.5 G/DL (ref 3.5–5.2)
ALBUMIN/GLOB SERPL: 1.3 G/DL
ALP SERPL-CCNC: 103 U/L (ref 39–117)
ALT SERPL W P-5'-P-CCNC: 27 U/L (ref 1–33)
ANION GAP SERPL CALCULATED.3IONS-SCNC: 10 MMOL/L (ref 5–15)
AST SERPL-CCNC: 32 U/L (ref 1–32)
BASOPHILS # BLD AUTO: 0.03 10*3/MM3 (ref 0–0.2)
BASOPHILS NFR BLD AUTO: 0.5 % (ref 0–1.5)
BILIRUB SERPL-MCNC: 0.3 MG/DL (ref 0–1.2)
BUN SERPL-MCNC: 20 MG/DL (ref 8–23)
BUN/CREAT SERPL: 18.2 (ref 7–25)
CALCIUM SPEC-SCNC: 8.4 MG/DL (ref 8.6–10.5)
CANCER AG15-3 SERPL-ACNC: 169 U/ML
CHLORIDE SERPL-SCNC: 107 MMOL/L (ref 98–107)
CO2 SERPL-SCNC: 28 MMOL/L (ref 22–29)
CREAT SERPL-MCNC: 1.1 MG/DL (ref 0.57–1)
DEPRECATED RDW RBC AUTO: 63.2 FL (ref 37–54)
EGFRCR SERPLBLD CKD-EPI 2021: 51.9 ML/MIN/1.73
EOSINOPHIL # BLD AUTO: 0.07 10*3/MM3 (ref 0–0.4)
EOSINOPHIL NFR BLD AUTO: 1.2 % (ref 0.3–6.2)
ERYTHROCYTE [DISTWIDTH] IN BLOOD BY AUTOMATED COUNT: 15.7 % (ref 12.3–15.4)
GLOBULIN UR ELPH-MCNC: 2.6 GM/DL
GLUCOSE SERPL-MCNC: 85 MG/DL (ref 65–99)
HCT VFR BLD AUTO: 41.5 % (ref 34–46.6)
HGB BLD-MCNC: 13 G/DL (ref 12–15.9)
IMM GRANULOCYTES # BLD AUTO: 0.04 10*3/MM3 (ref 0–0.05)
IMM GRANULOCYTES NFR BLD AUTO: 0.7 % (ref 0–0.5)
LYMPHOCYTES # BLD AUTO: 1.37 10*3/MM3 (ref 0.7–3.1)
LYMPHOCYTES NFR BLD AUTO: 24.2 % (ref 19.6–45.3)
MAGNESIUM SERPL-MCNC: 1.9 MG/DL (ref 1.6–2.4)
MCH RBC QN AUTO: 33.5 PG (ref 26.6–33)
MCHC RBC AUTO-ENTMCNC: 31.3 G/DL (ref 31.5–35.7)
MCV RBC AUTO: 107 FL (ref 79–97)
MONOCYTES # BLD AUTO: 0.75 10*3/MM3 (ref 0.1–0.9)
MONOCYTES NFR BLD AUTO: 13.3 % (ref 5–12)
NEUTROPHILS NFR BLD AUTO: 3.39 10*3/MM3 (ref 1.7–7)
NEUTROPHILS NFR BLD AUTO: 60.1 % (ref 42.7–76)
PHOSPHATE SERPL-MCNC: 3.2 MG/DL (ref 2.5–4.5)
PLATELET # BLD AUTO: 117 10*3/MM3 (ref 140–450)
PMV BLD AUTO: 11.7 FL (ref 6–12)
POTASSIUM SERPL-SCNC: 4.4 MMOL/L (ref 3.5–5.2)
PROT SERPL-MCNC: 6.1 G/DL (ref 6–8.5)
RBC # BLD AUTO: 3.88 10*6/MM3 (ref 3.77–5.28)
SODIUM SERPL-SCNC: 145 MMOL/L (ref 136–145)
WBC NRBC COR # BLD: 5.65 10*3/MM3 (ref 3.4–10.8)

## 2023-08-23 PROCEDURE — 25010000002 ZOLEDRONIC ACID PER 1 MG: Performed by: INTERNAL MEDICINE

## 2023-08-23 PROCEDURE — 83735 ASSAY OF MAGNESIUM: CPT | Performed by: NURSE PRACTITIONER

## 2023-08-23 PROCEDURE — 84100 ASSAY OF PHOSPHORUS: CPT | Performed by: NURSE PRACTITIONER

## 2023-08-23 PROCEDURE — 96413 CHEMO IV INFUSION 1 HR: CPT

## 2023-08-23 PROCEDURE — 96374 THER/PROPH/DIAG INJ IV PUSH: CPT

## 2023-08-23 PROCEDURE — 86300 IMMUNOASSAY TUMOR CA 15-3: CPT | Performed by: NURSE PRACTITIONER

## 2023-08-23 PROCEDURE — 80053 COMPREHEN METABOLIC PANEL: CPT | Performed by: NURSE PRACTITIONER

## 2023-08-23 PROCEDURE — 85025 COMPLETE CBC W/AUTO DIFF WBC: CPT | Performed by: NURSE PRACTITIONER

## 2023-08-23 RX ORDER — SODIUM CHLORIDE 9 MG/ML
250 INJECTION, SOLUTION INTRAVENOUS ONCE
Status: COMPLETED | OUTPATIENT
Start: 2023-08-23 | End: 2023-08-23

## 2023-08-23 RX ORDER — CAPECITABINE 500 MG/1
1500 TABLET, FILM COATED ORAL 2 TIMES DAILY
Qty: 84 TABLET | Refills: 11 | Status: SHIPPED | OUTPATIENT
Start: 2023-08-23

## 2023-08-23 RX ORDER — BUDESONIDE 3 MG/1
9 CAPSULE, COATED PELLETS ORAL EVERY MORNING
COMMUNITY
Start: 2023-08-02

## 2023-08-23 RX ORDER — SODIUM CHLORIDE 9 MG/ML
250 INJECTION, SOLUTION INTRAVENOUS ONCE
Status: CANCELLED | OUTPATIENT
Start: 2023-08-24

## 2023-08-23 RX ADMIN — ZOLEDRONIC ACID 3.5 MG: 4 INJECTION, SOLUTION, CONCENTRATE INTRAVENOUS at 10:57

## 2023-08-23 RX ADMIN — SODIUM CHLORIDE 250 ML: 9 INJECTION, SOLUTION INTRAVENOUS at 10:58

## 2023-08-23 NOTE — PROGRESS NOTES
Oral Chemotherapy - New Referral    Received a referral from Dr. Prasad    Treatment Plan: Xeloda (capecitabine)  Start date of treatment planned for: As soon as oral specialty medication is available.  Indication: metastatic breast cancer, ER/SD +, HER2 negative, no ESR1 mutation  Relevant past treatments: lumpectomy x 2, RT, anastrozole, palbociclib + anastrozole ,fulvestrant  Is the therapy appropriate based on treatment guidelines and FDA labeling?: yes  Therapeutic Goals: Continue treatment until progression or intolerable toxicity  Patient can self-administer oral medications: Yes    Drug-Drug Interactions: The current medication list was reviewed and there are no relevant drug-drug interactions.  Medication Allergies: The patient has no relevant allergies as it relates to their oral specialty medication  Review of Labs/Dose Adjustments: The patient's most recent labs were reviewed and all are WNL to start treatment at this dose.   CrCl is 50mL/min with actual body weight - will monitor kidney function   Capecitabine 1000mg/m2 x 1.72 = 1720 mg - rounded down to 1500mg for tablet size and kidney function    A prescription was released to [facility] for   Drug: Xeloda (capecitabine)  Strength: 500 mg  Directions: Take 3 tablets by mouth twice a day on days 1-7, then off for 7 days, then on for 7 days then off for 7 days of a 28-day cycle  Quantity: 84  Refills: 11    Pharmacy education is scheduled for 8/25., CCA and consent will be signed at that time.    Arlin Arzate PharmD, [facility]  Clinical Oncology Pharmacy Specialist  Phone: [phone]      8/23/2023  13:16 EDT

## 2023-08-23 NOTE — TELEPHONE ENCOUNTER
Caller: STEFANY BECKHAM    Relationship: SELF    Best call back number: [phone]    Who are you requesting to speak with (clinical staff, provider,  specific staff member):   CLINICAL - CATIA    Is it okay if the provider responds through [facility] [employer]: NO, PLEASE CALL.    PATIENT CALLED; STATED SHE HAS LAB WORK DONE FOR DR. LUCERO AROUND EVERY 3 MONTHS. TODAY, SHE HAD LABS DONE FOR DR. GRIFFIN AND SHE WOULD LIKE TO KNOW IF THIS IS SUFFICIENT FOR DR. LUCERO AS WELL. SHE WOULD LIKE FOR CATIA TO REVIEW AND CALL HER BACK [phone]. OK TO LEAVE HER A VOICEMAIL.

## 2023-08-23 NOTE — PROGRESS NOTES
PROBLEM LIST:  1. Left-sided pT1bN0 (IA), low-grade invasive ductal carcinoma diagnosed 05/01/2014. She presented with left-sided nipple discharge and an abnormality on mammogram in 04/2014. Biopsy showed invasive ductal cancer low-grade,estrogen receptor and progesterone receptor positive, HER-2 negative. She underwent a left needle localization lumpectomy on 06/27/2014. Final tumor size  is 1.4 cm  a) Oncotype recurrence score was 0 which is associated with a 3% risk of 10 year recurrence.  b) Adjuvant Arimidex was started after the completion of radiotherapy in 08/2014.  c) Arimidex was discontinued in 11/2014 due to severe hot flashes. Arimidex resumed in 07/2015.  Completed 5 years of treatment.  D) recurrent left breast IDC.  Left breast lumpectomy on 9/1/21 showed 5 mm tumor, completely removed by biopsy needle, intermediate grade.  ER+ MO+ Her2 negative (pT1bNx).  Bone scan showed widespread metastatic bone disease.  CT chest abdomen and pelvis on 10/20/2021 showed extensive sclerotic changes in the sternum and thoracic spine, but no involvement of the organs.  E) started Ibrance and anastrozole 10/22/2021.   F) CT scans, bone scan 3/6/23 showed mild progression in the bones.  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  Started on fulvestrant March 2023.  G) CT scan 8/21/2023 showed new liver lesions.  Bone scan stable.  2. 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 Diagnosed on biopsy on 05/22/2014. She underwent a lumpectomy on 06/27/2014 with final tumor size being 2 mm. It was ER/MO positive with HER-2 not performed.   3. Hypothyroidism.   4. Diabetes.   5. Hypertension.   6. Hyperlipidemia.   7. Autoimmune hepatitis.  8. Osteopenia, received zometa x 4 doses, completed February 2018.  9. Erythrocytosis    Subjective     CHIEF COMPLAINT: breast cancer    HISTORY OF PRESENT ILLNESS:   Radha John returns for follow-up.  She has been feeling okay.  " She says she does not have a lot of pain in her joints but just feels stiff.  She has been using ibuprofen most days.  She has not had any nausea or abdominal pain.      Objective      /88 Comment: PT has not taken BP meds this AM  Pulse 68   Temp 97.6 øF (36.4 øC)   Resp 16   Ht 152.4 cm (60\")   Wt 74.4 kg (164 lb)   SpO2 97%   BMI 32.03 kg/mý    Vitals:    08/23/23 0900   PainSc: 0-No pain         ECOG score: 1             General: well appearing female in no acute distress  Neuro: alert and oriented  HEENT: sclera anicteric, oropharynx clear  Cardiovascular: Regular rate and rhythm  Lungs: Clear to auscultation bilaterally  Extremeties: No lower extremity edema.    Skin: no rashes, lesions, bruising, or petechiae  Psych: mood and affect appropriate    I have reexamined the patient and the results are consistent with the previously documented exam. Sarah Prasad MD      RECENT LABS:  Lab Results   Component Value Date    WBC 5.65 08/23/2023    HGB 13.0 08/23/2023    HCT 41.5 08/23/2023    .0 (H) 08/23/2023     (L) 08/23/2023       Lab Results   Component Value Date    GLUCOSE 88 07/24/2023    BUN 20 07/24/2023    CREATININE 1.02 (H) 07/24/2023    EGFRIFAFRI 51 (L) 02/15/2022    BCR 19.6 07/24/2023    K 4.1 07/24/2023    CO2 28.0 07/24/2023    CALCIUM 8.6 07/24/2023    PROTENTOTREF 6.8 03/22/2021    ALBUMIN 3.5 07/24/2023    AST 20 07/24/2023    ALT 16 07/24/2023          CA 27.29  Lab Results   Component Value Date    LABCA2 212.7 (H) 07/24/2023    LABCA2 195.2 (H) 06/22/2023    LABCA2 173.7 (H) 06/01/2023     NM Bone Scan Whole Body  Narrative: DATE OF EXAM: 8/21/2023 8:25 AM CDT    PROCEDURE: NM BONE SCAN WHOLE BODY    INDICATIONS: follow up metastatic breast cancer, assess treatment response    COMPARISON: 5/30/2023    TECHNIQUE: The patient received 26.7 mCi of technetium 99m MDP intravenously and 3 hour delayed anterior and posterior whole body bone images were " obtained.    FINDINGS:  There is abnormal radiotracer uptake throughout the calvarium, spine, pelvis, right and left femur, proximal humeral shaft, ribs, and sternum in a similar pattern and distribution compared to previous examination. No significant interval change given   differences in technique.  Impression: Relatively stable pattern of osseous metastatic disease when compared to prior exam given differences in technique.    Electronically Signed: Kyle Hager MD    8/22/2023 2:21 PM CDT    Workstation ID: [id number]  CT Abdomen Pelvis With Contrast  Narrative: CT ABDOMEN PELVIS W CONTRAST    Date of Exam: 8/21/2023 9:46 AM EDT    Indication: follow up metastatic breast cancer, assess treatment response.    Comparison: 5/30/2023     Technique: Axial CT images were obtained of the abdomen and pelvis following the uneventful intravenous administration of 85 mL Isovue-300. Reconstructed coronal and sagittal images were also obtained. Automated exposure control and iterative   construction methods were used.    Findings: There is a new lesion with peripheral enhancement in the posterior segment of the right hepatic lobe measuring approximately 2.7 cm. There is a new lesion with peripheral enhancement in the lateral segment of the left hepatic lobe measuring 3.4   cm. There is a new lesion in the inferior lateral segment of the left hepatic lobe measuring approximately 2.2 cm. There is a small vague low-density lesion of the right hepatic dome measuring less than 1 cm. The pancreas, spleen, and adrenal glands   appear normal. There are small renal cysts. There is no hydronephrosis. Ureters are normal in size. The bladder is poorly distended. The uterus is absent. There is no large or small bowel dilatation. There is mild diverticulosis without acute   diverticulitis. There is a normal appendix. Diffuse osseous metastatic disease appears similar..      Impression: Impression:  New liver lesions, compatible with  metastatic disease. Stable osseous metastatic disease.    Electronically Signed: Gloria Schaeffer MD    8/22/2023 11:21 AM EDT    Workstation ID: [id number]  CT Chest With Contrast Diagnostic  Narrative: CT CHEST W CONTRAST DIAGNOSTIC    Date of Exam: 8/21/2023 9:46 AM EDT    Indication: follow up metastatic breast cancer, assess treatment response.    Comparison: 5/30/2023.    Technique: Axial CT images were obtained of the chest after the uneventful intravenous administration of 85 mL Isovue-300.  Reconstructed coronal and sagittal images were also obtained. Automated exposure control and iterative construction methods were   used.    Findings:  There is a stable 6 mm nodule of the left upper lobe as measured on image #21, series 4. There are a few small scattered blebs. There are no new nodules or masses. There are no acute lung infiltrates. Dystrophic fat calcification of the bilateral breast   is again seen. Abnormalities in the liver are described on a separate CT abdomen and pelvis exam report. There are no pleural effusions. There are no enlarged mediastinal nodes. There are calcified left hilar nodes. There is extensive bony metastatic   disease which appears similar.  Impression: Impression:  Stable left upper lobe lung nodule. Metastatic disease to bone appears similar. No new abnormality of the chest.    Electronically Signed: Gloria Schaeffer MD    8/22/2023 11:16 AM EDT    Workstation ID: [id number]    I personally reviewed the imaging studies    Assessment & Plan   Radha John is a 77 y.o. female with bilateral stage I ER+ Her2 negative breast cancer, with subsequent recurrence involving bone and now liver.    She has progressed on fulvestrant.  I think we are unlikely to get additional benefit from endocrine targeted therapies.  We discussed changing treatment to Xeloda.   We reviewed the potential side effects of capecitabine including myelosuppression, nausea, diarrhea, hand-foot syndrome, coronary  vasospasm, and mucositis.  We will plan to treat with 1500 mg twice a day 1 week on 1 week off.    I also recommend that we get a biopsy of one of the liver lesions for repeat hormone receptor and HER2 testing, and we will also send for next generation sequencing.    Bone metastasis: Continue Zometa every 3 months.  Dose next due 8/24/2023     Follow-up in 3 weeks.      Total time of patient care on day of service including time prior to, face to face with patient, and following visit spent in reviewing records, lab results, imaging studies, discussion with patient, and documentation/charting was > 40 minutes.    Sarah Prasad MD  [facility]    8/23/2023

## 2023-08-24 ENCOUNTER — SPECIALTY PHARMACY (OUTPATIENT)
Dept: PHARMACY | Facility: HOSPITAL | Age: 78
End: 2023-08-24
Payer: MEDICARE

## 2023-08-24 DIAGNOSIS — K75.4 AUTOIMMUNE HEPATITIS: Primary | ICD-10-CM

## 2023-08-24 LAB — CANCER AG27-29 SERPL-ACNC: 231.5 U/ML (ref 0–38.6)

## 2023-08-24 NOTE — TELEPHONE ENCOUNTER
"\"Okay to add her usual labs to the blood and already in lab if sufficient.\"    Spoke with the lab, Pt's usual labs have been added to existing speciman.   "

## 2023-08-24 NOTE — PROGRESS NOTES
Specialty Pharmacy Note - Double Check    Dose calculation and reduction verified.     Drug: Xeloda (capecitabine)  Strength: 500 mg  Directions: Take 3 tablets by mouth twice daily for 7 days on, then 7 days off, then 7 days on, then 7 days off on a 28 day cycle  QTY: 84  RF:11    Released to pharmacy: [facility] - [place]    Completed independent double check on medication order/RX.

## 2023-08-25 ENCOUNTER — SPECIALTY PHARMACY (OUTPATIENT)
Dept: ONCOLOGY | Facility: HOSPITAL | Age: 78
End: 2023-08-25
Payer: MEDICARE

## 2023-08-25 ENCOUNTER — HOSPITAL ENCOUNTER (OUTPATIENT)
Dept: ONCOLOGY | Facility: HOSPITAL | Age: 78
Discharge: HOME | End: 2023-08-25
Payer: MEDICARE

## 2023-08-25 DIAGNOSIS — T45.1X5A CINV (CHEMOTHERAPY-INDUCED NAUSEA AND VOMITING): ICD-10-CM

## 2023-08-25 DIAGNOSIS — C79.51 MALIGNANT NEOPLASM METASTATIC TO BONE: Primary | ICD-10-CM

## 2023-08-25 DIAGNOSIS — R11.2 CINV (CHEMOTHERAPY-INDUCED NAUSEA AND VOMITING): ICD-10-CM

## 2023-08-25 RX ORDER — ONDANSETRON HYDROCHLORIDE 8 MG/1
8 TABLET, FILM COATED ORAL EVERY 8 HOURS PRN
Qty: 30 TABLET | Refills: 5 | Status: SHIPPED | OUTPATIENT
Start: 2023-08-25

## 2023-08-25 NOTE — PROGRESS NOTES
Specialty Pharmacy Patient Management Program  Oncology Initial Assessment       Radha John is a 77 y.o. female with breast cancer seen by an Oncology provider and enrolled in the Oncology Patient Management program offered by [facility].  An initial outreach was conducted, including assessment of therapy appropriateness and specialty medication education for capecitabine. The patient was introduced to services offered by [facility], including: regular assessments, refill coordination, curbside pick-up or mail order delivery options, prior authorization maintenance, and financial assistance programs as applicable. The patient was also provided with contact information for the pharmacy team.     Regimen: Capecitabine 1500mg PO BID 7 days on, 7 days off and repeat    Start date of oral specialty medication:  8/28/2023    Relevant Past Medical History, Comorbidities, and Vaccines  Relevant medical history and concomitant health conditions were discussed with the patient. The patient's chart has been reviewed for relevant past medical history and comorbid health conditions and updated as necessary.  Vaccines are coordinated by the patient's oncologist and primary care provider.  Past Medical History:   Diagnosis Date    Adenomatous polyp of colon 10/23/2019    Arthritis     Diabetes mellitus     Disease of thyroid gland     Diverticulosis     Gout     Hepatitis     History of kidney stones     Hx of radiation therapy 06/2014    Hyperlipidemia     Hypertension     Invasive ductal carcinoma of breast 09/01/2021    Malignant neoplasm of breast 2014    Menopause      Social History     Socioeconomic History    Marital status:    Tobacco Use    Smoking status: Former    Smokeless tobacco: Never    Tobacco comments:     quit 40 years ago   Vaping Use    Vaping Use: Never used   Substance and Sexual Activity    Alcohol use: No    Drug use: No    Sexual activity: Yes      Partners: Male     Birth control/protection: Post-menopausal, Surgical       Allergies  Known allergies and reactions were discussed with the patient. The patient's chart has been reviewed for allergy information and updated as necessary.   Allergies   Allergen Reactions    Erythromycin Swelling     C/O lips swelling    Hydrocodone-Acetaminophen Nausea Only     Lortab    Acetaminophen Unknown - Low Severity    Amoxicillin Nausea Only    Hydrocodone Unknown - Low Severity        Current Medication List  This medication list has been reviewed with the patient and evaluated for any interactions or necessary modifications/recommendations, and updated to include all prescription medications, OTC medications, and supplements the patient is currently taking.  This list reflects what is contained in the patient's profile, which has also been marked as reviewed to communicate to other providers it is the most up to date version of the patient's current medication therapy.   Prior to Admission medications    Medication Sig Start Date End Date Taking? Authorizing Provider   albuterol sulfate  (90 Base) MCG/ACT inhaler INHALE 2 PUFFS BY MOUTH EVERY 4 TO 6 HOURS 6/23/21   Bruno Flynn MD   amLODIPine (NORVASC) 10 MG tablet Take 1 tablet by mouth Daily.    Bruno Flynn MD   aspirin 81 MG EC tablet Take 1 tablet by mouth Daily.    Bruno Flynn MD   atorvastatin (LIPITOR) 10 MG tablet  8/2/22   Bruno Flynn MD   azaTHIOprine (IMURAN) 50 MG tablet Take 2 tablets by mouth daily 3/2/23   Les Marinelli APRN   Budesonide (ENTOCORT EC) 3 MG 24 hr capsule Take 3 capsules by mouth Every Morning. 8/2/23   Bruno Flynn MD   capecitabine (XELODA) 500 MG chemo tablet Take 3 tablets by mouth 2 (Two) Times a Day. Take 3 tab(s) in AM and 3 tab(s) in PM on days 1-7, then off for 7 days, then on for 7 days, then off for 7 days of a 28-day cycle 8/23/23   Sarah Prasad MD   chlorhexidine  (PERIDEX) 0.12 % solution RINSE WITH 15ML FOR 30 SECONDS AND SPIT, USE TWICE DAILY AFTER BRUSHING AND FLOSSING . 6/2/23   Bruno Flynn MD   Cholecalciferol (VITAMIN D-3 PO) Take 1 tablet by mouth daily.    Bruno Flynn MD   dorzolamide-timolol (COSOPT) 22.3-6.8 MG/ML ophthalmic solution INSTILL 1 DROP INTO EACH EYE TWICE DAILY 7/27/22   Bruno Flynn MD   Durezol 0.05 % ophthalmic emulsion INSTILL 1 DROP INTO EACH EYE 4 TIMES DAILY 9/20/21   Bruno Flynn MD   fluticasone (FLONASE) 50 MCG/ACT nasal spray 1 spray into the nostril(s) as directed by provider As Needed.    Bruno Flynn MD   glucose blood test strip True Metrix Glucose Test Strip   Take 1 strip every day by miscell. route.    Bruno Flynn MD   ibuprofen (ADVIL,MOTRIN) 400 MG tablet Take 1 tablet by mouth Every 6 (Six) Hours As Needed for Mild Pain , Moderate Pain , Fever or Headache. 12/10/20   Christy Lennon MD   ketorolac (ACULAR) 0.5 % ophthalmic solution Apply  to eye(s) as directed by provider Every 12 (Twelve) Hours.    Bruno Flynn MD   latanoprost (XALATAN) 0.005 % ophthalmic solution INSTILL 1 DROP INTO EACH EYE IN THE EVENING 4/18/22   Bruno Flynn MD   levothyroxine (SYNTHROID, LEVOTHROID) 75 MCG tablet  1/29/21   Bruno Flynn MD   levothyroxine (SYNTHROID, LEVOTHROID) 88 MCG tablet     Bruno Flynn MD   loratadine (CLARITIN) 10 MG tablet Take 1 tablet by mouth Daily.    Bruno Flynn MD   metFORMIN ER (GLUCOPHAGE-XR) 500 MG 24 hr tablet Take 1 tablet by mouth Daily. 5/3/20   Bruno Flynn MD   methocarbamol (ROBAXIN) 500 MG tablet Every 8 (Eight) Hours.    Bruno Flynn MD   metoprolol tartrate (LOPRESSOR) 50 MG tablet Take 1 tablet by mouth 2 (Two) Times a Day.    Bruno Flynn MD   metroNIDAZOLE (FLAGYL) 500 MG tablet     Bruno Flynn MD   montelukast (SINGULAIR) 10 MG tablet Take 1 tablet by mouth Daily.     Bruno Flynn MD   neomycin (MYCIFRADIN) 500 MG tablet     Bruno Flynn MD   nystatin (MYCOSTATIN) 232072 UNIT/GM cream     Bruno Flynn MD   olmesartan (BENICAR) 40 MG tablet Take 1 tablet by mouth Daily.    Bruno Flynn MD   polycarbophil 625 MG tablet tablet Take 1 tablet by mouth Daily. Takes two daily    Bruno Flynn MD   prednisoLONE acetate (PRED FORTE) 1 % ophthalmic suspension INSTILL 1 DROP INTO AFFECTED EYE(S) SIX TIMES DAILY 12/27/21   Bruno Flynn MD   zolpidem (AMBIEN) 10 MG tablet Take 1 tablet by mouth At Night As Needed for Sleep. 11/11/21   Sarah Prasad MD       Drug Interactions  Reviewed concomitant medications, allergies, labs, comorbidities/medical history, quality of life, and immunization history.   Drug-drug interactions noted and discussed during education: no significant drug interactions noted. . Reminded the patient to let us know before making any changes or starting any new prescription or OTC medications so we can first assess drug interactions.  Drug-food interactions noted and discussed during education.     Relevant Laboratory Values  Lab Results   Component Value Date    GLUCOSE 85 08/23/2023    CALCIUM 8.4 (L) 08/23/2023     08/23/2023    K 4.4 08/23/2023    CO2 28.0 08/23/2023     08/23/2023    BUN 20 08/23/2023    CREATININE 1.10 (H) 08/23/2023    EGFRIFAFRI 51 (L) 02/15/2022    BCR 18.2 08/23/2023    ANIONGAP 10.0 08/23/2023     Lab Results   Component Value Date    WBC 5.65 08/23/2023    RBC 3.88 08/23/2023    HGB 13.0 08/23/2023    HCT 41.5 08/23/2023    .0 (H) 08/23/2023    MCH 33.5 (H) 08/23/2023    MCHC 31.3 (L) 08/23/2023    RDW 15.7 (H) 08/23/2023    RDWSD 63.2 (H) 08/23/2023    MPV 11.7 08/23/2023     (L) 08/23/2023    NEUTRORELPCT 60.1 08/23/2023    LYMPHORELPCT 24.2 08/23/2023    MONORELPCT 13.3 (H) 08/23/2023    EOSRELPCT 1.2 08/23/2023    BASORELPCT 0.5 08/23/2023    AUTOIGPER 0.7 (H)  08/23/2023    NEUTROABS 3.39 08/23/2023    LYMPHSABS 1.37 08/23/2023    MONOSABS 0.75 08/23/2023    EOSABS 0.07 08/23/2023    BASOSABS 0.03 08/23/2023    AUTOIGNUM 0.04 08/23/2023    NRBC 0.0 06/22/2023       The above labs have been reviewed. No dose adjustments are needed for the oral specialty medication(s) based on the labs. Will monitor renal function closely.    Initial Education Provided for Specialty Medication  The patient has been provided with the following education. All questions and concerns have been addressed prior to the patient receiving the medication, and the patient has verbalized understanding of the education and any materials provided.  Additional patient education shall be provided and documented upon request by the patient, provider or payer.      Provided patient with:   Chemo calendar to help improve adherence., Education sheets about the medication, 24-hour clinic phone number and my contact information and instructions to call should additional questions arise.     Medication Education Sheets Provided: (select all that apply)  Oral Specialty Medication: Xeloda (capecitabine)  IV:  none  Steroid: None    Other Education Sheets Provided: (select all that apply)  Adherence, CINV, Diarrhea, Hand-Foot Syndrome, and Symptom Tracker Sheet and HERBIE Information    TOPICS COMMENTS   Storage and Handling of Oral Specialty Medication Store in the original container, in a dry location out of direct sunlight, and out of reach of children or pets. and Store at room temperature.  Discussed safe handling and what to do with any unused medication.   Administration of Oral Specialty Medication Take with food at the same time(s) each day., Take with a full glass of water., and Do not crush or chew tablets.   Adherence to Oral Specialty Regimen and Handling Missed Doses Patient is likely to have good treatment adherence; reinforced the importance of adherence. Reviewed how to address missed doses and  encouraged the patient to let us know of any missed doses.   Anemia: role of RBC, cause, s/s, ways to manage, role of transfusion Reviewed the role of RBC and the use of transfusions if hemoglobin decreases too much.  Patient to notify us if she experiences shortness of breath, dizziness, or palpitations.  Also let patient know that she could feel more tired than usual and to try to stay active, but rest if she needs to.    Thrombocytopenia: role of platelet, cause, s/s, ways to prevent bleeding, things to avoid, when to seek help Reviewed the role of platelets in blood clotting and when to call clinic (bloody nose that bleeds for 5 minutes despite pressure, a cut that won't stop bleeding despite pressure, gums that bleed excessively with brushing or flossing). Recommended using an electric razor, soft bristle toothbrush, and blowing your nose gently.    Neutropenia: role of WBC, cause, infection precautions, s/s of infection, when to call MD Reviewed the role of WBC, good infection prevention practices, and when to call the clinic (temperature 100.4F, sore throat, burning urination, etc).   Nutrition and Appetite Changes:  importance of maintaining healthy diet & weight, ways to manage to improve intake, dietary consult, exercise regimen, electrolyte and/or blood glucose abnormalities Decreased Appetite: Discussed the risk of decreased appetite. Recommended eating smaller, more frequent meals. Instructed the patient to contact the clinic if losing weight or having difficulty eating enough to maintain energy level.   Diarrhea: causes, s/s of dehydration, ways to manage, dietary changes, when to call MD Chemotherapy : Discussed the risk of diarrhea. Instructed patient on use OTC loperamide with diarrhea onset, but emphasized the importance of calling the clinic if 4-6 episodes in 24 hours not relieved by OTC loperamide.   Constipation: causes, ways to manage, dietary changes, when to call MD Provided supplementary  handout with instructions for use of docusate and other OTC therapies to manage constipation.  Patient was instructed to call us if medications aren't working.   Nausea/Vomiting: cause, use of antiemetics, dietary changes, when to call MD Emetic risk: Low-Minimal  PRN home meds: Ondansetron  Pharmacy home meds sent to: Mindy Zelaya RN will send in a script to local pharmacy.      Instructed the patient to take a dose of the PRN medication at the first onset of nausea and if it's not working to call us for additional medications.  Also provided non-drug measures to mitigate nausea.   Mouth Sores: causes, oral care, ways to manage Mouth sores can be prevented by making a mouth wash mixture of salt, baking soda, and water. The patient was instructed to swish and spit four times daily after meals and before bedtime.  Use of a soft bristle toothbrush was recommended.  The patient was instructed to avoid alcohol-containing OTC mouthwashes.    Alopecia: cause, ways to manage, resources Discussed the possibility of hair loss with the patient. Informed patient that she could request a prescription for a wig if desired and most of the cost is usually covered by insurance. Recommended covering the head with a hat and/or protecting the skin on the head with SPF 30 or higher.    Nervous System Changes: causes, s/s, neuropathies, cognitive changes, ways to manage    Pain: causes, ways to manage Chemo: Discussed muscle and joint aches/pains with chemotherapy, and recommended the use of OTC pain relief with ibuprofen or acetaminophen if needed.   Skin/Nail Changes: cause, s/s, ways to manage Hand-foot syndrome was discussed, including the s/s, prevention measures, and treatment measures. A supplementary handout with this information was also given to the patient.    Organ Toxicities: cause, s/s, need for diagnostic tests, labs, when to notify MD Discussed potential effects on organ systems, monitoring, diagnostic tests, labs,  and when to notify the MD. Discussed the signs/symptoms of the following: cardiotoxicity, hepatotoxicity, nephrotoxicity, and skin changes.   Survivorship: distress, distress assessment, secondary malignancies, early/late effects, follow-up, social issues, social support    Miscellaneous Financial Issues: Patient has a copay of $3.56 at [employer]. She will  medication after education session. Venessa Segura, Patient Care Coordinator, met with patient today.  Lab Draws: On or before day 1 of each cycle, no sooner than 3 days early.   Infertility and Sexuality:  causes, fertility preservation options, sexuality changes, ways to manage, importance of birth control Oral Oncology Therapy: Reviewed safe sex practices and the importance of minimizing exposure to body fluids while on oral oncology therapy., The patient is not of childbearing potential.   Home Care: how to manage bodily fluids Counseled on management of soiled linens and proper flush technique.  Discussed how to manage all the side effects at home and advised when to contact the MD office     Adherence and Self-Administration  Barriers to Patient Adherence and/or Self-Administration: no barriers identified  Methods for Supporting Patient Adherence and/or Self-Administration: dosing calendar  Expected duration of therapy: Until disease progression or intolerable toxicity    Goals of Therapy  Patient Goals of Therapy:   Consistently take medications as prescribed  Patient will adhere to medication regimen  Patient will report any medication side effects to healthcare provider  Clinical Goals:    Goals        Specialty Pharmacy General Goal      Clinical goal/therapeutic target: disease control, per the recent oncology clinic notes and labs.              Support patient understanding of medication regimen  Ensure patient knows the pharmacy contact information  Schedule regular follow-up to monitor the treatment serious adverse events  Schedule regular  follow-up to confirm medication adherence  Schedule regular follow-up to monitor disease progression or stability    Quality of Life Assessment   The patient's current (pre-therapy) quality of life was discussed with the patient. The QOL segment of this outreach has been reviewed and updated.   Quality of Life Score: 10/10    Reassessment Plan & Follow-Up  Pharmacist to perform regular reassessments no more than (6) months from the previous assessment.  Welcome information and patient satisfaction survey to be sent by retail team with patient's initial fill.  Care Coordinator to set up future refill outreaches, coordinate prescription delivery, and escalate clinical questions to pharmacist.     Additional Plans, Therapy Recommendations or Therapy Problems to Be Addressed: no further concerns     Attestation  I attest the patient was actively involved in and has agreed to the above plan of care. If the prescribed therapy is at any point deemed not appropriate based on the current or future assessments, a consultation will be initiated with the patient's specialty care provider to determine the best course of action. The revised plan of therapy will be documented along with any required assessments and/or additional patient education provided.     Arlin Arzate PharmD, [facility]  Clinical Oncology Pharmacy Specialist  Phone: [phone]      Date and Time: 8/25/2023  08:44 EDT

## 2023-08-28 ENCOUNTER — LAB (OUTPATIENT)
Dept: LAB | Facility: HOSPITAL | Age: 78
End: 2023-08-28
Payer: MEDICARE

## 2023-08-28 ENCOUNTER — TELEPHONE (OUTPATIENT)
Dept: ONCOLOGY | Facility: CLINIC | Age: 78
End: 2023-08-28
Payer: MEDICARE

## 2023-08-28 DIAGNOSIS — Z17.0 MALIGNANT NEOPLASM OF LEFT BREAST IN FEMALE, ESTROGEN RECEPTOR POSITIVE, UNSPECIFIED SITE OF BREAST: ICD-10-CM

## 2023-08-28 DIAGNOSIS — C79.51 MALIGNANT NEOPLASM METASTATIC TO BONE: ICD-10-CM

## 2023-08-28 DIAGNOSIS — C79.51 MALIGNANT NEOPLASM METASTATIC TO BONE: Primary | ICD-10-CM

## 2023-08-28 DIAGNOSIS — C50.912 MALIGNANT NEOPLASM OF LEFT BREAST IN FEMALE, ESTROGEN RECEPTOR POSITIVE, UNSPECIFIED SITE OF BREAST: ICD-10-CM

## 2023-08-28 LAB
ALBUMIN SERPL-MCNC: 3.4 G/DL (ref 3.5–5.2)
ALBUMIN/GLOB SERPL: 1 G/DL
ALP SERPL-CCNC: 115 U/L (ref 39–117)
ALT SERPL W P-5'-P-CCNC: 41 U/L (ref 1–33)
ANION GAP SERPL CALCULATED.3IONS-SCNC: 9 MMOL/L (ref 5–15)
AST SERPL-CCNC: 60 U/L (ref 1–32)
BASOPHILS # BLD AUTO: 0.02 10*3/MM3 (ref 0–0.2)
BASOPHILS NFR BLD AUTO: 0.3 % (ref 0–1.5)
BILIRUB SERPL-MCNC: 0.4 MG/DL (ref 0–1.2)
BUN SERPL-MCNC: 20 MG/DL (ref 8–23)
BUN/CREAT SERPL: 19 (ref 7–25)
CALCIUM SPEC-SCNC: 8.5 MG/DL (ref 8.6–10.5)
CHLORIDE SERPL-SCNC: 103 MMOL/L (ref 98–107)
CO2 SERPL-SCNC: 29 MMOL/L (ref 22–29)
CREAT SERPL-MCNC: 1.05 MG/DL (ref 0.57–1)
DEPRECATED RDW RBC AUTO: 61.8 FL (ref 37–54)
EGFRCR SERPLBLD CKD-EPI 2021: 54.8 ML/MIN/1.73
EOSINOPHIL # BLD AUTO: 0.08 10*3/MM3 (ref 0–0.4)
EOSINOPHIL NFR BLD AUTO: 1.2 % (ref 0.3–6.2)
ERYTHROCYTE [DISTWIDTH] IN BLOOD BY AUTOMATED COUNT: 15.8 % (ref 12.3–15.4)
GLOBULIN UR ELPH-MCNC: 3.4 GM/DL
GLUCOSE SERPL-MCNC: 101 MG/DL (ref 65–99)
HCT VFR BLD AUTO: 40.9 % (ref 34–46.6)
HGB BLD-MCNC: 13 G/DL (ref 12–15.9)
IMM GRANULOCYTES # BLD AUTO: 0.06 10*3/MM3 (ref 0–0.05)
IMM GRANULOCYTES NFR BLD AUTO: 0.9 % (ref 0–0.5)
LYMPHOCYTES # BLD AUTO: 1.4 10*3/MM3 (ref 0.7–3.1)
LYMPHOCYTES NFR BLD AUTO: 21.7 % (ref 19.6–45.3)
MCH RBC QN AUTO: 33.1 PG (ref 26.6–33)
MCHC RBC AUTO-ENTMCNC: 31.8 G/DL (ref 31.5–35.7)
MCV RBC AUTO: 104.1 FL (ref 79–97)
MONOCYTES # BLD AUTO: 0.85 10*3/MM3 (ref 0.1–0.9)
MONOCYTES NFR BLD AUTO: 13.2 % (ref 5–12)
NEUTROPHILS NFR BLD AUTO: 4.03 10*3/MM3 (ref 1.7–7)
NEUTROPHILS NFR BLD AUTO: 62.7 % (ref 42.7–76)
PLATELET # BLD AUTO: 195 10*3/MM3 (ref 140–450)
PMV BLD AUTO: 9.2 FL (ref 6–12)
POTASSIUM SERPL-SCNC: 4.2 MMOL/L (ref 3.5–5.2)
PROT SERPL-MCNC: 6.8 G/DL (ref 6–8.5)
RBC # BLD AUTO: 3.93 10*6/MM3 (ref 3.77–5.28)
SODIUM SERPL-SCNC: 141 MMOL/L (ref 136–145)
WBC NRBC COR # BLD: 6.44 10*3/MM3 (ref 3.4–10.8)

## 2023-08-28 PROCEDURE — 80053 COMPREHEN METABOLIC PANEL: CPT

## 2023-08-28 PROCEDURE — 85025 COMPLETE CBC W/AUTO DIFF WBC: CPT

## 2023-08-28 PROCEDURE — 36415 COLL VENOUS BLD VENIPUNCTURE: CPT

## 2023-08-28 NOTE — TELEPHONE ENCOUNTER
I talked with Arlin Arzate pharmD and she said we can check once a month.  Patient has return appt on Sept 15 so we will get labs including tumor markers then.  I notified patient and assured her that the labs are ordered for that day. She verbalized understanding.

## 2023-08-28 NOTE — TELEPHONE ENCOUNTER
I talked with pharmacy and they said to avoid foods that are greasy and eat bland foods if the patient develops diarrhea.  I talked with patient at length and explained about the xeloda side effect of diarrhea but told patient she could eat whatever she wants to eat as long as she does not have diarrhea.  I advised again about using lotion for hand/foot syndrome and also using immodium for the diarrhea.  I explained that she may have to adjust to bland foods if the diarrhea develops and she verbalized understanding.

## 2023-08-28 NOTE — TELEPHONE ENCOUNTER
Caller: Radha John    Relationship: Self    Best call back number: [phone]    Who are you requesting to speak with (clinical staff, provider,  specific staff member): CARY    What was the call regarding: ARDHA CALLED TO SPEAK WITH CARY REGARDING HER CHEMO MEDICATION. SHE IS NEEDING TO KNOW IF ANYONE HAS A MENU AVAILABLE FOR WHAT SHE CAN AND CAN'T EAT WHILE ON THAT MEDICATION.

## 2023-08-28 NOTE — TELEPHONE ENCOUNTER
Caller: Radha John    Relationship: Self    Best call back number: [phone]     What is the best time to reach you: ASAP    Who are you requesting to speak with (clinical staff, provider,  specific staff member): DR ELIAS TOWNSEND'S NURSE        What was the call regarding: PT HAS ANOTHER QUESTION ABOUT THE MEDICATION, SHE WANTS TO KNOW IF SHE NEEDS TO GET LABS EVERY WEEK, OR ONLY ON THE WEEKS THAT SHE TAKES THE MEDICATION.  SHE ALSO WANTS TO BE SURE THEY WILL HAVE IT THERE AT THE LAB FOR HER WHEN SHE GETS THERE.  PLEASE CALL BACK TO ADVISE.

## 2023-08-29 ENCOUNTER — TELEPHONE (OUTPATIENT)
Dept: ONCOLOGY | Facility: CLINIC | Age: 78
End: 2023-08-29
Payer: MEDICARE

## 2023-08-29 NOTE — TELEPHONE ENCOUNTER
I talked with Dr. Prasad and patient just started on xeloda today.  She had her baseline labs done yesterday.  I told patient per Dr. Prasad that she could have a cleaning Thursday but tell the dentist not to be too aggressive.  The counts are stable at this time.  Patient verbalized understanding.

## 2023-08-29 NOTE — TELEPHONE ENCOUNTER
Caller: Radha John    Relationship: Self    Best call back number: [phone]      What was the call regarding: PT CALLED WANTED TO SPEAK TO NURSE, SHE IS HAVING A DEEP CLEANING DENTAL APPOINTMENT ON  8-31-23 AND WANTED TO MAKE SURE THIS WAS OKAY WITH THE MEDICATIONS SHE IS TAKING. PLEASE CALL PATIENT TO ADVISE.

## 2023-09-05 NOTE — TELEPHONE ENCOUNTER
Called patient- Reviewed meds, given instructions on time, location, NPO, . Teaching done. Reviewed meds and patient knows what meds to take the morning of. hOLDING ASPIRIN AS OF 9/1

## 2023-09-07 ENCOUNTER — HOSPITAL ENCOUNTER (OUTPATIENT)
Dept: ULTRASOUND IMAGING | Facility: HOSPITAL | Age: 78
Discharge: HOME | End: 2023-09-07
Payer: MEDICARE

## 2023-09-07 VITALS
BODY MASS INDEX: 32.39 KG/M2 | RESPIRATION RATE: 17 BRPM | HEIGHT: 60 IN | OXYGEN SATURATION: 96 % | TEMPERATURE: 97.3 F | HEART RATE: 57 BPM | DIASTOLIC BLOOD PRESSURE: 75 MMHG | SYSTOLIC BLOOD PRESSURE: 150 MMHG | WEIGHT: 165 LBS

## 2023-09-07 DIAGNOSIS — Z17.0 MALIGNANT NEOPLASM OF LEFT BREAST IN FEMALE, ESTROGEN RECEPTOR POSITIVE, UNSPECIFIED SITE OF BREAST: ICD-10-CM

## 2023-09-07 DIAGNOSIS — C50.912 MALIGNANT NEOPLASM OF LEFT BREAST IN FEMALE, ESTROGEN RECEPTOR POSITIVE, UNSPECIFIED SITE OF BREAST: ICD-10-CM

## 2023-09-07 LAB
ANION GAP SERPL CALCULATED.3IONS-SCNC: 9 MMOL/L (ref 5–15)
BASOPHILS # BLD AUTO: 0.02 10*3/MM3 (ref 0–0.2)
BASOPHILS NFR BLD AUTO: 0.9 % (ref 0–1.5)
BUN SERPL-MCNC: 21 MG/DL (ref 8–23)
BUN/CREAT SERPL: 17.9 (ref 7–25)
CALCIUM SPEC-SCNC: 8.2 MG/DL (ref 8.6–10.5)
CHLORIDE SERPL-SCNC: 110 MMOL/L (ref 98–107)
CO2 SERPL-SCNC: 23 MMOL/L (ref 22–29)
CREAT SERPL-MCNC: 1.17 MG/DL (ref 0.57–1)
DEPRECATED RDW RBC AUTO: 59.4 FL (ref 37–54)
EGFRCR SERPLBLD CKD-EPI 2021: 48.2 ML/MIN/1.73
EOSINOPHIL # BLD AUTO: 0.01 10*3/MM3 (ref 0–0.4)
EOSINOPHIL NFR BLD AUTO: 0.4 % (ref 0.3–6.2)
ERYTHROCYTE [DISTWIDTH] IN BLOOD BY AUTOMATED COUNT: 15.7 % (ref 12.3–15.4)
GLUCOSE BLDC GLUCOMTR-MCNC: 87 MG/DL (ref 70–130)
GLUCOSE SERPL-MCNC: 81 MG/DL (ref 65–99)
HCT VFR BLD AUTO: 42.1 % (ref 34–46.6)
HGB BLD-MCNC: 13.4 G/DL (ref 12–15.9)
IMM GRANULOCYTES # BLD AUTO: 0.01 10*3/MM3 (ref 0–0.05)
IMM GRANULOCYTES NFR BLD AUTO: 0.4 % (ref 0–0.5)
INR PPP: 1.33 (ref 0.89–1.12)
LYMPHOCYTES # BLD AUTO: 1.03 10*3/MM3 (ref 0.7–3.1)
LYMPHOCYTES NFR BLD AUTO: 43.8 % (ref 19.6–45.3)
MCH RBC QN AUTO: 32.8 PG (ref 26.6–33)
MCHC RBC AUTO-ENTMCNC: 31.8 G/DL (ref 31.5–35.7)
MCV RBC AUTO: 102.9 FL (ref 79–97)
MONOCYTES # BLD AUTO: 0.04 10*3/MM3 (ref 0.1–0.9)
MONOCYTES NFR BLD AUTO: 1.7 % (ref 5–12)
NEUTROPHILS NFR BLD AUTO: 1.24 10*3/MM3 (ref 1.7–7)
NEUTROPHILS NFR BLD AUTO: 52.8 % (ref 42.7–76)
NRBC BLD AUTO-RTO: 0 /100 WBC (ref 0–0.2)
PLATELET # BLD AUTO: 157 10*3/MM3 (ref 140–450)
PMV BLD AUTO: 10.2 FL (ref 6–12)
POTASSIUM SERPL-SCNC: 4.1 MMOL/L (ref 3.5–5.2)
PROTHROMBIN TIME: 16.6 SECONDS (ref 12.2–14.5)
RBC # BLD AUTO: 4.09 10*6/MM3 (ref 3.77–5.28)
SODIUM SERPL-SCNC: 142 MMOL/L (ref 136–145)
WBC NRBC COR # BLD: 2.35 10*3/MM3 (ref 3.4–10.8)

## 2023-09-07 PROCEDURE — 25010000002 FENTANYL CITRATE (PF) 50 MCG/ML SOLUTION: Performed by: RADIOLOGY

## 2023-09-07 PROCEDURE — C1889 IMPLANT/INSERT DEVICE, NOC: HCPCS

## 2023-09-07 PROCEDURE — 99152 MOD SED SAME PHYS/QHP 5/>YRS: CPT

## 2023-09-07 PROCEDURE — 25010000002 MIDAZOLAM PER 1 MG: Performed by: RADIOLOGY

## 2023-09-07 PROCEDURE — 85610 PROTHROMBIN TIME: CPT | Performed by: RADIOLOGY

## 2023-09-07 PROCEDURE — 82948 REAGENT STRIP/BLOOD GLUCOSE: CPT

## 2023-09-07 PROCEDURE — 80048 BASIC METABOLIC PNL TOTAL CA: CPT | Performed by: RADIOLOGY

## 2023-09-07 PROCEDURE — 85025 COMPLETE CBC W/AUTO DIFF WBC: CPT | Performed by: RADIOLOGY

## 2023-09-07 PROCEDURE — 76942 ECHO GUIDE FOR BIOPSY: CPT

## 2023-09-07 RX ORDER — MIDAZOLAM HYDROCHLORIDE 1 MG/ML
INJECTION INTRAMUSCULAR; INTRAVENOUS AS NEEDED
Status: COMPLETED | OUTPATIENT
Start: 2023-09-07 | End: 2023-09-07

## 2023-09-07 RX ORDER — MIDAZOLAM HYDROCHLORIDE 1 MG/ML
INJECTION INTRAMUSCULAR; INTRAVENOUS
Status: DISPENSED
Start: 2023-09-07 | End: 2023-09-07

## 2023-09-07 RX ORDER — LIDOCAINE HYDROCHLORIDE 10 MG/ML
10 INJECTION, SOLUTION EPIDURAL; INFILTRATION; INTRACAUDAL; PERINEURAL ONCE
Status: COMPLETED | OUTPATIENT
Start: 2023-09-07 | End: 2023-09-07

## 2023-09-07 RX ORDER — FENTANYL CITRATE 50 UG/ML
INJECTION, SOLUTION INTRAMUSCULAR; INTRAVENOUS AS NEEDED
Status: COMPLETED | OUTPATIENT
Start: 2023-09-07 | End: 2023-09-07

## 2023-09-07 RX ORDER — SODIUM CHLORIDE 0.9 % (FLUSH) 0.9 %
10 SYRINGE (ML) INJECTION AS NEEDED
Status: DISCONTINUED | OUTPATIENT
Start: 2023-09-07 | End: 2023-09-08 | Stop reason: HOSPADM

## 2023-09-07 RX ORDER — FENTANYL CITRATE 50 UG/ML
INJECTION, SOLUTION INTRAMUSCULAR; INTRAVENOUS
Status: DISPENSED
Start: 2023-09-07 | End: 2023-09-07

## 2023-09-07 RX ADMIN — LIDOCAINE HYDROCHLORIDE 10 ML: 10 INJECTION, SOLUTION EPIDURAL; INFILTRATION; INTRACAUDAL; PERINEURAL at 10:53

## 2023-09-07 RX ADMIN — FENTANYL CITRATE 50 MCG: 50 INJECTION, SOLUTION INTRAMUSCULAR; INTRAVENOUS at 10:50

## 2023-09-07 RX ADMIN — MIDAZOLAM HYDROCHLORIDE 1 MG: 1 INJECTION, SOLUTION INTRAMUSCULAR; INTRAVENOUS at 10:50

## 2023-09-07 NOTE — NURSING NOTE
Image guided liver biopsy performed per Dr. Matos, 2 passes obtained & sent to lab per US tech. Fentanyl 50 mcg & Versed 1 mg given for a sedation time of 12 minutes. Dsg to site per protocol. Pt tolerated well. Report called to yue uDong in [place].

## 2023-09-07 NOTE — PRE-PROCEDURE NOTE
[facility]   Vascular Interventional Radiology  History & Physicial        Patient Name:Radha John    : 1945    MRN: [id number]    Primary Care Physician: Mague Reynolds MD    Referring Physician: Sarah Prasad MD     Date of admission: 2023    Subjective     Reason for Consult: Liver biopsy    History of Present Illness     Radha John is a 77 y.o. female referred to IR as noted above.      Active Hospital Problems:  There are no active hospital problems to display for this patient.      Personal History     Past Medical History:   Diagnosis Date    Adenomatous polyp of colon 10/23/2019    Arthritis     Diabetes mellitus     Disease of thyroid gland     Diverticulosis     Gout     Hepatitis     History of kidney stones     Hx of radiation therapy 2014    Hyperlipidemia     Hypertension     Invasive ductal carcinoma of breast 2021    Malignant neoplasm of breast     Menopause        Past Surgical History:   Procedure Laterality Date    BREAST BIOPSY Bilateral     BREAST LUMPECTOMY Bilateral 2014    BREAST LUMPECTOMY Left 2021    CATARACT EXTRACTION Right     COLON RESECTION N/A 2020    Procedure: ROBOTIC LOW ANTERIOR RESECTION, TAKEDOWN OF COLOVAGINAL FISTULA, URETEROLYSIS LEFT;  Surgeon: Sonam Olvera MD;  Location:  [facility];  Service: DaVinci;  Laterality: N/A;    COLONOSCOPY      CYSTOSCOPY N/A 2020    Procedure: CYSTOSCOPY, TEMPORY BILATERAL URETERAL STENTS;  Surgeon: Rosie Gottlieb MD;  Location:  [facility];  Service: Gynecology;  Laterality: N/A;    HEMORRHOIDECTOMY      PARATHYROIDECTOMY  2016    Dr. Izabela Hermosillo @ [facility]    THYROID SURGERY      VAGINAL HYSTERECTOMY         Family History: Her family history includes Breast cancer (age of onset: 50) in her sister; Hypertension in her father, paternal grandmother, and another family member; Other in an other family member; Ovarian cancer in her niece.     Social History: She  " reports that she has quit smoking. She has never used smokeless tobacco. She reports that she does not drink alcohol and does not use drugs.    Home Medications:  Budesonide, Cholecalciferol, albuterol sulfate HFA, amLODIPine, aspirin, atorvastatin, azaTHIOprine, capecitabine, chlorhexidine, difluprednate, dorzolamide-timolol, fluticasone, glucose blood, ibuprofen, ketorolac, latanoprost, levothyroxine, loratadine, metFORMIN ER, methocarbamol, metoprolol tartrate, metroNIDAZOLE, montelukast, neomycin, nystatin, olmesartan, ondansetron, polycarbophil, prednisoLONE acetate, and zolpidem    Current Medications:    sodium chloride     Allergies:  She is allergic to erythromycin, hydrocodone-acetaminophen, acetaminophen, amoxicillin, and hydrocodone.    Review of Systems    IR Procedure pertinent significant findings are mentioned in the PMH and PSH above.    Objective     Visit Vitals  /76 (BP Location: Right arm, Patient Position: Lying)   Pulse 55   Temp 97.3 °F (36.3 °C) (Tympanic)   Resp 18   Ht 152.4 cm (60\")   Wt 74.8 kg (165 lb)   SpO2 100%   BMI 32.22 kg/m²        Physical Exam    A&Ox3.   Able to communicate  No Apparent Distress  Average physique   CVS: VS as noted. Chart reviewed. Stable for the procedure.  Respiratory: Non labored breathing. No signs of respiratory compromise.    Result Review      I have personally reviewed the results from the time of this admission to 9/7/2023 10:26 EDT and agree with these findings.  [x]  Laboratory  []  Microbiology  [x]  Radiology  []  EKG/Telemetry   []  Cardiology/Vascular   []  Pathology  []  Old records  []  Other:    Most notable findings include: As noted:    Results from last 7 days   Lab Units 09/07/23  0836   INR  1.33*   WBC 10*3/mm3 2.35*   HEMOGLOBIN g/dL 13.4   HEMATOCRIT % 42.1   PLATELETS 10*3/mm3 157       Results from last 7 days   Lab Units 09/07/23  0836   SODIUM mmol/L 142   POTASSIUM mmol/L 4.1   CHLORIDE mmol/L 110*   CO2 mmol/L 23.0   BUN " mg/dL 21   CREATININE mg/dL 1.17*   EGFR mL/min/1.73 48.2*   GLUCOSE mg/dL 81             Estimated Creatinine Clearance: 36.4 mL/min (A) (by C-G formula based on SCr of 1.17 mg/dL (H)).   Creatinine   Date Value Ref Range Status   09/07/2023 1.17 (H) 0.57 - 1.00 mg/dL Final       SARS-CoV-2, ROM   Date Value Ref Range Status   09/25/2021 Not detected  Final        No results found for: PREGTESTUR, PREGSERUM, HCG, HCGQUANT     ASA SCALE ASSESSMENT (applicable ONLY if sedation planned):   3     MALLAMPATI CLASSIFICATION (applicable ONLY if sedation planned):   1    Assessment / Plan     Radha John is a 77 y.o. female referred to the IR service with above problem.    Plan:   As above.    Notice: The note was created before the performance of the procedure. It might have been left in the pending status and signed off after the procedure. The time stamp on the note may be misleading.    Russ Matos MD   Vascular Interventional Radiology  09/07/23   10:26 AM EDT

## 2023-09-07 NOTE — POST-PROCEDURE NOTE
The following procedure was performed: Liver biopsy    Please see corresponding Radiology report for in detail procedural information. The Radiology report will be dictated shortly, if not done so already. Please see the IR RN note for the information regarding medicines administered if any, jamir-procedural vitals and I/O information.

## 2023-09-07 NOTE — NURSING NOTE
Pt discharged from ir dept s/p biopsy of liver lesion. Pt tolerated procedure without complications. Dressing to site remained clean dry and intact throughout recovery stay. Pt denies pain at time of discharge as well as throughout her stay here today. Discharge instructions reviewed with patient and her spouse who both verbalized understanding. Pt transported to exit via wheelchair per nurse.

## 2023-09-07 NOTE — DISCHARGE INSTRUCTIONS
Avoid any strenuous activities, pulling, tugging, straining or lifting anything over 10 pounds for the next 7 days.     You may remove the bandaid (if you have one) tomorrow and shower tomorrow; but you will need to avoid tub baths or any situation where your are submersed in water for the next 4 or 5 days to avoid the risk of infection.     DO NOT DRIVE ON THE DAY OF YOUR PROCEDURE.    If you have any problems or concern please contact your physician's office.      SEEK IMMEDIATE MEDICAL ATTENTION FOR ANY UNCONTROLLED PAIN IN BACK/SHOULDER/ABDOMEN ON PROCEDURAL SIDE, UNUSUAL BRUISING/BLEEDING AT OR NEAR SITE, UNCONTROLLED NAUSEA OR VOMITING OR PERSISTENT DIZZINESS.

## 2023-09-08 ENCOUNTER — TELEPHONE (OUTPATIENT)
Dept: INFUSION THERAPY | Facility: HOSPITAL | Age: 78
End: 2023-09-08
Payer: MEDICARE

## 2023-09-15 ENCOUNTER — OFFICE VISIT (OUTPATIENT)
Dept: ONCOLOGY | Facility: CLINIC | Age: 78
End: 2023-09-15
Payer: MEDICARE

## 2023-09-15 ENCOUNTER — SPECIALTY PHARMACY (OUTPATIENT)
Dept: ONCOLOGY | Facility: HOSPITAL | Age: 78
End: 2023-09-15
Payer: MEDICARE

## 2023-09-15 ENCOUNTER — LAB (OUTPATIENT)
Dept: LAB | Facility: HOSPITAL | Age: 78
End: 2023-09-15
Payer: MEDICARE

## 2023-09-15 ENCOUNTER — TELEPHONE (OUTPATIENT)
Dept: ONCOLOGY | Facility: CLINIC | Age: 78
End: 2023-09-15

## 2023-09-15 VITALS
RESPIRATION RATE: 18 BRPM | DIASTOLIC BLOOD PRESSURE: 75 MMHG | TEMPERATURE: 97.1 F | HEIGHT: 60 IN | WEIGHT: 161 LBS | BODY MASS INDEX: 31.61 KG/M2 | HEART RATE: 51 BPM | OXYGEN SATURATION: 98 % | SYSTOLIC BLOOD PRESSURE: 142 MMHG

## 2023-09-15 DIAGNOSIS — C50.911 INVASIVE DUCTAL CARCINOMA OF RIGHT BREAST: ICD-10-CM

## 2023-09-15 DIAGNOSIS — C79.51 MALIGNANT NEOPLASM METASTATIC TO BONE: Primary | ICD-10-CM

## 2023-09-15 DIAGNOSIS — C50.912 INVASIVE DUCTAL CARCINOMA OF LEFT BREAST: ICD-10-CM

## 2023-09-15 DIAGNOSIS — C50.912 MALIGNANT NEOPLASM OF LEFT BREAST IN FEMALE, ESTROGEN RECEPTOR POSITIVE, UNSPECIFIED SITE OF BREAST: ICD-10-CM

## 2023-09-15 DIAGNOSIS — Z17.0 MALIGNANT NEOPLASM OF LEFT BREAST IN FEMALE, ESTROGEN RECEPTOR POSITIVE, UNSPECIFIED SITE OF BREAST: ICD-10-CM

## 2023-09-15 LAB
ALBUMIN SERPL-MCNC: 3.5 G/DL (ref 3.5–5.2)
ALBUMIN/GLOB SERPL: 1.1 G/DL
ALP SERPL-CCNC: 83 U/L (ref 39–117)
ALT SERPL W P-5'-P-CCNC: 11 U/L (ref 1–33)
ANION GAP SERPL CALCULATED.3IONS-SCNC: 11 MMOL/L (ref 5–15)
AST SERPL-CCNC: 16 U/L (ref 1–32)
BASOPHILS # BLD AUTO: 0 10*3/MM3 (ref 0–0.2)
BASOPHILS NFR BLD AUTO: 0 % (ref 0–1.5)
BILIRUB SERPL-MCNC: 0.7 MG/DL (ref 0–1.2)
BUN SERPL-MCNC: 19 MG/DL (ref 8–23)
BUN/CREAT SERPL: 17.3 (ref 7–25)
CALCIUM SPEC-SCNC: 7.9 MG/DL (ref 8.6–10.5)
CANCER AG15-3 SERPL-ACNC: 170 U/ML
CHLORIDE SERPL-SCNC: 107 MMOL/L (ref 98–107)
CO2 SERPL-SCNC: 22 MMOL/L (ref 22–29)
CREAT SERPL-MCNC: 1.1 MG/DL (ref 0.57–1)
DEPRECATED RDW RBC AUTO: 54.9 FL (ref 37–54)
EGFRCR SERPLBLD CKD-EPI 2021: 51.9 ML/MIN/1.73
EOSINOPHIL # BLD AUTO: 0 10*3/MM3 (ref 0–0.4)
EOSINOPHIL NFR BLD AUTO: 0 % (ref 0.3–6.2)
ERYTHROCYTE [DISTWIDTH] IN BLOOD BY AUTOMATED COUNT: 14.7 % (ref 12.3–15.4)
GLOBULIN UR ELPH-MCNC: 3.2 GM/DL
GLUCOSE SERPL-MCNC: 143 MG/DL (ref 65–99)
HCT VFR BLD AUTO: 37.9 % (ref 34–46.6)
HGB BLD-MCNC: 12.5 G/DL (ref 12–15.9)
IMM GRANULOCYTES # BLD AUTO: 0 10*3/MM3 (ref 0–0.05)
IMM GRANULOCYTES NFR BLD AUTO: 0 % (ref 0–0.5)
LYMPHOCYTES # BLD AUTO: 0.52 10*3/MM3 (ref 0.7–3.1)
LYMPHOCYTES NFR BLD AUTO: 94.5 % (ref 19.6–45.3)
MCH RBC QN AUTO: 32.8 PG (ref 26.6–33)
MCHC RBC AUTO-ENTMCNC: 33 G/DL (ref 31.5–35.7)
MCV RBC AUTO: 99.5 FL (ref 79–97)
MONOCYTES # BLD AUTO: 0.01 10*3/MM3 (ref 0.1–0.9)
MONOCYTES NFR BLD AUTO: 1.8 % (ref 5–12)
NEUTROPHILS NFR BLD AUTO: 0.02 10*3/MM3 (ref 1.7–7)
NEUTROPHILS NFR BLD AUTO: 3.7 % (ref 42.7–76)
PLATELET # BLD AUTO: 66 10*3/MM3 (ref 140–450)
PMV BLD AUTO: 9.8 FL (ref 6–12)
POTASSIUM SERPL-SCNC: 3.9 MMOL/L (ref 3.5–5.2)
PROT SERPL-MCNC: 6.7 G/DL (ref 6–8.5)
RBC # BLD AUTO: 3.81 10*6/MM3 (ref 3.77–5.28)
SODIUM SERPL-SCNC: 140 MMOL/L (ref 136–145)
WBC NRBC COR # BLD: 0.55 10*3/MM3 (ref 3.4–10.8)

## 2023-09-15 PROCEDURE — 36415 COLL VENOUS BLD VENIPUNCTURE: CPT

## 2023-09-15 PROCEDURE — 86300 IMMUNOASSAY TUMOR CA 15-3: CPT

## 2023-09-15 PROCEDURE — 85025 COMPLETE CBC W/AUTO DIFF WBC: CPT

## 2023-09-15 PROCEDURE — 80053 COMPREHEN METABOLIC PANEL: CPT

## 2023-09-15 RX ORDER — METHYLPREDNISOLONE 4 MG/1
TABLET ORAL
COMMUNITY
Start: 2023-09-14

## 2023-09-15 RX ORDER — DOXYCYCLINE HYCLATE 100 MG/1
100 CAPSULE ORAL 2 TIMES DAILY
Qty: 14 CAPSULE | Refills: 0 | Status: SHIPPED | OUTPATIENT
Start: 2023-09-15

## 2023-09-15 RX ORDER — SODIUM CHLORIDE 9 MG/ML
250 INJECTION, SOLUTION INTRAVENOUS ONCE
OUTPATIENT
Start: 2023-11-16

## 2023-09-15 NOTE — PROGRESS NOTES
PROBLEM LIST:  1. Left-sided pT1bN0 (IA), low-grade invasive ductal carcinoma diagnosed 05/01/2014. She presented with left-sided nipple discharge and an abnormality on mammogram in 04/2014. Biopsy showed invasive ductal cancer low-grade,estrogen receptor and progesterone receptor positive, HER-2 negative. She underwent a left needle localization lumpectomy on 06/27/2014. Final tumor size  is 1.4 cm  a) Oncotype recurrence score was 0 which is associated with a 3% risk of 10 year recurrence.  b) Adjuvant Arimidex was started after the completion of radiotherapy in 08/2014.  c) Arimidex was discontinued in 11/2014 due to severe hot flashes. Arimidex resumed in 07/2015.  Completed 5 years of treatment.  D) recurrent left breast IDC.  Left breast lumpectomy on 9/1/21 showed 5 mm tumor, completely removed by biopsy needle, intermediate grade.  ER+ MD+ Her2 negative (pT1bNx).  Bone scan showed widespread metastatic bone disease.  CT chest abdomen and pelvis on 10/20/2021 showed extensive sclerotic changes in the sternum and thoracic spine, but no involvement of the organs.  E) started Ibrance and anastrozole 10/22/2021.   F) CT scans, bone scan 3/6/23 showed mild progression in the bones.  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  Started on fulvestrant March 2023.  G) CT scan 8/21/2023 showed new liver lesions.  Bone scan stable. Started on Xeloda 8/28/23  2. 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 Diagnosed on biopsy on 05/22/2014. She underwent a lumpectomy on 06/27/2014 with final tumor size being 2 mm. It was ER/MD positive with HER-2 not performed.   3. Hypothyroidism.   4. Diabetes.   5. Hypertension.   6. Hyperlipidemia.   7. Autoimmune hepatitis.  8. Osteopenia, received zometa x 4 doses, completed February 2018.  9. Erythrocytosis    Subjective     CHIEF COMPLAINT: breast cancer    HISTORY OF PRESENT ILLNESS:   Radha John returns for follow-up.   "She started on Xeloda 8/28/2023 1500 mg twice daily 7 days on followed by 7 days off.  She is not tolerating this dose well.  She complains of her mouth having sores and being very painful to eat.  She is also having some increased insomnia.    She hurt her right knee recently and her primary care provider started her on a Medrol Dosepak yesterday.        Objective      /75 Comment: LUE  Pulse 51   Temp 97.1 °F (36.2 °C) (Infrared)   Resp 18   Ht 152.4 cm (60\")   Wt 73 kg (161 lb)   SpO2 98% Comment: RA  BMI 31.44 kg/m²    Vitals:    09/15/23 0903   PainSc: 0-No pain           ECOG score: 1             General: well appearing female in no acute distress  Neuro: alert and oriented  HEENT: sclera anicteric, oropharynx with erythema  Cardiovascular: Regular rate and rhythm  Lungs: Clear to auscultation bilaterally  Extremeties: No lower extremity edema.    Skin: no rashes, lesions, bruising, or petechiae  Psych: mood and affect appropriate    I have reexamined the patient and the results are consistent with the previously documented exam. Rhoda Llamas, APRN      RECENT LABS:  Lab Results   Component Value Date    WBC 0.55 (C) 09/15/2023    HGB 12.5 09/15/2023    HCT 37.9 09/15/2023    MCV 99.5 (H) 09/15/2023    PLT 66 (L) 09/15/2023       Lab Results   Component Value Date    GLUCOSE 81 09/07/2023    BUN 21 09/07/2023    CREATININE 1.17 (H) 09/07/2023    EGFRIFAFRI 51 (L) 02/15/2022    BCR 17.9 09/07/2023    K 4.1 09/07/2023    CO2 23.0 09/07/2023    CALCIUM 8.2 (L) 09/07/2023    PROTENTOTREF 6.8 03/22/2021    ALBUMIN 3.4 (L) 08/28/2023    AST 60 (H) 08/28/2023    ALT 41 (H) 08/28/2023          CA 27.29  Lab Results   Component Value Date    LABCA2 231.5 (H) 08/23/2023    LABCA2 212.7 (H) 07/24/2023    LABCA2 195.2 (H) 06/22/2023     US Guided Liver Biopsy  Narrative: US GUIDED LIVER BIOPSY     History: breast cancer     : Russ Matos MD.     Modality: Ultrasound               "   SEDATION: Moderate sedation was administered. 1 milligram of Versed and 50 micrograms of fentanyl IV was used for moderate sedation. Total intra service time of sedation was 12 minutes. The sedation was administered and the patient's vital signs   monitored throughout the procedure and recorded in the patient's medical record by the nurse under my direct supervision.    Anesthesia:   Lidocaine 1% local infiltration.    Estimated blood loss:  < 5 cc.            Technique:  A thorough discussion of the risks, benefits, and alternatives of the procedure, and if applicable, moderate sedation, was carried out with the patient. They were encouraged to ask any questions. Any questions were answered. They verbalized   understanding. A written informed consent was then signed.      A multi-component timeout was performed prior to starting the procedure using the departmental protocol.     The procedure room personnel used personal protective equipment. The operators used sterile gloves and if indicated, sterile gowns. The surgical site was prepped with chlorhexidine gluconate  and draped in the maximal applicable sterile fashion.    A preliminary ultrasonography was performed to assess the target and determine a safe access site. It showed a mass in the left lobe of the liver. Pertinent ultrasound images were secured to the PACS for documentation. A subcostal access site was   selected and sterilely prepped and draped. A dermatotomy was performed.    Using aseptic precautions, under real-time ultrasonographic guidance, the target lesion was accessed with a 17-gauge guide. Using an 18-gauge coaxial biopsy device, multiple Core biopsies of the targeted tissue were performed. The specimen was submitted   in formalin for further processing.    At the end of the procedure, an aseptic dressing applied. The patient tolerated the procedure well. After recovery, the patient was discharged from the department in stable condition.      Complications: None immediate.    Number of Cores if obtained: 2    Specimen: The specimen was labeled and sent to the lab in appropriate carriers & containers if such was requested by the ordering provider.               Impression: Impression:                                                                Successful ultrasound-guided core biopsy of a mass in the lateral segment of the left lobe of the liver.    Thank you for the opportunity to assist in the care of your patient.    Electronically Signed: Russ Matos MD    9/7/2023 3:33 PM EDT    Workstation ID: [id number]        Assessment & Plan   Radha John is a 77 y.o. female with bilateral stage I ER+ Her2 negative breast cancer, with subsequent recurrence involving bone and now liver.    She progressed on fulvestrant.  She was started on Xeloda 1500 mg twice a day 1 week on 1 week off on 8/28/2023.  She is experiencing some stomatitis.  Her ANC today is 0.02 with white count 0.55.  Platelets 66.  I will have her hold the Xeloda starting today.  I will see her back in 1 week for repeat labs and most likely dose reduction of the Xeloda at that time.  I will start her on doxycycline 100 mg twice daily for 7 days for prophylaxis due to severe neutropenia.  I cannot put her on a mariah quinolone due to drug interactions.    Biopsy of liver lesion completed on 9/7/2023 pathology showed:  Metastatic mammary ductal carcinoma, see comment   Estrogen Receptor            RESULT: Positive        95%          3+ (INTENSITY SCORE)   Progesterone Receptor     RESULT: Positive        2%          1+ (INTENSITY SCORE)   Her2/Barbara oncoprotein       RESULT: Negative        30%          1+ (INTENSITY SCORE)      Bone metastasis: Continue Zometa every 3 months.  Dose next due 11/16/2023    Stomatitis: Prescription for Magic mouthwash sent to pharmacy today.    Follow-up in 1 week with labs on return.      I spent 41 minutes caring for Radha on this date of service. This  time includes time spent by me in the following activities: preparing for the visit, reviewing tests, obtaining and/or reviewing a separately obtained history, performing a medically appropriate examination and/or evaluation, counseling and educating the patient/family/caregiver, ordering medications, tests, or procedures, and documenting information in the medical record      Rhoda Llamas APRN  [facility]    9/15/2023

## 2023-09-15 NOTE — TELEPHONE ENCOUNTER
Name of Physician notified: Rhoda Llamas, APRN    Time Physician Notified: [id number]      []  Orders received    []  Protocol/Standing orders followed    [x]  No new orders- pt was seen in office today and was given instructions, see office note.

## 2023-09-15 NOTE — TELEPHONE ENCOUNTER
----- Message from Sophie Santos RN sent at 9/15/2023  9:05 AM EDT -----  Regarding: critical lab      Critical Test Results      MD: Osmar    Date: 09/15/2023     Critical test result: WBC 0.55    Time results received: 0904am

## 2023-09-16 LAB — CANCER AG27-29 SERPL-ACNC: 225.8 U/ML (ref 0–38.6)

## 2023-09-18 NOTE — TELEPHONE ENCOUNTER
I called patient and she is struggling with mouth soreness and diarrhea.  She has had 4 diarrhea stools in the last 12 hours.  She has not taken any immodium and it is hard for her to swallow so oral intake has been minimal.  She denies fevers. She has been using magic mouthwash but says it tastes bad and she can't tell that it helps.  She has been on doxycycline bid since Thursday but she did not take it but once yesterday due to diarrhea and swallowing difficulty.  I spoke with CYDNEY Hopper and she said that the patient should come in for IV fluids with 12mg dexamethasone.  I put in orders and called infusion and they can do that at 2:30.  I called patient and she will come in and I encouraged her to use immodium if she can swallow it with some applesauce or yogurt.  She said she would try.  Ruth Ann said she can stop the doxycycline and I told patient that.  We will give her pedialyte samples when she arrives in infusion.   She verbalized understanding.

## 2023-09-18 NOTE — TELEPHONE ENCOUNTER
Caller: Radha John    Relationship: Self    Best call back number: [phone]    What is the best time to reach you: ANY    Who are you requesting to speak with (clinical staff, provider,  specific staff member): CLINICAL     What was the call regarding: RADHA IS CALLING STATES THAT DR GRIFFIN PUT HER ON AN ANTIBIOTIC LAST WEEK  RADHA STATES SHE HAS HAD DIARRHEA, SORE THROAT, CAN NOT EAT ANYTHING, AND FEELS WEAK    SHE IS ASKING FOR A CALL BACK     Is it okay if the provider responds through MyChart: NO

## 2023-09-19 PROBLEM — K12.30 STOMATITIS AND MUCOSITIS: Status: ACTIVE | Noted: 2023-01-01

## 2023-09-19 PROBLEM — A41.9 SEPSIS: Status: ACTIVE | Noted: 2023-01-01

## 2023-09-19 PROBLEM — R65.20 SEVERE SEPSIS: Status: ACTIVE | Noted: 2023-01-01

## 2023-09-19 PROBLEM — T45.1X5A CHEMOTHERAPY INDUCED NEUTROPENIA: Status: ACTIVE | Noted: 2023-01-01

## 2023-09-19 PROBLEM — K12.1 STOMATITIS AND MUCOSITIS: Status: ACTIVE | Noted: 2023-01-01

## 2023-09-19 PROBLEM — N17.9 ACUTE KIDNEY INJURY (NONTRAUMATIC): Status: ACTIVE | Noted: 2023-01-01

## 2023-09-19 PROBLEM — D70.1 CHEMOTHERAPY INDUCED NEUTROPENIA: Status: ACTIVE | Noted: 2023-01-01

## 2023-09-19 PROBLEM — E87.20 LACTIC ACIDOSIS: Status: ACTIVE | Noted: 2023-01-01

## 2023-09-19 NOTE — TELEPHONE ENCOUNTER
I talked with patient and she said she feels worse today than yesterday.  Unable to swallow due to mouthsores.  She is struggling to use the magic mouthwash because it makes her sick.  I talked with Ruth Ann LAMAS and she suggested we send in some liquid pain medication.  When I called patient back, she stated she is very weak and thinks she may need to go to the hospital.  She fell in the shower this morning and she has not been able to eat/drink anything of substance.  I advised that she can go to the ED and be evaluated and I would notify Ruth Ann and Dr. Prasad.

## 2023-09-19 NOTE — H&P
[facility] Medicine Services  HISTORY AND PHYSICAL    Patient Name: Radha John  : 1945  MRN: [id number]  Primary Care Physician: Mague Reynolds MD  Date of admission: 2023    Subjective   Subjective     Chief Complaint:  Generalized weakness. Mouth ulcers.    HPI:  Radha John is a 78 y.o. female with PMH significant for metastatic breast cancer on capecitabine.  Was presented today with generalized weakness, nausea and mouth ulcers.  Started over the weekend.  Got worse.  Patient was seen by her oncologist last Tuesday when she was taken off the Xeloda and she was started on phylactic doxycycline due to worsening neutropenia.  Symptoms did not improve.  She presented to the ED this morning.  Pressure was 98/73.  Heart rate 89.  Temperature 98.  Respiratory rate 18.  Saturation 98 on room air.  Labs remarkable for severe neutropenia with ANC of 0.  Platelet 37.  Hemoglobin 12.8.  BMP remarkable for elevated creatinine at 1.47.  Baseline around 1.1.  HCO3 16.  Lactic acid 4.8.  Pro-Clif 44.  Chest x-ray was concerning for pneumonia.  Patient was started on broad-spectrum antibiotic and aggressive IV fluid per severe sepsis protocol.  Oncology was consulted by ED provider.  [facility] [place] the patient for further evaluation.    ROS:  No fever, chills, headache, productive cough, dysuria, abdominal pain, open wounds or skin rash.    Personal History     Past Medical History:   Diagnosis Date    Adenomatous polyp of colon 10/23/2019    Arthritis     Diabetes mellitus     Disease of thyroid gland     Diverticulosis     Gout     Hepatitis     History of kidney stones     Hx of radiation therapy 2014    Hyperlipidemia     Hypertension     Invasive ductal carcinoma of breast 2021    Malignant neoplasm of breast     Menopause          Oncology Problem List:  Malignant neoplasm metastatic to bone (10/14/2021; Status: Active)  Low grade invasive ductal  carcinoma of left breast (08/12/2016;   Status: Active)  Invasive ductal carcinoma of right breast (08/12/2016; Status: Active)  Malignant neoplasm of breast (01/01/2014; Status: Active)  Oncology/Hematology History   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   8/12/2016 Initial Diagnosis    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     10/22/2021 -  Chemotherapy    OP SUPPORTIVE Zoledronic Acid Q12W       Invasive ductal carcinoma of right breast   8/12/2016 Initial Diagnosis    Invasive ductal carcinoma of right breast (HCC)     10/21/2021 - 10/21/2021 Chemotherapy    OP BREAST Letrozole / Palbociclib       10/22/2021 -  Chemotherapy    OP SUPPORTIVE Zoledronic Acid Q12W       9/18/2023 -  Chemotherapy    OP SUPPORTIVE HYDRATION + ANTIEMETICS       Malignant neoplasm of breast   1/1/2014 Initial Diagnosis    Malignant neoplasm of breast (HCC)     3/30/2023 - 7/24/2023 Chemotherapy    OP BREAST Fulvestrant       8/29/2023 -  Chemotherapy    OP BREAST Capecitabine       Malignant neoplasm metastatic to bone   10/14/2021 Initial Diagnosis    Bone metastases (HCC)     10/21/2021 - 10/21/2021 Chemotherapy    OP BREAST Letrozole / Palbociclib       10/22/2021 -  Chemotherapy    OP SUPPORTIVE Zoledronic Acid Q12W       3/30/2023 - 7/24/2023 Chemotherapy    OP BREAST Fulvestrant       8/29/2023 -  Chemotherapy    OP BREAST Capecitabine           Past Surgical History:   Procedure Laterality Date    BREAST BIOPSY Bilateral     BREAST LUMPECTOMY Bilateral 06/27/2014    BREAST LUMPECTOMY Left 09/01/2021    CATARACT EXTRACTION Right     COLON RESECTION N/A 12/8/2020    Procedure: ROBOTIC LOW ANTERIOR RESECTION, TAKEDOWN OF COLOVAGINAL FISTULA, URETEROLYSIS LEFT;  Surgeon: Sonam Olvera MD;  Location:  [facility];  Service: [facility];  Laterality: N/A;    COLONOSCOPY      CYSTOSCOPY N/A 12/8/2020    Procedure: CYSTOSCOPY, TEMPORY BILATERAL URETERAL STENTS;  Surgeon: Rosie Gottlieb MD;  Location:  [facility];   Service: Gynecology;  Laterality: N/A;    HEMORRHOIDECTOMY      PARATHYROIDECTOMY  07/19/2016    Dr. Izabela Hermosillo @ [facility]    THYROID SURGERY      VAGINAL HYSTERECTOMY         Family History: family history includes Breast cancer (age of onset: 50) in her sister; Hypertension in her father, paternal grandmother, and another family member; Other in an other family member; Ovarian cancer in her niece.     Social History:  reports that she has quit smoking. She has never used smokeless tobacco. She reports that she does not drink alcohol and does not use drugs.  Social History     Social History Narrative    Not on file       Medications:  Budesonide, Cholecalciferol, albuterol sulfate HFA, amLODIPine, aspirin, atorvastatin, azaTHIOprine, capecitabine, chlorhexidine, difluprednate, dorzolamide-timolol, doxycycline, fluticasone, glucose blood, ibuprofen, ketorolac, latanoprost, levothyroxine, loratadine, magic mouthwash oral suspension, metFORMIN ER, methocarbamol, methylPREDNISolone, metoprolol tartrate, montelukast, neomycin, nystatin, olmesartan, ondansetron, polycarbophil, prednisoLONE acetate, and zolpidem    Allergies   Allergen Reactions    Erythromycin Swelling     C/O lips swelling    Hydrocodone-Acetaminophen Nausea Only     Lortab    Acetaminophen Unknown - Low Severity    Hydrocodone Unknown - Low Severity    Amoxicillin Nausea Only       Objective   Objective     Vital Signs:   Temp:  [98 °F (36.7 °C)] 98 °F (36.7 °C)  Heart Rate:  [85-89] 85  Resp:  [18] 18  BP: ()/(60-73) 115/60    Physical Exam  Constitutional:       Appearance: She is ill-appearing.   HENT:      Head: Normocephalic and atraumatic.      Mouth/Throat:      Mouth: Mucous membranes are dry.      Pharynx: No oropharyngeal exudate.      Comments: Ulcer in the back of the hard palat.  Eyes:      Pupils: Pupils are equal, round, and reactive to light.   Cardiovascular:      Rate and Rhythm: Normal rate and regular rhythm.       Heart sounds: No murmur heard.  Pulmonary:      Effort: Pulmonary effort is normal. No respiratory distress.      Breath sounds: Normal breath sounds.   Abdominal:      General: Bowel sounds are normal. There is distension.   Musculoskeletal:         General: Normal range of motion.      Cervical back: No rigidity or tenderness.   Skin:     General: Skin is warm.      Capillary Refill: Capillary refill takes 2 to 3 seconds.      Coloration: Skin is not jaundiced.   Neurological:      General: No focal deficit present.      Mental Status: She is alert and oriented to person, place, and time.      Cranial Nerves: No cranial nerve deficit.   Psychiatric:         Behavior: Behavior normal.          Result Review:  I have personally reviewed the results from the time of this admission to 9/19/2023 15:41 EDT and agree with these findings:  [x]  Laboratory list / accordion  [x]  Microbiology  [x]  Radiology  []  EKG/Telemetry   []  Cardiology/Vascular   []  Pathology  []  Old records  []  Other:    LAB RESULTS:      Lab 09/19/23  1425 09/19/23  1125 09/15/23  0853   WBC  --  0.16* 0.55*   HEMOGLOBIN  --  12.9 12.5   HEMATOCRIT  --  39.8 37.9   PLATELETS  --  37* 66*   NEUTROS ABS  --  0.00* 0.02*   IMMATURE GRANS (ABS)  --   --  0.00   LYMPHS ABS  --   --  0.52*   MONOS ABS  --   --  0.01*   EOS ABS  --  0.00 0.00   MCV  --  100.8* 99.5*   PROCALCITONIN  --  44.96*  --    LACTATE 5.9* 4.8*  --          Lab 09/19/23  1125 09/15/23  0853   SODIUM 137 140   POTASSIUM 4.1 3.9   CHLORIDE 104 107   CO2 16.0* 22.0   ANION GAP 17.0* 11.0   BUN 34* 19   CREATININE 1.47* 1.10*   EGFR 36.4* 51.9*   GLUCOSE 159* 143*   CALCIUM 7.5* 7.9*   MAGNESIUM 1.9  --          Lab 09/19/23  1125 09/15/23  0853   TOTAL PROTEIN 6.4 6.7   ALBUMIN 3.0* 3.5   GLOBULIN 3.4 3.2   ALT (SGPT) 9 11   AST (SGOT) 9 16   BILIRUBIN 0.7 0.7   ALK PHOS 60 83         Lab 09/19/23  1125   HSTROP T 14*                 Brief Urine Lab Results  (Last result in the  past 365 days)        Color   Clarity   Blood   Leuk Est   Nitrite   Protein   CREAT   Urine HCG        09/19/23 1307 Yellow   Cloudy   Small (1+)   Negative   Negative   100 mg/dL (2+)                 Microbiology Results (last 10 days)       Procedure Component Value - Date/Time    COVID PRE-OP / PRE-PROCEDURE SCREENING ORDER (NO ISOLATION) - Swab, Nasopharynx [[id number]]  (Normal) Collected: 09/19/23 1318    Lab Status: Final result Specimen: Swab from Nasopharynx Updated: 09/19/23 1411    Narrative:      The following orders were created for panel order COVID PRE-OP / PRE-PROCEDURE SCREENING ORDER (NO ISOLATION) - Swab, Nasopharynx.  Procedure                               Abnormality         Status                     ---------                               -----------         ------                     COVID-19 and FLU A/B PCR...[[id number]]  Normal              Final result                 Please view results for these tests on the individual orders.    COVID-19 and FLU A/B PCR - Swab, Nasopharynx [[id number]]  (Normal) Collected: 09/19/23 1318    Lab Status: Final result Specimen: Swab from Nasopharynx Updated: 09/19/23 1411     COVID19 Not Detected     Influenza A PCR Not Detected     Influenza B PCR Not Detected    Narrative:      Fact sheet for providers: https://www.fda.gov/media/173860/download    Fact sheet for patients: https://www.fda.gov/media/006316/download    Test performed by PCR.            CT Soft Tissue Neck With Contrast    Result Date: 9/19/2023  CT SOFT TISSUE NECK W CONTRAST, CT CHEST W CONTRAST DIAGNOSTIC Date of Exam: 9/19/2023 1:46 PM EDT Indication: L sided neck pain/swelling. Comparison: CT chest from August 21, 2023 Technique: Axial CT images were obtained of the neck and chest after the uneventful intravenous administration of 80 mL Isovue-300.  Reconstructed coronal and sagittal images were also obtained. Automated exposure control and iterative construction methods were used.  Findings: Neck: The visualized intracranial contents are unremarkable. The globes and orbits appear intact. The nasopharynx and oropharynx have a normal configuration. The larynx appears normal. The parotid and submandibular glands appear intact. The thyroid gland is surgically absent. No abnormally enlarged lymph nodes are identified. The major vasculature within the neck appears widely patent. Diffuse osseous sclerotic metastatic disease is noted. The paranasal sinuses and mastoid air cells are well-aerated. Chest: The central tracheobronchial tree is clear. There is mild emphysema. A 4 mm left upper lobe nodule on image 25 is unchanged. A couple benign calcified granulomas are noted. No new pulmonary nodule is identified. There is no focal consolidation. There is no pleural effusion. The heart size appears normal. The great vessels are normal in caliber. No abnormally enlarged lymph nodes are identified. Partial evaluation of the upper abdomen demonstrates hepatic and renal cysts. Diffuse osseous sclerotic metastatic disease appears unchanged.     Impression: Impression: 1.No acute process identified within neck or chest. 2.Diffuse osseous sclerotic metastatic disease. 3.Mild emphysema. 4.Stable 4 mm left upper lobe nodule. Electronically Signed: Sergey Martínez MD  9/19/2023 2:12 PM EDT  Workstation ID: [id number]    CT Chest With Contrast Diagnostic    Result Date: 9/19/2023  CT SOFT TISSUE NECK W CONTRAST, CT CHEST W CONTRAST DIAGNOSTIC Date of Exam: 9/19/2023 1:46 PM EDT Indication: L sided neck pain/swelling. Comparison: CT chest from August 21, 2023 Technique: Axial CT images were obtained of the neck and chest after the uneventful intravenous administration of 80 mL Isovue-300.  Reconstructed coronal and sagittal images were also obtained. Automated exposure control and iterative construction methods were used. Findings: Neck: The visualized intracranial contents are unremarkable. The globes and orbits  appear intact. The nasopharynx and oropharynx have a normal configuration. The larynx appears normal. The parotid and submandibular glands appear intact. The thyroid gland is surgically absent. No abnormally enlarged lymph nodes are identified. The major vasculature within the neck appears widely patent. Diffuse osseous sclerotic metastatic disease is noted. The paranasal sinuses and mastoid air cells are well-aerated. Chest: The central tracheobronchial tree is clear. There is mild emphysema. A 4 mm left upper lobe nodule on image 25 is unchanged. A couple benign calcified granulomas are noted. No new pulmonary nodule is identified. There is no focal consolidation. There is no pleural effusion. The heart size appears normal. The great vessels are normal in caliber. No abnormally enlarged lymph nodes are identified. Partial evaluation of the upper abdomen demonstrates hepatic and renal cysts. Diffuse osseous sclerotic metastatic disease appears unchanged.     Impression: Impression: 1.No acute process identified within neck or chest. 2.Diffuse osseous sclerotic metastatic disease. 3.Mild emphysema. 4.Stable 4 mm left upper lobe nodule. Electronically Signed: Sergey Martínez MD  9/19/2023 2:12 PM EDT  Workstation ID: [id number]    XR Chest 1 View    Result Date: 9/19/2023  XR CHEST 1 VW Date of Exam: 9/19/2023 9:47 AM CDT Indication: Weak/Dizzy/AMS triage protocol Comparison: Chest x-ray 8 12/1/2020, chest CT 8/21/2023 Findings: Cardiomediastinal silhouette is unremarkable. Few areas of interstitial prominence. There are few vague nodular foci in the right lung. No pneumothorax nor pleural effusion. No acute osseous abnormality identified.     Impression: Impression: Few new vague nodular foci in the right lung may represent developing infection. Correlate with symptoms. Electronically Signed: Kyle Hager MD  9/19/2023 10:12 AM CDT  Workstation ID: [id number]         Assessment & Plan   Assessment & Plan       Severe  sepsis    Chemotherapy induced neutropenia    Lactic acidosis    Malignant neoplasm metastatic to bone    Acute kidney injury (nontraumatic)    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    Invasive ductal carcinoma of right breast    Hypothyroidism    Diabetes    Hypertension    Hyperlipidemia    Autoimmune hepatitis    Stomatitis and mucositis    ##Severe sepsis with acute organ dysfunction: Unknown source  ##Severe chemotherapy-induced neutropenia  ##Lactic acidosis:  ##Metastatic breast cancer on Xeloda:  ##Mucositis/stomatitis likely secondary to chemotherapy:  -White blood count 0.16. ANC 0.  Platelet 37.  Hemoglobin 12.9.  Procal 44. Lactic acid 4.4.  - CXR Few new vague nodular foci in the right lung may represent developing infection but CT chest negative.. UA -ve for infection. No dysuria.   -Patient was started on aggressive IV fluid and broad-spectrum antibiotic ( Vanc/Zosyn) per sepsis protocol. Repeat lactic acid  -Cultures ordered.  Hold oral chemotherapy.  Daily CBC with differential.  - Oncology consulted.  - No active bleeding.  Blood transfusion as needed.  - Pharmacy consulted for oral mouth wash/analgesia.    ##JAVIER likely secondary to the above:  ## HAGMA  -Aggressive IV fluid. Monitor urine output.  Daily BMP.  Replace electrolytes as needed.    ## HTN:  - Hold BP medications.      DVT prophylaxis: SCD    CODE STATUS:  Full       Expected Discharge  Expected Discharge Date: 9/22/2023; Expected Discharge Time:       This note has been completed as part of a split-shared workflow.     Signature: Electronically signed by Mirna Haines MD, 09/19/23, 3:44 PM EDT

## 2023-09-19 NOTE — TELEPHONE ENCOUNTER
Has been sleeping in bed throughout the night, without periods of restlessness observed  Patient called and stated she had received fluids yesterday and woke up this morning not feeling well, left of her throat is hurting and hard to swallow, she is requesting a phone to discuss further.

## 2023-09-19 NOTE — ED PROVIDER NOTES
[facility]    EMERGENCY DEPARTMENT ENCOUNTER      Pt Name: Radha John  MRN: [id number]  YOB: 1945  Date of evaluation: 9/19/2023  Provider: Gutierrez John MD    CHIEF COMPLAINT       Chief Complaint   Patient presents with    Weakness - Generalized    Nausea         HISTORY OF PRESENT ILLNESS   Radha John is a 78 y.o. female who presents to the emergency department with complaint of several days progressively worsening moderate severity generalized weakness and mild pain.  Patient is on Xeloda for breast cancer and was recently taken off due to stomatitis and declining white blood cell counts.  She has had some cough but denies any fever, chills, chest pain, shortness of breath, abdominal pain, vomiting, diarrhea, or urinary symptoms.      Nursing notes were reviewed.    REVIEW OF SYSTEMS     ROS:  A chief complaint appropriate review of systems was completed and is negative except as noted in the HPI.      PAST MEDICAL HISTORY     Past Medical History:   Diagnosis Date    Adenomatous polyp of colon 10/23/2019    Arthritis     Diabetes mellitus     Disease of thyroid gland     Diverticulosis     Gout     Hepatitis     History of kidney stones     Hx of radiation therapy 06/2014    Hyperlipidemia     Hypertension     Invasive ductal carcinoma of breast 09/01/2021    Malignant neoplasm of breast 2014    Menopause          SURGICAL HISTORY       Past Surgical History:   Procedure Laterality Date    BREAST BIOPSY Bilateral     BREAST LUMPECTOMY Bilateral 06/27/2014    BREAST LUMPECTOMY Left 09/01/2021    CATARACT EXTRACTION Right     COLON RESECTION N/A 12/8/2020    Procedure: ROBOTIC LOW ANTERIOR RESECTION, TAKEDOWN OF COLOVAGINAL FISTULA, URETEROLYSIS LEFT;  Surgeon: Sonam Olvera MD;  Location: [facility];  Service: DaVinci;  Laterality: N/A;    COLONOSCOPY      CYSTOSCOPY N/A 12/8/2020    Procedure: CYSTOSCOPY, TEMPORY BILATERAL URETERAL STENTS;  Surgeon: Rosie Gottlieb MD;   Location: [facility];  Service: Gynecology;  Laterality: N/A;    HEMORRHOIDECTOMY      PARATHYROIDECTOMY  07/19/2016    Dr. Izabela Hermosillo @ [facility]    THYROID SURGERY      VAGINAL HYSTERECTOMY           CURRENT MEDICATIONS       Current Facility-Administered Medications:     Calcium Replacement - Follow Nurse / BPA Driven Protocol, , Does not apply, Yancy MONTERO Rabie, MD    lactated ringers bolus 1,000 mL, 1,000 mL, Intravenous, Once, Doris Villavicencio PA-C, Last Rate: 1,000 mL/hr at 09/19/23 2143, 1,000 mL at 09/19/23 2143    Magnesium Standard Dose Replacement - Follow Nurse / BPA Driven Protocol, , Does not apply, Yancy MONTERO Rabie, MD    morphine injection 1 mg, 1 mg, Intravenous, Q4H PRN, 1 mg at 09/19/23 1955 **AND** naloxone (NARCAN) injection 0.4 mg, 0.4 mg, Intravenous, Q5 Min PRN, Mirna Haines MD    ondansetron (ZOFRAN) injection 4 mg, 4 mg, Intravenous, Q6H PRN, Mirna Haines MD    palliative care oral rinse, , Mouth/Throat, PRN, Mirna Haines MD    Phosphorus Replacement - Follow Nurse / BPA Driven Protocol, , Does not apply, Yancy MONTERO Rabie, MD    Potassium Replacement - Follow Nurse / BPA Driven Protocol, , Does not apply, Yancy MONTERO Rabie, MD    sodium chloride 0.9 % flush 10 mL, 10 mL, Intravenous, PRN, Mirna Haines MD    sodium chloride 0.9 % flush 10 mL, 10 mL, Intravenous, Q12H, Mirna Haines MD, 10 mL at 09/19/23 2029    sodium chloride 0.9 % flush 10 mL, 10 mL, Intravenous, PRYancy CONTRERAS Rabie, MD    sodium chloride 0.9 % infusion 40 mL, 40 mL, Intravenous, PRNYancy Rabie, MD    sodium chloride 0.9 % infusion, 150 mL/hr, Intravenous, Continuous, Mirna Haines MD, Last Rate: 150 mL/hr at 09/19/23 1818, 150 mL/hr at 09/19/23 1818    Uridine Triacetate pack 10 g, 10 g, Oral, Q6H, Mirna Haines MD    ALLERGIES     Erythromycin, Hydrocodone-acetaminophen, Acetaminophen, Hydrocodone, and Amoxicillin    FAMILY HISTORY       Family History   Problem Relation  Age of Onset    Hypertension Father     Hypertension Paternal Grandmother     Hypertension Other     Other Other         CAD    Breast cancer Sister 50    Ovarian cancer Niece     Endometrial cancer Neg Hx     Colon cancer Neg Hx     Colon polyps Neg Hx           SOCIAL HISTORY       Social History     Socioeconomic History    Marital status:    Tobacco Use    Smoking status: Former    Smokeless tobacco: Never    Tobacco comments:     quit 40 years ago   Vaping Use    Vaping Use: Never used   Substance and Sexual Activity    Alcohol use: No    Drug use: No    Sexual activity: Yes     Partners: Male     Birth control/protection: Post-menopausal, Surgical         PHYSICAL EXAM    (up to 7 for level 4, 8 or more for level 5)     Vitals:    09/19/23 1330 09/19/23 1436 09/19/23 1644 09/19/23 1833   BP: 115/60 123/69 105/62 104/59   BP Location:    Left arm   Patient Position:    Lying   Pulse: 85 94 98 108   Resp:    18   Temp:    99.1 °F (37.3 °C)   TempSrc:    Axillary   SpO2: 100% 99% 98% 99%   Weight:       Height:           General: Ill-appearing  HEENT: Conjunctivae normal.  Neck: Trachea midline.  Cardiac: Heart regular rate, rhythm, no murmurs, rubs, or gallops  Lungs: Lungs are clear to auscultation, there is no wheezing, rhonchi, or rales. There is no use of accessory muscles.  Chest wall: There is no tenderness to palpation over the chest wall or over ribs  Abdomen: Abdomen is soft, nontender, nondistended. There are no firm or pulsatile masses, no rebound rigidity or guarding.   Musculoskeletal: No deformity.  Neuro: Alert and oriented x 4.  Dermatology: Skin is warm and dry  Psych: Mentation is grossly normal, cognition is grossly normal. Affect is appropriate.        DIAGNOSTIC RESULTS     EKG: All EKGs are interpreted by the Emergency Department Physician who either signs or Co-signs this chart in the absence of a cardiologist.    ECG 12 Lead ED Triage Standing Order; Weak / Dizzy / AMS    Preliminary Result   Test Reason : ED Triage Standing Order~   Blood Pressure :   */*   mmHG   Vent. Rate :  89 BPM     Atrial Rate :  89 BPM      P-R Int : 136 ms          QRS Dur : 100 ms       QT Int : 374 ms       P-R-T Axes :  22 -49  51 degrees      QTc Int : 455 ms      Normal sinus rhythm   Possible Left atrial enlargement   Left anterior fascicular block   Left ventricular hypertrophy   Abnormal ECG   When compared with ECG of 30-AUG-2021 15:04,   T wave inversion no longer evident in Inferior leads      Referred By: ED MD           Confirmed By:             RADIOLOGY:   [x] Radiologist's Report Reviewed:  CT Soft Tissue Neck With Contrast   Final Result   Impression:   1.No acute process identified within neck or chest.   2.Diffuse osseous sclerotic metastatic disease.   3.Mild emphysema.   4.Stable 4 mm left upper lobe nodule.            Electronically Signed: Sergey Martínez MD     9/19/2023 2:12 PM EDT     Workstation ID: [id number]      CT Chest With Contrast Diagnostic   Final Result   Impression:   1.No acute process identified within neck or chest.   2.Diffuse osseous sclerotic metastatic disease.   3.Mild emphysema.   4.Stable 4 mm left upper lobe nodule.            Electronically Signed: Sergey Martínez MD     9/19/2023 2:12 PM EDT     Workstation ID: [id number]      XR Chest 1 View   Final Result   Impression:   Few new vague nodular foci in the right lung may represent developing infection. Correlate with symptoms.         Electronically Signed: Kyle Hager MD     9/19/2023 10:12 AM CDT     Workstation ID: [id number]          I ordered and independently reviewed the above noted radiographic studies.        LABS:    I have reviewed and interpreted all of the currently available lab results from this visit (if applicable):  Results for orders placed or performed during the hospital encounter of 09/19/23   COVID-19 and FLU A/B PCR - Swab, Nasopharynx    Specimen: Nasopharynx; Swab   Result Value  Ref Range    COVID19 Not Detected Not Detected - Ref. Range    Influenza A PCR Not Detected Not Detected    Influenza B PCR Not Detected Not Detected   Comprehensive Metabolic Panel    Specimen: Blood   Result Value Ref Range    Glucose 159 (H) 65 - 99 mg/dL    BUN 34 (H) 8 - 23 mg/dL    Creatinine 1.47 (H) 0.57 - 1.00 mg/dL    Sodium 137 136 - 145 mmol/L    Potassium 4.1 3.5 - 5.2 mmol/L    Chloride 104 98 - 107 mmol/L    CO2 16.0 (L) 22.0 - 29.0 mmol/L    Calcium 7.5 (L) 8.6 - 10.5 mg/dL    Total Protein 6.4 6.0 - 8.5 g/dL    Albumin 3.0 (L) 3.5 - 5.2 g/dL    ALT (SGPT) 9 1 - 33 U/L    AST (SGOT) 9 1 - 32 U/L    Alkaline Phosphatase 60 39 - 117 U/L    Total Bilirubin 0.7 0.0 - 1.2 mg/dL    Globulin 3.4 gm/dL    A/G Ratio 0.9 g/dL    BUN/Creatinine Ratio 23.1 7.0 - 25.0    Anion Gap 17.0 (H) 5.0 - 15.0 mmol/L    eGFR 36.4 (L) >60.0 mL/min/1.73   Single High Sensitivity Troponin T    Specimen: Blood   Result Value Ref Range    HS Troponin T 14 (H) <10 ng/L   Magnesium    Specimen: Blood   Result Value Ref Range    Magnesium 1.9 1.6 - 2.4 mg/dL   Urinalysis With Microscopic If Indicated (No Culture) - Urine, Clean Catch    Specimen: Urine, Clean Catch   Result Value Ref Range    Color, UA Yellow Yellow, Straw    Appearance, UA Cloudy (A) Clear    pH, UA 5.5 5.0 - 8.0    Specific Gravity, UA 1.022 1.001 - 1.030    Glucose,  mg/dL (Trace) (A) Negative    Ketones, UA Trace (A) Negative    Bilirubin, UA Negative Negative    Blood, UA Small (1+) (A) Negative    Protein,  mg/dL (2+) (A) Negative    Leuk Esterase, UA Negative Negative    Nitrite, UA Negative Negative    Urobilinogen, UA 0.2 E.U./dL 0.2 - 1.0 E.U./dL   CBC Auto Differential    Specimen: Blood   Result Value Ref Range    WBC 0.16 (C) 3.40 - 10.80 10*3/mm3    RBC 3.95 3.77 - 5.28 10*6/mm3    Hemoglobin 12.9 12.0 - 15.9 g/dL    Hematocrit 39.8 34.0 - 46.6 %    .8 (H) 79.0 - 97.0 fL    MCH 32.7 26.6 - 33.0 pg    MCHC 32.4 31.5 - 35.7 g/dL     RDW 14.7 12.3 - 15.4 %    RDW-SD 55.4 (H) 37.0 - 54.0 fl    MPV 10.0 6.0 - 12.0 fL    Platelets 37 (C) 140 - 450 10*3/mm3   Lactic Acid, Plasma    Specimen: Blood   Result Value Ref Range    Lactate 4.8 (C) 0.5 - 2.0 mmol/L   Procalcitonin    Specimen: Blood   Result Value Ref Range    Procalcitonin 44.96 (H) 0.00 - 0.25 ng/mL   STAT Lactic Acid, Reflex    Specimen: Blood   Result Value Ref Range    Lactate 5.9 (C) 0.5 - 2.0 mmol/L   Manual Differential    Specimen: Blood   Result Value Ref Range    Neutrophil % 0.0 (L) 42.7 - 76.0 %    Lymphocyte % 93.0 (H) 19.6 - 45.3 %    Monocyte % 1.0 (L) 5.0 - 12.0 %    Eosinophil % 0.0 (L) 0.3 - 6.2 %    Basophil % 0.0 0.0 - 1.5 %    Atypical Lymphocyte % 6.0 (H) 0.0 - 5.0 %    Neutrophils Absolute 0.00 (L) 1.70 - 7.00 10*3/mm3    Lymphocytes Absolute 0.16 (L) 0.70 - 3.10 10*3/mm3    Monocytes Absolute 0.00 (L) 0.10 - 0.90 10*3/mm3    Eosinophils Absolute 0.00 0.00 - 0.40 10*3/mm3    Basophils Absolute 0.00 0.00 - 0.20 10*3/mm3    Crenated RBC's Mod/2+ None Seen    Macrocytes Slight/1+ None Seen    WBC Morphology Normal Normal    Platelet Morphology Normal Normal   Urinalysis, Microscopic Only - Urine, Clean Catch    Specimen: Urine, Clean Catch   Result Value Ref Range    RBC, UA 3-6 (A) None Seen, 0-2 /HPF    WBC, UA 6-12 (A) None Seen, 0-2 /HPF    Bacteria, UA Trace None Seen, Trace /HPF    Squamous Epithelial Cells, UA 13-20 (A) None Seen, 0-2 /HPF    Hyaline Casts, UA 0-6 0 - 6 /LPF    Mucus, UA Moderate/2+ (A) None Seen, Trace /HPF    Methodology Manual Light Microscopy    STAT Lactic Acid, Reflex    Specimen: Blood   Result Value Ref Range    Lactate 5.8 (C) 0.5 - 2.0 mmol/L   STAT Lactic Acid, Reflex    Specimen: Blood   Result Value Ref Range    Lactate 6.3 (C) 0.5 - 2.0 mmol/L   ECG 12 Lead ED Triage Standing Order; Weak / Dizzy / AMS   Result Value Ref Range    QT Interval 374 ms    QTC Interval 455 ms   Green Top (Gel)   Result Value Ref Range    Extra Tube Hold  for add-ons.    Lavender Top   Result Value Ref Range    Extra Tube hold for add-on    Gold Top - SST   Result Value Ref Range    Extra Tube Hold for add-ons.    Gray Top   Result Value Ref Range    Extra Tube Hold for add-ons.    Light Blue Top   Result Value Ref Range    Extra Tube Hold for add-ons.         If labs were ordered, I independently reviewed the results and considered them in treating the patient.      EMERGENCY DEPARTMENT COURSE and DIFFERENTIAL DIAGNOSIS/MDM:   Vitals:  AS OF 22:28 EDT    BP - 104/59  HR - 108  TEMP - 99.1 °F (37.3 °C) (Axillary)  O2 SATS - 99%        Discussion below represents my analysis of pertinent findings related to patient's condition, differential diagnosis, treatment plan and final disposition.      Differential diagnosis:  The differential diagnosis associated with the patient's presentation includes: Pneumonia, UTI, neutropenic fever, bacteremia, dehydration, electrolyte derangement      Independent interpretations (ECG/rhythm strip/X-ray/US/CT scan): I dependently interpreted the patient's chest CT and see no evidence of focal pulmonary infiltrate.  I independently interpreted the patient's cardiac monitor which showed sinus rhythm.      Additional sources:  Discussed/obtained information from independent historians:   [] Spouse:   [] Parent:   [] Friend:   [] EMS:   [] Other:  External (non-ED) record review:   [] Inpatient record:   [] Office record:   [] Outpatient record:   [x] Prior Outpatient labs: I reviewed prior laboratory studies.  White blood cell count is been steadily declining over the course of the last week to 2 weeks.   [] Prior Outpatient radiology:   [] Primary Care record:   [] Outside ED record:   [] Other:       Patient's care impacted by:   [x] Diabetes   [] Hypertension   [] Coronary Artery Disease   [] Cancer   [x] Other: Active breast cancer on chemotherapy    Care significantly affected by Social Determinants of Health (housing and economic  circumstances, unemployment)    [] Yes     [x] No   If yes, Patient's care significantly limited by  Social Determinants of Health including:    [] Inadequate housing    [] Low income    [] Alcoholism and drug addiction in family    [] Problems related to primary support group    [] Unemployment    [] Problems related to employment    [] Other Social Determinants of Health:       Consideration of admission/observation vs discharge: This patient presents with severe neutropenia and concern for infection and requires admission      I considered prescription management with:    [] Pain medication:   [] Antiviral:   [x] Antibiotic: Patient started on broad-spectrum IV antibiotic   [] Other:      PROCEDURES:  Procedures    CRITICAL CARE TIME    Approximately 35 minutes of discontinuous critical care time was provided to this patient by myself absent of any time spent performing procedures.  Patient presents critically ill with acute sepsis in the setting of severe neutropenia along with acute kidney injury placed in the cardiovascular, respiratory, neurologic, renal systems at risk requiring the following interventions: IV fluids, broad-spectrum IV antibiotics, interpretation of lab/ECG/imaging, frequent reassessment, coordination of admission.  Patient at high risk of deterioration and possibly death without these interventions.      FINAL IMPRESSION      1. Acute sepsis    2. Neutropenia, unspecified type    3. Acute kidney injury    4. On antineoplastic chemotherapy    5. History of diabetes mellitus          DISPOSITION/PLAN     ED Disposition       ED Disposition   Decision to Admit    Condition   --    Comment   Level of Care: Telemetry [5]   Diagnosis: Sepsis [[id number]]   Certification: I Certify That Inpatient Hospital Services Are Medically Necessary For Greater Than 2 Midnights                   Comment: Please note this report has been produced using speech recognition software.      Gutierrez John,  MD  Attending Emergency Physician             Gutierrez John MD  09/19/23 [id number]

## 2023-09-19 NOTE — PROGRESS NOTES
Called and spoke to ED attending as well as patient.  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  I have spoken with pharmacy and ordered uridine triacetate, to be started this evening or as soon as available.  Supportive care for pain and mucositis.

## 2023-09-19 NOTE — PLAN OF CARE
Problem: Adult Inpatient Plan of Care  Goal: Plan of Care Review  Outcome: Ongoing, Progressing  Flowsheets (Taken 9/19/2023 1940)  Progress: no change  Plan of Care Reviewed With: patient  Goal: Patient-Specific Goal (Individualized)  Outcome: Ongoing, Progressing  Goal: Absence of Hospital-Acquired Illness or Injury  Outcome: Ongoing, Progressing  Goal: Optimal Comfort and Wellbeing  Outcome: Ongoing, Progressing  Goal: Readiness for Transition of Care  Outcome: Ongoing, Progressing     Problem: Fall Injury Risk  Goal: Absence of Fall and Fall-Related Injury  Outcome: Ongoing, Progressing     Problem: Skin Injury Risk Increased  Goal: Skin Health and Integrity  Outcome: Ongoing, Progressing   Goal Outcome Evaluation:  Plan of Care Reviewed With: patient        Progress: no change

## 2023-09-20 PROBLEM — R78.81 BACTEREMIA: Status: ACTIVE | Noted: 2023-01-01

## 2023-09-20 PROBLEM — E88.89: Status: ACTIVE | Noted: 2023-01-01

## 2023-09-20 NOTE — CONSULTS
INFECTIOUS DISEASE CONSULT/INITIAL HOSPITAL VISIT    Radha John  1945  [id number]    Date of Consult: 9/20/2023    Admission Date: 9/19/2023      Requesting Provider: No ref. provider found  Evaluating Physician: Beau Martinez MD    Reason for Consultation: Severe sepsis/gram-negative bacteremia    History of present illness:    Patient is a 78 y.o. female with a history of breast cancer on Xeloda chemotherapy complicated by severe leukopenia who is seen today for evaluation of severe sepsis with gram-negative bacteremia.Her Xeloda therapy has recently been complicated by severe leukopenia and she was started on prophylactic doxycycline. She presented to the emergency room yesterday complaining of severe generalized weakness and nausea.She was found to have tachycardia with a heart rate of 108 and hypotension with a blood pressure of 81/54.She had lactic acidosis with a lactic acid level of 4.8 and an elevated pro-calcitonin of 45. Her white blood cell count was 0.16 and her platelet count was 37.Her creatinine was elevated at 1.47.  Urinalysis revealed 6-12 white blood cells. She was started on intravenous vancomycin and Zosyn therapy.  Her vancomycin has now been held.  Her blood cultures are now growing gram-negative rods and 2 sets with a BCID PCR positive for both E. Coli and Pseudomonas. No resistance chains were detected. Maximum temperature has been 99.1.  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  She has been transferred to the ICU this morning due to persistent hypotension/severe sepsis.  She has oral discomfort/sores.  She denies dysuria and urinary frequency.  She has nausea and vomiting.  She has cough with minimal sputum production.  Her abdominal CT scan this afternoon reveals segmental wall thickening and pericolonic inflammatory changes involving the mid transverse colon consistent with focal colitis.  She is on 2 L of  oxygen with an O2 saturation of 96%      Past Medical History:   Diagnosis Date    Adenomatous polyp of colon 10/23/2019    Arthritis     Diabetes mellitus     Disease of thyroid gland     Diverticulosis     Gout     Hepatitis     History of kidney stones     Hx of radiation therapy 06/2014    Hyperlipidemia     Hypertension     Invasive ductal carcinoma of breast 09/01/2021    Malignant neoplasm of breast 2014    Menopause        Past Surgical History:   Procedure Laterality Date    BREAST BIOPSY Bilateral     BREAST LUMPECTOMY Bilateral 06/27/2014    BREAST LUMPECTOMY Left 09/01/2021    CATARACT EXTRACTION Right     COLON RESECTION N/A 12/8/2020    Procedure: ROBOTIC LOW ANTERIOR RESECTION, TAKEDOWN OF COLOVAGINAL FISTULA, URETEROLYSIS LEFT;  Surgeon: Sonam Olvera MD;  Location:  [facility];  Service: DaVinci;  Laterality: N/A;    COLONOSCOPY      CYSTOSCOPY N/A 12/8/2020    Procedure: CYSTOSCOPY, TEMPORY BILATERAL URETERAL STENTS;  Surgeon: Rosie Gottlieb MD;  Location:  [facility];  Service: Gynecology;  Laterality: N/A;    HEMORRHOIDECTOMY      PARATHYROIDECTOMY  07/19/2016    Dr. Izabela Hermosillo @ [facility]    THYROID SURGERY      VAGINAL HYSTERECTOMY         Family History   Problem Relation Age of Onset    Hypertension Father     Hypertension Paternal Grandmother     Hypertension Other     Other Other         CAD    Breast cancer Sister 50    Ovarian cancer Niece     Endometrial cancer Neg Hx     Colon cancer Neg Hx     Colon polyps Neg Hx        Social History     Socioeconomic History    Marital status:    Tobacco Use    Smoking status: Former    Smokeless tobacco: Never    Tobacco comments:     quit 40 years ago   Vaping Use    Vaping Use: Never used   Substance and Sexual Activity    Alcohol use: No    Drug use: No    Sexual activity: Yes     Partners: Male     Birth control/protection: Post-menopausal, Surgical       Allergies   Allergen Reactions    Erythromycin Swelling     C/O lips  swelling    Hydrocodone-Acetaminophen Nausea Only     Lortab    Acetaminophen Unknown - Low Severity    Hydrocodone Unknown - Low Severity    Amoxicillin Nausea Only         Medication:    Current Facility-Administered Medications:     Calcium Replacement - Follow Nurse / BPA Driven Protocol, , Does not apply, Yancy MONTERO Rabie, MD    Magnesium Standard Dose Replacement - Follow Nurse / BPA Driven Protocol, , Does not apply, Yancy MONTERO Rabie, MD    morphine injection 1 mg, 1 mg, Intravenous, Q4H PRN, 1 mg at 09/19/23 1955 **AND** naloxone (NARCAN) injection 0.4 mg, 0.4 mg, Intravenous, Q5 Min PRN, Mirna Haines MD    ondansetron (ZOFRAN) injection 4 mg, 4 mg, Intravenous, Q6H PRN, Mirna Haines MD    palliative care oral rinse, , Mouth/Throat, PRN, Mirna Haines MD    Pharmacy Consult - Pharmacy to dose, , Does not apply, Continuous PRN, Doris Villavicencio PA-C    Phosphorus Replacement - Follow Nurse / BPA Driven Protocol, , Does not apply, Yancy MONTERO Rabie, MD    piperacillin-tazobactam (ZOSYN) 3.375 g in iso-osmotic dextrose 50 ml (premix), 3.375 g, Intravenous, Q8H, Doris Villavicencio PA-C    Potassium Replacement - Follow Nurse / BPA Driven Protocol, , Does not apply, Yancy MONTERO Rabie, MD    sodium chloride 0.9 % flush 10 mL, 10 mL, Intravenous, PRNYancy Rabie, MD    sodium chloride 0.9 % flush 10 mL, 10 mL, Intravenous, Q12H, Mirna Haines MD, 10 mL at 09/19/23 2029    sodium chloride 0.9 % flush 10 mL, 10 mL, Intravenous, PRYancy CONTRERAS Rabie, MD    sodium chloride 0.9 % infusion 40 mL, 40 mL, Intravenous, PRNYancy Rabie, MD    sodium chloride 0.9 % infusion, 150 mL/hr, Intravenous, Continuous, Mirna Haines MD, Last Rate: 150 mL/hr at 09/19/23 1818, 150 mL/hr at 09/19/23 1818    Uridine Triacetate pack 10 g, 10 g, Oral, Q6H, Mirna Haines MD, 10 g at 09/20/23 0519    Antibiotics:  Anti-Infectives (From admission, onward)      Ordered     Dose/Rate Route Frequency  Start Stop    23 0459  piperacillin-tazobactam (ZOSYN) 3.375 g in iso-osmotic dextrose 50 ml (premix)        Ordering Provider: Doris Villavicencio PA-C    3.375 g  over 4 Hours Intravenous Every 8 Hours 23 1200 23 1159    23 0459  piperacillin-tazobactam (ZOSYN) 3.375 g in iso-osmotic dextrose 50 ml (premix)        Ordering Provider: Doris Villavicencio PA-C    3.375 g  over 30 Minutes Intravenous Once 23 0545 23 0540    23 1249  vancomycin IVPB 1500 mg in 0.9% NaCl (Premix) 500 mL        Ordering Provider: Gutierrez John MD    20 mg/kg × 72.6 kg  333.3 mL/hr over 90 Minutes Intravenous Once 23 1345 23 1807    23 1249  piperacillin-tazobactam (ZOSYN) 3.375 g in iso-osmotic dextrose 50 ml (premix)        Ordering Provider: Gutierrez John MD    3.375 g  over 30 Minutes Intravenous Once 23 1305 23 1512              Review of Systems:  Constitutional--she has fevers, chills, and sweats.    HEENT--she has oral sores/discomfort.  She has a mild headache.  CV--there is no history of valvular heart disease  Resp--she has some dyspnea with a slight cough.  GI-she has nausea and anorexia.  --she denies dysuria.  She has a history of nephrolithiasis.    Heme-she has a history of breast cancer.  She has been on Xeloda and now has secondary severe leukopenia and thrombocytopenia from Xeloda  MS-- no swelling or pain in the bones or joints of arms/legs.  No new back pain.  Neuro-- No acute focal weakness or numbness in the arms or legs.   Skin--No rashes or lesions  Endocrine-type 2 diabetes mellitus      Physical Exam:   Vital Signs  Temp (24hrs), Av.3 °F (36.8 °C), Min:97.6 °F (36.4 °C), Max:99.1 °F (37.3 °C)    Temp  Min: 97.6 °F (36.4 °C)  Max: 99.1 °F (37.3 °C)  BP  Min: 81/54  Max: 123/69  Pulse  Min: 85  Max: 108  Resp  Min: 18  Max: 18  SpO2  Min: 96 %  Max: 100 %    GENERAL: She is toxic and ill-appearing.  She is in no acute  distress.  HEENT: She has oral mucositis consistent with HSV.  NECK: Supple   LYMPH: No cervical, axillary or inguinal lymphadenopathy.  HEART: RRR; No murmur, rubs   LUNGS: Clear to auscultation bilaterally   ABDOMEN: Soft, nontender, nondistended.  No rebound or guarding. NO mass or HSM.  EXT:  No cyanosis, clubbing or edema.   :  Without Raymond catheter.  MSK: No joint effusions or erythema  SKIN: Warm and dry without cutaneous eruptions on Inspection/palpation.    NEURO: Oriented to PPT.  Motor 5/5 strength  PSYCHIATRIC: Normal insight and judgment. Cooperative with PE    Laboratory Data    Results from last 7 days   Lab Units 09/19/23  1125 09/15/23  0853   WBC 10*3/mm3 0.16* 0.55*   HEMOGLOBIN g/dL 12.9 12.5   HEMATOCRIT % 39.8 37.9   PLATELETS 10*3/mm3 37* 66*     Results from last 7 days   Lab Units 09/19/23  1125   SODIUM mmol/L 137   POTASSIUM mmol/L 4.1   CHLORIDE mmol/L 104   CO2 mmol/L 16.0*   BUN mg/dL 34*   CREATININE mg/dL 1.47*   GLUCOSE mg/dL 159*   CALCIUM mg/dL 7.5*     Results from last 7 days   Lab Units 09/19/23  1125   ALK PHOS U/L 60   BILIRUBIN mg/dL 0.7   ALT (SGPT) U/L 9   AST (SGOT) U/L 9             Results from last 7 days   Lab Units 09/20/23  0043   LACTATE mmol/L 6.6*             Estimated Creatinine Clearance: 28 mL/min (A) (by C-G formula based on SCr of 1.47 mg/dL (H)).      Microbiology:  Blood Culture   Date Value Ref Range Status   09/19/2023 Abnormal Stain (C)  Preliminary     BCID, PCR   Date Value Ref Range Status   09/19/2023 Escherichia coli. Identification by BCID2 PCR. (A) Negative by BCID PCR. Culture to Follow. Final     BCID, PCR 2   Date Value Ref Range Status   09/19/2023 (A) Negative by BCID PCR. Culture to Follow. Final    Pseudomonas aeruginosa. Identification by BCID2 PCR     No results found for: CULTURES, HSVCX, URCX  No results found for: EYECULTURE, GCCX, HSVCULTURE, LABHSV  No results found for: LEGIONELLA, MRSACX, MUMPSCX, MYCOPLASCX  No results found  for: NOCARDIACX, STOOLCX  No results found for: THROATCX, UNSTIMCULT, URINECX, CULTURE, VZVCULTUR  No results found for: VIRALCULTU, WOUNDCX        Radiology:  Imaging Results (Last 72 Hours)       Procedure Component Value Units Date/Time    CT Soft Tissue Neck With Contrast [[id number]] Collected: 09/19/23 1359     Updated: 09/19/23 1416    Narrative:        CT SOFT TISSUE NECK W CONTRAST, CT CHEST W CONTRAST DIAGNOSTIC    Date of Exam: 9/19/2023 1:46 PM EDT    Indication: L sided neck pain/swelling.    Comparison: CT chest from August 21, 2023    Technique: Axial CT images were obtained of the neck and chest after the uneventful intravenous administration of 80 mL Isovue-300.  Reconstructed coronal and sagittal images were also obtained. Automated exposure control and iterative construction   methods were used.      Findings:  Neck:    The visualized intracranial contents are unremarkable. The globes and orbits appear intact. The nasopharynx and oropharynx have a normal configuration. The larynx appears normal. The parotid and submandibular glands appear intact. The thyroid gland is   surgically absent. No abnormally enlarged lymph nodes are identified. The major vasculature within the neck appears widely patent. Diffuse osseous sclerotic metastatic disease is noted. The paranasal sinuses and mastoid air cells are well-aerated.    Chest:    The central tracheobronchial tree is clear. There is mild emphysema. A 4 mm left upper lobe nodule on image 25 is unchanged. A couple benign calcified granulomas are noted. No new pulmonary nodule is identified. There is no focal consolidation. There is   no pleural effusion.    The heart size appears normal. The great vessels are normal in caliber. No abnormally enlarged lymph nodes are identified.    Partial evaluation of the upper abdomen demonstrates hepatic and renal cysts.    Diffuse osseous sclerotic metastatic disease appears unchanged.      Impression:       Impression:  1.No acute process identified within neck or chest.  2.Diffuse osseous sclerotic metastatic disease.  3.Mild emphysema.  4.Stable 4 mm left upper lobe nodule.        Electronically Signed: Sergey Martínez MD    9/19/2023 2:12 PM EDT    Workstation ID: [id number]    CT Chest With Contrast Diagnostic [[id number]] Collected: 09/19/23 1359     Updated: 09/19/23 1416    Narrative:        CT SOFT TISSUE NECK W CONTRAST, CT CHEST W CONTRAST DIAGNOSTIC    Date of Exam: 9/19/2023 1:46 PM EDT    Indication: L sided neck pain/swelling.    Comparison: CT chest from August 21, 2023    Technique: Axial CT images were obtained of the neck and chest after the uneventful intravenous administration of 80 mL Isovue-300.  Reconstructed coronal and sagittal images were also obtained. Automated exposure control and iterative construction   methods were used.      Findings:  Neck:    The visualized intracranial contents are unremarkable. The globes and orbits appear intact. The nasopharynx and oropharynx have a normal configuration. The larynx appears normal. The parotid and submandibular glands appear intact. The thyroid gland is   surgically absent. No abnormally enlarged lymph nodes are identified. The major vasculature within the neck appears widely patent. Diffuse osseous sclerotic metastatic disease is noted. The paranasal sinuses and mastoid air cells are well-aerated.    Chest:    The central tracheobronchial tree is clear. There is mild emphysema. A 4 mm left upper lobe nodule on image 25 is unchanged. A couple benign calcified granulomas are noted. No new pulmonary nodule is identified. There is no focal consolidation. There is   no pleural effusion.    The heart size appears normal. The great vessels are normal in caliber. No abnormally enlarged lymph nodes are identified.    Partial evaluation of the upper abdomen demonstrates hepatic and renal cysts.    Diffuse osseous sclerotic metastatic disease appears  unchanged.      Impression:      Impression:  1.No acute process identified within neck or chest.  2.Diffuse osseous sclerotic metastatic disease.  3.Mild emphysema.  4.Stable 4 mm left upper lobe nodule.        Electronically Signed: Sergey Martínez MD    9/19/2023 2:12 PM EDT    Workstation ID: [id number]    XR Chest 1 View [[id number]] Collected: 09/19/23 1107     Updated: 09/19/23 1116    Narrative:      XR CHEST 1 VW    Date of Exam: 9/19/2023 9:47 AM CDT    Indication: Weak/Dizzy/AMS triage protocol    Comparison: Chest x-ray 8 12/1/2020, chest CT 8/21/2023    Findings:  Cardiomediastinal silhouette is unremarkable. Few areas of interstitial prominence. There are few vague nodular foci in the right lung. No pneumothorax nor pleural effusion. No acute osseous abnormality identified.      Impression:      Impression:  Few new vague nodular foci in the right lung may represent developing infection. Correlate with symptoms.      Electronically Signed: Kyle Hager MD    9/19/2023 10:12 AM CDT    Workstation ID: [id number]              Impression:   Septic shock-manifested by hypotension, tachycardia, severe leukopenia/absolute neutropenia, acute hypoxic respiratory failure requiring supplemental oxygen, lactic acidosis, an elevated pro-calcitonin, acute kidney injury, and  gram-negative bacteremia.  Pseudomonas/E. Coli bacteremia-secondary to absolute neutropenia with a possible urinary tract source  Pyuria-she has pyuria despite having absolute neutropenia.  This may signify a UTI as a source for her bacteremia.  Severe leukopenia/absolute neutropenia-possibly secondary to Xeloda in the setting of DPD deficiency.  This is being addressed by Dr. 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  I will start her on intravenous  acyclovir.  She has nausea and vomiting and may not be able to tolerate oral acyclovir/Valtrex.  Nausea/vomiting  11. ?Transverse colon colitis-by CT scan      PLAN/RECOMMENDATIONS:   Thank you for asking us to see Radha John, I recommend the following:  Blood cultures-pending  Urine culture-this has not been sent but I will have it added to her previous urine sample  Intravenous Zosyn at 4.5 g IV every 8 hours  Discontinue vancomycin  Volume resuscitation and possible vasopressor therapy-Per the intensivist service  Intravenous acyclovir at 5 mg/kg every 12 hours    I discussed her complex situation with [facility].  We will boost her dose of Zosyn up to levels more appropriate for Pseudomonas bacteremia with septic shock  I discussed her complex situation in detail with Dr. Laureano today.  I discussed her complex situation with Dr. Prasad today.  I discussed her complex situation with her and her  today.  She is critically ill with a high mortality risk.  I spent over 75 minutes on her complex care today.       Beau Martinez MD  9/20/2023  06:07 EDT

## 2023-09-20 NOTE — PROGRESS NOTES
Intensive Care Follow-up     Hospital:  LOS: 1 day   Ms. Radha John, 78 y.o. female is followed for:   Severe sepsis            History of present illness:   Radha John is a 78 year-old female with DM, HTN, hypothyroidism, hepatitis, diverticulosis, and bilateral stage I ER+ HER2 negative breat cancer with subsequent recurrence involving bone and liver currently undergoing treatment with Oncology. She was placed on Xeloda starting 8/28/23 and subsequently instructed to hold it after her WBCs dropped to 0.55 and platelets to 66. She was started on doxycycline for prophylaxis on 9/15/23. She developed diarrhea with moth soreness. She received IVF and dexamethasone per Oncology on 9/18/23, however, did not improve so she presented to the ED on 9/19/23. Work-up in the ED with ANC 0, thrombocytopenia, neutropenia, JAVIER, and increased LA and procal. She was admitted for sepsis concerns and found to have E. Coli and Pseudomonas aeruginosa bacteremia. 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 Uridine triacetate ordered.     On the morning of 9/20/23, patient became acutely hypotensive despite IVF. She has received ~ 5 L since admission yesterday. Lactic acid has been steadily increasing since admission, last at 6.6. She was additionally hypoglycemic this AM and received D50W for treatment. ICU was contacted for transfer for higher level of care and she has been accepted into our care.    Upon arrival to ICU, patient's BP is 113/60. She is awake. She does report she wishes to remain a Full Code with Full Interventions.     Time spent: 5 minutes  Electronically signed by CYDNEY Casey, 09/20/23, 8:25 AM EDT.    Subjective   Interval History:  Patient appears quite lethargic but wakes up.  Complaining of mouth pain.  Denies any chest pain.  Denies any abdominal pain.  No diarrhea.                 The patient's past medical, surgical and social history were  "reviewed and updated in Epic as appropriate.       Objective     Infusions:  sodium bicarbonate drip (greater than 75 mEq/bag), 150 mEq, Last Rate: 150 mEq (09/20/23 1016)      Medications:  First Mouthwash (Magic Mouthwash), 5 mL, Swish & Spit, Q6H  piperacillin-tazobactam, 4.5 g, Intravenous, Q8H  sodium chloride, 10 mL, Intravenous, Q12H  thiamine (B-1) IV, 200 mg, Intravenous, Q8H  Uridine Triacetate, 10 g, Oral, Q6H        Vital Sign Min/Max for last 24 hours  Temp  Min: 96 °F (35.6 °C)  Max: 99.1 °F (37.3 °C)   BP  Min: 79/57  Max: 123/69   Pulse  Min: 85  Max: 108   Resp  Min: 18  Max: 22   SpO2  Min: 96 %  Max: 100 %   Flow (L/min)  Min: 2  Max: 2       Input/Output for last 24 hour shift  09/19 0701 - 09/20 0700  In: 1550   Out: -       Objective:  Vital signs: (most recent): Blood pressure 113/60, pulse 99, temperature 97.9 °F (36.6 °C), temperature source Oral, resp. rate 22, height 152.4 cm (60\"), weight 72.6 kg (160 lb), SpO2 99 %, not currently breastfeeding.          General Appearance: Awake, alert, in mild distress  Head:  Whitish tongue, mucosal ulcerations noted as well.   Lungs:   B/L Breath sounds present with decreased breath sounds on bases, no wheezing heard, occ rhonchi   Heart: S1 and S2 present, no murmur  Abdomen: Obese, Soft, nontender, no guarding or rigidity, bowel sounds positive.  Extremities:  no cyanosis or clubbing,  no edema, warm to touch.  Pulses: Positive and symmetric.  Neurologic:  Moving all four extremities. Generalized weak.       Results from last 7 days   Lab Units 09/19/23  1125 09/15/23  0853   WBC 10*3/mm3 0.16* 0.55*   HEMOGLOBIN g/dL 12.9 12.5   PLATELETS 10*3/mm3 37* 66*     Results from last 7 days   Lab Units 09/20/23  0538 09/19/23  1125 09/15/23  0853   SODIUM mmol/L 143 137 140   POTASSIUM mmol/L 3.3* 4.1 3.9   CO2 mmol/L 13.0* 16.0* 22.0   BUN mg/dL 32* 34* 19   CREATININE mg/dL 1.40* 1.47* 1.10*   MAGNESIUM mg/dL 1.6 1.9  --    PHOSPHORUS mg/dL 3.5  --   -- "    GLUCOSE mg/dL 55* 159* 143*     Estimated Creatinine Clearance: 29.4 mL/min (A) (by C-G formula based on SCr of 1.4 mg/dL (H)).          Images:   No imaging available reviewed disorder.  CT neck and chest did not show any acute pathology.  No definite evidence of consolidation or pneumonia noted.        I reviewed the patient's results and images.     Blood cultures 2 out of 2 positive for Pseudomonas and E. coli.    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  Patient transferred to ICU with ongoing severe sepsis and septic shock picture with worsening lactic acidosis.  She does have gram-negative bacteremia with Pseudomonas and E. coli.  Etiology unclear.  She does have some white blood cells noted in urine but looks contaminated urine sample with elevated squamous cells.  Will follow cultures.  I will go ahead and proceed with abdominal CT scan to rule out any intra-abdominal pathology which could be leading to gram-negative sepsis.  Unfortunately we will not be able to give contrast due to ongoing acute kidney injury.  We will monitor closely.  Infectious disease is also following.  Antibiotic dosing has been adjusted to higher dose of Zosyn to cover for Pseudomonas.  Will await sensitivities.  2.  Patient is slightly tachypneic and complaining of shortness of breath.  Has worsening lactic acidosis.  Will give bolus of albumin trying to improve perfusion.  I w patient has high anion gap and non-anion gap acidosis likely hyperchloremic due to ongoing saline  infusion.  I will give bicarb supplementation to help improve metabolic acidosis will work towards improving perfusion and improving lactic acidosis.  3.  Diarrhea may be component of type B lactic acidosis.  Will give thiamine supplementation.  4.  Monitor urine output closely.  Replace electrolytes as needed per ICU protocol.  Replace potassium today.  5.  Get echocardiogram to evaluate cardiac function.  6.  PICC line placement for medication administration and for pressors if needed.  7.  Medical oncology following.  ANC 0 currently.  Thought to have chemosensitivity and toxicity.  Unclear if there is any benefit of GM-CSF administration at this point.  8.  Continue neutropenic precautions.  9.  Patient has severe mucositis likely from chemotherapy.  Discussed with ID and they are considering possible HSV eruption.  They are considering acyclovir.  We will monitor renal function closely.  10.  GI prophylaxis.  11.  SCDs for DVT prophylaxis for now.  Pharmacologic prophylaxis will avoid due to severe pancytopenia and especially thrombocytopenia.    High risk of decline.  Will monitor closely in intensive care unit.  Patient wishes to remain full code.  We will continue aggressive support.    Plan of care and goals reviewed with multidisciplinary/antibiotic stewardship team during rounds.   I discussed the patient's findings and my recommendations with patient, nursing staff, and consulting provider       Time spent  critical care 40 min  (exclusive of procedure time)  including high complexity decision making to assess, manipulate, and support vital organ system failure in this individual who has impairment of one or more vital organ systems such that there is a high probability of imminent or life threatening deterioration in the patient’s condition.      Fernando Laureano MD, [facility]  Pulmonary, Critical care and Sleep Medicine

## 2023-09-20 NOTE — PROGRESS NOTES
Clinical Nutrition     Nutrition Support Assessment  Reason for Visit: MDR, Identified at risk by screening criteria, Malnutrition Severity Assessment      Patient Name: Radha John  YOB: 1945  MRN: [id number]  Date of Encounter: 09/20/23 16:58 EDT  Admission date: 9/19/2023      Pt meets criteria for moderate chronic malnutrition 2nd metastatic breast cancer, evidenced by po intake < 75%, 13lb wt loss over 1 year, 8% wt loss, moderate muscle wasting and fat loss    Severe mucositis, If pt unable to swallow, consider short term EN    TF recs if needed: Peptamen AF at 25ml, advance gradually as tolerated to goal rate @50ml/hr, Prosource 1x/day, free water @10ml/hr    Suggest add MVI as TF volume too low to meet RDI's    Nutrition Assessment   Admission Diagnosis:  Sepsis [A41.9]      Problem List:    Severe sepsis    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    Invasive ductal carcinoma of right breast    Hypothyroidism    Diabetes    Hypertension    Hyperlipidemia    Autoimmune hepatitis    Malignant neoplasm metastatic to bone    Chemotherapy induced neutropenia    Stomatitis and mucositis    Lactic acidosis    Acute kidney injury (nontraumatic)    Gram-negative bacteremia    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    PSH:  She  has a past surgical history that includes Hemorrhoid surgery; Parathyroidectomy (07/19/2016); Breast biopsy (Bilateral); Thyroid surgery; Vaginal hysterectomy; Colonoscopy; Colectomy (N/A, 12/8/2020); Cystoscopy (N/A, 12/8/2020); Cataract extraction (Right); Breast  "lumpectomy (Bilateral, 06/27/2014); and Breast lumpectomy (Left, 09/01/2021).    Substance history: tobacco remote    Applicable Nutrition Concerns:     Oral:mucositis  GI:N/V 9-19      Reported/Observed/Food/Nutrition Related History:     Pt resting in bed, on nasal cannula, ill appearing, has difficulty speaking, reports mouth is still very sore, is no longer having any nausea or vomiting    Per RN: pt has sores in mouth, had not been able to eat or drink anything all day, unable to swallow meds, plan to place ngt      Labs    Labs Reviewed: Yes     Results from last 7 days   Lab Units 09/20/23  1200 09/20/23  0538 09/19/23  1125 09/15/23  0853   GLUCOSE mg/dL 137* 55* 159* 143*   BUN mg/dL 31* 32* 34* 19   CREATININE mg/dL 1.42* 1.40* 1.47* 1.10*   SODIUM mmol/L 142 143 137 140   CHLORIDE mmol/L 111* 113* 104 107   POTASSIUM mmol/L 3.3* 3.3* 4.1 3.9   PHOSPHORUS mg/dL  --  3.5  --   --    MAGNESIUM mg/dL  --  1.6 1.9  --    ALT (SGPT) U/L  --  8 9 11       Results from last 7 days   Lab Units 09/20/23  1200 09/20/23  0538 09/19/23  1125 09/15/23  0853   ALBUMIN g/dL  --  2.2* 3.0* 3.5   IONIZED CALCIUM mmol/L 0.92*  --   --   --        Results from last 7 days   Lab Units 09/20/23  0752 09/20/23  0725   GLUCOSE mg/dL 206* 52*     Lab Results   Lab Value Date/Time    HGBA1C 6.20 (H) 12/01/2020 1038                 Medications    Medications Reviewed: Yes  Pertinent: abx, acyclovir, magic mouthwash, protonix  Infusion:Bicarb @150ml        Intake/Ouptut 24 hrs (0701 - 0700)   I&O's Reviewed: Yes     Intake & Output (last day)         09/19 0701 09/20 0700 09/20 0701 09/21 0700    I.V. (mL/kg)  4154 (57.2)    IV Piggyback 1550 3083    Total Intake(mL/kg) 1550 (21.3) 7237 (99.7)    Urine (mL/kg/hr)  1075 (1.5)    Total Output  1075    Net +1550 +6162          Urine Unmeasured Occurrence 1 x 2 x              Anthropometrics     Flowsheet Rows      Flowsheet Row First Filed Value   Admission Height 152.4 cm (60\") " "Documented at 09/19/2023 0954   Admission Weight 72.6 kg (160 lb) Documented at 09/19/2023 0954            Height: Height: 152.4 cm (60\")  Last Filed Weight: Weight: 72.6 kg (160 lb) (09/19/23 0954)  Method: Weight Method: Stated  BMI: BMI (Calculated): 31.2  BMI classification: Obese Class I: 30-34.9kg/m2  IBW:  100lb    Pt weighed 173lb on 9-23-22 at MD office    Weight change: ~13lb wt loss per EMR based on stated weight, may be more possibly     Weight       Weight (kg) Weight (lbs) Weight Method Visit Report   7/26/2021 76.658 kg  169 lb   --    9/29/2021 74.163 kg  163 lb 8 oz      10/14/2021 73.029 kg  161 lb   --    10/22/2021 74.844 kg  165 lb   --    11/4/2021 73.936 kg  163 lb   --    12/3/2021 74.662 kg  164 lb 9.6 oz   --    1/5/2022 75.751 kg  167 lb   --    2/15/2022 76.476 kg  168 lb 9.6 oz   --    3/30/2022 77.565 kg  171 lb   --     77.6 kg  171 lb 1.2 oz   --    5/12/2022 76.204 kg  168 lb   --    6/29/2022 78.019 kg  172 lb   --    8/12/2022 78.926 kg  174 lb   --    9/23/2022 78.472 kg  173 lb   --    11/3/2022 77.565 kg  171 lb   --    12/16/2022 78.019 kg  172 lb   --    1/18/2023 77.111 kg  170 lb   --    3/2/2023 77.021 kg  169 lb 12.8 oz   --    3/9/2023 76.658 kg  169 lb   --    3/30/2023 75.978 kg  167 lb 8 oz      4/13/2023 75.751 kg  167 lb   --    4/28/2023 75.751 kg  167 lb      5/26/2023 76.204 kg  168 lb   --    6/1/2023 75.297 kg  166 lb   --    6/26/2023 74.844 kg  165 lb   --    7/24/2023 74.39 kg  164 lb   --    8/23/2023 74.39 kg  164 lb   --    9/7/2023 74.844 kg  165 lb  Standing scale     9/15/2023 73.029 kg  161 lb   --    9/18/2023 71.668 kg  158 lb      9/19/2023 72.576 kg  160 lb  Stated           Nutrition Focused Physical Exam     Date: 9-20-23    Patient meets criteria for malnutrition diagnosis, see MSA note.    Needs Assessment   Date:9-20-23    Height used:Height: 152.4 cm (60\")  Weights used- ABW:160lb/ 73kg  IBW: 100lb/ 45kg    Estimated Calorie needs: ~ " 1200kcal  Method:  14Kcals/KG ABW: 1022kcal  Method:  MSJx 1.2 ABW: 1358kcal      Estimated Protein needs: ~ 90g protein  Method: 1-1.2g protein per kg ABW: 73-88g protein  Method: 2g protein per kg IBW: 90g protein      Current Nutrition Prescription     PO: Diet: Liquid Diets; Clear Liquid; Fluid Consistency: Thin (IDDSI 0)  Oral Nutrition Supplement:   Intake: Insufficient data      Nutrition Diagnosis   Date: 9-20-23 Updated:   Problem Malnutrition    Etiology Breast CA with bone mets   Signs/Symptoms Po intake < 75%, 13lb wt loss over 1 year, 8% wt loss, moderate muscle wasting, fat loss     Problem Swallowing difficulty   Etiology Chemotherapy induced mucositis   Signs/Symptoms Severe mouth sores, unable to swallow       Goal:   General: Nutrition support treatment  PO: Tolerate PO  EN/PN:  Consider EN if pt unable to take po    Nutrition Intervention      Follow treatment progress, Care plan reviewed, Advised available snacks, Encourage intake, Supplement provided    Pt meets criteria for moderate chronic malnutrition with po intake < 75%, 13lb wt loss over 1 year, 8% wt loss, moderate muscle wasting and fat loss    Add Boost Breeze 3x/day    Severe mucositis, If pt unable to swallow, consider short term EN    TF recs if needed: Peptamen AF at 25ml, advance gradually as tolerated to goal rate @50ml/hr, Prosource 1x/day, free water @10ml/hr    Suggest add MVI as TF volume too low to meet RDI's      At goal over: 20Hrs/day    Rx will supply:   Goal Volume 1000  mL/day     Flush Volume 200 mL/day     Energy 1260 Kcal/day 105 % Est Need   Protein 91 g/day 101 % Est Need   Fiber 6 g/day     Water in   mL     Total Water 1010 mL     Meet DRI No            Monitoring/Evaluation:   Per protocol, I&O, PO intake, Pertinent labs, Weight, Skin status, GI status, Symptoms, Swallow function, Hemodynamic stability      Linh Sanchez, JESSICA  Time Spent: 90min

## 2023-09-20 NOTE — PROGRESS NOTES
Malnutrition Severity Assessment    Patient Name:  Radha John  YOB: 1945  MRN: [id number]  Admit Date:  9/19/2023    Patient meets criteria for : Moderate (non-severe) Malnutrition      Malnutrition Severity Assessment  Malnutrition Type: Chronic Disease - Related Malnutrition  Malnutrition Type (last 8 hours)       Malnutrition Severity Assessment       Row Name 09/20/23 1732       Malnutrition Severity Assessment    Malnutrition Type Chronic Disease - Related Malnutrition      Row Name 09/20/23 1732       Insufficient Energy Intake     Insufficient Energy Intake  <75% of est. energy requirement for > or equal to 3 months      Row Name 09/20/23 1732       Unintentional Weight Loss     Unintentional Weight Loss Findings --  13lb wt loss over 1 year, 8% wt loss      Row Name 09/20/23 1732       Muscle Loss    Loss of Muscle Mass Findings Moderate    Latter day Region Moderate - slight depression    Patellar Region Moderate - patella more prominent, less muscle definition around patella    Posterior Calf Region Moderate - some roundness, slight firmness      Row Name 09/20/23 1732       Fat Loss    Subcutaneous Fat Loss Findings Moderate    Orbital Region  Moderate -  somewhat hollowness, slightly dark circles    Upper Arm Region Moderate - some fat tissue, not ample      Row Name 09/20/23 1732       Criteria Met (Must meet criteria for severity in at least 2 of these categories: M Wasting, Fat Loss, Fluid, Secondary Signs, Wt. Status, Intake)    Patient meets criteria for  Moderate (non-severe) Malnutrition                    Electronically signed by:  Linh Sanchez RD  09/20/23 17:37 EDT

## 2023-09-20 NOTE — PROGRESS NOTES
[facility] Medicine Services  PROGRESS NOTE    Patient Name: Radha John  : 1945  MRN: [id number]    Date of Admission: 2023  Primary Care Physician: Mague Reynolds MD    Subjective   Subjective     CC: Weakness    HPI: Notes mouth soreness/sore throat. No f/c. Tired. Cough, dry.       Objective   Objective     Vital Signs:   Temp:  [96 °F (35.6 °C)-99.1 °F (37.3 °C)] 96 °F (35.6 °C)  Heart Rate:  [] 99  Resp:  [18] 18  BP: ()/(47-73) 84/51     Physical Exam:  NAD, alert, lethargic  OP with ulceration  Neck supple  Tachy  Decreased at bases  +BS, soft  ARGUETA  Flat affect    Results Reviewed:  LAB RESULTS:      Lab 23  0043 23  2030 23  1739 23  1425 23  1125 09/15/23  0853   WBC  --   --   --   --  0.16* 0.55*   HEMOGLOBIN  --   --   --   --  12.9 12.5   HEMATOCRIT  --   --   --   --  39.8 37.9   PLATELETS  --   --   --   --  37* 66*   NEUTROS ABS  --   --   --   --  0.00* 0.02*   IMMATURE GRANS (ABS)  --   --   --   --   --  0.00   LYMPHS ABS  --   --   --   --   --  0.52*   MONOS ABS  --   --   --   --   --  0.01*   EOS ABS  --   --   --   --  0.00 0.00   MCV  --   --   --   --  100.8* 99.5*   PROCALCITONIN  --   --   --   --  44.96*  --    LACTATE 6.6* 6.3* 5.8* 5.9* 4.8*  --          Lab 23  0538 23  1125 09/15/23  0853   SODIUM 143 137 140   POTASSIUM 3.3* 4.1 3.9   CHLORIDE 113* 104 107   CO2 13.0* 16.0* 22.0   ANION GAP 17.0* 17.0* 11.0   BUN 32* 34* 19   CREATININE 1.40* 1.47* 1.10*   EGFR 38.6* 36.4* 51.9*   GLUCOSE 55* 159* 143*   CALCIUM 6.3* 7.5* 7.9*   MAGNESIUM 1.6 1.9  --    PHOSPHORUS 3.5  --   --          Lab 23  0538 23  1125 09/15/23  0853   TOTAL PROTEIN 4.7* 6.4 6.7   ALBUMIN 2.2* 3.0* 3.5   GLOBULIN 2.5 3.4 3.2   ALT (SGPT) 8 9 11   AST (SGOT) 13 9 16   BILIRUBIN 0.5 0.7 0.7   ALK PHOS 38* 60 83         Lab 23  1125   HSTROP T 14*                 Brief Urine Lab Results  (Last  result in the past 365 days)        Color   Clarity   Blood   Leuk Est   Nitrite   Protein   CREAT   Urine HCG        09/19/23 1307 Yellow   Cloudy   Small (1+)   Negative   Negative   100 mg/dL (2+)                   Microbiology Results Abnormal       Procedure Component Value - Date/Time    COVID PRE-OP / PRE-PROCEDURE SCREENING ORDER (NO ISOLATION) - Swab, Nasopharynx [[id number]]  (Normal) Collected: 09/19/23 1318    Lab Status: Final result Specimen: Swab from Nasopharynx Updated: 09/19/23 1411    Narrative:      The following orders were created for panel order COVID PRE-OP / PRE-PROCEDURE SCREENING ORDER (NO ISOLATION) - Swab, Nasopharynx.  Procedure                               Abnormality         Status                     ---------                               -----------         ------                     COVID-19 and FLU A/B PCR...[[id number]]  Normal              Final result                 Please view results for these tests on the individual orders.    COVID-19 and FLU A/B PCR - Swab, Nasopharynx [[id number]]  (Normal) Collected: 09/19/23 1318    Lab Status: Final result Specimen: Swab from Nasopharynx Updated: 09/19/23 1411     COVID19 Not Detected     Influenza A PCR Not Detected     Influenza B PCR Not Detected    Narrative:      Fact sheet for providers: https://www.fda.gov/media/015821/download    Fact sheet for patients: https://www.fda.gov/media/823402/download    Test performed by PCR.            CT Soft Tissue Neck With Contrast    Result Date: 9/19/2023  CT SOFT TISSUE NECK W CONTRAST, CT CHEST W CONTRAST DIAGNOSTIC Date of Exam: 9/19/2023 1:46 PM EDT Indication: L sided neck pain/swelling. Comparison: CT chest from August 21, 2023 Technique: Axial CT images were obtained of the neck and chest after the uneventful intravenous administration of 80 mL Isovue-300.  Reconstructed coronal and sagittal images were also obtained. Automated exposure control and iterative construction methods were  used. Findings: Neck: The visualized intracranial contents are unremarkable. The globes and orbits appear intact. The nasopharynx and oropharynx have a normal configuration. The larynx appears normal. The parotid and submandibular glands appear intact. The thyroid gland is surgically absent. No abnormally enlarged lymph nodes are identified. The major vasculature within the neck appears widely patent. Diffuse osseous sclerotic metastatic disease is noted. The paranasal sinuses and mastoid air cells are well-aerated. Chest: The central tracheobronchial tree is clear. There is mild emphysema. A 4 mm left upper lobe nodule on image 25 is unchanged. A couple benign calcified granulomas are noted. No new pulmonary nodule is identified. There is no focal consolidation. There is no pleural effusion. The heart size appears normal. The great vessels are normal in caliber. No abnormally enlarged lymph nodes are identified. Partial evaluation of the upper abdomen demonstrates hepatic and renal cysts. Diffuse osseous sclerotic metastatic disease appears unchanged.     Impression: Impression: 1.No acute process identified within neck or chest. 2.Diffuse osseous sclerotic metastatic disease. 3.Mild emphysema. 4.Stable 4 mm left upper lobe nodule. Electronically Signed: Sergey Martínez MD  9/19/2023 2:12 PM EDT  Workstation ID: [id number]    CT Chest With Contrast Diagnostic    Result Date: 9/19/2023  CT SOFT TISSUE NECK W CONTRAST, CT CHEST W CONTRAST DIAGNOSTIC Date of Exam: 9/19/2023 1:46 PM EDT Indication: L sided neck pain/swelling. Comparison: CT chest from August 21, 2023 Technique: Axial CT images were obtained of the neck and chest after the uneventful intravenous administration of 80 mL Isovue-300.  Reconstructed coronal and sagittal images were also obtained. Automated exposure control and iterative construction methods were used. Findings: Neck: The visualized intracranial contents are unremarkable. The globes and  orbits appear intact. The nasopharynx and oropharynx have a normal configuration. The larynx appears normal. The parotid and submandibular glands appear intact. The thyroid gland is surgically absent. No abnormally enlarged lymph nodes are identified. The major vasculature within the neck appears widely patent. Diffuse osseous sclerotic metastatic disease is noted. The paranasal sinuses and mastoid air cells are well-aerated. Chest: The central tracheobronchial tree is clear. There is mild emphysema. A 4 mm left upper lobe nodule on image 25 is unchanged. A couple benign calcified granulomas are noted. No new pulmonary nodule is identified. There is no focal consolidation. There is no pleural effusion. The heart size appears normal. The great vessels are normal in caliber. No abnormally enlarged lymph nodes are identified. Partial evaluation of the upper abdomen demonstrates hepatic and renal cysts. Diffuse osseous sclerotic metastatic disease appears unchanged.     Impression: Impression: 1.No acute process identified within neck or chest. 2.Diffuse osseous sclerotic metastatic disease. 3.Mild emphysema. 4.Stable 4 mm left upper lobe nodule. Electronically Signed: Sergey Martínez MD  9/19/2023 2:12 PM EDT  Workstation ID: [id number]    XR Chest 1 View    Result Date: 9/19/2023  XR CHEST 1 VW Date of Exam: 9/19/2023 9:47 AM CDT Indication: Weak/Dizzy/AMS triage protocol Comparison: Chest x-ray 8 12/1/2020, chest CT 8/21/2023 Findings: Cardiomediastinal silhouette is unremarkable. Few areas of interstitial prominence. There are few vague nodular foci in the right lung. No pneumothorax nor pleural effusion. No acute osseous abnormality identified.     Impression: Impression: Few new vague nodular foci in the right lung may represent developing infection. Correlate with symptoms. Electronically Signed: Kyle Hager MD  9/19/2023 10:12 AM CDT  Workstation ID: [id number]         Current medications:  Scheduled Meds:albumin  human, 25 g, Intravenous, Once  piperacillin-tazobactam, 3.375 g, Intravenous, Q8H  sodium chloride, 1,500 mL, Intravenous, Once  sodium chloride, 10 mL, Intravenous, Q12H  Uridine Triacetate, 10 g, Oral, Q6H      Continuous Infusions:sodium chloride, 150 mL/hr, Last Rate: 150 mL/hr (09/19/23 9968)      PRN Meds:.  Calcium Replacement - Follow Nurse / BPA Driven Protocol    dextrose    Magnesium Standard Dose Replacement - Follow Nurse / BPA Driven Protocol    Morphine **AND** naloxone    ondansetron    palliative care oral rinse    Phosphorus Replacement - Follow Nurse / BPA Driven Protocol    Potassium Replacement - Follow Nurse / BPA Driven Protocol    sodium chloride    sodium chloride    sodium chloride    Assessment & Plan   Assessment & Plan     Active Hospital Problems    Diagnosis  POA    **Severe sepsis [A41.9, R65.20]  Yes    Chemotherapy induced neutropenia [D70.1, T45.1X5A]  Unknown    Stomatitis and mucositis [K12.1, K12.30]  Unknown    Lactic acidosis [E87.20]  Unknown    Acute kidney injury (nontraumatic) [N17.9]  Unknown    Malignant neoplasm metastatic to bone [C79.51]  Yes    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 [C50.912]  Yes    Invasive ductal carcinoma of right breast [C50.911]  Yes    Hypothyroidism [E03.9]  Yes    Diabetes [E11.9]  Yes    Hypertension [I10]  Yes    Hyperlipidemia [E78.5]  Yes    Autoimmune hepatitis [K75.4]  Yes      Resolved Hospital Problems   No resolved problems to display.        Brief Hospital Course to date:  Radha John is a 78 y.o. female    Severe sepsis  Gram negative bacteremia  JAVIER  Metabolic acidosis  Neutropenia  -IVF  -broad spectrum antibiotics  -d/w ICU, transfer, may warrant pressors    Hx of metastatic breast cancer s/p chemotherapy    Hypothyroidism    DM    Hx of HTN/HL        Expected Discharge Location and Transportation: [place]  Expected Discharge   Expected Discharge Date: 9/22/2023; Expected Discharge Time:      DVT  prophylaxis:  Mechanical DVT prophylaxis orders are present.          CODE STATUS:   Code Status and Medical Interventions:   Ordered at: 09/20/23 0726     Code Status (Patient has no pulse and is not breathing):    CPR (Attempt to Resuscitate)     Medical Interventions (Patient has pulse or is breathing):    Full Support       Eduardo Santamaria MD  09/20/23

## 2023-09-20 NOTE — CASE MANAGEMENT/SOCIAL WORK
Discharge Planning Assessment  [facility]     Patient Name: Radha John  MRN: [id number]  Today's Date: 9/20/2023    Admit Date: 9/19/2023    Plan: discharge plan   Discharge Needs Assessment       Row Name 09/20/23 1544       Living Environment    People in Home spouse    Name(s) of People in Home Jaspreet John(spouse)    Primary Care Provided by self    Provides Primary Care For no one, unable/limited ability to care for self    Family Caregiver if Needed spouse    Family Caregiver Names Jaspreet John(spouse)    Quality of Family Relationships helpful;involved;supportive    Able to Return to Prior Arrangements yes    Living Arrangement Comments Per spouse, pt resides in [place] with spouse.       Resource/Environmental Concerns    Resource/Environmental Concerns none       Transition Planning    Patient/Family Anticipates Transition to home with family    Patient/Family Anticipated Services at Transition     Transportation Anticipated family or friend will provide       Discharge Needs Assessment    Readmission Within the Last 30 Days no previous admission in last 30 days    Equipment Currently Used at Home none    Concerns to be Addressed discharge planning    Equipment Needed After Discharge TBD    Discharge Coordination/Progress Pt's spouse confirmed that pt has [employer] Medicare Replacement with prescription coverage and uses [facility] on [address]. Pt is independent at baseline and spouse reports pt uses no DME for mobility. Pt is here with severe sepsis. Pt's goal is home at discharge, CM will cont to follow for discharge needs.                    Discharge Plan       Row Name 09/20/23 0733       Plan    Plan discharge plan    Plan Comments Pt's goal is home at discharge. Pt currently in ICU and discharge plan evolving. CM will cont to follow    Final Discharge Disposition Code 01 - home or self-care                  Continued Care and Services - Admitted Since 9/19/2023    Coordination  has not been started for this encounter.       Selected Continued Care - Episodes Includes continued care and service providers with selected services from the active episodes listed below      Oncology Episode start date: 8/23/2023   There are no active outsourced providers for this episode.                 Expected Discharge Date and Time       Expected Discharge Date Expected Discharge Time    Sep 25, 2023            Demographic Summary       Row Name 09/20/23 1539       General Information    General Information Comments PCP is HUMERA WOODRUFF       Contact Information    Permission Granted to Share Info With     Contact Information Obtained for     Contact Information Comments Jaspreet John(spouse) [phone]                   Functional Status       Row Name 09/20/23 1540       Functional Status    Functional Status Comments Per spouse, pt is indepen                   Psychosocial    No documentation.                  Abuse/Neglect    No documentation.                  Legal    No documentation.                  Substance Abuse    No documentation.                  Patient Forms    No documentation.                     Avis Zarate RN

## 2023-09-20 NOTE — PROGRESS NOTES
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  Mouth is very painful.      REVIEW OF SYSTEMS:  A 14 point review of systems was performed and is negative except as noted above.      Objective     Vitals:    09/20/23 1100 09/20/23 1115 09/20/23 1130 09/20/23 1145   BP: 103/59 (!) 88/53 (!) 85/46 100/58   BP Location:       Patient Position:       Pulse: 110 108 103 108   Resp:       Temp:       TempSrc:       SpO2: 98% 100% 100% 99%   Weight:       Height:                       Performance Status: 3  General:  female in no acute distress  Neuro: alert and oriented  HEENT: sclerae anicteric, oropharynx dry, pale  Lymphatics: no cervical, supraclavicular, or axillary adenopathy  Cardiovascular: regular rate and rhythm, no murmurs  Lungs: clear to auscultation bilaterally  Abdomen: soft, nontender, nondistended.  No palpable organomegaly  Extremities: no lower extremity edema  Skin: no rashes, lesions, bruising, or petechiae  Psych: mood and affect appropriate      Labs:       Lab Results   Component Value Date    WBC 0.08 (C) 09/20/2023    HGB 8.5 (L) 09/20/2023    HCT 26.3 (L) 09/20/2023    .5 (H) 09/20/2023    PLT 9 (C) 09/20/2023     Lab Results   Component Value Date    GLUCOSE 55 (L) 09/20/2023    BUN 32 (H) 09/20/2023    CREATININE 1.40 (H) 09/20/2023    EGFRIFAFRI 51 (L) 02/15/2022    BCR 22.9 09/20/2023    K 3.3 (L) 09/20/2023    CO2 13.0 (L) 09/20/2023    CALCIUM 6.3 (L) 09/20/2023    PROTENTOTREF 6.8 03/22/2021    ALBUMIN 2.2 (L)  09/20/2023    AST 13 09/20/2023    ALT 8 09/20/2023     CT Chest With Contrast Diagnostic  Narrative: CT SOFT TISSUE NECK W CONTRAST, CT CHEST W CONTRAST DIAGNOSTIC    Date of Exam: 9/19/2023 1:46 PM EDT    Indication: L sided neck pain/swelling.    Comparison: CT chest from August 21, 2023    Technique: Axial CT images were obtained of the neck and chest after the uneventful intravenous administration of 80 mL Isovue-300.  Reconstructed coronal and sagittal images were also obtained. Automated exposure control and iterative construction   methods were used.    Findings:  Neck:    The visualized intracranial contents are unremarkable. The globes and orbits appear intact. The nasopharynx and oropharynx have a normal configuration. The larynx appears normal. The parotid and submandibular glands appear intact. The thyroid gland is   surgically absent. No abnormally enlarged lymph nodes are identified. The major vasculature within the neck appears widely patent. Diffuse osseous sclerotic metastatic disease is noted. The paranasal sinuses and mastoid air cells are well-aerated.    Chest:    The central tracheobronchial tree is clear. There is mild emphysema. A 4 mm left upper lobe nodule on image 25 is unchanged. A couple benign calcified granulomas are noted. No new pulmonary nodule is identified. There is no focal consolidation. There is   no pleural effusion.    The heart size appears normal. The great vessels are normal in caliber. No abnormally enlarged lymph nodes are identified.    Partial evaluation of the upper abdomen demonstrates hepatic and renal cysts.    Diffuse osseous sclerotic metastatic disease appears unchanged.  Impression: Impression:  1.No acute process identified within neck or chest.  2.Diffuse osseous sclerotic metastatic disease.  3.Mild emphysema.  4.Stable 4 mm left upper lobe nodule.    Electronically Signed: Sergey Martínez MD    9/19/2023 2:12 PM EDT    Workstation ID: [id number]  CT Soft  Tissue Neck With Contrast  Narrative: CT SOFT TISSUE NECK W CONTRAST, CT CHEST W CONTRAST DIAGNOSTIC    Date of Exam: 9/19/2023 1:46 PM EDT    Indication: L sided neck pain/swelling.    Comparison: CT chest from August 21, 2023    Technique: Axial CT images were obtained of the neck and chest after the uneventful intravenous administration of 80 mL Isovue-300.  Reconstructed coronal and sagittal images were also obtained. Automated exposure control and iterative construction   methods were used.    Findings:  Neck:    The visualized intracranial contents are unremarkable. The globes and orbits appear intact. The nasopharynx and oropharynx have a normal configuration. The larynx appears normal. The parotid and submandibular glands appear intact. The thyroid gland is   surgically absent. No abnormally enlarged lymph nodes are identified. The major vasculature within the neck appears widely patent. Diffuse osseous sclerotic metastatic disease is noted. The paranasal sinuses and mastoid air cells are well-aerated.    Chest:    The central tracheobronchial tree is clear. There is mild emphysema. A 4 mm left upper lobe nodule on image 25 is unchanged. A couple benign calcified granulomas are noted. No new pulmonary nodule is identified. There is no focal consolidation. There is   no pleural effusion.    The heart size appears normal. The great vessels are normal in caliber. No abnormally enlarged lymph nodes are identified.    Partial evaluation of the upper abdomen demonstrates hepatic and renal cysts.    Diffuse osseous sclerotic metastatic disease appears unchanged.  Impression: Impression:  1.No acute process identified within neck or chest.  2.Diffuse osseous sclerotic metastatic disease.  3.Mild emphysema.  4.Stable 4 mm left upper lobe nodule.    Electronically Signed: Sergey Martínez MD    9/19/2023 2:12 PM EDT    Workstation ID: [id number]  XR Chest 1 View  Narrative: XR CHEST 1 VW    Date of Exam: 9/19/2023 9:47  AM CDT    Indication: Weak/Dizzy/AMS triage protocol    Comparison: Chest x-ray 8 12/1/2020, chest CT 8/21/2023    Findings:  Cardiomediastinal silhouette is unremarkable. Few areas of interstitial prominence. There are few vague nodular foci in the right lung. No pneumothorax nor pleural effusion. No acute osseous abnormality identified.  Impression: Impression:  Few new vague nodular foci in the right lung may represent developing infection. Correlate with symptoms.    Electronically Signed: Kyle Hager MD    9/19/2023 10:12 AM CDT    Workstation ID: [id number]                Assessment & Plan     Radha John is a 78 y.o. year old female admitted with sepsis, severe pancytopenia, mucositis, bacteremia.    Sepsis: pseudomonas and E. Coli bacteremia.  ID following, on zosyn, acyclovir, vancomycin.  CT imaging unremarkable.    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  Will send genotype testing.  Uridine triacetate ordered - patient is having trouble swallowing.  This does not come in a different form, but very important that she take this medication to help with clearance of the active drug.  Discussed with nursing.  Consider NGT placement if needed.    Platelet transfusion ordered.    GCSF to start today.    Diarrhea: common capecitabine toxicity                   Sarah Prasad MD  9/20/2023

## 2023-09-21 PROBLEM — E44.0 MODERATE MALNUTRITION: Status: ACTIVE | Noted: 2023-01-01

## 2023-09-21 NOTE — PROGRESS NOTES
Intensive Care Follow-up     Hospital:  LOS: 2 days   Ms. Radha John, 78 y.o. female is followed for:   Severe sepsis            History of present illness:   78 year-old female with DM, HTN, hypothyroidism, hepatitis, diverticulosis, and bilateral stage I ER+ HER2 negative breat cancer with subsequent recurrence involving bone and liver currently undergoing treatment with Oncology. She was placed on Xeloda starting 8/28/23 and subsequently instructed to hold it after her WBCs dropped to 0.55 and platelets to 66. She was started on doxycycline for prophylaxis on 9/15/23. She developed diarrhea with moth soreness. She received IVF and dexamethasone per Oncology on 9/18/23, however, did not improve so she presented to the ED on 9/19/23. Work-up in the ED with ANC 0, thrombocytopenia, neutropenia, JAVIER, and increased LA and procal. She was admitted for sepsis concerns and found to have E. Coli and Pseudomonas aeruginosa bacteremia. 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 Uridine triacetate ordered.      On the morning of 9/20/23, patient became acutely hypotensive despite IVF. She has received ~ 5 L since admission yesterday. Lactic acid has been steadily increasing since admission, last at 6.6. She was additionally hypoglycemic this AM and received D50W for treatment. ICU was contacted for transfer for higher level of care and she has been accepted into our care.     Upon arrival to ICU, patient was awake. She does report she wishes to remain a Full Code with Full Interventions.     Patient was started on bicarb infusion.  Due to significantly elevated lactic acid levels abdominal CT was done which was concerning for transverse colon colitis.      Subjective   Interval History:  Overnight patient deteriorated with worsening respiratory status.  Worsening acidosis and worsening lactic acidosis.  Patient was given platelet transfusion yesterday for severe  "thrombocytopenia.  NG tube needed to be placed as patient was unable to swallow.  Patient was supported with BiPAP overnight.  Norepinephrine infusion started.  Patient was given multiple doses of bicarb and bicarb drip was really started.  Micafungin was added.                 The patient's past medical, surgical and social history were reviewed and updated in Epic as appropriate.       Objective     Infusions:  norepinephrine, 0.02-0.3 mcg/kg/min, Last Rate: 0.1 mcg/kg/min (09/21/23 0113)  phenylephrine, 0.5-3 mcg/kg/min  sodium bicarbonate drip (greater than 75 mEq/bag), 150 mEq, Last Rate: 150 mEq (09/21/23 0749)      Medications:  acyclovir, 250 mg, Intravenous, Q12H  calcium gluconate, 2,000 mg, Intravenous, Q2H  filgrastim, 300 mcg, Subcutaneous, Q PM  First Mouthwash (Magic Mouthwash), 5 mL, Swish & Spit, Q6H  micafungin (MYCAMINE) IV, 100 mg, Intravenous, Q24H  pantoprazole, 40 mg, Oral, Q AM  piperacillin-tazobactam, 4.5 g, Intravenous, Q8H  sodium chloride, 10 mL, Intravenous, Q12H  sodium chloride, 10 mL, Intravenous, Q12H  thiamine (B-1) IV, 500 mg, Intravenous, TID  Uridine Triacetate, 10 g, Oral, Q6H        Vital Sign Min/Max for last 24 hours  Temp  Min: 97.2 °F (36.2 °C)  Max: 98.2 °F (36.8 °C)   BP  Min: 81/54  Max: 124/75   Pulse  Min: 83  Max: 123   Resp  Min: 20  Max: 28   SpO2  Min: 87 %  Max: 100 %   Flow (L/min)  Min: 2  Max: 6       Input/Output for last 24 hour shift  09/20 0701 - 09/21 0700  In: 30423.7 [I.V.:7800.9]  Out: 1675 [Urine:1675]      Objective:  Vital signs: (most recent): Blood pressure 115/61, pulse 111, temperature 97.5 °F (36.4 °C), resp. rate 27, height 152.4 cm (60\"), weight 72.6 kg (160 lb), SpO2 94 %, not currently breastfeeding.          General Appearance: Lethargic, sleepy, on BiPAP in mild respiratory distress  Head:  Whitish tongue, mucosal ulcerations noted as well.    Lungs:   B/L Breath sounds present with decreased breath sounds on bases, no wheezing heard, occ " rhonchi   Heart: S1 and S2 present, no murmur  Abdomen: Obese, slightly distended soft, nontender, no guarding or rigidity, bowel sounds positive.  Extremities:  no cyanosis or clubbing,  no edema, warm to touch.  Pulses: Positive and symmetric.  Neurologic: Sleepy but wakes up and follows some simple commands.    Results from last 7 days   Lab Units 09/21/23  0639 09/20/23  2347 09/20/23  1042 09/19/23  1125   WBC 10*3/mm3 0.07* 0.05* 0.08* 0.16*   HEMOGLOBIN g/dL 8.5* 7.7* 8.5* 12.9   PLATELETS 10*3/mm3  --  15* 9* 37*     Results from last 7 days   Lab Units 09/21/23  0639 09/20/23  2347 09/20/23  1728 09/20/23  1200 09/20/23  0538 09/19/23  1125   SODIUM mmol/L 142 142 143   < > 143 137   POTASSIUM mmol/L 3.3* 3.3* 3.8   < > 3.3* 4.1   CO2 mmol/L 21.0* 18.0* 18.0*   < > 13.0* 16.0*   BUN mg/dL 30* 31* 31*   < > 32* 34*   CREATININE mg/dL 1.44* 1.30* 1.37*   < > 1.40* 1.47*   MAGNESIUM mg/dL 1.1*  --   --   --  1.6 1.9   PHOSPHORUS mg/dL 2.4*  --   --   --  3.5  --    GLUCOSE mg/dL 185* 211* 177*   < > 55* 159*    < > = values in this interval not displayed.     Estimated Creatinine Clearance: 28.6 mL/min (A) (by C-G formula based on SCr of 1.44 mg/dL (H)).    Results from last 7 days   Lab Units 09/21/23  0441   PH, ARTERIAL pH units 7.387   PCO2, ARTERIAL mm Hg 38.1   PO2 ART mm Hg 75.3*       Images:   CT neck and chest did not show any acute pathology.  No definite evidence of consolidation or pneumonia noted.        Blood cultures 2 out of 2 positive for Pseudomonas and E. coli.       CT abdomen reviewed  Impression:     1. Interval development of bilateral small pleural effusions with overlying basilar consolidation which may be due to atelectasis or pneumonia. Additional patchy somewhat nodular areas of consolidation are partially imaged in the more anterior lung   bases, which may be due to additional areas of pneumonia. Neoplasm not excluded. Attention on follow-up recommended.  2. Small amount of  ascites in the perisplenic and paracolic gutter regions, new from prior study.  3. Segmental wall thickening and pericolonic inflammatory change involving the mid transverse colon may indicate focal colitis.  4. Hepatic metastatic disease is not well demonstrated by noncontrast CT. Diffuse skeletal metastatic disease is again noted, as discussed above.        Electronically Signed: Yakov Lopez MD    9/20/2023 1:28 PM EDT    Workstation ID: [id number]    I reviewed the patient's results and images.       Echocardiogram reviewed  Interpretation Summary         Left ventricular systolic function is normal. Left ventricular ejection fraction appears to be 51 - 55%.    Left ventricular wall thickness is consistent with mild concentric hypertrophy.    Systolic flattening of the interventricular septum consistent with right ventricle pressure overload.  There is a Montoya sign noted in the apical right ventricle    Mild to moderate tricuspid valve regurgitation is present. Estimated right ventricular systolic pressure from tricuspid regurgitation is mildly elevated (35-45 mmHg).    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  Patient transferred to ICU with ongoing severe sepsis and septic shock with worsening lactic acidosis.  She does have gram-negative bacteremia with Pseudomonas and E. coli.  Discussed with ID team.  Yesterday micafungin was added due to worsening clinical status.  Patient is growing  some growth in urine cultures as well.  So far we are not seeing any resistance change in these organisms so we will continue high-dose Zosyn for now.  Patient is in septic shock now and on norepinephrine infusion.  We will titrate as tolerated to keep mean arterial pressure 65 or better.  2.  Patient will be worsening metabolic acidosis with worsening lactic acidosis overnight.  Was given multiple doses of bicarb and bicarb drip restarted.  Today morning ABG looks acceptable.  We will continue monitoring closely.  Continue bicarb infusion.  3.  Elevated lactic acidosis.  Worsening noted.  No evidence of colon ischemia yesterday.  Will give thiamine supplementation and see if he can get clearance of lactic acidosis.  No evidence of shock liver or elevated LFTs at this time.  4.  Monitor urine output closely.  Follow renal indicis.  Creatinine slightly up.  Will be high risk of worsening renal failure due to contrast given and due to hemodynamic instability and septic shock.  Multiple electrolyte abnormalities including hypocalcemia, hypomagnesemia and hypokalemia.  Replacing all these electrolytes aggressively per ICU protocol.  5.  Echocardiogram reviewed and showed normal ejection fraction.  However it significantly elevated right ventricular pressure with Montoya sign.  Clinically patient does not fit with large pulmonary embolism.  CT chest with contrast reviewed again carefully and I do not see any evidence of saddle PE.  Will monitor closely for now.  6.  PICC line placement for medication administration and for pressors.  7.  Medical oncology following.  ANC 20 currently. Patient remains on Uridine for chemotoxicity.  Started on GM-CSF as well.  Platelets are undetectable today.  I will go ahead and give 2 units of platelets.  Try to keep platelet count above 10,000.  8.  Continue neutropenic precautions.  9.  Patient has severe mucositis likely from chemotherapy. Patient started on acyclovir for possible  herpes eruption.  HSV swab is sent.  Magic mouthwash as tolerated.  10.  Support with BiPAP as needed.  If gets worse may need intubation and mechanical ventilation.  11.  GI prophylaxis.  11.  SCDs for DVT prophylaxis for now.  Not candidate for pharmacologic prophylaxis due to severe pancytopenia and severe thrombocytopenia.     High risk of decline.  Will monitor closely in intensive care unit.  Patient wished to remain full code.  Family updated.  Apparently extended family is on the way.  We will meet with them once they get around to update them.  Risk of death in this situation.  Discussed with ID team.    Plan of care and goals reviewed with multidisciplinary/antibiotic stewardship team during rounds.   I discussed the patient's findings and my recommendations with patient, family, nursing staff, and consulting provider       Time spent Critical care 40 min (exclusive of procedure time)  including high complexity decision making to assess, manipulate, and support vital organ system failure in this individual who has impairment of one or more vital organ systems such that there is a high probability of imminent or life threatening deterioration in the patient’s condition.      Fernando Laureano MD, [facility]  Pulmonary, Critical care and Sleep Medicine

## 2023-09-21 NOTE — PROGRESS NOTES
Specialty Pharmacy Refill Coordination Note     Radha is a 78 y.o. female contacted today regarding refills of  capecitabine 1500mg PO AM and 1500mg PO PM on days 1-7, then off 7 days, then on 7 days, then off 7 days of 28 day cycle specialty medication(s).    Patient is currently in the ICU with Severe leukopenia/absolute neutropenia-possibly secondary to Xeloda in the setting of DPD deficiency. This is being addressed by Dr. Prasad     Specialty medication(s) and dose(s) confirmed: yes    Refill Questions      Flowsheet Row Most Recent Value   Changes to allergies? No   Changes to medications? No   New conditions since last clinic visit No   Unplanned office visit, urgent care, ED, or hospital admission in the last 4 weeks  Yes  [Patient currently in the ICU]   How does patient/caregiver feel medication is working? Poor   Financial problems or insurance changes  No   Since the previous refill, were any specialty medication doses or scheduled injections missed or delayed?  Yes   If yes, please provide the amount unknown   Why were doses missed? patient currently in the ICU   Does this patient require a clinical escalation to a pharmacist? Yes                       Follow-up: 28 day(s)     Venessa Segura, Pharmacy Technician  Specialty Pharmacy Technician

## 2023-09-21 NOTE — SIGNIFICANT NOTE
78-year-old woman with metastatic breast cancer, recently progressed to involve the liver, started on capecitabine, now with pan cytopenia, septic shock with blood cultures positive for E. coli and Pseudomonas aeruginosa.  Infectious disease evaluated and suspect possible urinary source and possible colitis as the nidus of infection.  She was placed on Zosyn.  Tonight she became more short of air, ABG revealed a pH of 7.18, CO2 59, O2 of 108 on nasal cannula.  Bicarbonate drip was stopped approximately an hour ago.  She remains afebrile.  Systolic blood pressure is drifted down to 87.  Initial CT of the chest showed no pulmonary infiltrates and I do not hear inspiratory crackles or rhonchi on examination.  I suspect tachypnea from her acidosis and sepsis has caused some fatigue.  She is certainly at risk for acute lung injury just from her shock.  She remains profoundly neutropenic with a white count yesterday morning of 0.05, hemoglobin of 7.7 and platelet count of only 15.  She received 3 platelet transfusions.  I placed her on BiPAP 20/10 and her respiratory rate has improved now was 21 compared to 40.  I will give her some bicarb and restart her bicarbonate drip.  I will also add micafungin to her Zosyn.  I will start norepinephrine for her hypotension.  She does remain a full code and if these measures are not helpful she may need intubation.  I called her granddaughter, [phone] and got a voicemail.    I reached out to her granddaughter again and was successfully connected.  Family is unaware of the extent of her breast cancer.  I went over her clinical status and my plans to add vasopressors, more bicarbonate, noninvasive ventilation.  For now we will proceed with intubation if she deteriorates.  She is going to let the family know that she is critically ill but they need to come to the hospital.

## 2023-09-21 NOTE — PROGRESS NOTES
INFECTIOUS DISEASE Progress Note    Radha John  1945  [id number]      Admission Date: 9/19/2023      Requesting Provider: No ref. provider found  Evaluating Physician: Beau Martinez MD    Reason for Consultation: Severe sepsis/gram-negative bacteremia    History of present illness:    9/20/23: Patient is a 78 y.o. female with a history of breast cancer on Xeloda chemotherapy complicated by severe leukopenia who is seen today for evaluation of severe sepsis with gram-negative bacteremia.Her Xeloda therapy has recently been complicated by severe leukopenia and she was started on prophylactic doxycycline. She presented to the emergency room yesterday complaining of severe generalized weakness and nausea.She was found to have tachycardia with a heart rate of 108 and hypotension with a blood pressure of 81/54.She had lactic acidosis with a lactic acid level of 4.8 and an elevated pro-calcitonin of 45. Her white blood cell count was 0.16 and her platelet count was 37.Her creatinine was elevated at 1.47.  Urinalysis revealed 6-12 white blood cells. She was started on intravenous vancomycin and Zosyn therapy.  Her vancomycin has now been held.  Her blood cultures are now growing gram-negative rods and 2 sets with a BCID PCR positive for both E. Coli and Pseudomonas. No resistance chains were detected. Maximum temperature has been 99.1.  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  She has been transferred to the ICU this morning due to persistent hypotension/severe sepsis.  She has oral discomfort/sores.  She denies dysuria and urinary frequency.  She has nausea and vomiting.  She has cough with minimal sputum production.  Her abdominal CT scan this afternoon reveals segmental wall thickening and pericolonic inflammatory changes involving the mid transverse colon consistent with focal colitis.  She is on 2 L of oxygen with an O2 saturation of  96%.    9/21/23: She developed worsening hypercapnic/hypoxic respiratory failure last evening and was placed on BiPAP. Vasopressor therapy was started due to persistent hypotension.  Micafungin was added to her Zosyn therapy.She has been afebrile over the last 24 hours.ABGs earlier this morning revealed a pH of 7.16, PCO2 of 55.6, and PO2 of 77.9 with an FiO2 of 40%. Repeat ABGs at 441 revealed a pH of 7.39, PCO2 of 38.1, and PO2 of 75.3 on 25% BiPAP.Late last night her creatinine was 1.3 and her lactic acid was 6.9.  Her white blood cell count remained profoundly low at 0.05. His platelet count is profoundly low at 15. CXR reveals new bibasilar In perihilar infiltrates. She is lethargic and poorly responsive.  Sensitivities on her Pseudomonas and E. Coli are pending and will not be available until tomorrow.She continues to have lactic acidosis with a lactic acid level of 10.5 this evening.      Past Medical History:   Diagnosis Date    Adenomatous polyp of colon 10/23/2019    Arthritis     Diabetes mellitus     Disease of thyroid gland     Diverticulosis     Gout     Hepatitis     History of kidney stones     Hx of radiation therapy 06/2014    Hyperlipidemia     Hypertension     Invasive ductal carcinoma of breast 09/01/2021    Malignant neoplasm of breast 2014    Menopause        Past Surgical History:   Procedure Laterality Date    BREAST BIOPSY Bilateral     BREAST LUMPECTOMY Bilateral 06/27/2014    BREAST LUMPECTOMY Left 09/01/2021    CATARACT EXTRACTION Right     COLON RESECTION N/A 12/8/2020    Procedure: ROBOTIC LOW ANTERIOR RESECTION, TAKEDOWN OF COLOVAGINAL FISTULA, URETEROLYSIS LEFT;  Surgeon: Sonam Olvera MD;  Location:  [facility];  Service: DaVinci;  Laterality: N/A;    COLONOSCOPY      CYSTOSCOPY N/A 12/8/2020    Procedure: CYSTOSCOPY, TEMPORY BILATERAL URETERAL STENTS;  Surgeon: Rosie Gottlieb MD;  Location:  [facility];  Service: Gynecology;  Laterality: N/A;    HEMORRHOIDECTOMY       PARATHYROIDECTOMY  07/19/2016    Dr. Izabela Hermosillo @ [facility]    THYROID SURGERY      VAGINAL HYSTERECTOMY         Family History   Problem Relation Age of Onset    Hypertension Father     Hypertension Paternal Grandmother     Hypertension Other     Other Other         CAD    Breast cancer Sister 50    Ovarian cancer Niece     Endometrial cancer Neg Hx     Colon cancer Neg Hx     Colon polyps Neg Hx        Social History     Socioeconomic History    Marital status:    Tobacco Use    Smoking status: Former    Smokeless tobacco: Never    Tobacco comments:     quit 40 years ago   Vaping Use    Vaping Use: Never used   Substance and Sexual Activity    Alcohol use: No    Drug use: No    Sexual activity: Yes     Partners: Male     Birth control/protection: Post-menopausal, Surgical       Allergies   Allergen Reactions    Erythromycin Swelling     C/O lips swelling    Hydrocodone-Acetaminophen Nausea Only     Lortab    Acetaminophen Unknown - Low Severity    Hydrocodone Unknown - Low Severity    Amoxicillin Nausea Only         Medication:    Current Facility-Administered Medications:     acyclovir (ZOVIRAX) 250 mg in sodium chloride 0.9 % 250 mL IVPB, 250 mg, Intravenous, Q12H, Beau Martinez MD, 250 mg at 09/21/23 0533    Calcium Replacement - Follow Nurse / BPA Driven Protocol, , Does not apply, PRN, Mirna Haines MD    dextrose (D50W) (25 g/50 mL) IV injection 50 mL, 50 mL, Intravenous, Q1H PRN, Eduardo Santamaria MD, 50 mL at 09/20/23 0740    filgrastim (NEUPOGEN) injection 300 mcg, 300 mcg, Subcutaneous, Q PM, Sarah Prasad MD, 300 mcg at 09/20/23 1759    First Mouthwash (Magic Mouthwash) 5 mL, 5 mL, Swish & Spit, Q6H, Fernando Laureano MD, 5 mL at 09/21/23 0000    Magnesium Standard Dose Replacement - Follow Nurse / BPA Driven Protocol, , Does not apply, PRBEN, Mirna Haines MD    micafungin 100 mg/100 mL 0.9% NS IVPB (mbp), 100 mg, Intravenous, Q24H, Glenna Box MD, 100 mg at 09/21/23  0115    morphine injection 1 mg, 1 mg, Intravenous, Q4H PRN, 1 mg at 09/20/23 1751 **AND** naloxone (NARCAN) injection 0.4 mg, 0.4 mg, Intravenous, Q5 Min PRN, Mirna Haines MD    norepinephrine (LEVOPHED) 8 mg in 250 mL NS infusion (premix), 0.02-0.3 mcg/kg/min, Intravenous, Titrated, Summer Beltran APRN, Last Rate: 13.61 mL/hr at 09/21/23 0113, 0.1 mcg/kg/min at 09/21/23 0113    ondansetron (ZOFRAN) injection 4 mg, 4 mg, Intravenous, Q6H PRN, Mirna Haines MD, 4 mg at 09/20/23 0834    palliative care oral rinse, , Mouth/Throat, PRN, Mirna Haines MD    pantoprazole (PROTONIX) EC tablet 40 mg, 40 mg, Oral, Q AM, Fernando Laureano MD, 40 mg at 09/21/23 0533    phenylephrine (BECKIE-SYNEPHRINE) 50 mg in sodium chloride 0.9 % 250 mL infusion, 0.5-3 mcg/kg/min, Intravenous, Titrated, Herminia Diamond APRN    Phosphorus Replacement - Follow Nurse / BPA Driven Protocol, , Does not apply, PRN, Mirna Haines MD    piperacillin-tazobactam (ZOSYN) 4.5 g in iso-osmotic dextrose 100 mL IVPB (premix), 4.5 g, Intravenous, Q8H, Zaida Waters, PharmD, 4.5 g at 09/21/23 0352    Potassium Replacement - Follow Nurse / BPA Driven Protocol, , Does not apply, PRN, Mirna Haines MD    sodium bicarbonate 8.4 % 150 mEq in dextrose (D5W) 5 % 1,000 mL infusion (greater than 75 mEq), 150 mEq, Intravenous, Continuous, Summer Beltran APRN, Last Rate: 150 mL/hr at 09/21/23 0102, 150 mEq at 09/21/23 0102    sodium chloride 0.9 % flush 10 mL, 10 mL, Intravenous, PRN, Mirna Haines MD    sodium chloride 0.9 % flush 10 mL, 10 mL, Intravenous, Q12H, Mirna Haines MD, 10 mL at 09/21/23 0000    sodium chloride 0.9 % flush 10 mL, 10 mL, Intravenous, PRN, Mirna Haines MD    sodium chloride 0.9 % flush 10 mL, 10 mL, Intravenous, Q12H, Herminia Diamond APRN, 10 mL at 09/20/23 2029    sodium chloride 0.9 % flush 10 mL, 10 mL, Intravenous, PRN, Herminia Diamond, APRN    sodium chloride 0.9 %  infusion 40 mL, 40 mL, Intravenous, PRN, Mirna Haines MD    Uridine Triacetate pack 10 g, 10 g, Oral, Q6H, Mirna Haines MD, 10 g at 09/21/23 0534    Antibiotics:  Anti-Infectives (From admission, onward)      Ordered     Dose/Rate Route Frequency Start Stop    09/20/23 1335  acyclovir (ZOVIRAX) 250 mg in sodium chloride 0.9 % 250 mL IVPB        Ordering Provider: Beau Martinez MD    250 mg  over 60 Minutes Intravenous Every 12 Hours 09/21/23 0600 09/28/23 0559    09/21/23 0034  micafungin 100 mg/100 mL 0.9% NS IVPB (mbp)        Ordering Provider: Glenna Box MD    100 mg  over 60 Minutes Intravenous Every 24 Hours Scheduled 09/21/23 0045 09/27/23 2059    09/20/23 1014  piperacillin-tazobactam (ZOSYN) 4.5 g in iso-osmotic dextrose 100 mL IVPB (premix)        Ordering Provider: Zaida Waters PharmD    4.5 g  over 4 Hours Intravenous Every 8 Hours 09/20/23 2000 09/30/23 1959    09/20/23 1335  acyclovir (ZOVIRAX) 250 mg in sodium chloride 0.9 % 250 mL IVPB        Ordering Provider: Beau Martinez MD    250 mg  over 60 Minutes Intravenous Once 09/20/23 1500 09/20/23 1621    09/20/23 1014  piperacillin-tazobactam (ZOSYN) 4.5 g in iso-osmotic dextrose 100 mL IVPB (premix)        Ordering Provider: Zaida Waters PharmD    4.5 g  over 30 Minutes Intravenous Once 09/20/23 1100 09/20/23 1104    09/20/23 0459  piperacillin-tazobactam (ZOSYN) 3.375 g in iso-osmotic dextrose 50 ml (premix)        Ordering Provider: Doris Villavicencio PA-C    3.375 g  over 30 Minutes Intravenous Once 09/20/23 0545 09/20/23 0540    09/19/23 1249  vancomycin IVPB 1500 mg in 0.9% NaCl (Premix) 500 mL        Ordering Provider: Gutierrez John MD    20 mg/kg × 72.6 kg  333.3 mL/hr over 90 Minutes Intravenous Once 09/19/23 1345 09/19/23 1807    09/19/23 1249  piperacillin-tazobactam (ZOSYN) 3.375 g in iso-osmotic dextrose 50 ml (premix)        Ordering Provider: Gutierrez John MD    3.375 g  over 30 Minutes  Intravenous Once 23 1305 23 1512              Review of Systems:  See HPI      Physical Exam:   Vital Signs  Temp (24hrs), Av.6 °F (36.4 °C), Min:96 °F (35.6 °C), Max:98.2 °F (36.8 °C)    Temp  Min: 96 °F (35.6 °C)  Max: 98.2 °F (36.8 °C)  BP  Min: 79/57  Max: 137/67  Pulse  Min: 84  Max: 128  Resp  Min: 18  Max: 30  SpO2  Min: 87 %  Max: 100 %    GENERAL: She is toxic and ill-appearing.  She is in no acute distress.  HEENT: She has oral mucositis/Stomatitis  NECK: Supple  HEART: RRR; No murmur, rubs   LUNGS: Clear to auscultation bilaterally   ABDOMEN: Soft, nontender, nondistended.  No rebound or guarding. NO mass or HSM.  EXT:  No cyanosis, clubbing or edema.   : An external catheter is in place.  MSK: No joint effusions or erythema  SKIN: Warm and dry without cutaneous eruptions on Inspection/palpation.    NEURO: She is lethargic and poorly responsive.  She moves all of her extremities.    Laboratory Data    Results from last 7 days   Lab Units 23  2347 23  1042 23  1125   WBC 10*3/mm3 0.05* 0.08* 0.16*   HEMOGLOBIN g/dL 7.7* 8.5* 12.9   HEMATOCRIT % 24.2* 26.3* 39.8   PLATELETS 10*3/mm3 15* 9* 37*       Results from last 7 days   Lab Units 23  2347   SODIUM mmol/L 142   POTASSIUM mmol/L 3.3*   CHLORIDE mmol/L 105   CO2 mmol/L 18.0*   BUN mg/dL 31*   CREATININE mg/dL 1.30*   GLUCOSE mg/dL 211*   CALCIUM mg/dL 5.7*       Results from last 7 days   Lab Units 23  0538   ALK PHOS U/L 38*   BILIRUBIN mg/dL 0.5   ALT (SGPT) U/L 8   AST (SGOT) U/L 13               Results from last 7 days   Lab Units 23  2347   LACTATE mmol/L 6.9*               Estimated Creatinine Clearance: 31.7 mL/min (A) (by C-G formula based on SCr of 1.3 mg/dL (H)).      Microbiology:  Blood Culture   Date Value Ref Range Status   2023 Abnormal Stain (C)  Preliminary     BCID, PCR   Date Value Ref Range Status   2023 Escherichia coli. Identification by BCID2 PCR. (A) Negative by  BCID PCR. Culture to Follow. Final     BCID, PCR 2   Date Value Ref Range Status   09/19/2023 (A) Negative by BCID PCR. Culture to Follow. Final    Pseudomonas aeruginosa. Identification by BCID2 PCR     No results found for: CULTURES, HSVCX, URCX  No results found for: EYECULTURE, GCCX, HSVCULTURE, LABHSV  No results found for: LEGIONELLA, MRSACX, MUMPSCX, MYCOPLASCX  No results found for: NOCARDIACX, STOOLCX  No results found for: THROATCX, UNSTIMCULT, URINECX, CULTURE, VZVCULTUR  No results found for: VIRALCULTU, WOUNDCX        Radiology:  Imaging Results (Last 72 Hours)       Procedure Component Value Units Date/Time    XR Chest 1 View [[id number]] Collected: 09/21/23 0125     Updated: 09/21/23 0130    Narrative:      XR CHEST 1 VW    Date of Exam: 9/21/2023 12:58 AM EDT    Indication: hypoxia, septic shock    Comparison: 9/19/2023 chest radiograph and CT.    Findings:  There is a new right upper extremity PICC, which terminates at the cavoatrial junction. There is mild stable elevation of the left hemidiaphragm. There is new mild perihilar and basilar airspace disease bilaterally. No definite pleural effusion. No   pneumothorax. Heart size is unchanged. Pulmonary vasculature is partially indistinct. New gastric suction tube courses below the diaphragm terminating in the stomach.      Impression:      Impression:  1.New right upper extremity PICC terminates at the cavoatrial junction.  2.New gastric suction tube terminates in the stomach.  3.New perihilar and basilar airspace disease.        Electronically Signed: Ramon Kelly MD    9/21/2023 1:27 AM EDT    Workstation ID: [id number]    XR Abdomen KUB [[id number]] Collected: 09/20/23 1925     Updated: 09/20/23 1929    Narrative:      XR ABDOMEN KUB    Date of Exam: 9/20/2023 6:58 PM EDT    Indication: NGT placement    Comparison: None available.    Findings:  Nasogastric tube is in place the tip ejecting over the distal body of the stomach. The abdominal bowel gas  pattern appears grossly normal. No abnormal calcifications overlie the abdomen or pelvis. Lung bases appear grossly clear.      Impression:      Impression:    1. Nasogastric tube in place the tip projecting over the distal body of the stomach.      Electronically Signed: Cory Swann MD    9/20/2023 7:26 PM EDT    Workstation ID: [id number]    XR Abdomen KUB [[id number]] Collected: 09/20/23 1824     Updated: 09/20/23 1828    Narrative:      XR ABDOMEN KUB    Date of Exam: 9/20/2023 6:10 PM EDT    Indication: NGT placement    Comparison: None available.    Findings:  There is an esophagogastric tube present with the tip in the distal stomach. Grossly nonobstructive bowel gas pattern.      Impression:      Impression:  Esophagogastric tube tip in the distal stomach.      Electronically Signed: Yakov Lopez MD    9/20/2023 6:25 PM EDT    Workstation ID: [id number]    CT Abdomen Pelvis Without Contrast [[id number]] Collected: 09/20/23 1316     Updated: 09/20/23 1331    Narrative:      CT ABDOMEN PELVIS WO CONTRAST    Date of Exam: 9/20/2023 1:13 PM EDT    Indication: Abdominal pain, acute, nonlocalized  Nausea/vomiting.    Comparison: 8/21/2023.    Technique: Axial CT images were obtained of the abdomen and pelvis without the administration of contrast. Reconstructed coronal and sagittal images were also obtained. Automated exposure control and iterative construction methods were used.      Findings:  There are small bilateral pleural effusions, right greater than left with overlying consolidation in the bases either due to atelectasis or pneumonia. Somewhat nodular areas of consolidation in the base of the right upper lobe and lingula are noted,   which may be due to pneumonia. Attention on follow-up recommended to rule out neoplasm. Calcified granulomatous changes are noted in the left hilum. There is prominence of the main pulmonary artery which may indicate underlying pulmonary hypertension.   Trace pericardial  effusion is noted. There is cardiomegaly.    There is calcified granulomatous change within the spleen. There is trace ascites in the perisplenic region and paracolic gutters. Unenhanced evaluation of the pancreas, adrenals, is grossly unremarkable. Known hepatic metastatic lesions are not well   visualized by noncontrast CT. Please see prior study 8/21/2023. There are calcified granulomatous changes in the liver. Gallbladder is distended but otherwise grossly unremarkable. Bilateral probable renal cysts are noted. No evidence of urinary calculus   is identified. There is increased density within the urinary bladder suggesting excreted contrast material. Correlate clinically for any recent contrasted study. Urinary bladder is distended but otherwise grossly unremarkable. Patient is status post   hysterectomy. There is a 2.2 cm densely calcified focus in the left ovary. Right ovary is grossly unremarkable. The appendix is identified and is normal caliber and appearance by noncontrast CT. There is a 4 cm long segment of circumferential wall   thickening and pericolonic inflammatory change involving the mid transverse colon suggesting focal colitis. There is postsurgical change from sigmoid colonic anastomosis. No gross adenopathy is identified in the abdomen or pelvis. No free air or   pneumatosis is identified.     Widespread sclerotic metastatic disease is again noted there is a lytic focus in the posterior left aspect of the T12 vertebral body, with erosion of the cortex of the adjacent anterior spinal canal, similar to recent prior study.      Impression:      Impression:    1. Interval development of bilateral small pleural effusions with overlying basilar consolidation which may be due to atelectasis or pneumonia. Additional patchy somewhat nodular areas of consolidation are partially imaged in the more anterior lung   bases, which may be due to additional areas of pneumonia. Neoplasm not excluded. Attention on  follow-up recommended.  2. Small amount of ascites in the perisplenic and paracolic gutter regions, new from prior study.  3. Segmental wall thickening and pericolonic inflammatory change involving the mid transverse colon may indicate focal colitis.  4. Hepatic metastatic disease is not well demonstrated by noncontrast CT. Diffuse skeletal metastatic disease is again noted, as discussed above.            Electronically Signed: Yakov Lopez MD    9/20/2023 1:28 PM EDT    Workstation ID: [id number]    CT Soft Tissue Neck With Contrast [[id number]] Collected: 09/19/23 1359     Updated: 09/19/23 1416    Narrative:        CT SOFT TISSUE NECK W CONTRAST, CT CHEST W CONTRAST DIAGNOSTIC    Date of Exam: 9/19/2023 1:46 PM EDT    Indication: L sided neck pain/swelling.    Comparison: CT chest from August 21, 2023    Technique: Axial CT images were obtained of the neck and chest after the uneventful intravenous administration of 80 mL Isovue-300.  Reconstructed coronal and sagittal images were also obtained. Automated exposure control and iterative construction   methods were used.      Findings:  Neck:    The visualized intracranial contents are unremarkable. The globes and orbits appear intact. The nasopharynx and oropharynx have a normal configuration. The larynx appears normal. The parotid and submandibular glands appear intact. The thyroid gland is   surgically absent. No abnormally enlarged lymph nodes are identified. The major vasculature within the neck appears widely patent. Diffuse osseous sclerotic metastatic disease is noted. The paranasal sinuses and mastoid air cells are well-aerated.    Chest:    The central tracheobronchial tree is clear. There is mild emphysema. A 4 mm left upper lobe nodule on image 25 is unchanged. A couple benign calcified granulomas are noted. No new pulmonary nodule is identified. There is no focal consolidation. There is   no pleural effusion.    The heart size appears normal. The great  vessels are normal in caliber. No abnormally enlarged lymph nodes are identified.    Partial evaluation of the upper abdomen demonstrates hepatic and renal cysts.    Diffuse osseous sclerotic metastatic disease appears unchanged.      Impression:      Impression:  1.No acute process identified within neck or chest.  2.Diffuse osseous sclerotic metastatic disease.  3.Mild emphysema.  4.Stable 4 mm left upper lobe nodule.        Electronically Signed: Sergey Martínez MD    9/19/2023 2:12 PM EDT    Workstation ID: [id number]    CT Chest With Contrast Diagnostic [[id number]] Collected: 09/19/23 1359     Updated: 09/19/23 1416    Narrative:        CT SOFT TISSUE NECK W CONTRAST, CT CHEST W CONTRAST DIAGNOSTIC    Date of Exam: 9/19/2023 1:46 PM EDT    Indication: L sided neck pain/swelling.    Comparison: CT chest from August 21, 2023    Technique: Axial CT images were obtained of the neck and chest after the uneventful intravenous administration of 80 mL Isovue-300.  Reconstructed coronal and sagittal images were also obtained. Automated exposure control and iterative construction   methods were used.      Findings:  Neck:    The visualized intracranial contents are unremarkable. The globes and orbits appear intact. The nasopharynx and oropharynx have a normal configuration. The larynx appears normal. The parotid and submandibular glands appear intact. The thyroid gland is   surgically absent. No abnormally enlarged lymph nodes are identified. The major vasculature within the neck appears widely patent. Diffuse osseous sclerotic metastatic disease is noted. The paranasal sinuses and mastoid air cells are well-aerated.    Chest:    The central tracheobronchial tree is clear. There is mild emphysema. A 4 mm left upper lobe nodule on image 25 is unchanged. A couple benign calcified granulomas are noted. No new pulmonary nodule is identified. There is no focal consolidation. There is   no pleural effusion.    The heart size  appears normal. The great vessels are normal in caliber. No abnormally enlarged lymph nodes are identified.    Partial evaluation of the upper abdomen demonstrates hepatic and renal cysts.    Diffuse osseous sclerotic metastatic disease appears unchanged.      Impression:      Impression:  1.No acute process identified within neck or chest.  2.Diffuse osseous sclerotic metastatic disease.  3.Mild emphysema.  4.Stable 4 mm left upper lobe nodule.        Electronically Signed: Sergey Martínez MD    9/19/2023 2:12 PM EDT    Workstation ID: [id number]    XR Chest 1 View [[id number]] Collected: 09/19/23 1107     Updated: 09/19/23 1116    Narrative:      XR CHEST 1 VW    Date of Exam: 9/19/2023 9:47 AM CDT    Indication: Weak/Dizzy/AMS triage protocol    Comparison: Chest x-ray 8 12/1/2020, chest CT 8/21/2023    Findings:  Cardiomediastinal silhouette is unremarkable. Few areas of interstitial prominence. There are few vague nodular foci in the right lung. No pneumothorax nor pleural effusion. No acute osseous abnormality identified.      Impression:      Impression:  Few new vague nodular foci in the right lung may represent developing infection. Correlate with symptoms.      Electronically Signed: Kyle Hager MD    9/19/2023 10:12 AM CDT    Workstation ID: [id number]              Impression:   Septic shock-manifested by hypotension, tachycardia, severe leukopenia/absolute neutropenia, acute hypoxic respiratory failure requiring supplemental oxygen, lactic acidosis, an elevated pro-calcitonin, acute kidney injury, and  gram-negative bacteremia. She is now requiring vasopressor therapy. Sensitivities on her Pseudomonas and E. Coli are pending.  Pseudomonas/E. Coli bacteremia-secondary to absolute neutropenia with a possible urinary tract vs colonic source  Pyuria-she has pyuria despite having absolute neutropenia. Her urine culture is growing and non-lactose fermenting gram-negative dina and a lactose fermenting  gram-negative dina  Severe leukopenia/absolute neutropenia-possibly secondary to Xeloda in the setting of DPD deficiency.  This is being addressed by Dr. Prasad  Lactic acidosis-secondary to sepsis  Acute kidney injury  Acute hypoxic respiratory failure-worsening and now on BiPAP.  Left breast cancer-on Xeloda therapy  Thrombocytopenia  Oral mucositis-secondary to 5-FU versus HSV.  She is now on intravenous acyclovir.  I will check an oral HSV culture.  Nausea/vomiting  11. ?Transverse colon colitis-by CT scan      PLAN/RECOMMENDATIONS:   Blood cultures-pending  Urine culture-pending  Continue intravenous Zosyn at 4.5 g IV every 8 hours  Vasopressor therapy per the intensivist service  Continue intravenous acyclovir at 5 mg/kg every 12 hours  Continue micafungin  Oral HSV culture    She is critically ill with a high mortality risk.  I discussed her complex situation in detail with multiple family members today.  I discussed her complex situation in detail with the nursing staff today.  I discussed her complex situation with Dr. Laureano today.  I spent over 35 minutes on complex care today.       Beau Martinez MD  9/21/2023  06:49 EDT

## 2023-09-21 NOTE — PLAN OF CARE
Goal Outcome Evaluation:      Pt declined during shift:    SBP 80's - 110 Levo started & titrated up to 0.1  O2 sats dropped to 87- 88% on 6 liters NC, ABG obtained (see labs), placed on bipap , current ABG shows improvement  3 amps bicarb given , bicarb drip continued  K 3.3 replaced x 2, morning labs in process  Pt able to follow commands, opens eyes to voice, nods head appropriately for yes / no questions  Family updated at bedside

## 2023-09-21 NOTE — PROGRESS NOTES
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  Awake and alert, nods in response to questions.      REVIEW OF SYSTEMS:  Unable to obtain 2/2 patient status.      Objective     Vitals:    09/21/23 1100 09/21/23 1115 09/21/23 1130 09/21/23 1140   BP: 117/65  113/62 115/61   BP Location:       Patient Position:       Pulse: 111 115 106 111   Resp: 26 26  27   Temp: 97.5 °F (36.4 °C) 97.5 °F (36.4 °C)  97.5 °F (36.4 °C)   TempSrc: Axillary Axillary     SpO2: 90% 93% 95% 94%   Weight:       Height:                       Performance Status: 3  General:  female , awake and alert  Neuro: alert and oriented  HEENT: on BIPAP  Lymphatics: no cervical, supraclavicular, or axillary adenopathy  Cardiovascular: regular rate and rhythm, no murmurs  Lungs: clear to auscultation bilaterally  Abdomen: soft, nontender, nondistended.  No palpable organomegaly  Extremities: no lower extremity edema  Skin: no rashes, lesions, bruising, or petechiae  Psych: mood and affect appropriate      Labs:       Lab Results   Component Value Date    WBC 0.07 (C) 09/21/2023    HGB 8.5 (L) 09/21/2023    HCT 25.4 (L) 09/21/2023    MCV 97.3 (H) 09/21/2023    PLT  09/21/2023      Comment:      Unable to calculate       Lab Results   Component Value Date    GLUCOSE 185 (H) 09/21/2023    BUN 30 (H) 09/21/2023    CREATININE 1.44 (H) 09/21/2023    EGFRIFAFRI 51 (L) 02/15/2022    BCR 20.8 09/21/2023    K 3.3 (L) 09/21/2023    CO2 21.0 (L) 09/21/2023     CALCIUM 5.6 (C) 09/21/2023    PROTENTOTREF 6.8 03/22/2021    ALBUMIN 2.5 (L) 09/21/2023    AST 22 09/21/2023    ALT 12 09/21/2023     XR Chest 1 View  Narrative: XR CHEST 1 VW    Date of Exam: 9/21/2023 12:58 AM EDT    Indication: hypoxia, septic shock    Comparison: 9/19/2023 chest radiograph and CT.    Findings:  There is a new right upper extremity PICC, which terminates at the cavoatrial junction. There is mild stable elevation of the left hemidiaphragm. There is new mild perihilar and basilar airspace disease bilaterally. No definite pleural effusion. No   pneumothorax. Heart size is unchanged. Pulmonary vasculature is partially indistinct. New gastric suction tube courses below the diaphragm terminating in the stomach.  Impression: Impression:  1.New right upper extremity PICC terminates at the cavoatrial junction.  2.New gastric suction tube terminates in the stomach.  3.New perihilar and basilar airspace disease.    Electronically Signed: Ramon Kelly MD    9/21/2023 1:27 AM EDT    Workstation ID: [id number]                Assessment & Plan     Radha John is a 78 y.o. year old female admitted with sepsis, severe pancytopenia, mucositis, bacteremia.    Sepsis: pseudomonas and E. Coli bacteremia.  ID following, on zosyn, acyclovir, vancomycin.   Micafungin added overnight.     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 Genotype testing pending. Uridine triacetate q6 hours as ordered.  Discussed with family that it may take days to see improvement.  She is in a very tenuous situation right now.    Platelet transfusion again today.    Continue GCSF                       Sarah Prasad MD  9/21/2023

## 2023-09-22 NOTE — PROGRESS NOTES
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  Multiple family members in the room.      REVIEW OF SYSTEMS:  Unable to obtain 2/2 patient status.      Objective     Vitals:    09/22/23 0945 09/22/23 1000 09/22/23 1015 09/22/23 1030   BP: 114/61 119/62 118/63 120/62   BP Location: Left arm      Patient Position: Lying      Pulse: 87 87 86 86   Resp: (!) 33      Temp: 97.9 °F (36.6 °C) 97.9 °F (36.6 °C)  97.9 °F (36.6 °C)   TempSrc: Bladder Bladder  Bladder   SpO2: 94% 94% 94% 94%   Weight:       Height:                       Performance Status: 4  General:  female , awake and alert  Neuro: alert and oriented  HEENT: on high flow NC  Lymphatics: no cervical, supraclavicular, or axillary adenopathy  Cardiovascular: regular rate and rhythm, no murmurs  Lungs: clear to auscultation bilaterally  Abdomen: soft, nontender, nondistended.  No palpable organomegaly  Extremities: no lower extremity edema  Skin: no rashes, lesions, bruising, or petechiae  Psych: mood and affect appropriate      Labs:       Lab Results   Component Value Date    WBC 0.11 (C) 09/22/2023    HGB 8.8 (L) 09/22/2023    HCT 26.4 (L) 09/22/2023    MCV 97.8 (H) 09/22/2023    PLT 5 (C) 09/22/2023     Lab Results   Component Value Date    GLUCOSE 269 (H) 09/22/2023    BUN 31 (H) 09/22/2023    CREATININE 1.34 (H) 09/22/2023    EGFRIFAFRI 51 (L) 02/15/2022    BCR 23.1 09/22/2023    K 3.5 09/22/2023    CO2 27.0 09/22/2023     CALCIUM 5.6 (C) 09/22/2023    PROTENTOTREF 6.8 03/22/2021    ALBUMIN 2.5 (L) 09/22/2023    AST 19 09/22/2023    ALT 14 09/22/2023     XR Chest 1 View  Narrative: XR CHEST 1 VW    Date of Exam: 9/21/2023 12:58 AM EDT    Indication: hypoxia, septic shock    Comparison: 9/19/2023 chest radiograph and CT.    Findings:  There is a new right upper extremity PICC, which terminates at the cavoatrial junction. There is mild stable elevation of the left hemidiaphragm. There is new mild perihilar and basilar airspace disease bilaterally. No definite pleural effusion. No   pneumothorax. Heart size is unchanged. Pulmonary vasculature is partially indistinct. New gastric suction tube courses below the diaphragm terminating in the stomach.  Impression: Impression:  1.New right upper extremity PICC terminates at the cavoatrial junction.  2.New gastric suction tube terminates in the stomach.  3.New perihilar and basilar airspace disease.    Electronically Signed: Ramon Kelly MD    9/21/2023 1:27 AM EDT    Workstation ID: [id number]                Assessment & Plan     Radha John is a 78 y.o. year old female admitted with sepsis, severe pancytopenia, mucositis, bacteremia.    Sepsis: pseudomonas and E. Coli bacteremia.  ID following, on cefepime, acyclovir, micafungin.  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 Genotype testing pending. Uridine triacetate q6 hours as ordered.  Remains in very high risk situation.  Discussed with spouse and family.    Platelet transfusion given this am.    Continue GCSF    My partner will follow over the weekend.                       Sarha Prasad MD  9/22/2023

## 2023-09-22 NOTE — PROGRESS NOTES
Intensive Care Follow-up     Hospital:  LOS: 3 days   Ms. Radha John, 78 y.o. female is followed for:   Severe sepsis            History of present illness:   78 year-old female with DM, HTN, hypothyroidism, hepatitis, diverticulosis, and bilateral stage I ER+ HER2 negative breat cancer with subsequent recurrence involving bone and liver currently undergoing treatment with Oncology. She was placed on Xeloda starting 8/28/23 and subsequently instructed to hold it after her WBCs dropped to 0.55 and platelets to 66. She was started on doxycycline for prophylaxis on 9/15/23. She developed diarrhea with moth soreness. She received IVF and dexamethasone per Oncology on 9/18/23, however, did not improve so she presented to the ED on 9/19/23. Work-up in the ED with ANC 0, thrombocytopenia, neutropenia, JAVIER, and increased LA and procal. She was admitted for sepsis concerns and found to have E. Coli and Pseudomonas aeruginosa bacteremia. 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 Uridine triacetate ordered.      On the morning of 9/20/23, patient became acutely hypotensive despite IVF. She has received ~ 5 L since admission yesterday. Lactic acid has been steadily increasing since admission, last at 6.6. She was additionally hypoglycemic this AM and received D50W for treatment. ICU was contacted for transfer for higher level of care and she has been accepted into our care.     Upon arrival to ICU, patient was awake. She does report she wishes to remain a Full Code with Full Interventions.     Patient was started on bicarb infusion.  Due to significantly elevated lactic acid levels abdominal CT was done which was concerning for transverse colon colitis.    Patient needed to be started on pressors.  Developed atrial fibrillation so amiodarone drip was started.  GM-CSF started per medical oncology.  ID team is following and antibiotics have been given for E. coli and  Pseudomonas bacteremia.  Patient was also started empirically on micafungin.  Patient however continued to decline and developed worsening respiratory failure and shock.  Extensive discussions held with patient and extended family.  They were made aware of extremely poor prognosis overall and high risk of imminent death in this patient.   decided to proceed with intubation and mechanical ventilation.      Subjective   Interval History:  Patient developed worsening hypotension overnight and now on 2 pressors.  Was on amiodarone drip and converted back to sinus rhythm currently.  Today morning was waking up and following some commands but as the day progressed she is not tolerating BiPAP very well and became more hypoxic.  She was not able to pull volumes through BiPAP.  Extensive discussions held with family and they decided to proceed with aggressive care so patient was intubated and placed on mechanical ventilation.             The patient's past medical, surgical and social history were reviewed and updated in Epic as appropriate.       Objective     Infusions:  amiodarone, 0.5 mg/min, Last Rate: 0.5 mg/min (09/22/23 1129)  fentanyl 10 mcg/mL,  mcg/hr, Last Rate: 50 mcg/hr (09/22/23 1317)  norepinephrine, 0.02-0.3 mcg/kg/min, Last Rate: 0.5 mcg/kg/min (09/22/23 1300)  phenylephrine, 0.5-3 mcg/kg/min, Last Rate: 1.2 mcg/kg/min (09/22/23 1244)  sodium bicarbonate drip (greater than 75 mEq/bag), 150 mEq, Last Rate: 150 mEq (09/22/23 0929)  vasopressin, 0.03 Units/min, Last Rate: 0.03 Units/min (09/22/23 1304)      Medications:  acyclovir, 250 mg, Intravenous, Q12H  albumin human, 250 mL, Intravenous, Once  calcium gluconate, 1,000 mg, Intravenous, Q2H  calcium gluconate, 2,000 mg, Intravenous, Once  cefepime, 2,000 mg, Intravenous, Q12H  chlorhexidine, 15 mL, Mouth/Throat, Q12H  fentaNYL citrate (PF), , ,   filgrastim, 300 mcg, Subcutaneous, Q PM  First Mouthwash (Magic Mouthwash), 5 mL, Swish & Spit,  "Q6H  micafungin (MYCAMINE) IV, 100 mg, Intravenous, Q24H  [START ON 9/23/2023] pantoprazole, 40 mg, Intravenous, Q AM  senna-docusate sodium, 2 tablet, Oral, BID  sodium chloride, 10 mL, Intravenous, Q12H  sodium chloride, 10 mL, Intravenous, Q12H  uridine triacetate, 10 g, Oral, Q6H        Vital Sign Min/Max for last 24 hours  Temp  Min: 96.8 °F (36 °C)  Max: 98.6 °F (37 °C)   BP  Min: 45/34  Max: 142/129   Pulse  Min: 61  Max: 156   Resp  Min: 20  Max: 33   SpO2  Min: 86 %  Max: 100 %   Flow (L/min)  Min: 50  Max: 50       Input/Output for last 24 hour shift  09/21 0701 - 09/22 0700  In: 7059.1 [I.V.:5035.1]  Out: 1225 [Urine:1125]   FiO2 (%):  [100 %] 100 %  S RR:  [16] 16  PEEP/CPAP (cm H2O):  [5 cm H20-8 cm H20] 8 cm H20  TN SUP:  [10 cm H20] 10 cm H20  MAP (cm H2O):  [0-12] 12  Objective:  Vital signs: (most recent): Blood pressure 91/58, pulse 78, temperature 97.9 °F (36.6 °C), resp. rate (!) 32, height 152.4 cm (60\"), weight 72.6 kg (160 lb), SpO2 100 %, not currently breastfeeding.          General Appearance: Currently on mechanical ventilation  Head:  Whitish tongue, mucosal ulcerations. Thick bloody secretions posterior hypopharynx.     Lungs:   B/L Breath sounds present with decreased breath sounds on bases, no wheezing heard, occ rhonchi   Heart: S1 and S2 present, no murmur, currently sinus rhythm  Abdomen: Obese, slightly distended soft, nontender, no guarding or rigidity, bowel sounds hypoactive  Extremities:  trace edema, warm to touch.  Pulses: Positive and symmetric.  Neurologic: sedated currently but prior to this pt was minimally responsive.     Results from last 7 days   Lab Units 09/22/23  0605 09/21/23  1423 09/21/23  0639 09/20/23  2347   WBC 10*3/mm3 0.11* 0.05* 0.07* 0.05*   HEMOGLOBIN g/dL 8.8* 7.5* 8.5* 7.7*   PLATELETS 10*3/mm3 5* 55*  --  15*       Results from last 7 days   Lab Units 09/22/23  1150 09/22/23  0605 09/21/23  2321 09/21/23  1811 09/21/23  1423 09/21/23  0639 " 09/20/23  1200 09/20/23  0538   SODIUM mmol/L 141 142 143   < > 139 142   < > 143   POTASSIUM mmol/L 3.9 3.5 3.1*   < > 3.5 3.3*   < > 3.3*   CO2 mmol/L 32.0* 27.0 27.0   < > 26.0 21.0*   < > 13.0*   BUN mg/dL 31* 31* 31*   < > 30* 30*   < > 32*   CREATININE mg/dL 1.48* 1.34* 1.32*   < > 1.41* 1.44*   < > 1.40*   MAGNESIUM mg/dL  --  1.8 2.0  --  1.8 1.1*  --  1.6   PHOSPHORUS mg/dL  --  2.2*  --   --   --  2.4*  --  3.5   GLUCOSE mg/dL 274* 269* 273*   < > 247* 185*   < > 55*    < > = values in this interval not displayed.       Estimated Creatinine Clearance: 27.8 mL/min (A) (by C-G formula based on SCr of 1.48 mg/dL (H)).    Results from last 7 days   Lab Units 09/22/23  0342   PH, ARTERIAL pH units 7.360   PCO2, ARTERIAL mm Hg 49.9*   PO2 ART mm Hg 88.4         Images:   Chest x-ray reviewed personally and showed endotracheal tube above dalia.  NG tube coursing below the diaphragm.  Bilateral basilar atelectasis and pleural effusion slightly worse.        Blood cultures 2 out of 2 positive for Pseudomonas and E. coli.       CT abdomen reviewed  Impression:     1. Interval development of bilateral small pleural effusions with overlying basilar consolidation which may be due to atelectasis or pneumonia. Additional patchy somewhat nodular areas of consolidation are partially imaged in the more anterior lung   bases, which may be due to additional areas of pneumonia. Neoplasm not excluded. Attention on follow-up recommended.  2. Small amount of ascites in the perisplenic and paracolic gutter regions, new from prior study.  3. Segmental wall thickening and pericolonic inflammatory change involving the mid transverse colon may indicate focal colitis.  4. Hepatic metastatic disease is not well demonstrated by noncontrast CT. Diffuse skeletal metastatic disease is again noted, as discussed above.        Electronically Signed: Yakov Lopez MD    9/20/2023 1:28 PM EDT    Workstation ID: [id number]    I reviewed the  patient's results and images.       Echocardiogram reviewed  Interpretation Summary         Left ventricular systolic function is normal. Left ventricular ejection fraction appears to be 51 - 55%.    Left ventricular wall thickness is consistent with mild concentric hypertrophy.    Systolic flattening of the interventricular septum consistent with right ventricle pressure overload.  There is a Montoya sign noted in the apical right ventricle    Mild to moderate tricuspid valve regurgitation is present. Estimated right ventricular systolic pressure from tricuspid regurgitation is mildly elevated (35-45 mmHg).    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  Patient transferred to ICU with ongoing severe sepsis and septic shock with worsening lactic acidosis.  She does have gram-negative bacteremia with Pseudomonas and E. coli.  Discussed with ID team.  Micafungin was added due to worsening clinical status.  Patient has more than 100,000 colonies of gram-negative bacilli in urine.  ID team is following and antibiotics changed to cefepime.  Remains on acyclovir for concern about HSV eruption oral cavity.  Patient is in profound septic shock.  Now requiring 3 pressors.  We had to add vasopressin as well.  We will continue to monitor closely.  Place arterial line for hemodynamic monitoring.    2.  Patient will be worsening metabolic acidosis with worsening lactic acidosis overnight.  Not clearing lactic  acid well.   Recent abdominal CT showed evidence of colitis but no evidence of ischemia.  Not stable enough to proceed for any further imaging.  We will continue supportive care.  Patient was given thiamine supplementation without any improvement either.  Likely low perfusion state leading to lactic acidosis.  Will check LFTs again in the morning.  There was no evidence of shock liver either.    3. Monitor urine output closely.  Follow renal indices.  Creatinine slightly up.  Will be high risk of worsening renal failure due to contrast given and due to hemodynamic instability and septic shock.  Multiple electrolyte abnormalities including refractory hypocalcemia, hypomagnesemia and hypokalemia.  Replacing all these electrolytes aggressively per ICU protocol.  Wondering if hypocalcemia is due to chemotherapy toxicity.    4.  Echocardiogram reviewed and showed normal ejection fraction.  However it significantly elevated right ventricular pressure with Montoya sign.  Clinically patient does not fit with large pulmonary embolism.  CT chest with contrast reviewed again carefully and I do not see any evidence of saddle PE.  Will monitor closely for now.    5.  Patient Orlept atrial fibrillation with rapid ventricular rate.  Converted back to sinus rhythm with IV amiodarone.  We will continue amiodarone for another 24 hours at low-dose rate.    6.  PICC line placement for medication administration and for pressors.    7.  Medical oncology following.  WBC count 0.11 with ANC of 10. Patient remains on Uridine for chemotoxicity.  Started on GM-CSF as well.  Platelets are 5000.  Will give additional 2 units of platelets.      8.  Continue neutropenic precautions.    9.  Patient has severe mucositis likely from chemotherapy. Patient started on acyclovir for possible herpes eruption.  HSV swab is sent.      10.  Patient developed worsening respiratory failure with worsening hypoxemia.  Maxed out on BiPAP and was still  desaturating on 100% FiO2 and becoming less responsive.  Extensive discussions held with family and we proceeded to intubate and mechanically ventilate the patient.  We will continue supportive care.  Follow blood gases.  .  11.  GI prophylaxis.    12.  SCDs for DVT prophylaxis for now.  Not candidate for pharmacologic prophylaxis due to severe pancytopenia and severe thrombocytopenia.     13.  Not able to start any enteral nutrition due to significant pressor requirements at this time.  High risk of complications such as infectious complication with severe neutropenia with TPN.  We will add bone marrow recovery and then reevaluate nutrition in next 24 to 48 hours.    Patient very high risk of dying.  Have met with multiple family members and updated them about current condition.  They are aware but  wants to give patient every possible chance.  We proceeded with intubation and mechanical ventilation.  We will continue aggressive support at this point.    Plan of care and goals reviewed with multidisciplinary/antibiotic stewardship team during rounds.   I discussed the patient's findings and my recommendations with patient, family, nursing staff, and consulting provider       Time spent Critical care 55 min (exclusive of procedure time)  including high complexity decision making to assess, manipulate, and support vital organ system failure in this individual who has impairment of one or more vital organ systems such that there is a high probability of imminent or life threatening deterioration in the patient’s condition.      Fernando Laureano MD, [facility]  Pulmonary, Critical care and Sleep Medicine

## 2023-09-22 NOTE — PROGRESS NOTES
INFECTIOUS DISEASE Progress Note    Radha John  1945  [id number]      Admission Date: 9/19/2023      Requesting Provider: No ref. provider found  Evaluating Physician: Beau Martinez MD    Reason for Consultation: Severe sepsis/gram-negative bacteremia    History of present illness:    9/20/23: Patient is a 78 y.o. female with a history of breast cancer on Xeloda chemotherapy complicated by severe leukopenia who is seen today for evaluation of severe sepsis with gram-negative bacteremia.Her Xeloda therapy has recently been complicated by severe leukopenia and she was started on prophylactic doxycycline. She presented to the emergency room yesterday complaining of severe generalized weakness and nausea.She was found to have tachycardia with a heart rate of 108 and hypotension with a blood pressure of 81/54.She had lactic acidosis with a lactic acid level of 4.8 and an elevated pro-calcitonin of 45. Her white blood cell count was 0.16 and her platelet count was 37.Her creatinine was elevated at 1.47.  Urinalysis revealed 6-12 white blood cells. She was started on intravenous vancomycin and Zosyn therapy.  Her vancomycin has now been held.  Her blood cultures are now growing gram-negative rods and 2 sets with a BCID PCR positive for both E. Coli and Pseudomonas. No resistance chains were detected. Maximum temperature has been 99.1.  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  She has been transferred to the ICU this morning due to persistent hypotension/severe sepsis.  She has oral discomfort/sores.  She denies dysuria and urinary frequency.  She has nausea and vomiting.  She has cough with minimal sputum production.  Her abdominal CT scan this afternoon reveals segmental wall thickening and pericolonic inflammatory changes involving the mid transverse colon consistent with focal colitis.  She is on 2 L of oxygen with an O2 saturation of  96%.    9/21/23: She developed worsening hypercapnic/hypoxic respiratory failure last evening and was placed on BiPAP. Vasopressor therapy was started due to persistent hypotension.  Micafungin was added to her Zosyn therapy.She has been afebrile over the last 24 hours.ABGs earlier this morning revealed a pH of 7.16, PCO2 of 55.6, and PO2 of 77.9 with an FiO2 of 40%. Repeat ABGs at 441 revealed a pH of 7.39, PCO2 of 38.1, and PO2 of 75.3 on 25% BiPAP.Late last night her creatinine was 1.3 and her lactic acid was 6.9.  Her white blood cell count remained profoundly low at 0.05. His platelet count is profoundly low at 15. CXR reveals new bibasilar In perihilar infiltrates. She is lethargic and poorly responsive.  Sensitivities on her Pseudomonas and E. Coli are pending and will not be available until tomorrow.She continues to have lactic acidosis with a lactic acid level of 10.5 this evening.    9/22/23: She remains on vasopressor support with Levophed and Naresh-Synephrine. Her O2 saturation is 96% on a 55% BiPAP.Her lactic acidosis persists with a lactic at the level of 11 this morning. Her creatinine is 1.34.  Her white blood cell count remains profoundly low at 0.11.  Her platelet count is 5000. Her absolute neutrophil count is 10.  Her Pseudomonas and E. Coli blood culture isolates are sensitive to Zosyn but the Pseudomonas Zosyn ALEXIS of 16. Her urine culture is growing greater than 100,000 colony forming units of a gram-negative bacillus.  She developed atrial fibrillation and is now on amiodarone.  She is requiring higher dose vasopressor therapy for blood pressure support.  She is on Neupogen and uridine triacetate.    Past Medical History:   Diagnosis Date    Adenomatous polyp of colon 10/23/2019    Arthritis     Diabetes mellitus     Disease of thyroid gland     Diverticulosis     Gout     Hepatitis     History of kidney stones     Hx of radiation therapy 06/2014    Hyperlipidemia     Hypertension     Invasive  ductal carcinoma of breast 09/01/2021    Malignant neoplasm of breast 2014    Menopause        Past Surgical History:   Procedure Laterality Date    BREAST BIOPSY Bilateral     BREAST LUMPECTOMY Bilateral 06/27/2014    BREAST LUMPECTOMY Left 09/01/2021    CATARACT EXTRACTION Right     COLON RESECTION N/A 12/8/2020    Procedure: ROBOTIC LOW ANTERIOR RESECTION, TAKEDOWN OF COLOVAGINAL FISTULA, URETEROLYSIS LEFT;  Surgeon: Sonam Olvera MD;  Location:  [facility];  Service: DaVinci;  Laterality: N/A;    COLONOSCOPY      CYSTOSCOPY N/A 12/8/2020    Procedure: CYSTOSCOPY, TEMPORY BILATERAL URETERAL STENTS;  Surgeon: Rosie Gottlieb MD;  Location:  [facility];  Service: Gynecology;  Laterality: N/A;    HEMORRHOIDECTOMY      PARATHYROIDECTOMY  07/19/2016    Dr. Izabela Hermosillo @ [facility]    THYROID SURGERY      VAGINAL HYSTERECTOMY         Family History   Problem Relation Age of Onset    Hypertension Father     Hypertension Paternal Grandmother     Hypertension Other     Other Other         CAD    Breast cancer Sister 50    Ovarian cancer Niece     Endometrial cancer Neg Hx     Colon cancer Neg Hx     Colon polyps Neg Hx        Social History     Socioeconomic History    Marital status:    Tobacco Use    Smoking status: Former    Smokeless tobacco: Never    Tobacco comments:     quit 40 years ago   Vaping Use    Vaping Use: Never used   Substance and Sexual Activity    Alcohol use: No    Drug use: No    Sexual activity: Yes     Partners: Male     Birth control/protection: Post-menopausal, Surgical       Allergies   Allergen Reactions    Erythromycin Swelling     C/O lips swelling    Hydrocodone-Acetaminophen Nausea Only     Lortab    Acetaminophen Unknown - Low Severity    Hydrocodone Unknown - Low Severity    Amoxicillin Nausea Only         Medication:    Current Facility-Administered Medications:     acyclovir (ZOVIRAX) 250 mg in sodium chloride 0.9 % 250 mL IVPB, 250 mg, Intravenous, Q12H, Beau Martinez,  MD, 250 mg at 23 0518    [COMPLETED] amiodarone 150 mg in 100 mL D5W (loading dose), 150 mg, Intravenous, Once, 150 mg at 23 1351 **FOLLOWED BY** [] amiodarone 360 mg in 200 mL D5W infusion, 1 mg/min, Intravenous, Continuous, Last Rate: 33.3 mL/hr at 23 1442, 1 mg/min at 23 1442 **FOLLOWED BY** amiodarone 360 mg in 200 mL D5W infusion, 0.5 mg/min, Intravenous, Continuous, Fernando Laureano MD, Last Rate: 16.67 mL/hr at 23 0212, 0.5 mg/min at 23 0212    [COMPLETED] calcium gluconate 1000 Mg/50ml 0.675% NaCl IV SOLN, 1,000 mg, Intravenous, Q1H, 1,000 mg at 23 0648 **FOLLOWED BY** calcium gluconate 2000-675 MG/100ML NACL IVPB, 2,000 mg, Intravenous, Q2H, Glenna Box MD    Calcium Replacement - Follow Nurse / BPA Driven Protocol, , Does not apply, Yancy MONTERO Rabie, MD    dextrose (D50W) (25 g/50 mL) IV injection 50 mL, 50 mL, Intravenous, Q1H PRN, Eduardo Santamaria MD, 50 mL at 23 0740    filgrastim (NEUPOGEN) injection 300 mcg, 300 mcg, Subcutaneous, Q PM, Sarah Prasad MD, 300 mcg at 23 1707    First Mouthwash (Magic Mouthwash) 5 mL, 5 mL, Swish & Spit, Q6H, Fernando Laureano MD, 5 mL at 23 0633    Magnesium Standard Dose Replacement - Follow Nurse / BPA Driven Protocol, , Does not apply, Yancy MONTERO Rabie, MD    micafungin 100 mg/100 mL 0.9% NS IVPB (mbp), 100 mg, Intravenous, Q24H, Glenna Box MD, 100 mg at 23 2124    morphine injection 1 mg, 1 mg, Intravenous, Q4H PRN, 1 mg at 23 1751 **AND** naloxone (NARCAN) injection 0.4 mg, 0.4 mg, Intravenous, Q5 Min PRN, Mirna Haines MD    norepinephrine (LEVOPHED) 8 mg in 250 mL NS infusion (premix), 0.02-0.3 mcg/kg/min, Intravenous, Titrated, Summer Beltran APRN, Last Rate: 40.8 mL/hr at 23 0403, 0.3 mcg/kg/min at 23 0403    ondansetron (ZOFRAN) injection 4 mg, 4 mg, Intravenous, Q6H PRN, Mirna Haines MD, 4 mg at 23 1216     palliative care oral rinse, , Mouth/Throat, PRN, Mirna Haines MD    pantoprazole (PROTONIX) EC tablet 40 mg, 40 mg, Oral, Q AM, Fernando Laureano MD, 40 mg at 09/22/23 0518    phenylephrine (BECKIE-SYNEPHRINE) 50 mg in sodium chloride 0.9 % 250 mL infusion, 0.5-3 mcg/kg/min, Intravenous, Titrated, Herminia Diamond APRN, Last Rate: 26.1 mL/hr at 09/22/23 0648, 1.2 mcg/kg/min at 09/22/23 0648    Phosphorus Replacement - Follow Nurse / BPA Driven Protocol, , Does not apply, PRYancy CONTRERAS Rabie, MD    piperacillin-tazobactam (ZOSYN) 4.5 g in iso-osmotic dextrose 100 mL IVPB (premix), 4.5 g, Intravenous, Q8H, Zaida Waters PharmD, 4.5 g at 09/22/23 0402    Potassium Replacement - Follow Nurse / BPA Driven Protocol, , Does not apply, Yancy MONTERO Rabie, MD    sodium bicarbonate 8.4 % 150 mEq in dextrose (D5W) 5 % 1,000 mL infusion (greater than 75 mEq), 150 mEq, Intravenous, Continuous, Summer Beltran APRN, Last Rate: 150 mL/hr at 09/22/23 0303, 150 mEq at 09/22/23 0303    sodium chloride 0.9 % flush 10 mL, 10 mL, Intravenous, PRN, Mirna Haines MD    sodium chloride 0.9 % flush 10 mL, 10 mL, Intravenous, Q12H, Mirna Haines MD, 10 mL at 09/21/23 2015    sodium chloride 0.9 % flush 10 mL, 10 mL, Intravenous, PRN, Mirna Haines MD    sodium chloride 0.9 % flush 10 mL, 10 mL, Intravenous, Q12H, Herminia Diamond APRN, 10 mL at 09/21/23 2015    sodium chloride 0.9 % flush 10 mL, 10 mL, Intravenous, PRN, Herminia Diamond APRN    sodium chloride 0.9 % infusion 40 mL, 40 mL, Intravenous, PRN, Mirna Haines MD    Uridine Triacetate pack 10 g, 10 g, Oral, Q6H, Mirna Haines MD, 10 g at 09/22/23 0518    Antibiotics:  Anti-Infectives (From admission, onward)      Ordered     Dose/Rate Route Frequency Start Stop    09/20/23 1335  acyclovir (ZOVIRAX) 250 mg in sodium chloride 0.9 % 250 mL IVPB        Ordering Provider: Beau Martinez MD    250 mg  over 60 Minutes Intravenous Every 12  Hours 23 0600 23 0559    23 0034  micafungin 100 mg/100 mL 0.9% NS IVPB (mbp)        Ordering Provider: Glenna Box MD    100 mg  over 60 Minutes Intravenous Every 24 Hours Scheduled 23 0045 23 2059    23 1014  piperacillin-tazobactam (ZOSYN) 4.5 g in iso-osmotic dextrose 100 mL IVPB (premix)        Ordering Provider: Zaida Waters PharmD    4.5 g  over 4 Hours Intravenous Every 8 Hours 23 2000 23 1959    23 1335  acyclovir (ZOVIRAX) 250 mg in sodium chloride 0.9 % 250 mL IVPB        Ordering Provider: Beau Martinez MD    250 mg  over 60 Minutes Intravenous Once 23 1500 23 1621    23 1014  piperacillin-tazobactam (ZOSYN) 4.5 g in iso-osmotic dextrose 100 mL IVPB (premix)        Ordering Provider: Zaida Waters PharmD    4.5 g  over 30 Minutes Intravenous Once 23 1100 23 1104    23 0459  piperacillin-tazobactam (ZOSYN) 3.375 g in iso-osmotic dextrose 50 ml (premix)        Ordering Provider: Doris Villavicencio PA-C    3.375 g  over 30 Minutes Intravenous Once 23 0545 23 0540    23 1249  vancomycin IVPB 1500 mg in 0.9% NaCl (Premix) 500 mL        Ordering Provider: Gutierrez John MD    20 mg/kg × 72.6 kg  333.3 mL/hr over 90 Minutes Intravenous Once 23 1345 23 1807    23 1249  piperacillin-tazobactam (ZOSYN) 3.375 g in iso-osmotic dextrose 50 ml (premix)        Ordering Provider: Gutierrez John MD    3.375 g  over 30 Minutes Intravenous Once 23 1305 23 1512              Review of Systems:  See HPI      Physical Exam:   Vital Signs  Temp (24hrs), Av.7 °F (36.5 °C), Min:96.8 °F (36 °C), Max:98.6 °F (37 °C)    Temp  Min: 96.8 °F (36 °C)  Max: 98.6 °F (37 °C)  BP  Min: 45/34  Max: 130/71  Pulse  Min: 76  Max: 156  Resp  Min: 20  Max: 32  SpO2  Min: 88 %  Max: 100 %    GENERAL: She is toxic and ill-appearing.  BiPAP is in place.  HEENT: She has  oral mucositis/Stomatitis  NECK: Supple  HEART: RRR; No murmur, rubs   LUNGS: Clear to auscultation bilaterally   ABDOMEN: Soft, nontender, nondistended.  No rebound or guarding. NO mass or HSM.  EXT:  No cyanosis, clubbing or edema.   MSK: No joint effusions or erythema  SKIN: Warm and dry without cutaneous eruptions on Inspection/palpation.    NEURO: She is lethargic and poorly responsive.  She moves all of her extremities.  Right arm PICC line: No erythema or drainage    Laboratory Data    Results from last 7 days   Lab Units 09/22/23  0605 09/21/23  1423 09/21/23  0639 09/20/23  2347   WBC 10*3/mm3 0.11* 0.05* 0.07* 0.05*   HEMOGLOBIN g/dL 8.8* 7.5* 8.5* 7.7*   HEMATOCRIT % 26.4* 22.4* 25.4* 24.2*   PLATELETS 10*3/mm3 5* 55*  --  15*       Results from last 7 days   Lab Units 09/22/23  0605   SODIUM mmol/L 142   POTASSIUM mmol/L 3.5   CHLORIDE mmol/L 95*   CO2 mmol/L 27.0   BUN mg/dL 31*   CREATININE mg/dL 1.34*   GLUCOSE mg/dL 269*   CALCIUM mg/dL 5.6*       Results from last 7 days   Lab Units 09/22/23  0605   ALK PHOS U/L 32*   BILIRUBIN mg/dL 1.6*   ALT (SGPT) U/L 14   AST (SGOT) U/L 19               Results from last 7 days   Lab Units 09/22/23  0605   LACTATE mmol/L 11.0*               Estimated Creatinine Clearance: 30.8 mL/min (A) (by C-G formula based on SCr of 1.34 mg/dL (H)).      Microbiology:  Blood Culture   Date Value Ref Range Status   09/19/2023 Abnormal Stain (C)  Preliminary     BCID, PCR   Date Value Ref Range Status   09/19/2023 Escherichia coli. Identification by BCID2 PCR. (A) Negative by BCID PCR. Culture to Follow. Final     BCID, PCR 2   Date Value Ref Range Status   09/19/2023 (A) Negative by BCID PCR. Culture to Follow. Final    Pseudomonas aeruginosa. Identification by BCID2 PCR     No results found for: CULTURES, HSVCX, URCX  No results found for: EYECULTURE, GCCX, HSVCULTURE, LABHSV  No results found for: LEGIONELLA, MRSACX, MUMPSCX, MYCOPLASCX  No results found for: NOCARDIACX,  STOOLCX  No results found for: THROATCX, UNSTIMCULT, URINECX, CULTURE, VZVCULTUR  No results found for: VIRALCULTU, WOUNDCX        Radiology:  Imaging Results (Last 72 Hours)       Procedure Component Value Units Date/Time    XR Chest 1 View [[id number]] Collected: 09/21/23 0125     Updated: 09/21/23 0130    Narrative:      XR CHEST 1 VW    Date of Exam: 9/21/2023 12:58 AM EDT    Indication: hypoxia, septic shock    Comparison: 9/19/2023 chest radiograph and CT.    Findings:  There is a new right upper extremity PICC, which terminates at the cavoatrial junction. There is mild stable elevation of the left hemidiaphragm. There is new mild perihilar and basilar airspace disease bilaterally. No definite pleural effusion. No   pneumothorax. Heart size is unchanged. Pulmonary vasculature is partially indistinct. New gastric suction tube courses below the diaphragm terminating in the stomach.      Impression:      Impression:  1.New right upper extremity PICC terminates at the cavoatrial junction.  2.New gastric suction tube terminates in the stomach.  3.New perihilar and basilar airspace disease.        Electronically Signed: Ramon Kelly MD    9/21/2023 1:27 AM EDT    Workstation ID: [id number]    XR Abdomen KUB [[id number]] Collected: 09/20/23 1925     Updated: 09/20/23 1929    Narrative:      XR ABDOMEN KUB    Date of Exam: 9/20/2023 6:58 PM EDT    Indication: NGT placement    Comparison: None available.    Findings:  Nasogastric tube is in place the tip ejecting over the distal body of the stomach. The abdominal bowel gas pattern appears grossly normal. No abnormal calcifications overlie the abdomen or pelvis. Lung bases appear grossly clear.      Impression:      Impression:    1. Nasogastric tube in place the tip projecting over the distal body of the stomach.      Electronically Signed: Cory Swann MD    9/20/2023 7:26 PM EDT    Workstation ID: [id number]    XR Abdomen KUB [[id number]] Collected: 09/20/23 1824      Updated: 09/20/23 1828    Narrative:      XR ABDOMEN KUB    Date of Exam: 9/20/2023 6:10 PM EDT    Indication: NGT placement    Comparison: None available.    Findings:  There is an esophagogastric tube present with the tip in the distal stomach. Grossly nonobstructive bowel gas pattern.      Impression:      Impression:  Esophagogastric tube tip in the distal stomach.      Electronically Signed: Yakov Lopez MD    9/20/2023 6:25 PM EDT    Workstation ID: [id number]    CT Abdomen Pelvis Without Contrast [[id number]] Collected: 09/20/23 1316     Updated: 09/20/23 1331    Narrative:      CT ABDOMEN PELVIS WO CONTRAST    Date of Exam: 9/20/2023 1:13 PM EDT    Indication: Abdominal pain, acute, nonlocalized  Nausea/vomiting.    Comparison: 8/21/2023.    Technique: Axial CT images were obtained of the abdomen and pelvis without the administration of contrast. Reconstructed coronal and sagittal images were also obtained. Automated exposure control and iterative construction methods were used.      Findings:  There are small bilateral pleural effusions, right greater than left with overlying consolidation in the bases either due to atelectasis or pneumonia. Somewhat nodular areas of consolidation in the base of the right upper lobe and lingula are noted,   which may be due to pneumonia. Attention on follow-up recommended to rule out neoplasm. Calcified granulomatous changes are noted in the left hilum. There is prominence of the main pulmonary artery which may indicate underlying pulmonary hypertension.   Trace pericardial effusion is noted. There is cardiomegaly.    There is calcified granulomatous change within the spleen. There is trace ascites in the perisplenic region and paracolic gutters. Unenhanced evaluation of the pancreas, adrenals, is grossly unremarkable. Known hepatic metastatic lesions are not well   visualized by noncontrast CT. Please see prior study 8/21/2023. There are calcified granulomatous changes in the  liver. Gallbladder is distended but otherwise grossly unremarkable. Bilateral probable renal cysts are noted. No evidence of urinary calculus   is identified. There is increased density within the urinary bladder suggesting excreted contrast material. Correlate clinically for any recent contrasted study. Urinary bladder is distended but otherwise grossly unremarkable. Patient is status post   hysterectomy. There is a 2.2 cm densely calcified focus in the left ovary. Right ovary is grossly unremarkable. The appendix is identified and is normal caliber and appearance by noncontrast CT. There is a 4 cm long segment of circumferential wall   thickening and pericolonic inflammatory change involving the mid transverse colon suggesting focal colitis. There is postsurgical change from sigmoid colonic anastomosis. No gross adenopathy is identified in the abdomen or pelvis. No free air or   pneumatosis is identified.     Widespread sclerotic metastatic disease is again noted there is a lytic focus in the posterior left aspect of the T12 vertebral body, with erosion of the cortex of the adjacent anterior spinal canal, similar to recent prior study.      Impression:      Impression:    1. Interval development of bilateral small pleural effusions with overlying basilar consolidation which may be due to atelectasis or pneumonia. Additional patchy somewhat nodular areas of consolidation are partially imaged in the more anterior lung   bases, which may be due to additional areas of pneumonia. Neoplasm not excluded. Attention on follow-up recommended.  2. Small amount of ascites in the perisplenic and paracolic gutter regions, new from prior study.  3. Segmental wall thickening and pericolonic inflammatory change involving the mid transverse colon may indicate focal colitis.  4. Hepatic metastatic disease is not well demonstrated by noncontrast CT. Diffuse skeletal metastatic disease is again noted, as discussed  above.            Electronically Signed: Yakov Lopez MD    9/20/2023 1:28 PM EDT    Workstation ID: [id number]    CT Soft Tissue Neck With Contrast [[id number]] Collected: 09/19/23 1359     Updated: 09/19/23 1416    Narrative:        CT SOFT TISSUE NECK W CONTRAST, CT CHEST W CONTRAST DIAGNOSTIC    Date of Exam: 9/19/2023 1:46 PM EDT    Indication: L sided neck pain/swelling.    Comparison: CT chest from August 21, 2023    Technique: Axial CT images were obtained of the neck and chest after the uneventful intravenous administration of 80 mL Isovue-300.  Reconstructed coronal and sagittal images were also obtained. Automated exposure control and iterative construction   methods were used.      Findings:  Neck:    The visualized intracranial contents are unremarkable. The globes and orbits appear intact. The nasopharynx and oropharynx have a normal configuration. The larynx appears normal. The parotid and submandibular glands appear intact. The thyroid gland is   surgically absent. No abnormally enlarged lymph nodes are identified. The major vasculature within the neck appears widely patent. Diffuse osseous sclerotic metastatic disease is noted. The paranasal sinuses and mastoid air cells are well-aerated.    Chest:    The central tracheobronchial tree is clear. There is mild emphysema. A 4 mm left upper lobe nodule on image 25 is unchanged. A couple benign calcified granulomas are noted. No new pulmonary nodule is identified. There is no focal consolidation. There is   no pleural effusion.    The heart size appears normal. The great vessels are normal in caliber. No abnormally enlarged lymph nodes are identified.    Partial evaluation of the upper abdomen demonstrates hepatic and renal cysts.    Diffuse osseous sclerotic metastatic disease appears unchanged.      Impression:      Impression:  1.No acute process identified within neck or chest.  2.Diffuse osseous sclerotic metastatic disease.  3.Mild  emphysema.  4.Stable 4 mm left upper lobe nodule.        Electronically Signed: Sergey Martínez MD    9/19/2023 2:12 PM EDT    Workstation ID: [id number]    CT Chest With Contrast Diagnostic [[id number]] Collected: 09/19/23 1359     Updated: 09/19/23 1416    Narrative:        CT SOFT TISSUE NECK W CONTRAST, CT CHEST W CONTRAST DIAGNOSTIC    Date of Exam: 9/19/2023 1:46 PM EDT    Indication: L sided neck pain/swelling.    Comparison: CT chest from August 21, 2023    Technique: Axial CT images were obtained of the neck and chest after the uneventful intravenous administration of 80 mL Isovue-300.  Reconstructed coronal and sagittal images were also obtained. Automated exposure control and iterative construction   methods were used.      Findings:  Neck:    The visualized intracranial contents are unremarkable. The globes and orbits appear intact. The nasopharynx and oropharynx have a normal configuration. The larynx appears normal. The parotid and submandibular glands appear intact. The thyroid gland is   surgically absent. No abnormally enlarged lymph nodes are identified. The major vasculature within the neck appears widely patent. Diffuse osseous sclerotic metastatic disease is noted. The paranasal sinuses and mastoid air cells are well-aerated.    Chest:    The central tracheobronchial tree is clear. There is mild emphysema. A 4 mm left upper lobe nodule on image 25 is unchanged. A couple benign calcified granulomas are noted. No new pulmonary nodule is identified. There is no focal consolidation. There is   no pleural effusion.    The heart size appears normal. The great vessels are normal in caliber. No abnormally enlarged lymph nodes are identified.    Partial evaluation of the upper abdomen demonstrates hepatic and renal cysts.    Diffuse osseous sclerotic metastatic disease appears unchanged.      Impression:      Impression:  1.No acute process identified within neck or chest.  2.Diffuse osseous sclerotic  metastatic disease.  3.Mild emphysema.  4.Stable 4 mm left upper lobe nodule.        Electronically Signed: Sergey Martínez MD    9/19/2023 2:12 PM EDT    Workstation ID: [id number]    XR Chest 1 View [[id number]] Collected: 09/19/23 1107     Updated: 09/19/23 1116    Narrative:      XR CHEST 1 VW    Date of Exam: 9/19/2023 9:47 AM CDT    Indication: Weak/Dizzy/AMS triage protocol    Comparison: Chest x-ray 8 12/1/2020, chest CT 8/21/2023    Findings:  Cardiomediastinal silhouette is unremarkable. Few areas of interstitial prominence. There are few vague nodular foci in the right lung. No pneumothorax nor pleural effusion. No acute osseous abnormality identified.      Impression:      Impression:  Few new vague nodular foci in the right lung may represent developing infection. Correlate with symptoms.      Electronically Signed: Kyle Hager MD    9/19/2023 10:12 AM CDT    Workstation ID: [id number]              Impression:   Septic shock-manifested by hypotension, tachycardia, severe leukopenia/absolute neutropenia, acute hypoxic respiratory failure requiring supplemental oxygen, lactic acidosis, an elevated pro-calcitonin, acute kidney injury, and Pseudomonas/E. coli bacteremia.   Pseudomonas/E. Coli bacteremia-secondary to absolute neutropenia with a possible urinary tract vs colonic source.  Although her Pseudomonas is sensitive to Zosyn, the ALEXIS is 16 and I think that cefepime would be a better alternative.  Gram-negative UTI-this may be the source for her sepsis.  Severe leukopenia/absolute neutropenia-possibly secondary to Xeloda in the setting of DPD deficiency.  She is on Neupogen and uridine triacetate.  Lactic acidosis-secondary to sepsis  Acute kidney injury  Acute hypoxic respiratory failure-worsening and now on BiPAP.  Left breast cancer-on Xeloda therapy  Thrombocytopenia  Oral mucositis-secondary to 5-FU versus HSV.  She is now on intravenous acyclovir.  I will check an oral HSV  culture.  Nausea/vomiting  11. ?Transverse colon colitis-by CT scan      PLAN/RECOMMENDATIONS:   Discontinue Zosyn  Cefepime 2 g IV every 12 hours utilizing extended infusions  Urine culture-pending  Vasopressor therapy per the intensivist service  Continue intravenous acyclovir at 5 mg/kg every 12 hours  Continue micafungin  Oral HSV culture  Neupogen therapy per Dr. Prasad  Uridine triacetate therapy per Dr. Prasad    I discussed her complex situation in detail with [facility] clinical pharmacy today.  I discussed her complex situation with the nursing staff today.  I discussed her complex situation in detail with her and her family today.  She remains critically ill with a very high mortality risk.  I spent over 40 minutes on her care today.         Beau Martinez MD  9/22/2023  08:24 EDT

## 2023-09-22 NOTE — PROGRESS NOTES
Clinical Nutrition     Nutrition Support Assessment  Reason for Visit: MDR, Follow-up protocol      Patient Name: Radha John  YOB: 1945  MRN: [id number]  Date of Encounter: 09/22/23 15:38 EDT  Admission date: 9/19/2023      Pt critically ill, on multiple pressors, EN contraindicated at present, PN also not a good option in setting of severe neutropenia    When able to wean pressor support, suggest start trophic feed: Peptamen AF @15ml/hr, free water @10ml/hr    If tolerates trophic feed, rec gradually advance as tolerated to goal rate @50ml/hr, Prosource 1x/day, free water @10ml/hr    Suggest add MVI as TF volume too low to meet RDI's    Nutrition Assessment   Admission Diagnosis:  Sepsis [A41.9]      Problem List:    Severe sepsis    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    Invasive ductal carcinoma of right breast    Hypothyroidism    Diabetes    Hypertension    Hyperlipidemia    Autoimmune hepatitis    Malignant neoplasm metastatic to bone    Chemotherapy induced neutropenia    Stomatitis and mucositis    Lactic acidosis    Acute kidney injury (nontraumatic)    Gram-negative bacteremia    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    PSH:  She  has a past surgical history that includes Hemorrhoid surgery; Parathyroidectomy (07/19/2016); Breast biopsy (Bilateral); Thyroid surgery; Vaginal hysterectomy; Colonoscopy; Colectomy (N/A, 12/8/2020); Cystoscopy (N/A, 12/8/2020); Cataract extraction (Right); Breast lumpectomy (Bilateral, 06/27/2014); and Breast lumpectomy (Left,  09/01/2021).    Substance history: tobacco remote    Applicable Nutrition Concerns:     Oral:mucositis  GI:abdomen round, non-distended    Interval History:     CT scan 9-20-23: ? transverse colon colitis    ARF/ Intubated 9-22-23    Reported/Observed/Food/Nutrition Related History:     9-22-23: pt intubated, sedated, family at bedside  + amio, fentanyl, bicarb @150ml, levophed 0.18mcg, johanna 3mcg      9-19-23:Pt resting in bed, on nasal cannula, ill appearing, has difficulty speaking, reports mouth is still very sore, is no longer having any nausea or vomiting    Per RN: pt has sores in mouth, had not been able to eat or drink anything all day, unable to swallow meds, plan to place ngt      Labs    Labs Reviewed: Yes     Results from last 7 days   Lab Units 09/22/23  1150 09/22/23  0605 09/21/23  2321 09/21/23  1811 09/21/23  1423 09/21/23  0639 09/20/23  1200 09/20/23  0538   GLUCOSE mg/dL 274* 269* 273*   < > 247* 185*   < > 55*   BUN mg/dL 31* 31* 31*   < > 30* 30*   < > 32*   CREATININE mg/dL 1.48* 1.34* 1.32*   < > 1.41* 1.44*   < > 1.40*   SODIUM mmol/L 141 142 143   < > 139 142   < > 143   CHLORIDE mmol/L 92* 95* 95*   < > 97* 101   < > 113*   POTASSIUM mmol/L 3.9 3.5 3.1*   < > 3.5 3.3*   < > 3.3*   PHOSPHORUS mg/dL  --  2.2*  --   --   --  2.4*  --  3.5   MAGNESIUM mg/dL  --  1.8 2.0  --  1.8 1.1*  --  1.6   ALT (SGPT) U/L  --  14  --   --   --  12  --  8    < > = values in this interval not displayed.         Results from last 7 days   Lab Units 09/22/23  0605 09/21/23  1811 09/21/23  0639 09/20/23  1200 09/20/23  0538   ALBUMIN g/dL 2.5*  --  2.5*  --  2.2*   IONIZED CALCIUM mmol/L 0.75* 0.83* 0.75*   < >  --     < > = values in this interval not displayed.         Results from last 7 days   Lab Units 09/22/23  1146 09/22/23  0519 09/21/23  2330 09/21/23  1804 09/20/23  0752 09/20/23  0725   GLUCOSE mg/dL 302* 286* 293* 282* 206* 52*       Lab Results   Lab Value Date/Time    HGBA1C 6.20 (H) 12/01/2020  "1038                 Medications    Medications Reviewed: Yes  Pertinent: albumin, abx, acyclovir, magic mouthwash, protonix, bowel regimen,   Infusion:Bicarb @150ml, amio, fentanyl, levophed, johanna    Intake/Ouptut 24 hrs (0701 - 0700)   I&O's Reviewed: Yes     Intake & Output (last day)         09/21 0701 09/22 0700 09/22 0701 09/23 0700    I.V. (mL/kg) 5035.1 (69.4) 3868.1 (53.3)    Blood 575 600    Other 550 375    IV Piggyback 899 300    Total Intake(mL/kg) 7059.1 (97.2) 5143.1 (70.8)    Urine (mL/kg/hr) 1125 (0.6) 245 (0.4)    Stool 100 300    Total Output 1225 545    Net +5834.1 +4598.1          Urine Unmeasured Occurrence 2 x     Stool Unmeasured Occurrence  1 x              Anthropometrics     Flowsheet Rows      Flowsheet Row First Filed Value   Admission Height 152.4 cm (60\") Documented at 09/19/2023 0954   Admission Weight 72.6 kg (160 lb) Documented at 09/19/2023 0954            Height: Height: 152.4 cm (60\")  Last Filed Weight: Weight: 72.6 kg (160 lb) (09/19/23 0954)  Method: Weight Method: Stated  BMI: BMI (Calculated): 31.2  BMI classification: Obese Class I: 30-34.9kg/m2  IBW:  100lb    Pt weighed 173lb on 9-23-22 at MD office    Weight change: ~13lb wt loss per EMR based on stated weight, may be more possibly     Weight       Weight (kg) Weight (lbs) Weight Method Visit Report   7/26/2021 76.658 kg  169 lb   --    9/29/2021 74.163 kg  163 lb 8 oz      10/14/2021 73.029 kg  161 lb   --    10/22/2021 74.844 kg  165 lb   --    11/4/2021 73.936 kg  163 lb   --    12/3/2021 74.662 kg  164 lb 9.6 oz   --    1/5/2022 75.751 kg  167 lb   --    2/15/2022 76.476 kg  168 lb 9.6 oz   --    3/30/2022 77.565 kg  171 lb   --     77.6 kg  171 lb 1.2 oz   --    5/12/2022 76.204 kg  168 lb   --    6/29/2022 78.019 kg  172 lb   --    8/12/2022 78.926 kg  174 lb   --    9/23/2022 78.472 kg  173 lb   --    11/3/2022 77.565 kg  171 lb   --    12/16/2022 78.019 kg  172 lb   --    1/18/2023 77.111 kg  170 lb   --  " "  3/2/2023 77.021 kg  169 lb 12.8 oz   --    3/9/2023 76.658 kg  169 lb   --    3/30/2023 75.978 kg  167 lb 8 oz      4/13/2023 75.751 kg  167 lb   --    4/28/2023 75.751 kg  167 lb      5/26/2023 76.204 kg  168 lb   --    6/1/2023 75.297 kg  166 lb   --    6/26/2023 74.844 kg  165 lb   --    7/24/2023 74.39 kg  164 lb   --    8/23/2023 74.39 kg  164 lb   --    9/7/2023 74.844 kg  165 lb  Standing scale     9/15/2023 73.029 kg  161 lb   --    9/18/2023 71.668 kg  158 lb      9/19/2023 72.576 kg  160 lb  Stated           Nutrition Focused Physical Exam     Date: 9-20-23    Patient meets criteria for malnutrition diagnosis, see MSA note.    Needs Assessment   Date:9-22-23    Height used:Height: 152.4 cm (60\")  Weights used- ABW:160lb/ 73kg  IBW: 100lb/ 45kg    Estimated Calorie needs: ~ 1200kcal  Method:  14Kcals/KG ABW: 1022kcal  Method:  MSJx 1.2 ABW: 1358kcal  Method: PSU ABW: 1276kcal    Estimated Protein needs: ~ 90g protein  Method: 1-1.2g protein per kg ABW: 73-88g protein  Method: 2g protein per kg IBW: 90g protein      Current Nutrition Prescription     PO: NPO Diet NPO Type: Strict NPO  Oral Nutrition Supplement:   Intake: Insufficient data      Nutrition Diagnosis   Date: 9-20-23 Updated: 9-22  Problem Malnutrition    Etiology Breast CA with mets   Signs/Symptoms Po intake < 75%, 13lb wt loss over 1 year, 8% wt loss, moderate muscle wasting, fat loss     Problem Swallowing difficulty   Etiology Chemotherapy induced mucositis   Signs/Symptoms Severe mouth sores, unable to swallow       Goal:   General: Nutrition support treatment  PO: N/A  EN/PN:  Consider trophic feed when able to wean pressors    Nutrition Intervention      Follow treatment progress, Care plan reviewed, Advised available snacks, Encourage intake, Supplement provided    Pt Clear Liquids/ NPO 3 days    Pt critically ill, on multiple pressors, EN contraindicated at present, PN also not a good option in setting of severe neutropenia    When " able to wean pressor support, suggest start trophic feed: Peptamen AF @15ml/hr, free water @10ml/hr    If tolerates trophic feed, rec gradually advance as tolerated to goal rate @50ml/hr, Prosource 1x/day, free water @10ml/hr    Suggest add MVI as TF volume too low to meet RDI's    At goal over: 20Hrs/day    Rx will supply:   Goal Volume 1000  mL/day     Flush Volume 200 mL/day     Energy 1260 Kcal/day 105 % Est Need   Protein 91 g/day 101 % Est Need   Fiber 6 g/day     Water in   mL     Total Water 1010 mL     Meet DRI No            Monitoring/Evaluation:   Per protocol, I&O, PO intake, Pertinent labs, Weight, Skin status, GI status, Symptoms, Swallow function, Hemodynamic stability      Linh Sanchez, RD  Time Spent:30min

## 2023-09-22 NOTE — PROCEDURES
"Intubation    Date/Time: 9/22/2023 1:30 PM  Performed by: Fernando Laureano MD  Authorized by: Fernando Laureano MD   Consent: Verbal consent obtained.  Risks and benefits: risks, benefits and alternatives were discussed  Consent given by: spouse  Procedure consent: procedure consent matches procedure scheduled  Relevant documents: relevant documents present and verified  Test results: test results available and properly labeled  Site marked: the operative site was marked  Imaging studies: imaging studies available  Required items: required blood products, implants, devices, and special equipment available  Patient identity confirmed: anonymous protocol, patient vented/unresponsive  Time out: Immediately prior to procedure a \"time out\" was called to verify the correct patient, procedure, equipment, support staff and site/side marked as required.  Indications: respiratory failure  Intubation method: video-assisted  Patient status: sedated  Preoxygenation: BIPAP.  Pretreatment medications: midazolam  Sedatives: etomidate  Laryngoscope size: Mac 3  Tube size: 7.5 mm  Tube type: cuffed  Number of attempts: 1  Cricoid pressure: yes  Cords visualized: yes  Post-procedure assessment: CO2 detector  Breath sounds: equal  Cuff inflated: yes  ETT to lip: 24 cm  Tube secured with: ETT herbert  Chest x-ray interpreted by me.  Chest x-ray findings: endotracheal tube in appropriate position  Patient tolerance: patient tolerated the procedure well with no immediate complications      "

## 2023-09-22 NOTE — PROCEDURES
Insert Arterial Line    Date/Time: 9/22/2023 2:01 PM  Performed by: Shelbie Gunter DNP, APRN  Authorized by: Shelbie Gunter DNP, APRN     Universal Protocol:     Verbal consent obtained?: Yes      Written consent obtained?: Yes      Risks and benefits: Risks, benefits and alternatives were discussed      Consent given by: Discussed with granddaughter.    Patient identity confirmed:  Arm band and hospital-assigned identification number    Time out: Immediately prior to the procedure a time out was called    A time out verifies correct patient, procedure, equipment, support staff and site/side marked as required:   Preparation:     Preparation: Patient was prepped and draped in usual sterile fashion    Indications:     Indications: multiple ABGs, respiratory failure and hemodynamic monitoring    Location:     Location:  Left radial  Anesthesia:     Patient sedated: Yes      Vital signs: Vital signs monitored during sedation    Procedure Details:     Ultrasound Guidance: yes  The ultrasound was used for evaluation of possible access sites.  Vessel patency was confirmed with the ultrasound.  Needle entry into vessel was visualized in realtime with the ultrasound.       Needle gauge:  20    Seldinger technique: Seldinger technique used      Number of attempts:  1  Post-procedure:     Post-procedure:  Line sutured and dressing applied    Post-procedure CMS: Unable to assess 2* intubated & sedated status.     patient tolerated the procedure well with no immediate complications     Inserted with ultrasound guidance with bright red pulsatile blood detected. Connected to pressure tubing with arterial waveform present. Suture placed and sterile dressing applied.     Shelbie Gunter DNP, APRN, [facility]  Pulmonary and Critical Care Medicine

## 2023-09-22 NOTE — PROGRESS NOTES
Spoke with patient's granddaughter and  about the events of the day.  Unfortunately she has deteriorated further, now intubated, continued pressor support.   Recommended no further escalation of care, no CPR in the events of cardiac arrest.  If no improvement in the next day, consider withdrawal of care.   stated his agreement.

## 2023-09-22 NOTE — CASE MANAGEMENT/SOCIAL WORK
Continued Stay Note  [facility]     Patient Name: Radha John  MRN: [id number]  Today's Date: 9/22/2023    Admit Date: 9/19/2023    Plan: discharge plan   Discharge Plan       Row Name 09/22/23 1512       Plan    Plan discharge plan    Plan Comments Discharge plan is ongoing as pt remains in ICU and on a vent. CM will cont to follow for discharge needs.    Final Discharge Disposition Code 30 - still a patient                   Discharge Codes    No documentation.                 Expected Discharge Date and Time       Expected Discharge Date Expected Discharge Time    Sep 25, 2023               Avis Zarate RN

## 2023-09-22 NOTE — SIGNIFICANT NOTE
Patient's family has gathered today.  All of her children are in agreement that they would not want heroic measures such as CPR or intubation.  Her  is not ready to shift CODE STATUS.  Her granddaughter who is a cardiologist spoke to the patient and the patient still wishes all heroic measures and will remain a full code.

## 2023-09-22 NOTE — PLAN OF CARE
Goal Outcome Evaluation:      Patient remains on bipap support at 55%   Afebrile, SBP fluctuating 's  Levo and Naresh continued    Lactic trending up, PLTs 5 on am labs ( 2 units ordered), K+/ CA replacements started  Follows commands, nods appropriately for yes/ no questions  Multiple GOC discussions had with patient and family ( see MD note )

## 2023-09-23 NOTE — SIGNIFICANT NOTE
Exam confirms with auscultation zero audible heart tones and zero audible respirations. Ms.Paula Chivo John was pronounced dead at 1319.  MD notified by Patient's RN.    William Esparza RN  Clinical House Supervisor  9/23/2023 13:33 EDT

## 2023-09-23 NOTE — SIGNIFICANT NOTE
Was contacted by primary RN on behalf of family wishing to discuss code status.     I spoke with the patient's  and family outside the room, who have elected to make the difficult but appropriate decision to transition code status to comfort measures only and proceed with withdrawal of life supportive therapy. Changes have been made in Epic to reflect their decision, and appropriate medications have been added to the MAR as needed to optimize patient comfort. We will continue to follow and support the patient and family during this difficult time.     Shelbie Gunter, DNP, APRN, AGACNP-BC  Pulmonary and Critical Care Medicine

## 2023-09-23 NOTE — DISCHARGE SUMMARY
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 female who presented to [facility] ED on 23 for evaluation of generalized weakness and mouth ulcers.     Patient has a PMH of HTN, T2DM, hypothyroidism, hepatitis, diverticulosis, and bilateral stage I ER+ HER2 negative breast cancer with subsequent recurrence involving bone and liver currently undergoing treatment with Oncology (Dr. Prasad). She was placed on Xeloda starting 23 and subsequently instructed to hold it after WBCs dropped to 0.55 and platelets to 66. She additionally developed mouth sores and was initiated on prophylactic doxycycline on 9/15/23. She received IVF and dexamethasone per Oncology on 23, however she failed to improve prompting presentation to our ED on 23.     Workup in the ED was significant for ANC 0, thrombocytopenia, neutropenia, JAVIER, and increased lactic acid and procalcitonin. Blood pressure additionally noted to be  borderline low. Due to septic concern she was pan-cultured initiated on IVF and broad-spectrum antibiotics. She was admitted to Hospital Medicine for further evaluation and treatment.     Hospital Course:  Patient was admitted to Hospital Medicine 2* issues discussed in above HPI. CT soft tissue of the neck and CT chest with contrast were both negative for an acute abnormality. Oncology was consulted (Dr. Prasad) with concern for DPD deficiency leading to severe bone marrow toxicity from Xeloda. Infectious disease additionally consulted as blood cultures grew Pseudomonas and E. Coli placed on Zosyn and acyclovir.     On the morning of 9/20/23 patient became acutely hypotensive despite IVF (~5 L since admission). Additionally hypoglycemic requiring D50W and increasing lactic acid. Due to her acute decline she was transferred to ICU for further management. Of note, when discussing wishes in the event of cardiopulmonary arrest patient relayed to staff that she wished to be a FULL CODE.     Upon arrival to ICU CT A/P obtained and showed interval development of bilateral small pleural effusions with overlying basilar consolidation, felt 2* atelectasis versus PNA, patchy nodular areas of consolidation in more anterior lung bases, small amount of ascites in the perisplenic and paracolic gutter regions, segmental wall thickening and pericolonic inflammatory change involving the mid-transverse colon concerning for focal colitis, and re-demonstration of known diffuse skeletal and hepatic metastatic disease. Repeat labs demonstrated profound mixed respiratory and metabolic acidosis with pH of 7.1 and was placed on BiPAP as well as initiated on bicarbonate infusion. Micafungin added for antibiotic coverage. She was additionally initiated on levophed for blood pressure support. Platelets were low and she received transfusions (total of 5 units during admission).     Family was contacted who were unaware of the extent of her  breast cancer, and following further discussions children were all in agreement that they would not want heroic measures, however  was not ready to shift code status and thus she remained a FULL CODE.     She continued to worsen from a respiratory standpoint / became BiPAP dependent and acutely desaturated on 23 warranting intubation. She subsequently became profoundly hypotensive requiring multiple 3 pressors, with continued worsening metabolic and lactic acidosis despite continuous bicarbonate infusion. Discussions were had with the family once again who ultimately elected to change CODE STATUS to no CPR and no escalation of care.     Despite continued aggressive intervention and optimal medical therapy patient continued to deteriorate overnight and throughout the day today. Repeated discussions were had with the family and  who ultimately elected to make the difficult but appropriate decision to transition code status to comfort measures only and proceed with withdrawal of life supportive therapy. Shortly following palliative extubation Ms. John , on 23 at 1319.     Shelbie Gunter, DNP, APRN, AGACNP-BC  Pulmonary and Critical Care Medicine     Please note that portions of this note were completed with a voice recognition program.

## 2023-09-23 NOTE — PROGRESS NOTES
INTENSIVIST   PROGRESS NOTE     Hospital:  LOS: 4 days     Ms. Radha John, 78 y.o. female is followed for a Chief Complaint of: Shock, Respiratory Failure      Subjective   S     Interval History:  Maxed on 3 vasopressors. Marginal SBP in the 70s. Remains on mechanical ventilation. Desaturation into the 70s with any stimulation.        The patient's relevant past medical, surgical and social history were reviewed and updated in Epic as appropriate.      ROS: Unable to obtain secondary to sedation and mechanical ventilation.     Objective   O     Vitals:  Temp  Min: 97 °F (36.1 °C)  Max: 99.5 °F (37.5 °C)  BP  Min: 59/35  Max: 144/81  Pulse  Min: 61  Max: 120  Resp  Min: 20  Max: 32  SpO2  Min: 79 %  Max: 100 % Flow (L/min)  Min: 50  Max: 50    Intake/Ouptut 24 hrs (7:00AM - 6:59 AM)  Intake & Output (last 3 days)         09/20 0701 09/21 0700 09/21 0701 09/22 0700 09/22 0701 09/23 0700 09/23 0701  09/24 0700    I.V. (mL/kg) 7800.9 (107.5) 5035.1 (69.4) 8645.3 (119.1) 877.7 (12.1)    Blood 478 575 600     Other  550 795     IV Piggyback 3571.8 899 936.6 50    Total Intake(mL/kg) 01539.7 (163.2) 7059.1 (97.2) 73793.9 (151.2) 927.7 (12.8)    Urine (mL/kg/hr) 1675 (1) 1125 (0.6) 535 (0.3)     Stool 0 100 400     Total Output 1675 1225 935     Net +44095.7 +5834.1 +52932.9 +927.7            Urine Unmeasured Occurrence 2 x 2 x      Stool Unmeasured Occurrence 3 x  2 x             Medications (drips):  amiodarone, Last Rate: 0.5 mg/min (09/22/23 6299)  fentanyl 10 mcg/mL, Last Rate: 150 mcg/hr (09/23/23 0831)  norepinephrine, Last Rate: 0.3 mcg/kg/min (09/23/23 0755)  phenylephrine, Last Rate: 3 mcg/kg/min (09/23/23 0922)  sodium bicarbonate drip (greater than 75 mEq/bag), Last Rate: 150 mEq (09/23/23 0622)  vasopressin, Last Rate: 0.03 Units/min (09/23/23 0755)        Mechanical Ventilator Settings:          Resp Rate (Set): 20  Pressure Support (cm H2O): 10 cm H20  FiO2 (%): 80 %  PEEP/CPAP (cm H2O): 8 cm  H20    Minute Ventilation (L/min) (Obs): 8.04 L/min  Resp Rate (Observed) Vent: 20  I:E Ratio (Set): 1:2.00  I:E Ratio (Obs): 1:2    PIP Observed (cm H2O): 35 cm H2O  Plateau Pressure (cm H2O): (S) 32 cm H2O    Physical Examination  Telemetry:  Normal sinus rhythm.    Constitutional:  No acute distress.  ETT in place on mechanical ventilation.    Eyes: No scleral icterus.   PERRL, EOM intact.    Neck:  Supple, FROM   Cardiovascular: Normal rate, regular and rhythm. Normal heart sounds.  No murmurs, gallop or rub.   Respiratory: No respiratory distress. Normal respiratory effort.  Bilateral crackles/rhonchi.    Abdominal:  Soft. No masses. Nontender. No distension. No HSM.   Extremities: No digital cyanosis. No clubbing.  1+ peripheral edema.   Skin: No rashes, lesions or ulcers   Neurological:   Opens eyes to stimulation. Does not follow commands.              Results from last 7 days   Lab Units 09/23/23  0436 09/22/23  0605 09/21/23  1423   WBC 10*3/mm3 0.17* 0.11* 0.05*   HEMOGLOBIN g/dL 7.6* 8.8* 7.5*   MCV fL 97.4* 97.8* 98.2*   PLATELETS 10*3/mm3 4* 5* 55*     Results from last 7 days   Lab Units 09/23/23  0436 09/22/23  2357 09/22/23  1803 09/22/23  1150 09/22/23  0605   SODIUM mmol/L 141 141 138   < > 142   POTASSIUM mmol/L 3.3* 3.6 3.5   < > 3.5   CO2 mmol/L 15.0* 15.0* 19.0*   < > 27.0   CREATININE mg/dL 1.38* 1.38* 1.47*   < > 1.34*   GLUCOSE mg/dL 216* 223* 244*   < > 269*   MAGNESIUM mg/dL 1.6  --  1.6  --  1.8   PHOSPHORUS mg/dL 3.4  --  2.1*  --  2.2*    < > = values in this interval not displayed.     Estimated Creatinine Clearance: 29.9 mL/min (A) (by C-G formula based on SCr of 1.38 mg/dL (H)).  Results from last 7 days   Lab Units 09/23/23  0436 09/22/23  0605 09/21/23  0639   ALK PHOS U/L 32* 32* 33*   BILIRUBIN mg/dL 1.7* 1.6* 1.1   ALT (SGPT) U/L 19 14 12   AST (SGOT) U/L 28 19 22       Results from last 7 days   Lab Units 09/23/23  0322 09/22/23  1357 09/22/23  0342 09/21/23  1409  09/21/23  0441   PH, ARTERIAL pH units 7.342* 7.363 7.360 7.345* 7.387   PCO2, ARTERIAL mm Hg 31.8* 46.2* 49.9* 50.0* 38.1   PO2 ART mm Hg 73.4* 132.0* 88.4 68.4* 75.3*   FIO2 % 50 100 55 50 25       Images:  Imaging Results (Last 24 Hours)       Procedure Component Value Units Date/Time    XR Chest 1 View [[id number]] Collected: 09/23/23 0709     Updated: 09/23/23 0715    Narrative:      XR CHEST 1 VW    Date of Exam: 9/23/2023 2:30 AM EDT    Indication: resp failure    Comparison: Chest x-ray 9/22/2023    Findings:  Removal of defibrillator pad. Otherwise stable medical support devices from prior. Stable cardiomegaly. Similar dense retrocardiac opacities. Similar patchy central height from predominant interstitial opacities bilaterally. Small bilateral pleural   effusions cannot be entirely excluded. No pneumothorax.      Impression:      Impression:  Similar dense retrocardiac opacities and patchy bilateral interstitial opacities suspicious for pulmonary edema or multifocal infection. Removal of defibrillator pad.      Electronically Signed: Wojciech Espitia MD    9/23/2023 7:12 AM EDT    Workstation ID: [id number]    XR Chest 1 View [[id number]] Collected: 09/22/23 1323     Updated: 09/22/23 1327    Narrative:      XR CHEST 1 VW    Date of Exam: 9/22/2023 1:10 PM EDT    Indication: intubation    Comparison: 9/21/2023    Findings:  There is an ET tube present 1.9 cm from the dalia. Esophagogastric tube courses through midline below the diaphragm, tip not seen. There is cardiomegaly. There is pulmonary vascular congestion and bilateral perihilar and basilar haziness suggesting   pulmonary edema or multifocal pneumonia pattern. Probable small effusions. A definite pneumothorax is not identified. Evaluation limited by overlying leads and appliances. Right upper extremity PICC is noted with the tip projecting near the cavoatrial   junction.      Impression:      Impression:    1. ET tube tip approximately 1.9 cm from the  dalia.  2. Cardiomegaly with worsening pulmonary vascular congestion and bilateral edema or infiltrates and effusions.      Electronically Signed: Yakov Lopez MD    9/22/2023 1:24 PM EDT    Workstation ID: [id number]               Results: Reviewed.  I reviewed the patient's new laboratory and imaging results.  I independently reviewed the patient's new images.    Medications: Reviewed.    Assessment & Plan   A / P     78 year-old female with DM, HTN, hypothyroidism, hepatitis, diverticulosis, and bilateral stage I ER+ HER2 negative breat cancer with subsequent recurrence involving bone and liver currently undergoing treatment with Oncology. She was placed on Xeloda starting 8/28/23 and subsequently instructed to hold it after her WBCs dropped to 0.55 and platelets to 66. She was started on doxycycline for prophylaxis on 9/15/23. She developed diarrhea with moth soreness. She received IVF and dexamethasone per Oncology on 9/18/23, however, did not improve so she presented to the ED on 9/19/23. Work-up in the ED with ANC 0, thrombocytopenia, neutropenia, JAVIER, and increased LA and procal. She was admitted for sepsis concerns and found to have E. Coli and Pseudomonas aeruginosa bacteremia. 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 Uridine triacetate ordered.      On the morning of 9/20/23, patient became acutely hypotensive despite IVF. She has received ~ 5 L since admission yesterday. Lactic acid has been steadily increasing since admission, last at 6.6. She was additionally hypoglycemic this AM and received D50W for treatment. ICU was contacted for transfer for higher level of care and she has been accepted into our care.     Upon arrival to ICU, patient was awake. She does report she wishes to remain a Full Code with Full Interventions.      Patient was started on bicarb infusion.  Due to significantly elevated lactic acid levels abdominal CT was done which was  concerning for transverse colon colitis.     Patient needed to be started on pressors.  Developed atrial fibrillation so amiodarone drip was started.  GM-CSF started per medical oncology.  ID team is following and antibiotics have been given for E. coli and Pseudomonas bacteremia.  Patient was also started empirically on micafungin.  Patient however continued to decline and developed worsening respiratory failure and shock.  Extensive discussions held with patient and extended family.  They were made aware of extremely poor prognosis overall and high risk of imminent death in this patient.   decided to proceed with intubation and mechanical ventilation.    This AM, she is maxed on Levophed, Neosynephrine and Vasopressin with a SBP of 70. She remains on mechanical ventilation at FiO2 of 100% and continues to have desaturation into the 70s with stimulation. Lactate is up to 20.8.     Nutrition:   NPO Diet NPO Type: Strict NPO  Advance Directives:   Code Status and Medical Interventions:   Ordered at: 09/22/23 1710     Medical Intervention Limits:    NO cardioversion     Level Of Support Discussed With:    Health Care Surrogate     Code Status (Patient has no pulse and is not breathing):    No CPR (Do Not Attempt to Resuscitate)     Medical Interventions (Patient has pulse or is breathing):    Limited Support     Comments:    no chest compressions or shocks, ok to continue antibiotics, ventilator suport, pressor support for now.       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 Not appropriate for weaning. Continue FiO2 at 100%.   Continue Fentanyl for sedation. Titrate as needed.   Unable to titrate vasopressors.   Continue antibiotics.   Will not transfuse further blood products as this would be futile.   Continue bicarb infusion.  Supportive care.     Patient's family considering comfort measures if she continues to require high levels of support with no improvement.     Critically ill secondary to profound shock requiring multiple vasopressors.     High level of risk due to:  severe exacerbation of chronic illness and illness with threat to life or bodily function.    Plan of care and goals reviewed during interdisciplinary rounds.  I discussed the patient's findings and my recommendations with nursing staff    Critical Care Time: was greater than 35 minutes.(This excludes time spent performing separately reportable procedures and services). including high complexity decision making to assess, manipulate, and support vital organ system failure in this individual who has impairment of one or more vital organ systems such that there is a high probability of imminent or life threatening deterioration in the patient’s condition.      Kyara Ely, DO    Intensive Care Medicine and Pulmonary Medicine

## 2023-09-23 NOTE — PROGRESS NOTES
INFECTIOUS DISEASE Progress Note    Radha John  1945  [id number]      Admission Date: 9/19/2023      Requesting Provider: No ref. provider found  Evaluating Physician: Beau Martinez MD    Reason for Consultation: Severe sepsis/gram-negative bacteremia    History of present illness:    9/20/23: Patient is a 78 y.o. female with a history of breast cancer on Xeloda chemotherapy complicated by severe leukopenia who is seen today for evaluation of severe sepsis with gram-negative bacteremia.Her Xeloda therapy has recently been complicated by severe leukopenia and she was started on prophylactic doxycycline. She presented to the emergency room yesterday complaining of severe generalized weakness and nausea.She was found to have tachycardia with a heart rate of 108 and hypotension with a blood pressure of 81/54.She had lactic acidosis with a lactic acid level of 4.8 and an elevated pro-calcitonin of 45. Her white blood cell count was 0.16 and her platelet count was 37.Her creatinine was elevated at 1.47.  Urinalysis revealed 6-12 white blood cells. She was started on intravenous vancomycin and Zosyn therapy.  Her vancomycin has now been held.  Her blood cultures are now growing gram-negative rods and 2 sets with a BCID PCR positive for both E. Coli and Pseudomonas. No resistance chains were detected. Maximum temperature has been 99.1.  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  She has been transferred to the ICU this morning due to persistent hypotension/severe sepsis.  She has oral discomfort/sores.  She denies dysuria and urinary frequency.  She has nausea and vomiting.  She has cough with minimal sputum production.  Her abdominal CT scan this afternoon reveals segmental wall thickening and pericolonic inflammatory changes involving the mid transverse colon consistent with focal colitis.  She is on 2 L of oxygen with an O2 saturation of  96%.    9/21/23: She developed worsening hypercapnic/hypoxic respiratory failure last evening and was placed on BiPAP. Vasopressor therapy was started due to persistent hypotension.  Micafungin was added to her Zosyn therapy.She has been afebrile over the last 24 hours.ABGs earlier this morning revealed a pH of 7.16, PCO2 of 55.6, and PO2 of 77.9 with an FiO2 of 40%. Repeat ABGs at 441 revealed a pH of 7.39, PCO2 of 38.1, and PO2 of 75.3 on 25% BiPAP.Late last night her creatinine was 1.3 and her lactic acid was 6.9.  Her white blood cell count remained profoundly low at 0.05. His platelet count is profoundly low at 15. CXR reveals new bibasilar In perihilar infiltrates. She is lethargic and poorly responsive.  Sensitivities on her Pseudomonas and E. Coli are pending and will not be available until tomorrow.She continues to have lactic acidosis with a lactic acid level of 10.5 this evening.    9/22/23: She remains on vasopressor support with Levophed and Naresh-Synephrine. Her O2 saturation is 96% on a 55% BiPAP.Her lactic acidosis persists with a lactic at the level of 11 this morning. Her creatinine is 1.34.  Her white blood cell count remains profoundly low at 0.11.  Her platelet count is 5000. Her absolute neutrophil count is 10.  Her Pseudomonas and E. Coli blood culture isolates are sensitive to Zosyn but the Pseudomonas Zosyn ALEXIS of 16. Her urine culture is growing greater than 100,000 colony forming units of a gram-negative bacillus.  She developed atrial fibrillation and is now on amiodarone.  She is requiring higher dose vasopressor therapy for blood pressure support.  She is on Neupogen and uridine triacetate.    9/23/23: She continued to worsen over the last 24 hours.  She has required high-dose vasopressor therapy for refractory hypotension.  She required endotracheal intubation and mechanical ventilation for her progressive respiratory failure. Her lactic acid this morning is 20.8.  Her creatinine is  1.38.  Her white blood cell count remains profoundly low at 0.17.  Her platelet count is 4000. Her FiO2 is 60% with 100% O2 saturation. Chest x-ray today reveals bilateral pulmonary opacities.  She has occasionally been able to respond to simple questions. Her urine culture grew out pansensitive E. coli today.  Her absolute neutrophil count is 0.        Past Medical History:   Diagnosis Date    Adenomatous polyp of colon 10/23/2019    Arthritis     Diabetes mellitus     Disease of thyroid gland     Diverticulosis     Gout     Hepatitis     History of kidney stones     Hx of radiation therapy 06/2014    Hyperlipidemia     Hypertension     Invasive ductal carcinoma of breast 09/01/2021    Malignant neoplasm of breast 2014    Menopause        Past Surgical History:   Procedure Laterality Date    BREAST BIOPSY Bilateral     BREAST LUMPECTOMY Bilateral 06/27/2014    BREAST LUMPECTOMY Left 09/01/2021    CATARACT EXTRACTION Right     COLON RESECTION N/A 12/8/2020    Procedure: ROBOTIC LOW ANTERIOR RESECTION, TAKEDOWN OF COLOVAGINAL FISTULA, URETEROLYSIS LEFT;  Surgeon: Sonam Olvera MD;  Location:  [facility];  Service: DaVinci;  Laterality: N/A;    COLONOSCOPY      CYSTOSCOPY N/A 12/8/2020    Procedure: CYSTOSCOPY, TEMPORY BILATERAL URETERAL STENTS;  Surgeon: Rosie Gottlieb MD;  Location:  [facility];  Service: Gynecology;  Laterality: N/A;    HEMORRHOIDECTOMY      PARATHYROIDECTOMY  07/19/2016    Dr. Izabela Hermosillo @ [facility]    THYROID SURGERY      VAGINAL HYSTERECTOMY         Family History   Problem Relation Age of Onset    Hypertension Father     Hypertension Paternal Grandmother     Hypertension Other     Other Other         CAD    Breast cancer Sister 50    Ovarian cancer Niece     Endometrial cancer Neg Hx     Colon cancer Neg Hx     Colon polyps Neg Hx        Social History     Socioeconomic History    Marital status:    Tobacco Use    Smoking status: Former    Smokeless tobacco: Never    Tobacco  comments:     quit 40 years ago   Vaping Use    Vaping Use: Never used   Substance and Sexual Activity    Alcohol use: No    Drug use: No    Sexual activity: Yes     Partners: Male     Birth control/protection: Post-menopausal, Surgical       Allergies   Allergen Reactions    Erythromycin Swelling     C/O lips swelling    Hydrocodone-Acetaminophen Nausea Only     Lortab    Acetaminophen Unknown - Low Severity    Hydrocodone Unknown - Low Severity    Amoxicillin Nausea Only         Medication:    Current Facility-Administered Medications:     acyclovir (ZOVIRAX) 250 mg in sodium chloride 0.9 % 250 mL IVPB, 250 mg, Intravenous, Q12H, Beau Martinez MD, 250 mg at 23 0622    [COMPLETED] amiodarone 150 mg in 100 mL D5W (loading dose), 150 mg, Intravenous, Once, 150 mg at 23 1351 **FOLLOWED BY** [] amiodarone 360 mg in 200 mL D5W infusion, 1 mg/min, Intravenous, Continuous, Last Rate: 33.3 mL/hr at 23 1442, 1 mg/min at 23 1442 **FOLLOWED BY** amiodarone 360 mg in 200 mL D5W infusion, 0.5 mg/min, Intravenous, Continuous, Fernando Laureano MD, Last Rate: 16.67 mL/hr at 23 235, 0.5 mg/min at 23    artificial tears ophthalmic ointment, , Both Eyes, Q1H PRN, Fernando Laureano MD, Given at 23 1800    sennosides-docusate (PERICOLACE) 8.6-50 MG per tablet 2 tablet, 2 tablet, Nasogastric, BID, 2 tablet at 23 **AND** polyethylene glycol (MIRALAX) packet 17 g, 17 g, Nasogastric, Daily PRN **AND** [DISCONTINUED] bisacodyl (DULCOLAX) EC tablet 5 mg, 5 mg, Oral, Daily PRN **AND** bisacodyl (DULCOLAX) suppository 10 mg, 10 mg, Rectal, Daily PRN, Sarah Prasad MD    calcium gluconate 1000 Mg/50ml 0.675% NaCl IV SOLN, 1,000 mg, Intravenous, Q1H **FOLLOWED BY** calcium gluconate 2000-675 MG/100ML NACL IVPB, 2,000 mg, Intravenous, Q2H, Blanquita West, CYDNEY    Calcium Replacement - Follow Nurse / BPA Driven Protocol, , Does not apply, PRN, Mirna Haines MD    cefepime  (MAXIPIME) 2000 mg/100 mL 0.9% NS (mbp), 2,000 mg, Intravenous, Q12H, Beau Martinez MD, 2,000 mg at 09/22/23 2307    chlorhexidine (PERIDEX) 0.12 % solution 15 mL, 15 mL, Mouth/Throat, Q12H, Fernando Laureano MD, 15 mL at 09/22/23 2129    dextrose (D50W) (25 g/50 mL) IV injection 50 mL, 50 mL, Intravenous, Q1H PRN, Eduardo Santamaria MD, 50 mL at 09/20/23 0740    fentaNYL (SUBLIMAZE) bolus from bag 10 mcg/mL 50 mcg, 50 mcg, Intravenous, Q30 Min PRN, Fernando Laureano MD    fentaNYL 2500 mcg/250 mL NS infusion,  mcg/hr, Intravenous, Titrated, Fernando Laureano MD, Last Rate: 10 mL/hr at 09/22/23 1948, 100 mcg/hr at 09/22/23 1948    filgrastim (NEUPOGEN) injection 300 mcg, 300 mcg, Subcutaneous, Q PM, Sarah Prasad MD, 300 mcg at 09/22/23 1724    First Mouthwash (Magic Mouthwash) 5 mL, 5 mL, Swish & Spit, Q6H, Fernando Laureano MD, 5 mL at 09/22/23 2321    Magnesium Standard Dose Replacement - Follow Nurse / BPA Driven Protocol, , Does not apply, PRN, Mirna Haines MD    micafungin 100 mg/100 mL 0.9% NS IVPB (mbp), 100 mg, Intravenous, Q24H, Glenna Box MD, 100 mg at 09/22/23 2130    morphine injection 1 mg, 1 mg, Intravenous, Q4H PRN, 1 mg at 09/20/23 1751 **AND** naloxone (NARCAN) injection 0.4 mg, 0.4 mg, Intravenous, Q5 Min PRN, Mirna Haines MD    norepinephrine (LEVOPHED) 8 mg in 250 mL NS infusion (premix), 0.02-0.3 mcg/kg/min, Intravenous, Titrated, Summer Beltran APRN, Last Rate: 40.8 mL/hr at 09/23/23 0229, 0.3 mcg/kg/min at 09/23/23 0229    ondansetron (ZOFRAN) injection 4 mg, 4 mg, Intravenous, Q6H PRN, Mirna Haines MD, 4 mg at 09/21/23 1216    palliative care oral rinse, , Mouth/Throat, PRN, Mirna Haines MD    pantoprazole (PROTONIX) injection 40 mg, 40 mg, Intravenous, Q AM, Fernando Laureano MD, 40 mg at 09/23/23 0605    phenylephrine (BECKIE-SYNEPHRINE) 50 mg in sodium chloride 0.9 % 250 mL infusion, 0.5-3 mcg/kg/min, Intravenous, Titrated, Herminia Diamond  CYDNEY CHRISTIANSON, Last Rate: 65.3 mL/hr at 09/23/23 0605, 3 mcg/kg/min at 09/23/23 0605    Phosphorus Replacement - Follow Nurse / BPA Driven Protocol, , Does not apply, Yancy MONTERO Rabie, MD    Potassium Replacement - Follow Nurse / BPA Driven Protocol, , Does not apply, Yancy MONTERO Rabie, MD    sodium bicarbonate 8.4 % 150 mEq in dextrose (D5W) 5 % 1,000 mL infusion (greater than 75 mEq), 150 mEq, Intravenous, Continuous, Summer Beltran APRN, Last Rate: 150 mL/hr at 09/23/23 0622, 150 mEq at 09/23/23 0622    sodium chloride 0.9 % flush 10 mL, 10 mL, Intravenous, PRN, Mirna Haines MD    sodium chloride 0.9 % flush 10 mL, 10 mL, Intravenous, Q12H, Mirna Haines MD, 10 mL at 09/22/23 0852    sodium chloride 0.9 % flush 10 mL, 10 mL, Intravenous, PRBEN, Mirna Haines MD, 10 mL at 09/22/23 2154    sodium chloride 0.9 % flush 10 mL, 10 mL, Intravenous, Q12H, Herminia Diamond APRN, 10 mL at 09/22/23 2132    sodium chloride 0.9 % flush 10 mL, 10 mL, Intravenous, PRN, Herminia Diamond APRN, 10 mL at 09/22/23 2133    sodium chloride 0.9 % infusion 40 mL, 40 mL, Intravenous, PRBEN, Mirna Haines MD    uridine triacetate (VISTOGARD) packet 10 g, 10 g, Nasogastric, Q6H, Sarah Prasad MD, 10 g at 09/23/23 0604    Vasopressin (VASOSTRICT) 0.2 UNIT/ML solution, 0.03 Units/min, Intravenous, Continuous, Fernando Laureano MD, Last Rate: 9 mL/hr at 09/22/23 2144, 0.03 Units/min at 09/22/23 2144    Antibiotics:  Anti-Infectives (From admission, onward)      Ordered     Dose/Rate Route Frequency Start Stop    09/22/23 0834  cefepime (MAXIPIME) 2000 mg/100 mL 0.9% NS (mbp)        Ordering Provider: Beau Martinez MD    2,000 mg  over 4 Hours Intravenous Every 12 Hours 09/22/23 1000 09/29/23 0959    09/20/23 1335  acyclovir (ZOVIRAX) 250 mg in sodium chloride 0.9 % 250 mL IVPB        Ordering Provider: Beau Martinez MD    250 mg  over 60 Minutes Intravenous Every 12 Hours 09/21/23 0600 09/28/23 0559     23 0034  micafungin 100 mg/100 mL 0.9% NS IVPB (mbp)        Ordering Provider: Glenna Box MD    100 mg  over 60 Minutes Intravenous Every 24 Hours Scheduled 23 0045 23 2059    23 1335  acyclovir (ZOVIRAX) 250 mg in sodium chloride 0.9 % 250 mL IVPB        Ordering Provider: Beau Martinez MD    250 mg  over 60 Minutes Intravenous Once 23 1500 23 1621    23 1014  piperacillin-tazobactam (ZOSYN) 4.5 g in iso-osmotic dextrose 100 mL IVPB (premix)        Ordering Provider: Zaiad Waters PharmD    4.5 g  over 30 Minutes Intravenous Once 23 1100 23 1104    23 0459  piperacillin-tazobactam (ZOSYN) 3.375 g in iso-osmotic dextrose 50 ml (premix)        Ordering Provider: Doris Villavicencio PA-C    3.375 g  over 30 Minutes Intravenous Once 23 0545 23 0540    23 1249  vancomycin IVPB 1500 mg in 0.9% NaCl (Premix) 500 mL        Ordering Provider: Gutierrez John MD    20 mg/kg × 72.6 kg  333.3 mL/hr over 90 Minutes Intravenous Once 23 1345 23 1807    23 1249  piperacillin-tazobactam (ZOSYN) 3.375 g in iso-osmotic dextrose 50 ml (premix)        Ordering Provider: Gutierrez John MD    3.375 g  over 30 Minutes Intravenous Once 23 1305 23 1512              Review of Systems:  See HPI      Physical Exam:   Vital Signs  Temp (24hrs), Av.3 °F (36.8 °C), Min:97 °F (36.1 °C), Max:99.5 °F (37.5 °C)    Temp  Min: 97 °F (36.1 °C)  Max: 99.5 °F (37.5 °C)  BP  Min: 59/35  Max: 144/81  Pulse  Min: 61  Max: 120  Resp  Min: 20  Max: 33  SpO2  Min: 79 %  Max: 100 %    GENERAL: Sedated. Endotracheally intubated  HEENT: She has oral mucositis/Stomatitis  NECK: Supple  HEART: RRR; No murmur, rubs   LUNGS:   ABDOMEN: Soft, nontender, nondistended.  No rebound or guarding. NO mass or HSM.  EXT:  Left radial arterial line. Anasarca  : Raymond cath present  MSK: No joint effusions or erythema  SKIN: Warm and  dry without cutaneous eruptions on Inspection/palpation.    NEURO: Sedated and unresponsive  Right arm PICC line: No erythema or drainage    Laboratory Data    Results from last 7 days   Lab Units 09/23/23  0436 09/22/23  0605 09/21/23  1423   WBC 10*3/mm3 0.17* 0.11* 0.05*   HEMOGLOBIN g/dL 7.6* 8.8* 7.5*   HEMATOCRIT % 22.7* 26.4* 22.4*   PLATELETS 10*3/mm3 4* 5* 55*       Results from last 7 days   Lab Units 09/23/23  0436   SODIUM mmol/L 141   POTASSIUM mmol/L 3.3*   CHLORIDE mmol/L 89*   CO2 mmol/L 15.0*   BUN mg/dL 30*   CREATININE mg/dL 1.38*   GLUCOSE mg/dL 216*   CALCIUM mg/dL 5.9*       Results from last 7 days   Lab Units 09/23/23  0436   ALK PHOS U/L 32*   BILIRUBIN mg/dL 1.7*   ALT (SGPT) U/L 19   AST (SGOT) U/L 28               Results from last 7 days   Lab Units 09/23/23  0436   LACTATE mmol/L 20.8*               Estimated Creatinine Clearance: 29.9 mL/min (A) (by C-G formula based on SCr of 1.38 mg/dL (H)).      Microbiology:  Blood Culture   Date Value Ref Range Status   09/19/2023 Abnormal Stain (C)  Preliminary     BCID, PCR   Date Value Ref Range Status   09/19/2023 Escherichia coli. Identification by BCID2 PCR. (A) Negative by BCID PCR. Culture to Follow. Final     BCID, PCR 2   Date Value Ref Range Status   09/19/2023 (A) Negative by BCID PCR. Culture to Follow. Final    Pseudomonas aeruginosa. Identification by BCID2 PCR     No results found for: CULTURES, HSVCX, URCX  No results found for: EYECULTURE, GCCX, HSVCULTURE, LABHSV  No results found for: LEGIONELLA, MRSACX, MUMPSCX, MYCOPLASCX  No results found for: NOCARDIACX, STOOLCX  No results found for: THROATCX, UNSTIMCULT, URINECX, CULTURE, VZVCULTUR  No results found for: VIRALCULTU, WOUNDCX        Radiology:  Imaging Results (Last 72 Hours)       Procedure Component Value Units Date/Time    XR Chest 1 View [[id number]] Resulted: 09/23/23 0246     Updated: 09/23/23 0246    XR Chest 1 View [[id number]] Collected: 09/22/23 1323     Updated:  09/22/23 1327    Narrative:      XR CHEST 1 VW    Date of Exam: 9/22/2023 1:10 PM EDT    Indication: intubation    Comparison: 9/21/2023    Findings:  There is an ET tube present 1.9 cm from the dalia. Esophagogastric tube courses through midline below the diaphragm, tip not seen. There is cardiomegaly. There is pulmonary vascular congestion and bilateral perihilar and basilar haziness suggesting   pulmonary edema or multifocal pneumonia pattern. Probable small effusions. A definite pneumothorax is not identified. Evaluation limited by overlying leads and appliances. Right upper extremity PICC is noted with the tip projecting near the cavoatrial   junction.      Impression:      Impression:    1. ET tube tip approximately 1.9 cm from the dalia.  2. Cardiomegaly with worsening pulmonary vascular congestion and bilateral edema or infiltrates and effusions.      Electronically Signed: Yakov Lopez MD    9/22/2023 1:24 PM EDT    Workstation ID: [id number]    XR Chest 1 View [[id number]] Collected: 09/21/23 0125     Updated: 09/21/23 0130    Narrative:      XR CHEST 1 VW    Date of Exam: 9/21/2023 12:58 AM EDT    Indication: hypoxia, septic shock    Comparison: 9/19/2023 chest radiograph and CT.    Findings:  There is a new right upper extremity PICC, which terminates at the cavoatrial junction. There is mild stable elevation of the left hemidiaphragm. There is new mild perihilar and basilar airspace disease bilaterally. No definite pleural effusion. No   pneumothorax. Heart size is unchanged. Pulmonary vasculature is partially indistinct. New gastric suction tube courses below the diaphragm terminating in the stomach.      Impression:      Impression:  1.New right upper extremity PICC terminates at the cavoatrial junction.  2.New gastric suction tube terminates in the stomach.  3.New perihilar and basilar airspace disease.        Electronically Signed: Ramon Kelly MD    9/21/2023 1:27 AM EDT    Workstation ID:  [id number]    XR Abdomen KUB [[id number]] Collected: 09/20/23 1925     Updated: 09/20/23 1929    Narrative:      XR ABDOMEN KUB    Date of Exam: 9/20/2023 6:58 PM EDT    Indication: NGT placement    Comparison: None available.    Findings:  Nasogastric tube is in place the tip ejecting over the distal body of the stomach. The abdominal bowel gas pattern appears grossly normal. No abnormal calcifications overlie the abdomen or pelvis. Lung bases appear grossly clear.      Impression:      Impression:    1. Nasogastric tube in place the tip projecting over the distal body of the stomach.      Electronically Signed: Cory Swann MD    9/20/2023 7:26 PM EDT    Workstation ID: [id number]    XR Abdomen KUB [[id number]] Collected: 09/20/23 1824     Updated: 09/20/23 1828    Narrative:      XR ABDOMEN KUB    Date of Exam: 9/20/2023 6:10 PM EDT    Indication: NGT placement    Comparison: None available.    Findings:  There is an esophagogastric tube present with the tip in the distal stomach. Grossly nonobstructive bowel gas pattern.      Impression:      Impression:  Esophagogastric tube tip in the distal stomach.      Electronically Signed: Yakov Lopez MD    9/20/2023 6:25 PM EDT    Workstation ID: [id number]    CT Abdomen Pelvis Without Contrast [[id number]] Collected: 09/20/23 1316     Updated: 09/20/23 1331    Narrative:      CT ABDOMEN PELVIS WO CONTRAST    Date of Exam: 9/20/2023 1:13 PM EDT    Indication: Abdominal pain, acute, nonlocalized  Nausea/vomiting.    Comparison: 8/21/2023.    Technique: Axial CT images were obtained of the abdomen and pelvis without the administration of contrast. Reconstructed coronal and sagittal images were also obtained. Automated exposure control and iterative construction methods were used.      Findings:  There are small bilateral pleural effusions, right greater than left with overlying consolidation in the bases either due to atelectasis or pneumonia. Somewhat nodular areas of  consolidation in the base of the right upper lobe and lingula are noted,   which may be due to pneumonia. Attention on follow-up recommended to rule out neoplasm. Calcified granulomatous changes are noted in the left hilum. There is prominence of the main pulmonary artery which may indicate underlying pulmonary hypertension.   Trace pericardial effusion is noted. There is cardiomegaly.    There is calcified granulomatous change within the spleen. There is trace ascites in the perisplenic region and paracolic gutters. Unenhanced evaluation of the pancreas, adrenals, is grossly unremarkable. Known hepatic metastatic lesions are not well   visualized by noncontrast CT. Please see prior study 8/21/2023. There are calcified granulomatous changes in the liver. Gallbladder is distended but otherwise grossly unremarkable. Bilateral probable renal cysts are noted. No evidence of urinary calculus   is identified. There is increased density within the urinary bladder suggesting excreted contrast material. Correlate clinically for any recent contrasted study. Urinary bladder is distended but otherwise grossly unremarkable. Patient is status post   hysterectomy. There is a 2.2 cm densely calcified focus in the left ovary. Right ovary is grossly unremarkable. The appendix is identified and is normal caliber and appearance by noncontrast CT. There is a 4 cm long segment of circumferential wall   thickening and pericolonic inflammatory change involving the mid transverse colon suggesting focal colitis. There is postsurgical change from sigmoid colonic anastomosis. No gross adenopathy is identified in the abdomen or pelvis. No free air or   pneumatosis is identified.     Widespread sclerotic metastatic disease is again noted there is a lytic focus in the posterior left aspect of the T12 vertebral body, with erosion of the cortex of the adjacent anterior spinal canal, similar to recent prior study.      Impression:       Impression:    1. Interval development of bilateral small pleural effusions with overlying basilar consolidation which may be due to atelectasis or pneumonia. Additional patchy somewhat nodular areas of consolidation are partially imaged in the more anterior lung   bases, which may be due to additional areas of pneumonia. Neoplasm not excluded. Attention on follow-up recommended.  2. Small amount of ascites in the perisplenic and paracolic gutter regions, new from prior study.  3. Segmental wall thickening and pericolonic inflammatory change involving the mid transverse colon may indicate focal colitis.  4. Hepatic metastatic disease is not well demonstrated by noncontrast CT. Diffuse skeletal metastatic disease is again noted, as discussed above.            Electronically Signed: Yakov Lopez MD    9/20/2023 1:28 PM EDT    Workstation ID: [id number]        I read her radiographic images.      Impression:   Profound septic shock-manifested by hypotension, tachycardia, severe leukopenia/absolute neutropenia, acute hypoxic respiratory failure, lactic acidosis, an elevated pro-calcitonin, acute kidney injury, and Pseudomonas/E. coli bacteremia.  She is unlikely to survive her septic shock as she has persistent absolute neutropenia.  Pseudomonas bacteremia-secondary to absolute neutropenia   E. Coli bacteremia-secondary to UTI  E. coli UTI-this is the likely source for her E. coli bacteremia.  Absolute neutropenia-possibly secondary to Xeloda in the setting of DPD deficiency.  She is on Neupogen and uridine triacetate.  Her neutrophil count is now 0.  Lactic acidosis-secondary to sepsis  Acute kidney injury  Acute hypoxic respiratory failure-worsened and now endotracheally intubated and mechanically ventilated  Left breast cancer-on Xeloda therapy  Thrombocytopenia  Oral mucositis-secondary to 5-FU versus HSV.  She is now on intravenous acyclovir. HSV culture is pending  Nausea/vomiting  11. ?Transverse colon colitis-by  CT scan      PLAN/RECOMMENDATIONS:   Mechanical ventilation   Cefepime 2 g IV every 12 hours utilizing extended infusions  Vasopressor therapy per the intensivist service  Continue intravenous acyclovir 5 mg/kg every 12 hours  Continue micafungin  Oral HSV culture  Neupogen therapy per Dr. Prasad  Uridine triacetate therapy per Dr. Prasad    She is critically ill and highly unlikely to survive.  I discussed her highly complex situation with the nursing staff today.  Her family is considering a transition to comfort measures.  I spent over 35 minutes on her complex care today.       Beau Martinez MD  9/23/2023  07:08 EDT

## 2023-09-23 NOTE — PLAN OF CARE
Goal Outcome Evaluation:           Progress: declining      Vent 60% [id number]   Levo,Naresh, Vaso maxed  SBP 90's - 110's     2 large liquid stools  O2 drops in 70's- 80's with any repositioning  Lactic continues to increase  Planning to go comfort measures sometime today per family

## 2023-09-24 LAB — HSV SPEC CULT: NEGATIVE

## 2023-09-25 LAB
QT INTERVAL: 374 MS
QTC INTERVAL: 455 MS

## 2023-09-28 NOTE — PROGRESS NOTES
"Enter Query Response Below      Query Response:   The documentation supports severe sepsis with septic shock - I am unsure what is \"conflicting.\"   Electronically signed by Shelbie Gunter, MORIS, APRN, 23, 5:38 PM EDT.        If applicable, please update the problem list.     Patient: Radha John        : 1945  Account: [id number]           Admit Date: 2023        How to Respond to this query:       a. Click New Note     b. Answer query within the yellow box.                c. Update the Problem List, if applicable.      If you have any questions about this query contact me at: [email or web address]     Shelbie Gunter DNR APRN:     Infectious disease consult note states septic shock, manifested by hypotension, tachycardia, severe leukopenia/absolute neutropenia, acute hypoxic respiratory failure, requiring supplemental oxygen, lactic acidosis, an elevated procal, acute kidney injury, and gram negative bacteremia, intravenous zosyn, discontinue vancomycin, volume resuscitation and possible vasopressor therapy, acyclovir IV q 12.   bp 79/57 - 113/60.  Intensivist progress note states patient transferred to ICU with ongoing severe sepsis, and septic shock picture, bolus albumin, bicarb supplementation, PICC. Discharge summary states initially on levophed for blood pressure support, continue to worsen from respiratory standpoint warranting intubation, profoundly hypotensive requiring multiple 3 pressors, family ultimately elected to change to no CPR.     Please clarify conflicting documentation as:    Severe sepsis with septic shock  Sepsis with hypotension.  Other- specify___________.  Unable to determine.    By submitting this query, we are merely seeking further clarification of documentation to accurately reflect all conditions that you are monitoring, evaluating, treating or that extend the hospitalization or utilize additional resources of care. Please utilize your independent " clinical judgment when addressing the question(s) above.     This query and your response, once completed, will be entered into the legal medical record.    Sincerely,  Bev HAMILTON RN BSN   Clinical Documentation Integrity Program   [email or web address]

## 2023-10-04 LAB
QT INTERVAL: 316 MS
QTC INTERVAL: 506 MS

## 2023-10-13 LAB
CLINICAL INFO: NORMAL
DPYD GENE MUT ANL BLD/T: NORMAL
IMP & REVIEW OF LAB RESULTS: NORMAL
MEDICAL DIRECTOR REVIEW: NORMAL

## (undated) DEVICE — REDUCER: Brand: ENDOWRIST

## (undated) DEVICE — GLV SURG DERMASSURE GRN LF PF 7.0

## (undated) DEVICE — 3M™ WARMING BLANKET, UPPER BODY, 10 PER CASE, 42268: Brand: BAIR HUGGER™

## (undated) DEVICE — CATH URETRL FLXITP POLLACK STD 5F 70CM

## (undated) DEVICE — SUT VIC 3/0 SH 36IN J527H

## (undated) DEVICE — SUT ETHLN 3/0 FSLX 30IN 1673H

## (undated) DEVICE — ARM DRAPE

## (undated) DEVICE — NITINOL WIRE WITH HYDROPHILIC TIP: Brand: SENSOR

## (undated) DEVICE — TIP COVER ACCESSORY

## (undated) DEVICE — LEGGINGS, PAIR, 29X43, STERILE: Brand: MEDLINE

## (undated) DEVICE — GLV SURG PREMIERPRO MIC LTX PF SZ7 BRN

## (undated) DEVICE — UNDERGLV SURG BIOGEL INDICAT PF 61/2 GRN

## (undated) DEVICE — TOTAL TRAY, 16FR 10ML SIL FOLEY, URN: Brand: MEDLINE

## (undated) DEVICE — CLTH CLENS READYCLEANSE PERI CARE PK/5

## (undated) DEVICE — JELLY,LUBE,STERILE,FLIP TOP,TUBE,2-OZ: Brand: MEDLINE

## (undated) DEVICE — 3M™ IOBAN™ 2 ANTIMICROBIAL INCISE DRAPE 6650EZ: Brand: IOBAN™ 2

## (undated) DEVICE — TOWEL,OR,DSP,ST,BLUE,STD,4/PK,20PK/CS: Brand: MEDLINE

## (undated) DEVICE — WOUND RETRACTOR AND PROTECTOR: Brand: ALEXIS WOUND PROTECTOR-RETRACTOR

## (undated) DEVICE — GLV SURG SENSICARE PI MIC PF SZ6 LF STRL

## (undated) DEVICE — SYS SKIN CLS DERMABOND PRINEO W/22CM MESH TP

## (undated) DEVICE — PK UROL DAVINCI 10

## (undated) DEVICE — PREMIUM DRY TRAY LF: Brand: MEDLINE INDUSTRIES, INC.

## (undated) DEVICE — LEX D AND C: Brand: MEDLINE INDUSTRIES, INC.

## (undated) DEVICE — SYR LL TP 10ML STRL

## (undated) DEVICE — CVR HNDL LIGHT RIGID

## (undated) DEVICE — ENDOPATH XCEL BLADELESS TROCARS WITH STABILITY SLEEVES: Brand: ENDOPATH XCEL

## (undated) DEVICE — BLADELESS OBTURATOR: Brand: WECK VISTA

## (undated) DEVICE — VESSEL SEALER EXTEND: Brand: ENDOWRIST

## (undated) DEVICE — ST TBG CONN PNEUMOCLEAR EVAC SMOKE HEAT/HUMID

## (undated) DEVICE — SUT MNCRYL PLS ANTIB UD 4/0 PS2 18IN

## (undated) DEVICE — SEAL

## (undated) DEVICE — SUT PDS 1 CTX 36IN VIO PDP371T

## (undated) DEVICE — 3M™ STERI-DRAPE™ INSTRUMENT POUCH 1018: Brand: STERI-DRAPE™

## (undated) DEVICE — CYSTO/BLADDER IRRIGATION SET, REGULATING CLAMP

## (undated) DEVICE — KT POSTN TRENDELENBURG NBXL PAD WING STRAP TABL HDRST XLG

## (undated) DEVICE — SPNG LAP PREWSH SFTPK 18X18IN STRL PK/5

## (undated) DEVICE — GLV SURG PREMIERPRO MIC LTX PF SZ6.5 BRN

## (undated) DEVICE — DRAPE,UNDERBUTTOCKS,PCH,STERILE: Brand: MEDLINE

## (undated) DEVICE — COLUMN DRAPE

## (undated) DEVICE — CANNULA SEAL

## (undated) DEVICE — SAFESECURE,SECUREMENT,FOLEY CATH,STERILE: Brand: MEDLINE

## (undated) DEVICE — SUT MNCRYL PLS ANTIB UD 3/0 PS2 27IN